# Patient Record
Sex: MALE | Race: WHITE | Employment: OTHER | URBAN - NONMETROPOLITAN AREA
[De-identification: names, ages, dates, MRNs, and addresses within clinical notes are randomized per-mention and may not be internally consistent; named-entity substitution may affect disease eponyms.]

---

## 2017-10-23 ENCOUNTER — HOSPITAL ENCOUNTER (OUTPATIENT)
Dept: GENERAL RADIOLOGY | Age: 67
Discharge: HOME | End: 2017-10-23
Payer: MEDICARE

## 2017-10-23 DIAGNOSIS — M25.551 PAIN OF RIGHT HIP JOINT: ICD-10-CM

## 2017-10-23 PROCEDURE — 73502 X-RAY EXAM HIP UNI 2-3 VIEWS: CPT

## 2017-11-17 ENCOUNTER — HOSPITAL ENCOUNTER (OUTPATIENT)
Age: 67
Setting detail: SPECIMEN
Discharge: HOME | End: 2017-11-17
Payer: MEDICARE

## 2017-11-17 DIAGNOSIS — R97.20 ELEVATED PSA: ICD-10-CM

## 2017-11-17 DIAGNOSIS — Z12.5 SCREENING PSA (PROSTATE SPECIFIC ANTIGEN): ICD-10-CM

## 2017-11-17 DIAGNOSIS — E78.5 HYPERLIPIDEMIA, UNSPECIFIED HYPERLIPIDEMIA TYPE: ICD-10-CM

## 2017-11-17 DIAGNOSIS — Z01.818 PRE-OP TESTING: ICD-10-CM

## 2017-11-17 LAB
ABSOLUTE EOS #: 0.2 K/UL (ref 0–0.4)
ABSOLUTE IMMATURE GRANULOCYTE: NORMAL K/UL (ref 0–0.3)
ABSOLUTE LYMPH #: 2.1 K/UL (ref 1–4.8)
ABSOLUTE MONO #: 0.8 K/UL (ref 0–1)
ALBUMIN SERPL-MCNC: 4.5 G/DL (ref 3.5–5.2)
ALBUMIN/GLOBULIN RATIO: 1.6 (ref 1–2.5)
ALP BLD-CCNC: 48 U/L (ref 40–129)
ALT SERPL-CCNC: 43 U/L (ref 5–41)
ANION GAP SERPL CALCULATED.3IONS-SCNC: 13 MMOL/L (ref 9–17)
AST SERPL-CCNC: 22 U/L
BASOPHILS # BLD: 1 %
BASOPHILS ABSOLUTE: 0 K/UL (ref 0–0.2)
BILIRUB SERPL-MCNC: 0.43 MG/DL (ref 0.3–1.2)
BUN BLDV-MCNC: 17 MG/DL (ref 8–23)
BUN/CREAT BLD: 22 (ref 9–20)
CALCIUM SERPL-MCNC: 10.7 MG/DL (ref 8.6–10.4)
CHLORIDE BLD-SCNC: 103 MMOL/L (ref 98–107)
CHOLESTEROL, FASTING: 187 MG/DL
CHOLESTEROL/HDL RATIO: 3.3
CO2: 25 MMOL/L (ref 20–31)
CREAT SERPL-MCNC: 0.76 MG/DL (ref 0.7–1.2)
DIFFERENTIAL TYPE: NORMAL
EOSINOPHILS RELATIVE PERCENT: 4 %
GFR AFRICAN AMERICAN: >60 ML/MIN
GFR NON-AFRICAN AMERICAN: >60 ML/MIN
GFR SERPL CREATININE-BSD FRML MDRD: ABNORMAL ML/MIN/{1.73_M2}
GFR SERPL CREATININE-BSD FRML MDRD: ABNORMAL ML/MIN/{1.73_M2}
GLUCOSE BLD-MCNC: 92 MG/DL (ref 70–99)
HCT VFR BLD CALC: 45 % (ref 41–53)
HDLC SERPL-MCNC: 56 MG/DL
HEMOGLOBIN: 15 G/DL (ref 13.5–17)
IMMATURE GRANULOCYTES: NORMAL %
LDL CHOLESTEROL: 116 MG/DL (ref 0–130)
LYMPHOCYTES # BLD: 32 %
MCH RBC QN AUTO: 30.5 PG (ref 26–34)
MCHC RBC AUTO-ENTMCNC: 33.4 G/DL (ref 31–37)
MCV RBC AUTO: 91.4 FL (ref 80–100)
MONOCYTES # BLD: 12 %
PDW BLD-RTO: 13.6 % (ref 12.1–15.2)
PLATELET # BLD: 195 K/UL (ref 140–450)
PLATELET ESTIMATE: NORMAL
PMV BLD AUTO: 10 FL (ref 6–12)
POTASSIUM SERPL-SCNC: 4.6 MMOL/L (ref 3.7–5.3)
PROSTATE SPECIFIC ANTIGEN: 18.67 UG/L
RBC # BLD: 4.92 M/UL (ref 4.5–5.9)
RBC # BLD: NORMAL 10*6/UL
SEG NEUTROPHILS: 51 %
SEGMENTED NEUTROPHILS ABSOLUTE COUNT: 3.3 K/UL (ref 1.8–7.7)
SODIUM BLD-SCNC: 141 MMOL/L (ref 135–144)
TOTAL PROTEIN: 7.3 G/DL (ref 6.4–8.3)
TRIGLYCERIDE, FASTING: 77 MG/DL
VLDLC SERPL CALC-MCNC: NORMAL MG/DL (ref 1–30)
WBC # BLD: 6.4 K/UL (ref 3.5–11)
WBC # BLD: NORMAL 10*3/UL

## 2017-11-17 PROCEDURE — 80061 LIPID PANEL: CPT

## 2017-11-17 PROCEDURE — 36415 COLL VENOUS BLD VENIPUNCTURE: CPT

## 2017-11-17 PROCEDURE — 84154 ASSAY OF PSA FREE: CPT

## 2017-11-17 PROCEDURE — 80053 COMPREHEN METABOLIC PANEL: CPT

## 2017-11-17 PROCEDURE — G0103 PSA SCREENING: HCPCS

## 2017-11-17 PROCEDURE — 85025 COMPLETE CBC W/AUTO DIFF WBC: CPT

## 2017-11-20 LAB
PROSTATE SPECIFIC ANTIGEN FREE: 4.9 UG/L
PROSTATE SPECIFIC ANTIGEN PERCENT FREE: 34 %
PROSTATE SPECIFIC ANTIGEN: 14.4 UG/L (ref 0–4)

## 2018-04-21 ENCOUNTER — HOSPITAL ENCOUNTER (OUTPATIENT)
Age: 68
Setting detail: SPECIMEN
Discharge: HOME | End: 2018-04-21
Payer: MEDICARE

## 2018-04-21 DIAGNOSIS — M25.561 ARTHRALGIA OF RIGHT KNEE: ICD-10-CM

## 2018-04-21 LAB — URIC ACID: 5.4 MG/DL (ref 3.4–7)

## 2018-04-21 PROCEDURE — 84550 ASSAY OF BLOOD/URIC ACID: CPT

## 2018-04-21 PROCEDURE — 36415 COLL VENOUS BLD VENIPUNCTURE: CPT

## 2020-01-03 ENCOUNTER — HOSPITAL ENCOUNTER (OUTPATIENT)
Age: 70
Discharge: HOME | End: 2020-01-03
Payer: MEDICARE

## 2020-01-03 LAB — PROSTATE SPECIFIC ANTIGEN: 14.54 UG/L

## 2020-01-03 PROCEDURE — 84520 ASSAY OF UREA NITROGEN: CPT

## 2020-01-03 PROCEDURE — 36415 COLL VENOUS BLD VENIPUNCTURE: CPT

## 2020-01-03 PROCEDURE — 82565 ASSAY OF CREATININE: CPT

## 2020-01-03 PROCEDURE — G0103 PSA SCREENING: HCPCS

## 2020-01-06 ENCOUNTER — HOSPITAL ENCOUNTER (OUTPATIENT)
Dept: CT IMAGING | Age: 70
Discharge: HOME | End: 2020-01-08
Payer: MEDICARE

## 2020-01-06 LAB
BUN BLDV-MCNC: 23 MG/DL (ref 8–23)
CREAT SERPL-MCNC: 0.86 MG/DL (ref 0.7–1.2)
GFR AFRICAN AMERICAN: >60 ML/MIN
GFR NON-AFRICAN AMERICAN: >60 ML/MIN
GFR SERPL CREATININE-BSD FRML MDRD: NORMAL ML/MIN/{1.73_M2}
GFR SERPL CREATININE-BSD FRML MDRD: NORMAL ML/MIN/{1.73_M2}

## 2020-01-06 PROCEDURE — 74178 CT ABD&PLV WO CNTR FLWD CNTR: CPT

## 2020-01-06 PROCEDURE — 6360000004 HC RX CONTRAST MEDICATION: Performed by: INTERNAL MEDICINE

## 2020-01-06 RX ADMIN — IOPAMIDOL 120 ML: 755 INJECTION, SOLUTION INTRAVENOUS at 17:42

## 2020-01-16 LAB — CREATININE: 1 MG/DL

## 2020-03-11 ENCOUNTER — OFFICE VISIT (OUTPATIENT)
Dept: FAMILY MEDICINE CLINIC | Age: 70
End: 2020-03-11
Payer: MEDICARE

## 2020-03-11 VITALS
DIASTOLIC BLOOD PRESSURE: 84 MMHG | HEIGHT: 71 IN | SYSTOLIC BLOOD PRESSURE: 126 MMHG | WEIGHT: 216 LBS | BODY MASS INDEX: 30.24 KG/M2

## 2020-03-11 PROBLEM — M48.061 SPINAL STENOSIS OF LUMBAR REGION: Status: ACTIVE | Noted: 2020-03-11

## 2020-03-11 PROBLEM — M48.02 CERVICAL SPINAL STENOSIS: Status: ACTIVE | Noted: 2020-03-11

## 2020-03-11 PROCEDURE — 1123F ACP DISCUSS/DSCN MKR DOCD: CPT | Performed by: FAMILY MEDICINE

## 2020-03-11 PROCEDURE — 4040F PNEUMOC VAC/ADMIN/RCVD: CPT | Performed by: FAMILY MEDICINE

## 2020-03-11 PROCEDURE — G8417 CALC BMI ABV UP PARAM F/U: HCPCS | Performed by: FAMILY MEDICINE

## 2020-03-11 PROCEDURE — G8427 DOCREV CUR MEDS BY ELIG CLIN: HCPCS | Performed by: FAMILY MEDICINE

## 2020-03-11 PROCEDURE — 99213 OFFICE O/P EST LOW 20 MIN: CPT | Performed by: FAMILY MEDICINE

## 2020-03-11 PROCEDURE — 3017F COLORECTAL CA SCREEN DOC REV: CPT | Performed by: FAMILY MEDICINE

## 2020-03-11 PROCEDURE — 99211 OFF/OP EST MAY X REQ PHY/QHP: CPT | Performed by: FAMILY MEDICINE

## 2020-03-11 PROCEDURE — G8482 FLU IMMUNIZE ORDER/ADMIN: HCPCS | Performed by: FAMILY MEDICINE

## 2020-03-11 PROCEDURE — 4004F PT TOBACCO SCREEN RCVD TLK: CPT | Performed by: FAMILY MEDICINE

## 2020-03-11 RX ORDER — TIMOLOL MALEATE 5 MG/ML
1 SOLUTION/ DROPS OPHTHALMIC DAILY
COMMUNITY
End: 2021-05-26 | Stop reason: SDUPTHER

## 2020-03-11 SDOH — ECONOMIC STABILITY: FOOD INSECURITY: WITHIN THE PAST 12 MONTHS, THE FOOD YOU BOUGHT JUST DIDN'T LAST AND YOU DIDN'T HAVE MONEY TO GET MORE.: NEVER TRUE

## 2020-03-11 SDOH — ECONOMIC STABILITY: INCOME INSECURITY: HOW HARD IS IT FOR YOU TO PAY FOR THE VERY BASICS LIKE FOOD, HOUSING, MEDICAL CARE, AND HEATING?: NOT HARD AT ALL

## 2020-03-11 SDOH — ECONOMIC STABILITY: FOOD INSECURITY: WITHIN THE PAST 12 MONTHS, YOU WORRIED THAT YOUR FOOD WOULD RUN OUT BEFORE YOU GOT MONEY TO BUY MORE.: NEVER TRUE

## 2020-03-11 ASSESSMENT — PATIENT HEALTH QUESTIONNAIRE - PHQ9
SUM OF ALL RESPONSES TO PHQ QUESTIONS 1-9: 0
SUM OF ALL RESPONSES TO PHQ9 QUESTIONS 1 & 2: 0
SUM OF ALL RESPONSES TO PHQ QUESTIONS 1-9: 0
2. FEELING DOWN, DEPRESSED OR HOPELESS: 0
1. LITTLE INTEREST OR PLEASURE IN DOING THINGS: 0

## 2020-03-11 NOTE — PROGRESS NOTES
I, Alan Polanco, [facility], am scribing for and in the presence of Dr. Santa Mandel. 3/11/20/4:27 pm/JML      [address] [address]  [place] [address]  Dept: [phone]    HPI:  Pain in back and left hip x 1 month  Bilat. arms numbness for several months, all the way down to all 5 fingers      Current Outpatient Medications   Medication Sig Dispense Refill    timolol (TIMOPTIC) 0.5 % ophthalmic solution 1 drop 2 times daily       No current facility-administered medications for this visit. ROS:  Admits back pain, around the middle lower back and down to hip, feels like \"something it trying to fall out,\" no pain with sitting, painful to stand straight up but improves as he begins to bend over, he saw Dr. Adelso Loera for manipulation and steroids which did not help, sharp pain on left side of back and down into left hip when he turns his head to the right    Admits hip pain, left now, ,had previously been right, had an episode in February where he had intense pain in his right hip muscles, came on very suddenly and almost dropped him to the floor, he has done PT with Harmony Dunlap for at least 2 weeks, dry needling, and massage therapy, these helped some but he also noticed that the pain radiated to the middle, this eventually resolved but then began happening on the left radiating down the posterior left leg    Admits numbness in both arms, worse at night, neck positioning does make a difference     EXAM:  /84   Ht 5' 10.5\" (1.791 m)   Wt 216 lb (98 kg)   BMI 30.55 kg/m²   Wt Readings from Last 3 Encounters:   03/11/20 216 lb (98 kg)     BP Readings from Last 3 Encounters:   03/11/20 126/84     General Appearance: in no acute distress, well developed, well nourished.   Eyes: pupils equal, round, reactive  Neck/Thyroid: No radicular element tenderness, flexion to 45 degrees, extension to 10 degrees, lateral rotation 30 degrees to the left and 45 to right, distraction

## 2020-03-12 ENCOUNTER — HOSPITAL ENCOUNTER (OUTPATIENT)
Dept: MRI IMAGING | Age: 70
Discharge: HOME | End: 2020-03-14
Payer: MEDICARE

## 2020-03-12 PROCEDURE — 72141 MRI NECK SPINE W/O DYE: CPT

## 2020-03-12 PROCEDURE — 72148 MRI LUMBAR SPINE W/O DYE: CPT

## 2020-06-24 ENCOUNTER — TELEPHONE (OUTPATIENT)
Dept: FAMILY MEDICINE CLINIC | Age: 70
End: 2020-06-24

## 2020-06-25 ENCOUNTER — HOSPITAL ENCOUNTER (OUTPATIENT)
Age: 70
Discharge: HOME | End: 2020-06-27
Payer: MEDICARE

## 2020-06-25 ENCOUNTER — HOSPITAL ENCOUNTER (OUTPATIENT)
Dept: GENERAL RADIOLOGY | Age: 70
Discharge: HOME | End: 2020-06-27
Payer: MEDICARE

## 2020-06-25 PROCEDURE — 73502 X-RAY EXAM HIP UNI 2-3 VIEWS: CPT

## 2020-07-21 ENCOUNTER — APPOINTMENT (OUTPATIENT)
Dept: GENERAL RADIOLOGY | Age: 70
DRG: 872 | End: 2020-07-21
Attending: FAMILY MEDICINE
Payer: MEDICARE

## 2020-07-21 ENCOUNTER — HOSPITAL ENCOUNTER (INPATIENT)
Age: 70
LOS: 2 days | Discharge: ANOTHER HOSPITAL | DRG: 872 | End: 2020-07-23
Attending: FAMILY MEDICINE | Admitting: FAMILY MEDICINE
Payer: MEDICARE

## 2020-07-21 ENCOUNTER — HOSPITAL ENCOUNTER (OUTPATIENT)
Dept: CT IMAGING | Age: 70
Discharge: HOME | DRG: 872 | End: 2020-07-23
Payer: MEDICARE

## 2020-07-21 ENCOUNTER — HOSPITAL ENCOUNTER (OUTPATIENT)
Dept: LAB | Age: 70
Setting detail: SPECIMEN
Discharge: HOME | DRG: 872 | End: 2020-07-21
Payer: MEDICARE

## 2020-07-21 PROBLEM — A41.9 SEPSIS (HCC): Status: ACTIVE | Noted: 2020-07-21

## 2020-07-21 PROBLEM — R33.9 INCOMPLETE EMPTYING OF BLADDER DUE TO BENIGN PROSTATIC HYPERPLASIA: Status: ACTIVE | Noted: 2020-07-21

## 2020-07-21 PROBLEM — N40.1 INCOMPLETE EMPTYING OF BLADDER DUE TO BENIGN PROSTATIC HYPERPLASIA: Status: ACTIVE | Noted: 2020-07-21

## 2020-07-21 PROBLEM — N30.00 ACUTE CYSTITIS WITHOUT HEMATURIA: Status: ACTIVE | Noted: 2020-07-21

## 2020-07-21 LAB
ABSOLUTE EOS #: 0.04 K/UL (ref 0–0.44)
ABSOLUTE EOS #: <0.03 K/UL (ref 0–0.44)
ABSOLUTE IMMATURE GRANULOCYTE: <0.03 K/UL (ref 0–0.3)
ABSOLUTE IMMATURE GRANULOCYTE: <0.03 K/UL (ref 0–0.3)
ABSOLUTE LYMPH #: 1.57 K/UL (ref 1.1–3.7)
ABSOLUTE LYMPH #: 1.8 K/UL (ref 1.1–3.7)
ABSOLUTE MONO #: 0.66 K/UL (ref 0.1–1.2)
ABSOLUTE MONO #: 1.11 K/UL (ref 0.1–1.2)
ALBUMIN SERPL-MCNC: 4.3 G/DL (ref 3.5–5.2)
ALBUMIN SERPL-MCNC: 4.7 G/DL (ref 3.5–5.2)
ALBUMIN/GLOBULIN RATIO: 1.7 (ref 1–2.5)
ALBUMIN/GLOBULIN RATIO: 1.7 (ref 1–2.5)
ALLEN TEST: ABNORMAL
ALP BLD-CCNC: 45 U/L (ref 40–129)
ALP BLD-CCNC: 50 U/L (ref 40–129)
ALT SERPL-CCNC: 57 U/L (ref 5–41)
ALT SERPL-CCNC: 58 U/L (ref 5–41)
ANION GAP SERPL CALCULATED.3IONS-SCNC: 11 MMOL/L (ref 9–17)
ANION GAP SERPL CALCULATED.3IONS-SCNC: 12 MMOL/L (ref 9–17)
AST SERPL-CCNC: 30 U/L
AST SERPL-CCNC: 33 U/L
BASOPHILS # BLD: 1 % (ref 0–2)
BASOPHILS # BLD: 1 % (ref 0–2)
BASOPHILS ABSOLUTE: 0.03 K/UL (ref 0–0.2)
BASOPHILS ABSOLUTE: 0.04 K/UL (ref 0–0.2)
BILIRUB SERPL-MCNC: 0.63 MG/DL (ref 0.3–1.2)
BILIRUB SERPL-MCNC: 0.85 MG/DL (ref 0.3–1.2)
BUN BLDV-MCNC: 22 MG/DL (ref 8–23)
BUN BLDV-MCNC: 25 MG/DL (ref 8–23)
BUN/CREAT BLD: 26 (ref 9–20)
BUN/CREAT BLD: 29 (ref 9–20)
C-REACTIVE PROTEIN: 0.3 MG/L (ref 0–5)
CALCIUM SERPL-MCNC: 10.3 MG/DL (ref 8.6–10.4)
CALCIUM SERPL-MCNC: 9.9 MG/DL (ref 8.6–10.4)
CARBOXYHEMOGLOBIN: ABNORMAL % (ref 0–5)
CHLORIDE BLD-SCNC: 101 MMOL/L (ref 98–107)
CHLORIDE BLD-SCNC: 97 MMOL/L (ref 98–107)
CO2: 23 MMOL/L (ref 20–31)
CO2: 24 MMOL/L (ref 20–31)
CREAT SERPL-MCNC: 0.84 MG/DL (ref 0.7–1.2)
CREAT SERPL-MCNC: 0.86 MG/DL (ref 0.7–1.2)
DIFFERENTIAL TYPE: ABNORMAL
DIFFERENTIAL TYPE: NORMAL
EOSINOPHILS RELATIVE PERCENT: 0 % (ref 1–4)
EOSINOPHILS RELATIVE PERCENT: 1 % (ref 1–4)
FIO2: 21
GFR AFRICAN AMERICAN: >60 ML/MIN
GFR AFRICAN AMERICAN: >60 ML/MIN
GFR NON-AFRICAN AMERICAN: >60 ML/MIN
GFR NON-AFRICAN AMERICAN: >60 ML/MIN
GFR SERPL CREATININE-BSD FRML MDRD: ABNORMAL ML/MIN/{1.73_M2}
GLUCOSE BLD-MCNC: 100 MG/DL (ref 70–99)
GLUCOSE BLD-MCNC: 142 MG/DL (ref 70–99)
HCO3 VENOUS: 25.4 MMOL/L (ref 24–30)
HCT VFR BLD CALC: 43.2 % (ref 40.7–50.3)
HCT VFR BLD CALC: 48.3 % (ref 40.7–50.3)
HEMOGLOBIN: 14.4 G/DL (ref 13–17)
HEMOGLOBIN: 16.1 G/DL (ref 13–17)
IMMATURE GRANULOCYTES: 0 %
IMMATURE GRANULOCYTES: 0 %
LACTIC ACID, SEPSIS WHOLE BLOOD: ABNORMAL MMOL/L (ref 0.5–1.9)
LACTIC ACID, SEPSIS WHOLE BLOOD: NORMAL MMOL/L (ref 0.5–1.9)
LACTIC ACID, SEPSIS: 1.3 MMOL/L (ref 0.5–1.9)
LACTIC ACID, SEPSIS: 2.2 MMOL/L (ref 0.5–1.9)
LYMPHOCYTES # BLD: 29 % (ref 24–43)
LYMPHOCYTES # BLD: 33 % (ref 24–43)
MCH RBC QN AUTO: 31.4 PG (ref 25.2–33.5)
MCH RBC QN AUTO: 31.6 PG (ref 25.2–33.5)
MCHC RBC AUTO-ENTMCNC: 33.3 G/DL (ref 28.4–34.8)
MCHC RBC AUTO-ENTMCNC: 33.3 G/DL (ref 28.4–34.8)
MCV RBC AUTO: 94.2 FL (ref 82.6–102.9)
MCV RBC AUTO: 94.7 FL (ref 82.6–102.9)
METHEMOGLOBIN: ABNORMAL % (ref 0–1.9)
MODE: ABNORMAL
MONOCYTES # BLD: 12 % (ref 3–12)
MONOCYTES # BLD: 20 % (ref 3–12)
NEGATIVE BASE EXCESS, VEN: ABNORMAL MMOL/L (ref 0–2)
NOTIFICATION TIME: ABNORMAL
NOTIFICATION: ABNORMAL
NRBC AUTOMATED: 0 PER 100 WBC
NRBC AUTOMATED: 0 PER 100 WBC
O2 DEVICE/FLOW/%: ABNORMAL
O2 SAT, VEN: 44.7 % (ref 60–85)
OXYHEMOGLOBIN: ABNORMAL % (ref 95–98)
PATIENT TEMP: 38.7
PCO2, VEN, TEMP ADJ: ABNORMAL MMHG (ref 39–55)
PCO2, VEN: 46.9 (ref 39–55)
PDW BLD-RTO: 12.7 % (ref 11.8–14.4)
PDW BLD-RTO: 12.8 % (ref 11.8–14.4)
PEEP/CPAP: ABNORMAL
PH VENOUS: 7.36 (ref 7.32–7.42)
PH, VEN, TEMP ADJ: ABNORMAL (ref 7.32–7.42)
PLATELET # BLD: 176 K/UL (ref 138–453)
PLATELET # BLD: 194 K/UL (ref 138–453)
PLATELET ESTIMATE: ABNORMAL
PLATELET ESTIMATE: NORMAL
PMV BLD AUTO: 10.9 FL (ref 8.1–13.5)
PMV BLD AUTO: 11.3 FL (ref 8.1–13.5)
PO2, VEN, TEMP ADJ: ABNORMAL MMHG (ref 30–50)
PO2, VEN: 28.4 (ref 30–50)
POSITIVE BASE EXCESS, VEN: 0.2 MMOL/L (ref 0–2)
POTASSIUM SERPL-SCNC: 3.7 MMOL/L (ref 3.7–5.3)
POTASSIUM SERPL-SCNC: 3.8 MMOL/L (ref 3.7–5.3)
PSV: ABNORMAL
PT. POSITION: ABNORMAL
RBC # BLD: 4.56 M/UL (ref 4.21–5.77)
RBC # BLD: 5.13 M/UL (ref 4.21–5.77)
RBC # BLD: ABNORMAL 10*6/UL
RBC # BLD: NORMAL 10*6/UL
RESPIRATORY RATE: 20
SAMPLE SITE: ABNORMAL
SEDIMENTATION RATE, ERYTHROCYTE: 4 MM (ref 0–20)
SEG NEUTROPHILS: 46 % (ref 36–65)
SEG NEUTROPHILS: 57 % (ref 36–65)
SEGMENTED NEUTROPHILS ABSOLUTE COUNT: 2.52 K/UL (ref 1.5–8.1)
SEGMENTED NEUTROPHILS ABSOLUTE COUNT: 3.17 K/UL (ref 1.5–8.1)
SET RATE: ABNORMAL
SODIUM BLD-SCNC: 132 MMOL/L (ref 135–144)
SODIUM BLD-SCNC: 136 MMOL/L (ref 135–144)
TEXT FOR RESPIRATORY: ABNORMAL
TOTAL HB: ABNORMAL G/DL (ref 12–16)
TOTAL PROTEIN: 6.9 G/DL (ref 6.4–8.3)
TOTAL PROTEIN: 7.4 G/DL (ref 6.4–8.3)
TOTAL RATE: ABNORMAL
VT: ABNORMAL
WBC # BLD: 5.5 K/UL (ref 3.5–11.3)
WBC # BLD: 5.5 K/UL (ref 3.5–11.3)
WBC # BLD: ABNORMAL 10*3/UL
WBC # BLD: NORMAL 10*3/UL

## 2020-07-21 PROCEDURE — 85025 COMPLETE CBC W/AUTO DIFF WBC: CPT

## 2020-07-21 PROCEDURE — 71045 X-RAY EXAM CHEST 1 VIEW: CPT

## 2020-07-21 PROCEDURE — 83605 ASSAY OF LACTIC ACID: CPT

## 2020-07-21 PROCEDURE — 87040 BLOOD CULTURE FOR BACTERIA: CPT

## 2020-07-21 PROCEDURE — 6370000000 HC RX 637 (ALT 250 FOR IP): Performed by: FAMILY MEDICINE

## 2020-07-21 PROCEDURE — 93005 ELECTROCARDIOGRAM TRACING: CPT | Performed by: FAMILY MEDICINE

## 2020-07-21 PROCEDURE — 87086 URINE CULTURE/COLONY COUNT: CPT

## 2020-07-21 PROCEDURE — 2580000003 HC RX 258: Performed by: FAMILY MEDICINE

## 2020-07-21 PROCEDURE — 51798 US URINE CAPACITY MEASURE: CPT

## 2020-07-21 PROCEDURE — 2000000000 HC ICU R&B

## 2020-07-21 PROCEDURE — 80053 COMPREHEN METABOLIC PANEL: CPT

## 2020-07-21 PROCEDURE — 85651 RBC SED RATE NONAUTOMATED: CPT

## 2020-07-21 PROCEDURE — 74177 CT ABD & PELVIS W/CONTRAST: CPT

## 2020-07-21 PROCEDURE — 6360000004 HC RX CONTRAST MEDICATION: Performed by: FAMILY MEDICINE

## 2020-07-21 PROCEDURE — 6360000002 HC RX W HCPCS: Performed by: FAMILY MEDICINE

## 2020-07-21 PROCEDURE — 36415 COLL VENOUS BLD VENIPUNCTURE: CPT

## 2020-07-21 PROCEDURE — 99223 1ST HOSP IP/OBS HIGH 75: CPT | Performed by: FAMILY MEDICINE

## 2020-07-21 PROCEDURE — 86140 C-REACTIVE PROTEIN: CPT

## 2020-07-21 PROCEDURE — 84145 PROCALCITONIN (PCT): CPT

## 2020-07-21 PROCEDURE — 82805 BLOOD GASES W/O2 SATURATION: CPT

## 2020-07-21 RX ORDER — HYDROMORPHONE HCL 110MG/55ML
0.5 PATIENT CONTROLLED ANALGESIA SYRINGE INTRAVENOUS
Status: DISCONTINUED | OUTPATIENT
Start: 2020-07-21 | End: 2020-07-22

## 2020-07-21 RX ORDER — VANCOMYCIN HYDROCHLORIDE 1 G/20ML
INJECTION, POWDER, LYOPHILIZED, FOR SOLUTION INTRAVENOUS
Status: DISPENSED
Start: 2020-07-21 | End: 2020-07-22

## 2020-07-21 RX ORDER — SODIUM CHLORIDE AND POTASSIUM CHLORIDE .9; .15 G/100ML; G/100ML
SOLUTION INTRAVENOUS CONTINUOUS
Status: DISCONTINUED | OUTPATIENT
Start: 2020-07-21 | End: 2020-07-23 | Stop reason: HOSPADM

## 2020-07-21 RX ORDER — SODIUM CHLORIDE 9 MG/ML
INJECTION, SOLUTION INTRAVENOUS CONTINUOUS
Status: DISCONTINUED | OUTPATIENT
Start: 2020-07-21 | End: 2020-07-21

## 2020-07-21 RX ORDER — SODIUM CHLORIDE 0.9 % (FLUSH) 0.9 %
10 SYRINGE (ML) INJECTION PRN
Status: DISCONTINUED | OUTPATIENT
Start: 2020-07-21 | End: 2020-07-23 | Stop reason: HOSPADM

## 2020-07-21 RX ORDER — ACETAMINOPHEN 325 MG/1
650 TABLET ORAL EVERY 6 HOURS PRN
Status: DISCONTINUED | OUTPATIENT
Start: 2020-07-21 | End: 2020-07-21

## 2020-07-21 RX ORDER — ALPRAZOLAM 0.5 MG/1
0.5 TABLET ORAL EVERY 6 HOURS PRN
Status: DISCONTINUED | OUTPATIENT
Start: 2020-07-21 | End: 2020-07-23 | Stop reason: HOSPADM

## 2020-07-21 RX ORDER — ACETAMINOPHEN 650 MG/1
650 SUPPOSITORY RECTAL EVERY 6 HOURS PRN
Status: DISCONTINUED | OUTPATIENT
Start: 2020-07-21 | End: 2020-07-21

## 2020-07-21 RX ORDER — ACETAMINOPHEN 650 MG/1
650 SUPPOSITORY RECTAL EVERY 6 HOURS PRN
Status: DISCONTINUED | OUTPATIENT
Start: 2020-07-21 | End: 2020-07-23 | Stop reason: HOSPADM

## 2020-07-21 RX ORDER — TIMOLOL MALEATE 5 MG/ML
1 SOLUTION/ DROPS OPHTHALMIC 2 TIMES DAILY
Status: DISCONTINUED | OUTPATIENT
Start: 2020-07-21 | End: 2020-07-23 | Stop reason: HOSPADM

## 2020-07-21 RX ORDER — ACETAMINOPHEN 325 MG/1
650 TABLET ORAL EVERY 4 HOURS PRN
Status: DISCONTINUED | OUTPATIENT
Start: 2020-07-21 | End: 2020-07-23 | Stop reason: HOSPADM

## 2020-07-21 RX ORDER — SODIUM CHLORIDE 0.9 % (FLUSH) 0.9 %
10 SYRINGE (ML) INJECTION EVERY 12 HOURS SCHEDULED
Status: DISCONTINUED | OUTPATIENT
Start: 2020-07-21 | End: 2020-07-23 | Stop reason: HOSPADM

## 2020-07-21 RX ORDER — VANCOMYCIN HYDROCHLORIDE 500 MG/10ML
INJECTION, POWDER, LYOPHILIZED, FOR SOLUTION INTRAVENOUS
Status: DISPENSED
Start: 2020-07-21 | End: 2020-07-22

## 2020-07-21 RX ORDER — HYDROMORPHONE HCL 110MG/55ML
0.25 PATIENT CONTROLLED ANALGESIA SYRINGE INTRAVENOUS
Status: DISCONTINUED | OUTPATIENT
Start: 2020-07-21 | End: 2020-07-22

## 2020-07-21 RX ORDER — 0.9 % SODIUM CHLORIDE 0.9 %
30 INTRAVENOUS SOLUTION INTRAVENOUS ONCE
Status: COMPLETED | OUTPATIENT
Start: 2020-07-21 | End: 2020-07-21

## 2020-07-21 RX ADMIN — VANCOMYCIN HYDROCHLORIDE 1500 MG: 500 INJECTION, POWDER, LYOPHILIZED, FOR SOLUTION INTRAVENOUS at 21:10

## 2020-07-21 RX ADMIN — HYDROMORPHONE HYDROCHLORIDE 0.25 MG: 2 INJECTION, SOLUTION INTRAMUSCULAR; INTRAVENOUS; SUBCUTANEOUS at 23:04

## 2020-07-21 RX ADMIN — ACETAMINOPHEN 650 MG: 325 TABLET, FILM COATED ORAL at 18:23

## 2020-07-21 RX ADMIN — SODIUM CHLORIDE AND POTASSIUM CHLORIDE: .9; .15 SOLUTION INTRAVENOUS at 21:38

## 2020-07-21 RX ADMIN — SODIUM CHLORIDE 1000 ML: 9 INJECTION, SOLUTION INTRAVENOUS at 18:15

## 2020-07-21 RX ADMIN — IOPAMIDOL 75 ML: 755 INJECTION, SOLUTION INTRAVENOUS at 16:10

## 2020-07-21 RX ADMIN — TIMOLOL MALEATE 1 DROP: 5 SOLUTION/ DROPS OPHTHALMIC at 21:37

## 2020-07-21 RX ADMIN — CEFEPIME HYDROCHLORIDE 2 G: 2 INJECTION, POWDER, FOR SOLUTION INTRAVENOUS at 19:45

## 2020-07-21 RX ADMIN — IOPAMIDOL 18 ML: 755 INJECTION, SOLUTION INTRAVENOUS at 16:10

## 2020-07-21 RX ADMIN — ENOXAPARIN SODIUM 40 MG: 40 INJECTION, SOLUTION INTRAVENOUS; SUBCUTANEOUS at 19:46

## 2020-07-21 ASSESSMENT — PAIN SCALES - GENERAL: PAINLEVEL_OUTOF10: 3

## 2020-07-21 NOTE — H&P
Hospital Medicine  History and Physical    Patient:  Minor Mansfield  MRN: [id number]    Chief Complaint:  Dysuria and chills      History Obtained From:  patient  PCP: Irlanda Huerta MD    History of Present Illness: The patient is a 79 y.o. male who presents with 10 day history of dsyuria and pelvic/ abdominal discomfort following a period of physical exertion which initially  Was felt to be musculoskeletal by pt due to reproducabilty with movement  Several days later he had foul and dark urine with burning dysuria  He began  Doxycyline with some modest improvement but still feeling not well  This am he recognized cogntion was not normal and felt woozy and thought may have neuro/psych side effect of doxycyline which would be consistent with his history of multiple med intolerances in past including quinolone and sulfa and NSAID among others  He had cbc and chemistries this am folllowed by CT abdomen and pelvis which revealed large prostate which is well documented as well as evidence of cystitis  He continued to feel poorly and decision making was suspect  He spiked fever and had chills  He is admitted to ICU for apparent sepsis of probable urinary source  He has not had catheter in  2 years and has had several rounds of antibiotics in past for UTI but no past cultures   He denies any respiratory symptoms and no HA  No nausea or vomiting  No diarrhea  No rash or joint pain aside from chronic nature      Past Medical History:    No past medical history on file. Past Surgical History:    No past surgical history on file. Medications Prior to Admission:    Prior to Admission medications    Medication Sig Start Date End Date Taking? Authorizing Provider   timolol (TIMOPTIC) 0.5 % ophthalmic solution 1 drop 2 times daily    Historical Provider, MD       Allergies:  Ibuprofen    Social History:   TOBACCO:   reports that he has never smoked.  He has never used smokeless tobacco.  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 Review of Systems:    Constitutional: positive for chills, fevers and malaise, negative for weight loss  Eyes: negative  Ears, nose, mouth, throat, and face: positive for hearing loss, negative for epistaxis and sore throat  Respiratory: negative for cough, dyspnea on exertion, pleurisy/chest pain and shortness of breath  Cardiovascular: negative for dyspnea, exertional chest pressure/discomfort, irregular heart beat, lower extremity edema, near-syncope and orthopnea  Gastrointestinal: positive for abdominal pain, negative for change in bowel habits, diarrhea and nausea  Genitourinary:positive for decreased stream, dysuria, frequency and hesitancy, negative for hematuria and urinary incontinence  Integument/breast: negative  Hematologic/lymphatic: negative for bleeding and easy bruising  Musculoskeletal:positive for arthralgias, negative for muscle weakness and myalgias  Neurological: positive for dizziness, memory problems and transient confusion, negative for headaches, speech problems and tremors  Behavioral/Psych: negative  Endocrine: negative  Allergic/Immunologic: negative        Objective:    Vitals:   Vitals:    07/21/20 1830   BP: (!) 166/106   Pulse:    Resp:    Temp: 101.7 °F (38.7 °C)   SpO2:          Height: 5' 10\" (177.8 cm)   -----------------------------------------------------------------    Exam:  GEN:    Awake, alert and oriented x 3. moderate acute distress. Well developed / well nourished. EYES:  EOMI, pupils equal   ENT:  Neck supple. No lymphadenopathy. No carotid bruit  CVS:    RRR, no murmur, rub or gallop  PULM: CTA, no wheezes, rales or rhonchi  ABD:    Bowels sounds normal.  Abdomen is soft. No distention. No flank tenderness  No tenderness. EXT:   no edema bilaterally . No calf tenderness.    NEURO: Motor and sensory are intact  He has transient lapses of focus  Somewhat hyperactive and verbal   SKIN:  No rashes. No skin lesions. Warm and dry  PSYCH:  Normal mood and affect  -----------------------------------------------------------------  Diagnostic Data:   · All diagnostic data was reviewed    Assessment:         Principal Problem:    Sepsis ([address]  Active Problems:    Acute cystitis without hematuria    Incomplete emptying of bladder due to benign prostatic hyperplasia  Resolved Problems:    * No resolved hospital problems.  *      Plan:     · This patient requires inpatient admission because of probalble sepsis of urinary origin  Prostate likely  · Factors affecting the medical complexity of this patient include massive prostatic hyperplasia and inadequate voiding  History of multiple prostate biopsies and past kirk cath placements    · Estimated length of stay is 3 days  · Given lack of clear source and multiple pathogens possible in urinary tract and failure of doxycycline we will cover him with broad gram neg and positive atb    Cooper Vera MD

## 2020-07-21 NOTE — PROGRESS NOTES
Patient direct admitted to  at this time. Patient states he has been sick for around a week and was having fevers this weekend off and on. Tympanic temperature of 101.3. Patient alert and oriented but does have some short term memory loss. States he does not remember coming into his room in ICU. Dr. Sae Arce and Florin Paulson, RN supervisor at bedside. New orders given.

## 2020-07-22 LAB
-: ABNORMAL
ABSOLUTE EOS #: <0.03 K/UL (ref 0–0.44)
ABSOLUTE IMMATURE GRANULOCYTE: <0.03 K/UL (ref 0–0.3)
ABSOLUTE LYMPH #: 1.43 K/UL (ref 1.1–3.7)
ABSOLUTE MONO #: 0.95 K/UL (ref 0.1–1.2)
ALBUMIN SERPL-MCNC: 3.7 G/DL (ref 3.5–5.2)
ALBUMIN/GLOBULIN RATIO: 1.6 (ref 1–2.5)
ALP BLD-CCNC: 38 U/L (ref 40–129)
ALT SERPL-CCNC: 48 U/L (ref 5–41)
AMORPHOUS: ABNORMAL
ANION GAP SERPL CALCULATED.3IONS-SCNC: 8 MMOL/L (ref 9–17)
AST SERPL-CCNC: 24 U/L
BACTERIA: ABNORMAL
BASOPHILS # BLD: 1 % (ref 0–2)
BASOPHILS ABSOLUTE: 0.03 K/UL (ref 0–0.2)
BILIRUB SERPL-MCNC: 0.48 MG/DL (ref 0.3–1.2)
BILIRUBIN URINE: NEGATIVE
BUN BLDV-MCNC: 15 MG/DL (ref 8–23)
BUN/CREAT BLD: 23 (ref 9–20)
CALCIUM SERPL-MCNC: 8.8 MG/DL (ref 8.6–10.4)
CASTS UA: ABNORMAL /LPF
CHLORIDE BLD-SCNC: 103 MMOL/L (ref 98–107)
CO2: 21 MMOL/L (ref 20–31)
COLOR: YELLOW
COMMENT UA: ABNORMAL
CREAT SERPL-MCNC: 0.64 MG/DL (ref 0.7–1.2)
CRYSTALS, UA: ABNORMAL /HPF
CULTURE: NO GROWTH
DIFFERENTIAL TYPE: ABNORMAL
EKG ATRIAL RATE: 73 BPM
EKG P AXIS: 1 DEGREES
EKG P-R INTERVAL: 172 MS
EKG Q-T INTERVAL: 412 MS
EKG QRS DURATION: 88 MS
EKG QTC CALCULATION (BAZETT): 453 MS
EKG R AXIS: 27 DEGREES
EKG T AXIS: 0 DEGREES
EKG VENTRICULAR RATE: 73 BPM
EOSINOPHILS RELATIVE PERCENT: 0 % (ref 1–4)
EPITHELIAL CELLS UA: ABNORMAL /HPF (ref 0–5)
GFR AFRICAN AMERICAN: >60 ML/MIN
GFR NON-AFRICAN AMERICAN: >60 ML/MIN
GFR SERPL CREATININE-BSD FRML MDRD: ABNORMAL ML/MIN/{1.73_M2}
GFR SERPL CREATININE-BSD FRML MDRD: ABNORMAL ML/MIN/{1.73_M2}
GLUCOSE BLD-MCNC: 115 MG/DL (ref 70–99)
GLUCOSE URINE: NEGATIVE
HCT VFR BLD CALC: 39.4 % (ref 40.7–50.3)
HEMOGLOBIN: 13 G/DL (ref 13–17)
IMMATURE GRANULOCYTES: 0 %
INR BLD: 1.1
KETONES, URINE: NEGATIVE
LACTIC ACID, WHOLE BLOOD: NORMAL MMOL/L (ref 0.7–2.1)
LACTIC ACID: 1.5 MMOL/L (ref 0.5–2.2)
LEUKOCYTE ESTERASE, URINE: NEGATIVE
LYMPHOCYTES # BLD: 26 % (ref 24–43)
Lab: NORMAL
MAGNESIUM: 2 MG/DL (ref 1.6–2.6)
MCH RBC QN AUTO: 31.4 PG (ref 25.2–33.5)
MCHC RBC AUTO-ENTMCNC: 33 G/DL (ref 28.4–34.8)
MCV RBC AUTO: 95.2 FL (ref 82.6–102.9)
MONOCYTES # BLD: 17 % (ref 3–12)
MUCUS: ABNORMAL
NITRITE, URINE: NEGATIVE
NRBC AUTOMATED: 0 PER 100 WBC
OTHER OBSERVATIONS UA: ABNORMAL
PARTIAL THROMBOPLASTIN TIME: 28.4 SEC (ref 24–36)
PDW BLD-RTO: 12.6 % (ref 11.8–14.4)
PH UA: 6 (ref 5–9)
PHOSPHORUS: 2.8 MG/DL (ref 2.5–4.5)
PLATELET # BLD: 153 K/UL (ref 138–453)
PLATELET ESTIMATE: ABNORMAL
PMV BLD AUTO: 10.8 FL (ref 8.1–13.5)
POTASSIUM SERPL-SCNC: 4.2 MMOL/L (ref 3.7–5.3)
PROCALCITONIN: 0.08 NG/ML
PROCALCITONIN: 0.08 NG/ML
PROTEIN UA: NEGATIVE
PROTHROMBIN TIME: 14.2 SEC (ref 11.8–14.6)
RBC # BLD: 4.14 M/UL (ref 4.21–5.77)
RBC # BLD: ABNORMAL 10*6/UL
RBC UA: ABNORMAL /HPF (ref 0–2)
RENAL EPITHELIAL, UA: ABNORMAL /HPF
SEG NEUTROPHILS: 56 % (ref 36–65)
SEGMENTED NEUTROPHILS ABSOLUTE COUNT: 3.17 K/UL (ref 1.5–8.1)
SODIUM BLD-SCNC: 132 MMOL/L (ref 135–144)
SPECIFIC GRAVITY UA: 1.01 (ref 1.01–1.02)
SPECIMEN DESCRIPTION: NORMAL
TOTAL PROTEIN: 6 G/DL (ref 6.4–8.3)
TRICHOMONAS: ABNORMAL
TURBIDITY: CLEAR
URINE HGB: ABNORMAL
UROBILINOGEN, URINE: NORMAL
WBC # BLD: 5.6 K/UL (ref 3.5–11.3)
WBC # BLD: ABNORMAL 10*3/UL
WBC UA: ABNORMAL /HPF (ref 0–5)
YEAST: ABNORMAL

## 2020-07-22 PROCEDURE — 6360000002 HC RX W HCPCS: Performed by: FAMILY MEDICINE

## 2020-07-22 PROCEDURE — 36415 COLL VENOUS BLD VENIPUNCTURE: CPT

## 2020-07-22 PROCEDURE — 84145 PROCALCITONIN (PCT): CPT

## 2020-07-22 PROCEDURE — 93010 ELECTROCARDIOGRAM REPORT: CPT | Performed by: INTERNAL MEDICINE

## 2020-07-22 PROCEDURE — 85610 PROTHROMBIN TIME: CPT

## 2020-07-22 PROCEDURE — 6370000000 HC RX 637 (ALT 250 FOR IP): Performed by: FAMILY MEDICINE

## 2020-07-22 PROCEDURE — 83735 ASSAY OF MAGNESIUM: CPT

## 2020-07-22 PROCEDURE — 85025 COMPLETE CBC W/AUTO DIFF WBC: CPT

## 2020-07-22 PROCEDURE — 80053 COMPREHEN METABOLIC PANEL: CPT

## 2020-07-22 PROCEDURE — 2580000003 HC RX 258: Performed by: FAMILY MEDICINE

## 2020-07-22 PROCEDURE — 81001 URINALYSIS AUTO W/SCOPE: CPT

## 2020-07-22 PROCEDURE — 51798 US URINE CAPACITY MEASURE: CPT

## 2020-07-22 PROCEDURE — 2000000000 HC ICU R&B

## 2020-07-22 PROCEDURE — 83605 ASSAY OF LACTIC ACID: CPT

## 2020-07-22 PROCEDURE — 85730 THROMBOPLASTIN TIME PARTIAL: CPT

## 2020-07-22 PROCEDURE — 84100 ASSAY OF PHOSPHORUS: CPT

## 2020-07-22 RX ORDER — HYDROMORPHONE HCL 110MG/55ML
0.5 PATIENT CONTROLLED ANALGESIA SYRINGE INTRAVENOUS
Status: DISCONTINUED | OUTPATIENT
Start: 2020-07-22 | End: 2020-07-23 | Stop reason: HOSPADM

## 2020-07-22 RX ORDER — HYDROMORPHONE HCL 110MG/55ML
1 PATIENT CONTROLLED ANALGESIA SYRINGE INTRAVENOUS
Status: DISCONTINUED | OUTPATIENT
Start: 2020-07-22 | End: 2020-07-23 | Stop reason: HOSPADM

## 2020-07-22 RX ORDER — POLYETHYLENE GLYCOL 3350 17 G/17G
17 POWDER, FOR SOLUTION ORAL DAILY
Status: DISCONTINUED | OUTPATIENT
Start: 2020-07-22 | End: 2020-07-23 | Stop reason: HOSPADM

## 2020-07-22 RX ORDER — TAMSULOSIN HYDROCHLORIDE 0.4 MG/1
0.4 CAPSULE ORAL 2 TIMES DAILY
Status: DISCONTINUED | OUTPATIENT
Start: 2020-07-22 | End: 2020-07-23 | Stop reason: HOSPADM

## 2020-07-22 RX ORDER — HYDROMORPHONE HCL 110MG/55ML
0.25 PATIENT CONTROLLED ANALGESIA SYRINGE INTRAVENOUS
Status: DISCONTINUED | OUTPATIENT
Start: 2020-07-22 | End: 2020-07-23 | Stop reason: HOSPADM

## 2020-07-22 RX ADMIN — HYDROMORPHONE HYDROCHLORIDE 0.5 MG: 2 INJECTION, SOLUTION INTRAMUSCULAR; INTRAVENOUS; SUBCUTANEOUS at 19:55

## 2020-07-22 RX ADMIN — ACETAMINOPHEN 650 MG: 325 TABLET, FILM COATED ORAL at 17:12

## 2020-07-22 RX ADMIN — Medication 10 ML: at 19:55

## 2020-07-22 RX ADMIN — ACETAMINOPHEN 650 MG: 325 TABLET, FILM COATED ORAL at 08:46

## 2020-07-22 RX ADMIN — CEFEPIME HYDROCHLORIDE 2 G: 2 INJECTION, POWDER, FOR SOLUTION INTRAVENOUS at 03:57

## 2020-07-22 RX ADMIN — VANCOMYCIN HYDROCHLORIDE 1500 MG: 500 INJECTION, POWDER, LYOPHILIZED, FOR SOLUTION INTRAVENOUS at 08:36

## 2020-07-22 RX ADMIN — ENOXAPARIN SODIUM 40 MG: 40 INJECTION, SOLUTION INTRAVENOUS; SUBCUTANEOUS at 08:46

## 2020-07-22 RX ADMIN — POLYETHYLENE GLYCOL (3350) 17 G: 17 POWDER, FOR SOLUTION ORAL at 12:22

## 2020-07-22 RX ADMIN — ACETAMINOPHEN 650 MG: 325 TABLET, FILM COATED ORAL at 23:35

## 2020-07-22 RX ADMIN — CEFEPIME HYDROCHLORIDE 2 G: 2 INJECTION, POWDER, FOR SOLUTION INTRAVENOUS at 12:16

## 2020-07-22 RX ADMIN — SODIUM CHLORIDE AND POTASSIUM CHLORIDE: .9; .15 SOLUTION INTRAVENOUS at 23:38

## 2020-07-22 RX ADMIN — ACETAMINOPHEN 650 MG: 325 TABLET, FILM COATED ORAL at 14:10

## 2020-07-22 RX ADMIN — CEFEPIME HYDROCHLORIDE 2 G: 2 INJECTION, POWDER, FOR SOLUTION INTRAVENOUS at 19:54

## 2020-07-22 RX ADMIN — TAMSULOSIN HYDROCHLORIDE 0.4 MG: 0.4 CAPSULE ORAL at 11:20

## 2020-07-22 RX ADMIN — ACETAMINOPHEN 650 MG: 325 TABLET, FILM COATED ORAL at 01:13

## 2020-07-22 RX ADMIN — HYDROMORPHONE HYDROCHLORIDE 0.5 MG: 2 INJECTION, SOLUTION INTRAMUSCULAR; INTRAVENOUS; SUBCUTANEOUS at 07:55

## 2020-07-22 RX ADMIN — TIMOLOL MALEATE 1 DROP: 5 SOLUTION/ DROPS OPHTHALMIC at 19:57

## 2020-07-22 RX ADMIN — HYDROMORPHONE HYDROCHLORIDE 0.5 MG: 2 INJECTION, SOLUTION INTRAMUSCULAR; INTRAVENOUS; SUBCUTANEOUS at 13:18

## 2020-07-22 RX ADMIN — HYDROMORPHONE HYDROCHLORIDE 1 MG: 2 INJECTION, SOLUTION INTRAMUSCULAR; INTRAVENOUS; SUBCUTANEOUS at 23:49

## 2020-07-22 RX ADMIN — TAMSULOSIN HYDROCHLORIDE 0.4 MG: 0.4 CAPSULE ORAL at 19:55

## 2020-07-22 RX ADMIN — VANCOMYCIN HYDROCHLORIDE 1500 MG: 500 INJECTION, POWDER, LYOPHILIZED, FOR SOLUTION INTRAVENOUS at 19:55

## 2020-07-22 RX ADMIN — Medication 10 ML: at 08:38

## 2020-07-22 RX ADMIN — SODIUM CHLORIDE AND POTASSIUM CHLORIDE: .9; .15 SOLUTION INTRAVENOUS at 11:20

## 2020-07-22 RX ADMIN — HYDROMORPHONE HYDROCHLORIDE 0.5 MG: 2 INJECTION, SOLUTION INTRAMUSCULAR; INTRAVENOUS; SUBCUTANEOUS at 01:13

## 2020-07-22 RX ADMIN — ALPRAZOLAM 0.5 MG: 0.5 TABLET ORAL at 01:13

## 2020-07-22 RX ADMIN — ALPRAZOLAM 0.5 MG: 0.5 TABLET ORAL at 19:55

## 2020-07-22 ASSESSMENT — PAIN DESCRIPTION - ORIENTATION
ORIENTATION: MID;LOWER
ORIENTATION: LOWER;MID

## 2020-07-22 ASSESSMENT — PAIN SCALES - GENERAL
PAINLEVEL_OUTOF10: 0
PAINLEVEL_OUTOF10: 3
PAINLEVEL_OUTOF10: 7
PAINLEVEL_OUTOF10: 7
PAINLEVEL_OUTOF10: 6
PAINLEVEL_OUTOF10: 0
PAINLEVEL_OUTOF10: 7
PAINLEVEL_OUTOF10: 8
PAINLEVEL_OUTOF10: 7

## 2020-07-22 ASSESSMENT — PAIN DESCRIPTION - LOCATION
LOCATION: PELVIS
LOCATION: FLANK
LOCATION: PELVIS;PENIS

## 2020-07-22 ASSESSMENT — PAIN DESCRIPTION - PAIN TYPE
TYPE: ACUTE PAIN

## 2020-07-22 ASSESSMENT — PAIN DESCRIPTION - DESCRIPTORS
DESCRIPTORS: CONSTANT;BURNING
DESCRIPTORS: CONSTANT;BURNING

## 2020-07-22 ASSESSMENT — PAIN DESCRIPTION - ONSET: ONSET: AWAKENED FROM SLEEP

## 2020-07-22 ASSESSMENT — PAIN DESCRIPTION - PROGRESSION: CLINICAL_PROGRESSION: GRADUALLY WORSENING

## 2020-07-22 ASSESSMENT — PAIN DESCRIPTION - FREQUENCY
FREQUENCY: INTERMITTENT
FREQUENCY: INTERMITTENT

## 2020-07-22 NOTE — PROGRESS NOTES
Dilaudid 0.25 mg IVP given slowly in left hand IV. States \"I've never had narcotics before\". Continues to void frequently. Girlfriend helping to hold urinal for patient.

## 2020-07-22 NOTE — PROGRESS NOTES
NS at 999 ml/hr continues to infuse in left had. C/O burning with urination. Bladder scanned for result of 222 ml noted. Dr. Nacho Amin aware. Okay with amount. Respirations easy and unlabored. Monitor shows normal sinus rhythm at 80's. C/O being hungry. Supervisor notified.

## 2020-07-22 NOTE — PROGRESS NOTES
Sat up at bedside. Attempted to void several times. Stood to void with assistance. Continues to be unable to void. Requests to self cath. Dr. Raj Rice called and talked with patient. Dr. Raj Rice agrees with  Self catheterization. Supplies given to patient. Insists on doing it himself. Used clean technique. Returned 600 ml of peach colored urine. Small amount of bleeding noted after catheter insertion. States great relief of pain with catheterization. Warm blanket applied to back. Clean kit at bedside at patients request.  Girlfriend at bedside.

## 2020-07-22 NOTE — PROGRESS NOTES
Progress Note    SUBJECTIVE:  Difficulty last pm getting rest  Eventually rested after xanax and dilaudid iv  BP improved  Urine frequency and urgency last pm  Pt agreed to straight cath and 600ml return  Has been incontinent while sleeping  Output has been good  Fever has subsided   No dyspnea and no neuro changes  Orients when aroused      OBJECTIVE:    Vitals:   Vitals:    07/22/20 0700   BP: (!) 114/57   Pulse: 55   Resp:    Temp:    SpO2:      Weight: 217 lb 13 oz (98.8 kg)   Height: 5' 10\" (177.8 cm)   -----------------------------------------------------------------  Exam:    CONSTITUTIONAL:  fatigued, somnolent and no apparent distress  EYES:  lids and lashes normal and pupils equal, round and reactive to light  ENT:  normocepalic, without obvious abnormality  NECK:  supple, symmetrical, trachea midline  HEMATOLOGIC/LYMPHATICS:  no cervical lymphadenopathy  BACK:  symmetric  LUNGS:  No increased work of breathing, good air exchange, clear to auscultation bilaterally, no crackles or wheezing  CARDIOVASCULAR:  Normal apical impulse, regular rate and rhythm, normal S1 and S2, no S3 or S4, and no murmur noted  ABDOMEN:  Firm not tender  No mass     MUSCULOSKELETAL:  there is no redness, warmth, or swelling of the joints  NEUROLOGIC:  Mental Status Exam:  Level of Alertness:   somnolent  Orientation:   person, place, time  Cranial Nerves:  cranial nerves II-XII are grossly intact  SKIN:  no bruising or bleeding  EXT:     no cyanosis, clubbing or edema present    -----------------------------------------------------------------  Diagnostic Data: Reviewed    ASSESSMENT:   Principal Problem:    Sepsis ([facility] [address]  Active Problems:    Acute cystitis without hematuria    Incomplete emptying of bladder due to benign prostatic hyperplasia  Resolved Problems:    * No resolved hospital problems.  *        PLAN:  · He is resting  Will discuss case with ID and determine appropriate narrow spectrum atb  Awaiting cultures  Lactic acid reduced  K jim Canseco M.D.

## 2020-07-22 NOTE — PROGRESS NOTES
Lactic acid drawn and second IV inserted in left antecubital by JUANITA Skinner RN. IV Vancomycin 1500 mg IV started. Girlfriend at bedside. Helping him eat supper.

## 2020-07-22 NOTE — PROGRESS NOTES
Nutrition Assessment     Type and Reason for Visit: Initial(Nutrition screen)    Nutrition Recommendations/Plan:   1. Suggest add 4 oz Ensure Enlive until appetite and oral intakes have improved to >60% of meals  2. Suggest add metamucil  3. Spoke with significant other about general healthy diet; will follow up with pt when more alert. Nutrition Assessment:  Inadequate energy intake related to acute injury/trauma as evidenced by intake 0-25%, lack of appetite, not feeling well enough to eat. Pt is sleeping at time of visit. Spoke with significant other. Overall, pt has been eating adequately, however past 3 days maybe less than 75% of typical. Overall exhausted, completing tasks as normal. This morning, pt refused breakfast, however ordered lunch. Usual body weight is 195# per significant other (stated), no weight history in EMR. 11.7% (22lb) weight gain is likely fluid weight. Last bowel movement was Monday. No BM since admission. Pt takes Metamucil at home and typically has good bowel regularity. Labs reviewed. Blood glucose acutely slightly elevated. Phosphorus is low normal which accounts for overall fatigue. Overall, pt eats healthy. Pt is at moderate nutrition risk and does not meet the guidelines for malnutrition. Malnutrition Assessment:  Malnutrition Status: No malnutrition    Estimated Daily Nutrient Needs:  Energy (kcal): 6150-6111.; Weight Used for Energy Requirements:  Admission     Protein (g): 83-98; Weight Used for Protein Requirements:  Ideal(1.1.-1. 3)        Fluid (ml/day): 1800+; Weight Used for Fluid Requirements:  Admission      Nutrition Related Findings: Obesity class I     Current Nutrition Therapies:    DIET GENERAL;     Anthropometric Measures:  · Height: 5' 10\" (177.8 cm)  · Current Body Wt: 217 lb 13 oz (98.8 kg)   · BMI: 31.3    Nutrition Diagnosis:   · Altered nutrition-related lab values related to endocrine dysfuntion as evidenced by lab values(142,100,115mg/dL)    · Inadequate energy intake related to acute injury/trauma as evidenced by intake 0-25%(lack of appetite, not feeling well enough to eat. )      Nutrition Interventions:   Food and/or Nutrient Delivery:  Continue Current Diet, Start Oral Nutrition Supplement  Nutrition Education/Counseling:  Education initiated   Coordination of Nutrition Care:  Continued Inpatient Monitoring    Goals:  PO > 75%        Nutrition Monitoring and Evaluation:   Food/Nutrient Intake Outcomes:  Food and Nutrient Intake  Physical Signs/Symptoms Outcomes:  Constipation, Biochemical Data     Discharge Planning:    Continue current diet     Electronically signed by Mary Cooley RD, LD on 7/22/20 at 10:11 AM EDT    Contact: [phone]

## 2020-07-22 NOTE — PROGRESS NOTES
Tylenol given for fever. Denies any pain, but still urinating frequently but improving. Incontinent at times. Patient still lethargic. Refusing to get up in chair and have brief changed. Patient states, \"Just wait until Moody comes back. \"

## 2020-07-22 NOTE — CONSULTS
Vancomycin trough ordered for 7/23/20 - 30 minutes prior to 4th dose. Thank you. Ozzy Sotelo.  Claudetta Lai

## 2020-07-22 NOTE — PLAN OF CARE
Problem: Falls - Risk of:  Goal: Will remain free from falls  Description: Will remain free from falls  Outcome: Ongoing  Note: Pt has no falls and is continuing to be monitored. Fall precautions remain intact. Bracelet on pt, star on, bed low and locked, alarm on, side rails up, call light and personal belongings within reach, and room clear and clean of clutter. Goal: Absence of physical injury  Description: Absence of physical injury  Outcome: Ongoing     Problem: Skin Integrity:  Goal: Will show no infection signs and symptoms  Description: Will show no infection signs and symptoms  Outcome: Ongoing  Note: IV antibiotics ordered. Patient febrile. Goal: Absence of new skin breakdown  Description: Absence of new skin breakdown  Outcome: Ongoing     Problem: Pain:  Description: Pain management should include both nonpharmacologic and pharmacologic interventions. Goal: Pain level will decrease  Description: Pain level will decrease  Outcome: Ongoing  Note: Pt is able to rate pain using a 0-10 scale. Medication, ice and repositioning reduce the pain if needed and pt is aware that it is available. Will continue to monitor.     Goal: Control of acute pain  Description: Control of acute pain  Outcome: Ongoing  Goal: Control of chronic pain  Description: Control of chronic pain  Outcome: Ongoing     Problem: Nutrition  Goal: Optimal nutrition therapy  Description: Nutrition Problem #1: Altered nutrition-related lab values  Intervention: Food and/or Nutrient Delivery: Continue Current Diet, Start Oral Nutrition Supplement  Nutritional Goals: PO > 75%       Outcome: Ongoing

## 2020-07-22 NOTE — PROGRESS NOTES
Tossing and turning in bed. Denies need  To call Dr. Nela Cary to get further pain medication. States, \"I'm fine\". Voided per urinal pink colored urine.

## 2020-07-22 NOTE — PROGRESS NOTES
Offered Tylenol and Xanax to patient. Refuses at this time. States he will take the Dilaudid 0.25 mg IVP.

## 2020-07-23 ENCOUNTER — APPOINTMENT (OUTPATIENT)
Dept: INTERVENTIONAL RADIOLOGY/VASCULAR | Age: 70
DRG: 871 | End: 2020-07-23
Attending: INTERNAL MEDICINE
Payer: MEDICARE

## 2020-07-23 ENCOUNTER — HOSPITAL ENCOUNTER (INPATIENT)
Age: 70
LOS: 12 days | Discharge: HOME HEALTH | DRG: 871 | End: 2020-08-04
Attending: INTERNAL MEDICINE | Admitting: INTERNAL MEDICINE
Payer: MEDICARE

## 2020-07-23 ENCOUNTER — APPOINTMENT (OUTPATIENT)
Dept: CT IMAGING | Age: 70
DRG: 872 | End: 2020-07-23
Attending: FAMILY MEDICINE
Payer: MEDICARE

## 2020-07-23 VITALS
OXYGEN SATURATION: 93 % | BODY MASS INDEX: 31.81 KG/M2 | WEIGHT: 222.2 LBS | DIASTOLIC BLOOD PRESSURE: 87 MMHG | TEMPERATURE: 99 F | HEIGHT: 70 IN | RESPIRATION RATE: 16 BRPM | HEART RATE: 74 BPM | SYSTOLIC BLOOD PRESSURE: 154 MMHG

## 2020-07-23 PROBLEM — G04.90 ENCEPHALITIS: Status: ACTIVE | Noted: 2020-07-23

## 2020-07-23 LAB
ABSOLUTE EOS #: 0.06 K/UL (ref 0–0.44)
ABSOLUTE EOS #: <0.03 K/UL (ref 0–0.44)
ABSOLUTE IMMATURE GRANULOCYTE: <0.03 K/UL (ref 0–0.3)
ABSOLUTE IMMATURE GRANULOCYTE: <0.03 K/UL (ref 0–0.3)
ABSOLUTE LYMPH #: 0.81 K/UL (ref 1.1–3.7)
ABSOLUTE LYMPH #: 1.3 K/UL (ref 1.1–3.7)
ABSOLUTE MONO #: 0.74 K/UL (ref 0.1–1.2)
ABSOLUTE MONO #: 0.93 K/UL (ref 0.1–1.2)
ALBUMIN CSF: 1393 MG/L (ref 70–350)
ALBUMIN INDEX: 339.8
ALBUMIN SERPL-MCNC: 3.9 G/DL (ref 3.5–5.2)
ALBUMIN SERPL-MCNC: 4 G/DL (ref 3.5–5.2)
ALBUMIN SERPL-MCNC: 4.1 G/DL (ref 3.5–5.2)
ALBUMIN/GLOBULIN RATIO: 1.7 (ref 1–2.5)
ALBUMIN/GLOBULIN RATIO: 1.8 (ref 1–2.5)
ALP BLD-CCNC: 39 U/L (ref 40–129)
ALP BLD-CCNC: 39 U/L (ref 40–129)
ALT SERPL-CCNC: 58 U/L (ref 5–41)
ALT SERPL-CCNC: 59 U/L (ref 5–41)
AMPHETAMINE SCREEN URINE: NEGATIVE
ANION GAP SERPL CALCULATED.3IONS-SCNC: 13 MMOL/L (ref 9–17)
ANION GAP SERPL CALCULATED.3IONS-SCNC: 9 MMOL/L (ref 9–17)
APPEARANCE CSF: ABNORMAL
AST SERPL-CCNC: 28 U/L
AST SERPL-CCNC: 31 U/L
BARBITURATE SCREEN URINE: NEGATIVE
BASOPHILS # BLD: 0 % (ref 0–2)
BASOPHILS # BLD: 1 % (ref 0–2)
BASOPHILS ABSOLUTE: 0.03 K/UL (ref 0–0.2)
BASOPHILS ABSOLUTE: <0.03 K/UL (ref 0–0.2)
BENZODIAZEPINE SCREEN, URINE: NEGATIVE
BILIRUB SERPL-MCNC: 0.74 MG/DL (ref 0.3–1.2)
BILIRUB SERPL-MCNC: 0.75 MG/DL (ref 0.3–1.2)
BILIRUBIN DIRECT: 0.21 MG/DL
BILIRUBIN, INDIRECT: 0.53 MG/DL (ref 0–1)
BUN BLDV-MCNC: 12 MG/DL (ref 8–23)
BUN BLDV-MCNC: 12 MG/DL (ref 8–23)
BUN/CREAT BLD: 18 (ref 9–20)
BUN/CREAT BLD: ABNORMAL (ref 9–20)
BUPRENORPHINE URINE: NORMAL
C-REACTIVE PROTEIN: 0.7 MG/L (ref 0–5)
CALCIUM SERPL-MCNC: 8.8 MG/DL (ref 8.6–10.4)
CALCIUM SERPL-MCNC: 9 MG/DL (ref 8.6–10.4)
CANNABINOID SCREEN URINE: NEGATIVE
CHLORIDE BLD-SCNC: 97 MMOL/L (ref 98–107)
CHLORIDE BLD-SCNC: 98 MMOL/L (ref 98–107)
CO2: 21 MMOL/L (ref 20–31)
CO2: 22 MMOL/L (ref 20–31)
COCAINE METABOLITE, URINE: NEGATIVE
CREAT SERPL-MCNC: 0.55 MG/DL (ref 0.7–1.2)
CREAT SERPL-MCNC: 0.65 MG/DL (ref 0.7–1.2)
CRYPTOCOCCUS NEOFORMANS/GATTI CSF FILM ARR.: NOT DETECTED
CYTOMEGALOVIRUS (CMV) CSF FILM ARRAY: NOT DETECTED
DIFFERENTIAL TYPE: ABNORMAL
DIFFERENTIAL TYPE: ABNORMAL
DIRECT EXAM: NORMAL
ENTEROVIRUS CSF FILM ARRAY: NOT DETECTED
EOSINOPHILS RELATIVE PERCENT: 0 % (ref 1–4)
EOSINOPHILS RELATIVE PERCENT: 1 % (ref 1–4)
ESCHERICHIA COLI K1 CSF FILM ARRAY: NOT DETECTED
FOLATE: 17.6 NG/ML
GFR AFRICAN AMERICAN: >60 ML/MIN
GFR AFRICAN AMERICAN: >60 ML/MIN
GFR NON-AFRICAN AMERICAN: >60 ML/MIN
GFR NON-AFRICAN AMERICAN: >60 ML/MIN
GFR SERPL CREATININE-BSD FRML MDRD: ABNORMAL ML/MIN/{1.73_M2}
GLOBULIN: ABNORMAL G/DL (ref 1.5–3.8)
GLUCOSE BLD-MCNC: 108 MG/DL (ref 70–99)
GLUCOSE BLD-MCNC: 125 MG/DL (ref 70–99)
GLUCOSE, CSF: 43 MG/DL (ref 40–70)
HAEMOPHILUS INFLUENZA CSF FILM ARRAY: NOT DETECTED
HCT VFR BLD CALC: 40.6 % (ref 40.7–50.3)
HCT VFR BLD CALC: 41.5 % (ref 40.7–50.3)
HEMOGLOBIN: 13.8 G/DL (ref 13–17)
HEMOGLOBIN: 14.1 G/DL (ref 13–17)
HHV-6 (HERPESVIRUS 6) CSF FILM ARRAY: NOT DETECTED
HIV AG/AB: NONREACTIVE
HSV-1 CSF FILM ARRAY: DETECTED
HSV-2 CSF FILM ARRAY: NOT DETECTED
IGG CSF: 14 MG/DL (ref 0.5–7)
IGG INDEX CSF: 0.54
IGG SYNTHESIS RATE CSF: 10.7 MG/24 H
IGG: 767 MG/DL (ref 700–1600)
IMMATURE GRANULOCYTES: 0 %
IMMATURE GRANULOCYTES: 0 %
INR BLD: 1.1
LACTIC ACID, WHOLE BLOOD: 1.3 MMOL/L (ref 0.7–2.1)
LACTIC ACID: NORMAL MMOL/L
LISTERIA MONOCYTOGENES CSF FILM ARRAY: NOT DETECTED
LYMPHOCYTES # BLD: 13 % (ref 24–43)
LYMPHOCYTES # BLD: 24 % (ref 24–43)
Lab: NORMAL
MAGNESIUM: 2.1 MG/DL (ref 1.6–2.6)
MCH RBC QN AUTO: 31 PG (ref 25.2–33.5)
MCH RBC QN AUTO: 31.2 PG (ref 25.2–33.5)
MCHC RBC AUTO-ENTMCNC: 34 G/DL (ref 28.4–34.8)
MCHC RBC AUTO-ENTMCNC: 34 G/DL (ref 28.4–34.8)
MCV RBC AUTO: 91.2 FL (ref 82.6–102.9)
MCV RBC AUTO: 91.9 FL (ref 82.6–102.9)
MDMA URINE: NORMAL
METHADONE SCREEN, URINE: NEGATIVE
METHAMPHETAMINE, URINE: NORMAL
MONOCYTES # BLD: 12 % (ref 3–12)
MONOCYTES # BLD: 17 % (ref 3–12)
MYOGLOBIN: 38 NG/ML (ref 28–72)
NEISSERIA MENIGITIDIS CSF FILM ARRAY: NOT DETECTED
NRBC AUTOMATED: 0 PER 100 WBC
NRBC AUTOMATED: 0 PER 100 WBC
OLIGOCLONAL BANDS: ABNORMAL
OPIATES, URINE: NEGATIVE
OXYCODONE SCREEN URINE: NEGATIVE
PARECHOVIRUS CSF FILM ARRAY: NOT DETECTED
PARTIAL THROMBOPLASTIN TIME: 27.9 SEC (ref 24–36)
PDW BLD-RTO: 11.9 % (ref 11.8–14.4)
PDW BLD-RTO: 12.2 % (ref 11.8–14.4)
PHENCYCLIDINE, URINE: NEGATIVE
PHOSPHORUS: 2.6 MG/DL (ref 2.5–4.5)
PLATELET # BLD: 142 K/UL (ref 138–453)
PLATELET # BLD: 153 K/UL (ref 138–453)
PLATELET ESTIMATE: ABNORMAL
PLATELET ESTIMATE: ABNORMAL
PMV BLD AUTO: 10.5 FL (ref 8.1–13.5)
PMV BLD AUTO: 10.7 FL (ref 8.1–13.5)
POTASSIUM SERPL-SCNC: 4.2 MMOL/L (ref 3.7–5.3)
POTASSIUM SERPL-SCNC: 4.4 MMOL/L (ref 3.7–5.3)
PROCALCITONIN: 0.08 NG/ML
PROPOXYPHENE, URINE: NORMAL
PROTEIN CSF: 257.4 MG/DL (ref 15–45)
PROTHROMBIN TIME: 13.6 SEC (ref 11.8–14.6)
RBC # BLD: 4.42 M/UL (ref 4.21–5.77)
RBC # BLD: 4.55 M/UL (ref 4.21–5.77)
RBC # BLD: ABNORMAL 10*6/UL
RBC # BLD: ABNORMAL 10*6/UL
RBC CSF: 3000 /MM3
SARS-COV-2 ANTIBODY, TOTAL: NEGATIVE
SARS-COV-2, PCR: NORMAL
SARS-COV-2, RAPID: NOT DETECTED
SARS-COV-2: NORMAL
SEG NEUTROPHILS: 57 % (ref 36–65)
SEG NEUTROPHILS: 75 % (ref 36–65)
SEGMENTED NEUTROPHILS ABSOLUTE COUNT: 3.17 K/UL (ref 1.5–8.1)
SEGMENTED NEUTROPHILS ABSOLUTE COUNT: 4.73 K/UL (ref 1.5–8.1)
SODIUM BLD-SCNC: 129 MMOL/L (ref 135–144)
SODIUM BLD-SCNC: 131 MMOL/L (ref 135–144)
SOURCE: NORMAL
SPECIMEN DESCRIPTION: ABNORMAL
SPECIMEN DESCRIPTION: NORMAL
STREPTOCOCCUS AGALACTIAE CSF FILM ARRAY: NOT DETECTED
STREPTOCOCCUS PNEUMONIAE CSF FILM ARRAY: NOT DETECTED
SUPERNAT COLOR CSF: ABNORMAL
TEST INFORMATION: NORMAL
TOTAL CK: 47 U/L (ref 39–308)
TOTAL PROTEIN: 6.2 G/DL (ref 6.4–8.3)
TOTAL PROTEIN: 6.2 G/DL (ref 6.4–8.3)
TRICYCLIC ANTIDEPRESSANTS, UR: NORMAL
TSH SERPL DL<=0.05 MIU/L-ACNC: 1.33 MIU/L (ref 0.3–5)
TUBE NUMBER CSF: 3
VANCOMYCIN TROUGH DATE LAST DOSE: NORMAL
VANCOMYCIN TROUGH DOSE AMOUNT: NORMAL
VANCOMYCIN TROUGH TIME LAST DOSE: NORMAL
VANCOMYCIN TROUGH: 10.2 UG/ML (ref 10–20)
VARICELLA-ZOSTER CSF FILM ARRAY: NOT DETECTED
VITAMIN B-12: 616 PG/ML (ref 232–1245)
VOLUME CSF: 8
WBC # BLD: 5.5 K/UL (ref 3.5–11.3)
WBC # BLD: 6.3 K/UL (ref 3.5–11.3)
WBC # BLD: ABNORMAL 10*3/UL
WBC # BLD: ABNORMAL 10*3/UL
WBC CSF: 1348 /MM3
XANTHOCHROMIA: PRESENT

## 2020-07-23 PROCEDURE — 6360000002 HC RX W HCPCS: Performed by: FAMILY MEDICINE

## 2020-07-23 PROCEDURE — 86341 ISLET CELL ANTIBODY: CPT

## 2020-07-23 PROCEDURE — 70450 CT HEAD/BRAIN W/O DYE: CPT

## 2020-07-23 PROCEDURE — 86777 TOXOPLASMA ANTIBODY: CPT

## 2020-07-23 PROCEDURE — 85610 PROTHROMBIN TIME: CPT

## 2020-07-23 PROCEDURE — 82550 ASSAY OF CK (CPK): CPT

## 2020-07-23 PROCEDURE — 95816 EEG AWAKE AND DROWSY: CPT | Performed by: PSYCHIATRY & NEUROLOGY

## 2020-07-23 PROCEDURE — 87483 CNS DNA AMP PROBE TYPE 12-25: CPT

## 2020-07-23 PROCEDURE — 83605 ASSAY OF LACTIC ACID: CPT

## 2020-07-23 PROCEDURE — 86038 ANTINUCLEAR ANTIBODIES: CPT

## 2020-07-23 PROCEDURE — 85730 THROMBOPLASTIN TIME PARTIAL: CPT

## 2020-07-23 PROCEDURE — 62328 DX LMBR SPI PNXR W/FLUOR/CT: CPT

## 2020-07-23 PROCEDURE — 86769 SARS-COV-2 COVID-19 ANTIBODY: CPT

## 2020-07-23 PROCEDURE — 80202 ASSAY OF VANCOMYCIN: CPT

## 2020-07-23 PROCEDURE — 86140 C-REACTIVE PROTEIN: CPT

## 2020-07-23 PROCEDURE — 84157 ASSAY OF PROTEIN OTHER: CPT

## 2020-07-23 PROCEDURE — 87389 HIV-1 AG W/HIV-1&-2 AB AG IA: CPT

## 2020-07-23 PROCEDURE — U0002 COVID-19 LAB TEST NON-CDC: HCPCS

## 2020-07-23 PROCEDURE — 99292 CRITICAL CARE ADDL 30 MIN: CPT | Performed by: INTERNAL MEDICINE

## 2020-07-23 PROCEDURE — 2000000000 HC ICU R&B

## 2020-07-23 PROCEDURE — 2580000003 HC RX 258: Performed by: INTERNAL MEDICINE

## 2020-07-23 PROCEDURE — 83735 ASSAY OF MAGNESIUM: CPT

## 2020-07-23 PROCEDURE — 6360000002 HC RX W HCPCS: Performed by: INTERNAL MEDICINE

## 2020-07-23 PROCEDURE — 86778 TOXOPLASMA ANTIBODY IGM: CPT

## 2020-07-23 PROCEDURE — 85025 COMPLETE CBC W/AUTO DIFF WBC: CPT

## 2020-07-23 PROCEDURE — 6370000000 HC RX 637 (ALT 250 FOR IP): Performed by: FAMILY MEDICINE

## 2020-07-23 PROCEDURE — 84443 ASSAY THYROID STIM HORMONE: CPT

## 2020-07-23 PROCEDURE — 83615 LACTATE (LD) (LDH) ENZYME: CPT

## 2020-07-23 PROCEDURE — 80076 HEPATIC FUNCTION PANEL: CPT

## 2020-07-23 PROCEDURE — 80307 DRUG TEST PRSMV CHEM ANLYZR: CPT

## 2020-07-23 PROCEDURE — 99223 1ST HOSP IP/OBS HIGH 75: CPT | Performed by: PSYCHIATRY & NEUROLOGY

## 2020-07-23 PROCEDURE — 6360000002 HC RX W HCPCS: Performed by: STUDENT IN AN ORGANIZED HEALTH CARE EDUCATION/TRAINING PROGRAM

## 2020-07-23 PROCEDURE — 89051 BODY FLUID CELL COUNT: CPT

## 2020-07-23 PROCEDURE — 83874 ASSAY OF MYOGLOBIN: CPT

## 2020-07-23 PROCEDURE — 95816 EEG AWAKE AND DROWSY: CPT

## 2020-07-23 PROCEDURE — 99291 CRITICAL CARE FIRST HOUR: CPT | Performed by: INTERNAL MEDICINE

## 2020-07-23 PROCEDURE — 80053 COMPREHEN METABOLIC PANEL: CPT

## 2020-07-23 PROCEDURE — 82945 GLUCOSE OTHER FLUID: CPT

## 2020-07-23 PROCEDURE — 87081 CULTURE SCREEN ONLY: CPT

## 2020-07-23 PROCEDURE — 87205 SMEAR GRAM STAIN: CPT

## 2020-07-23 PROCEDURE — 36415 COLL VENOUS BLD VENIPUNCTURE: CPT

## 2020-07-23 PROCEDURE — 86617 LYME DISEASE ANTIBODY: CPT

## 2020-07-23 PROCEDURE — 82042 OTHER SOURCE ALBUMIN QUAN EA: CPT

## 2020-07-23 PROCEDURE — 82746 ASSAY OF FOLIC ACID SERUM: CPT

## 2020-07-23 PROCEDURE — 84100 ASSAY OF PHOSPHORUS: CPT

## 2020-07-23 PROCEDURE — 84145 PROCALCITONIN (PCT): CPT

## 2020-07-23 PROCEDURE — 2580000003 HC RX 258: Performed by: FAMILY MEDICINE

## 2020-07-23 PROCEDURE — 82784 ASSAY IGA/IGD/IGG/IGM EACH: CPT

## 2020-07-23 PROCEDURE — 2709999900 HC NON-CHARGEABLE SUPPLY

## 2020-07-23 PROCEDURE — 2500000003 HC RX 250 WO HCPCS: Performed by: STUDENT IN AN ORGANIZED HEALTH CARE EDUCATION/TRAINING PROGRAM

## 2020-07-23 PROCEDURE — 87070 CULTURE OTHR SPECIMN AEROBIC: CPT

## 2020-07-23 PROCEDURE — 2500000003 HC RX 250 WO HCPCS: Performed by: INTERNAL MEDICINE

## 2020-07-23 PROCEDURE — 82607 VITAMIN B-12: CPT

## 2020-07-23 PROCEDURE — 80048 BASIC METABOLIC PNL TOTAL CA: CPT

## 2020-07-23 PROCEDURE — 86255 FLUORESCENT ANTIBODY SCREEN: CPT

## 2020-07-23 PROCEDURE — 99222 1ST HOSP IP/OBS MODERATE 55: CPT | Performed by: INTERNAL MEDICINE

## 2020-07-23 PROCEDURE — 83916 OLIGOCLONAL BANDS: CPT

## 2020-07-23 PROCEDURE — 2580000003 HC RX 258: Performed by: STUDENT IN AN ORGANIZED HEALTH CARE EDUCATION/TRAINING PROGRAM

## 2020-07-23 PROCEDURE — 83873 ASSAY OF CSF PROTEIN: CPT

## 2020-07-23 PROCEDURE — 82040 ASSAY OF SERUM ALBUMIN: CPT

## 2020-07-23 RX ORDER — LORAZEPAM 2 MG/ML
1 INJECTION INTRAMUSCULAR ONCE
Status: COMPLETED | OUTPATIENT
Start: 2020-07-23 | End: 2020-07-23

## 2020-07-23 RX ORDER — SODIUM CHLORIDE 9 MG/ML
INJECTION, SOLUTION INTRAVENOUS CONTINUOUS
Status: DISCONTINUED | OUTPATIENT
Start: 2020-07-23 | End: 2020-07-26

## 2020-07-23 RX ORDER — SODIUM CHLORIDE 0.9 % (FLUSH) 0.9 %
10 SYRINGE (ML) INJECTION PRN
Status: DISCONTINUED | OUTPATIENT
Start: 2020-07-23 | End: 2020-08-04 | Stop reason: HOSPADM

## 2020-07-23 RX ORDER — ACETAMINOPHEN 325 MG/1
650 TABLET ORAL EVERY 6 HOURS PRN
Status: DISCONTINUED | OUTPATIENT
Start: 2020-07-23 | End: 2020-08-04 | Stop reason: HOSPADM

## 2020-07-23 RX ORDER — PROMETHAZINE HYDROCHLORIDE 25 MG/1
12.5 TABLET ORAL EVERY 6 HOURS PRN
Status: DISCONTINUED | OUTPATIENT
Start: 2020-07-23 | End: 2020-08-04 | Stop reason: HOSPADM

## 2020-07-23 RX ORDER — LEVETIRACETAM 5 MG/ML
500 INJECTION INTRAVASCULAR 2 TIMES DAILY
Status: DISCONTINUED | OUTPATIENT
Start: 2020-07-23 | End: 2020-07-30

## 2020-07-23 RX ORDER — POLYETHYLENE GLYCOL 3350 17 G/17G
17 POWDER, FOR SOLUTION ORAL DAILY PRN
Status: DISCONTINUED | OUTPATIENT
Start: 2020-07-23 | End: 2020-07-29

## 2020-07-23 RX ORDER — SODIUM CHLORIDE 0.9 % (FLUSH) 0.9 %
10 SYRINGE (ML) INJECTION EVERY 12 HOURS SCHEDULED
Status: DISCONTINUED | OUTPATIENT
Start: 2020-07-23 | End: 2020-08-04 | Stop reason: HOSPADM

## 2020-07-23 RX ORDER — LEVETIRACETAM 10 MG/ML
1000 INJECTION INTRAVASCULAR ONCE
Status: COMPLETED | OUTPATIENT
Start: 2020-07-23 | End: 2020-07-23

## 2020-07-23 RX ORDER — ACETAMINOPHEN 650 MG/1
650 SUPPOSITORY RECTAL EVERY 6 HOURS PRN
Status: DISCONTINUED | OUTPATIENT
Start: 2020-07-23 | End: 2020-08-04 | Stop reason: HOSPADM

## 2020-07-23 RX ORDER — ONDANSETRON 2 MG/ML
4 INJECTION INTRAMUSCULAR; INTRAVENOUS EVERY 6 HOURS PRN
Status: DISCONTINUED | OUTPATIENT
Start: 2020-07-23 | End: 2020-08-04 | Stop reason: HOSPADM

## 2020-07-23 RX ADMIN — HYDROMORPHONE HYDROCHLORIDE 1 MG: 2 INJECTION, SOLUTION INTRAMUSCULAR; INTRAVENOUS; SUBCUTANEOUS at 13:27

## 2020-07-23 RX ADMIN — LEVETIRACETAM 1000 MG: 10 INJECTION INTRAVENOUS at 19:58

## 2020-07-23 RX ADMIN — SODIUM CHLORIDE: 9 INJECTION, SOLUTION INTRAVENOUS at 15:41

## 2020-07-23 RX ADMIN — LEVETIRACETAM 500 MG: 5 INJECTION INTRAVENOUS at 21:12

## 2020-07-23 RX ADMIN — ACYCLOVIR SODIUM 1000 MG: 50 INJECTION, SOLUTION INTRAVENOUS at 18:35

## 2020-07-23 RX ADMIN — HYDROMORPHONE HYDROCHLORIDE 1 MG: 2 INJECTION, SOLUTION INTRAMUSCULAR; INTRAVENOUS; SUBCUTANEOUS at 08:47

## 2020-07-23 RX ADMIN — CEFTRIAXONE SODIUM 2 G: 2 INJECTION, POWDER, FOR SOLUTION INTRAMUSCULAR; INTRAVENOUS at 17:59

## 2020-07-23 RX ADMIN — DOXYCYCLINE 100 MG: 100 INJECTION, POWDER, LYOPHILIZED, FOR SOLUTION INTRAVENOUS at 10:10

## 2020-07-23 RX ADMIN — TIMOLOL MALEATE 1 DROP: 5 SOLUTION/ DROPS OPHTHALMIC at 08:54

## 2020-07-23 RX ADMIN — ENOXAPARIN SODIUM 40 MG: 40 INJECTION, SOLUTION INTRAVENOUS; SUBCUTANEOUS at 08:48

## 2020-07-23 RX ADMIN — Medication 10 ML: at 08:48

## 2020-07-23 RX ADMIN — LORAZEPAM 1 MG: 2 INJECTION INTRAMUSCULAR; INTRAVENOUS at 00:25

## 2020-07-23 RX ADMIN — ACYCLOVIR SODIUM 1000 MG: 50 INJECTION, SOLUTION INTRAVENOUS at 10:27

## 2020-07-23 RX ADMIN — DOXYCYCLINE 100 MG: 100 INJECTION, POWDER, LYOPHILIZED, FOR SOLUTION INTRAVENOUS at 16:56

## 2020-07-23 RX ADMIN — TAMSULOSIN HYDROCHLORIDE 0.4 MG: 0.4 CAPSULE ORAL at 08:49

## 2020-07-23 RX ADMIN — SODIUM CHLORIDE, PRESERVATIVE FREE 10 ML: 5 INJECTION INTRAVENOUS at 21:12

## 2020-07-23 RX ADMIN — CEFEPIME HYDROCHLORIDE 2 G: 2 INJECTION, POWDER, FOR SOLUTION INTRAVENOUS at 04:06

## 2020-07-23 RX ADMIN — WATER 2 G: 1 INJECTION INTRAMUSCULAR; INTRAVENOUS; SUBCUTANEOUS at 10:12

## 2020-07-23 RX ADMIN — VANCOMYCIN HYDROCHLORIDE 1500 MG: 500 INJECTION, POWDER, LYOPHILIZED, FOR SOLUTION INTRAVENOUS at 08:59

## 2020-07-23 ASSESSMENT — PAIN SCALES - GENERAL
PAINLEVEL_OUTOF10: 7
PAINLEVEL_OUTOF10: 0
PAINLEVEL_OUTOF10: 7
PAINLEVEL_OUTOF10: 0

## 2020-07-23 ASSESSMENT — PAIN DESCRIPTION - PAIN TYPE: TYPE: ACUTE PAIN

## 2020-07-23 ASSESSMENT — PAIN DESCRIPTION - ORIENTATION: ORIENTATION: LEFT

## 2020-07-23 ASSESSMENT — PAIN DESCRIPTION - LOCATION: LOCATION: HIP

## 2020-07-23 ASSESSMENT — PAIN DESCRIPTION - DESCRIPTORS: DESCRIPTORS: PATIENT UNABLE TO DESCRIBE

## 2020-07-23 NOTE — PROGRESS NOTES
Non-pitting edema noted to LUE around hand IV site. Dressing adjusted; IV does have good blood return and flushes with no s/s of infiltrate. LUE elevated on pillow to aid in decrease in swelling. Will continue to monitor. Patient is restless in bed d/t c/o pain; Dilaudid given at this time. He is only oriented to himself, does not follow commands well, cannot keep eyes open during conversation, and has intermittent INC. Dr. Reagan Harp in room and aware.

## 2020-07-23 NOTE — PROGRESS NOTES
Patient laying calmly in bed with eyes closed. Chuckie Isaac states that patient's restlessness is minimal and he is able to rest comfortable in bed. Will continue to monitor for s/s of pain. Chuckie Isaac updated that there still is not a bed available yet for transfer.

## 2020-07-23 NOTE — PROGRESS NOTES
Progress Note    SUBJECTIVE:  Progressively more disoriented and agitated  Last pm responded to IV ativan and rested  This am he is confused and disoriented  Restless with akathisia he seems in pain in lower back  Denies HA  No nausea  Has had some im provement in urine flow   He has not had significant Etoh use in recent past  Significant other states he has been out in woods but protects himself well  Did note a bite on the left thigh in past few days  No rash      OBJECTIVE:    Vitals:   Vitals:    07/23/20 0653   BP:    Pulse: 71   Resp:    Temp:    SpO2:      Weight: 222 lb 3.2 oz (100.8 kg)   Height: 5' 10\" (177.8 cm)   -----------------------------------------------------------------  Exam:    CONSTITUTIONAL:  awake and intermittently coherent but quickly disorients    EYES:  lids and lashes normal, pupils equal, round and reactive to light, extra-ocular muscles intact and conjunctiva normal  ENT:  Normocephalic, without obvious abnormality, atraumatic, sinuses nontender on palpation, external ears without lesions, oral pharynx with moist mucus membranes, tonsils without erythema or exudates, gums normal and good dentition.   NECK:  Supple  No limited movement  No adenopathy   HEMATOLOGIC/LYMPHATICS:  no cervical lymphadenopathy  BACK:  Symmetric, no curvature, spinous processes are non-tender on palpation, paraspinous muscles are non-tender on palpation, no costal vertebral tenderness  LUNGS:  No increased work of breathing, good air exchange, clear to auscultation bilaterally, no crackles or wheezing  CARDIOVASCULAR:  Normal apical impulse, regular rate and rhythm, normal S1 and S2, no S3 or S4, and no murmur noted  ABDOMEN:  No scars, normal bowel sounds, soft, non-distended, non-tender, no masses palpated, no hepatosplenomegally    MUSCULOSKELETAL:  there is no redness, warmth, or swelling of the joints  NEUROLOGIC:  Awake but intermittently lapses coherence  Moves all extemities  Obeys commands    SKIN: Small bit on left thigh  No rashes   EXT:     no cyanosis, clubbing or edema present    -----------------------------------------------------------------  Diagnostic Data: Reviewed    ASSESSMENT:   Principal Problem:    Sepsis ([address]  Active Problems:    Acute cystitis without hematuria    Incomplete emptying of bladder due to benign prostatic hyperplasia  Resolved Problems:    * No resolved hospital problems.  *        PLAN:  · I am concerned that this does not look like sepsis any longer  His delirium and fever with normal WBC and high monocyte count suggests viral eitiology  We will get rapid covid test  CT brain  I spoke with DR Estee Marie who will see him when he gets here this am and we will transfer him to [facility] for expert ID and neuro eval        Mya Kumar M.D.

## 2020-07-23 NOTE — PLAN OF CARE
Problem: Falls - Risk of:  Goal: Will remain free from falls  Description: Will remain free from falls  7/22/2020 2025 by Kimani Granado RN  Outcome: Ongoing  Note: Bed in low position. Wheels locked. 2/4 side rails are up. Fall band on. Call light within reach. Problem: Skin Integrity:  Goal: Will show no infection signs and symptoms  Description: Will show no infection signs and symptoms  7/22/2020 2025 by Kimani Granado RN  Outcome: Ongoing  Note: Pt able to turn self, no open areas of concern noted at this time, will continue to monitor. Problem: Pain:  Goal: Pain level will decrease  Description: Pain level will decrease  7/22/2020 2025 by Kimani Granado RN  Outcome: Ongoing  Note: Pt is able to verbalize when in pain. Pt given pain medications as needed. Will continue to monitor. Problem: Infection, Septic Shock:  Goal: Will show no infection signs and symptoms  Description: Will show no infection signs and symptoms  Outcome: Ongoing  Note: Monitoring temperature. Pt is on Vanco and Maxipime IV, will continue to monitor. Problem: Venous Thromboembolism:  Goal: Will show no signs or symptoms of venous thromboembolism  Description: Will show no signs or symptoms of venous thromboembolism  Outcome: Ongoing  Note: No s/s of DVT's at this time, will continue to monitor.

## 2020-07-23 NOTE — H&P
Critical Care - History and Physical Examination    Patient's name:  Arnoldo Shepherd  Medical Record Number: [id number]  Patient's account/billing number: [de-identified]  Patient's YOB: 1950  Age: 79 y.o. Date of Admission: 7/23/2020  3:02 PM  Date of History and Physical Examination: 7/23/2020      Primary Care Physician: Mary Zavala MD  Attending Physician: Dr. Veronika Rogers Status: Full Code    Chief complaint: AMS    HISTORY OF PRESENT ILLNESS:   History was obtained from chart review. Arnoldo Shepherd is a 79 y.o. who presented initially at [facility] for abdominal/pelvic pain and dysuria x 10 days on 7/21. The patient had been taking doxycycline (prescribed himself) at home with some improvement. He started to feel \"woozy\" and it was thought it was related to the doxycycline. Per the wife, the patient had some confusion on Tuesday. He was evaluated in the emergency department noted to be febrile and was admitted for possible sepsis secondary to urinary source. CT abdomen/pelvis which showed large prostate and distended bladder concerning for cystitis. The patient was agitated and restless. Not oriented to person, place or time. He had a CT scan of his head which was negative for any acute abnormalities. Chest x-ray showed diffuse interstitial prominence. COVID swab was negative. The patient had no leukocytosis. His hemoglobin was stable. The patient was started on doxycycline, Rocephin and acyclovir for suspected encephalitis. Patient was transferred to [facility] for further management. Of note, per chart review the patient has a history of an insect bite sometime last week. No rashes. The patient has a history of UTIs in the past as well as Woods catheter placement due to his enlarged prostate. The patient arrived hemodynamically stable, saturating well on room air. Easily arousable. Unable to follow commands.        Past Medical History:  No past medical history on file.    Past Surgical History:  No past surgical history on file. Allergies: Allergies   Allergen Reactions    Ibuprofen          Home Meds:   Prior to Admission medications    Medication Sig Start Date End Date Taking? Authorizing Provider   timolol (TIMOPTIC) 0.5 % ophthalmic solution 1 drop 2 times daily    Historical Provider, MD       Social History:   TOBACCO:   reports that he has never smoked. He has never used smokeless tobacco.  ETOH:   has no history on file for alcohol. DRUGS:  has no history on file for drug. OCCUPATION:  Physician     Family History:   No family history on file. REVIEW OF SYSTEMS (ROS):  Review of Systems   Unable to obtain       Physical Exam:    Vitals: There were no vitals taken for this visit. Last Body weight:   Wt Readings from Last 3 Encounters:   07/23/20 222 lb 3.2 oz (100.8 kg)   03/11/20 216 lb (98 kg)       Body Mass Index : There is no height or weight on file to calculate BMI.         PHYSICAL EXAMINATION :  General appearance - no acute distress  Mental status -drowsy  Eyes - pupils equal and reactive, extraocular eye movements intact  Neck - supple, no significant adenopathy  Chest - clear to auscultation, no wheezes, rales or rhonchi, symmetric air entry  Heart - normal rate, regular rhythm, normal S1, S2, no murmurs, rubs, clicks or gallops  Abdomen - soft, nontender, nondistended, no masses or organomegaly  Neurological - no focal deficits, intermittently following commands, no oriented to person, place or time, garbled speech   Extremities - peripheral pulses normal, no pedal edema, no clubbing or cyanosis  Skin - normal coloration and turgor, no rashes, no suspicious skin lesions noted       Laboratory findings:-    CBC:   Recent Labs     07/23/20  0747   WBC 5.5   HGB 13.8        BMP:    Recent Labs     07/21/20  1825 07/22/20  0530 07/23/20  0747   * 132* 129*   K 3.7 4.2 4.2   CL 97* 103 98   CO2 24 21 22   BUN 25* 15 12   CREATININE 0. 86 0.64* 0.65*   GLUCOSE 100* 115* 108*     S. Calcium:  Recent Labs     07/23/20  0747   CALCIUM 9.0     S. Ionized Calcium:No results for input(s): IONCA in the last 72 hours. S. Magnesium:  Recent Labs     07/23/20  0747   MG 2.1     S. Phosphorus:  Recent Labs     07/23/20  0747   PHOS 2.6     S. Glucose:No results for input(s): POCGLU in the last 72 hours. Glycosylated hemoglobin A1C: No results for input(s): LABA1C in the last 72 hours. INR:   Recent Labs     07/23/20  0747   INR 1.1     Hepatic functions:   Recent Labs     07/23/20  0747   ALKPHOS 39*   ALT 58*   AST 28   PROT 6.2*   BILITOT 0.75   LABALBU 3.9     Pancreatic functions:  Recent Labs     07/22/20  0530   LACTA 1.5     S. Lactic Acid:   Recent Labs     07/22/20  0530   LACTA 1.5     Cardiac enzymes:No results for input(s): CKTOTAL, CKMB, CKMBINDEX, TROPONINI in the last 72 hours. BNP:No results for input(s): BNP in the last 72 hours. Lipid profile: No results for input(s): CHOL, TRIG, HDL, LDLCALC in the last 72 hours. Invalid input(s): LDL  Blood Gases: No results found for: PH, PCO2, PO2, HCO3, O2SAT  Thyroid functions: No results found for: TSH     (See actual reports for details)       Assessment and Plan       Patient Active Problem List   Diagnosis    Cervical spinal stenosis    Spinal stenosis of lumbar region    Sepsis ([facility] [address]    Acute cystitis without hematuria    Incomplete emptying of bladder due to benign prostatic hyperplasia    Encephalitis         Additional assessment:  79year old who presented from [facility] facility for altered mental status concerning for encephalitis/meningitis. Plan:  1.  AMS secondary to unknown etiology, possible meningitis  -plan for LP with IR   -neurology consulted, following recommendations   -continue on doxycyline, rocephin and acyclovir, ID consulted   -CT head [facility] facility negative for acute pathology   -UA negative for infection, blood cultures drawn at [facility] facility  -CXR negative, COVID swab negative      2. Hx of enlarged prostate  -will continue to monitor    3. Hyponatremia  -Na 129, will continue to monitor       Marzena Bran MD  Resident  PGY3   [facility], [address]   7/23/2020, 3:29 PM      Attending Physician Statement  I have discussed the care of Evelyne Walters, including pertinent history and exam findings with the resident. I have reviewed the key elements of all parts of the encounter with the resident. I have seen and examined the patient with the resident. I agree with the assessment and plan and status of the problem list as documented. Please see full note by critical care resident     I am seeing him after patient came to medical ICU, have reviewed the chart, H&P from Dr. Lynne Nayak on 7/21//20 seen and consult by Dr. Hernesto Griffiths in [facility] this morning seen and most of the information obtained from the chart as he is not able to provide me any history confused and disoriented to time and place also mumbling when he talks and when he is arousable and difficult to understand, according to the report he had received Ativan before he was transferred to [facility] ICU.   According to history he had history of dysuria pelvic/right hip pain for about 7 to 10 days other nonspecific symptoms feeling fatigue not sure whether he had fever initially but there was no cough no headache vomiting  according to chart, he had  History of prostatic hyperplasia and previous history of urinary tract infection he apparently started on doxycycline without much improvement and on Tuesday he was noted to have confusion his cognition was not normal thought to have side effect of doxycycline as he has history of other medication intolerance because of altered mental status he was brought into [facility] noted to have a high-grade fever on admission c started empirically on treatment for urinary tract infection/sepsis, his urinalysis apparently was not impressive, chest x-ray shows possible mild chronic changes no consolidation infiltrate, no noted hypoxia. He had CT scan of the abdomen and pelvis which showed very large prostate otherwise no acute changes, he also had a CT scan of the head done which did not show acute abnormality. During his 39 to 40 hours of stay in [facility] he had fever of 101 last night and since then he is afebrile. He had normal WBC count normal renal function normal platelets and normal INR. When I saw him he was confused disoriented to place time and person not able to answer questions or provide history with move extremities spontaneously and intermittently on commands, pupils were equally reactive, plantars were reactive abdominal soft and nondistended, lung examination was clear without wheezing he was not cooperative for full CNS exam.     Acute encephalopathy/possible viral encephalitis with history of fever and altered mental status doubt that he has acute bacterial meningitis. Mild hyponatremia  History of benign prostatic hyperplasia  Mild transaminitis.     We will get a spinal tap ASAP head call interventional radiology and they are aware. We will send CSF fluid for analysis cell count differential, proteins and glucose, AFB smear and culture, fungal culture, VDRL, viral PCR panel, encephalitis panel. Infectious disease consult and discussed with infectious disease attending DR Meryle Ao  Neurology evaluation and discussed with neurology resident. He will need EEG and MRI/MRA/MRV of the brain if no signs of encephalitis will need to rule out cerebral venous thrombosis   We will follow-up neurological status and mentation. Avoid benzodiazepines unless needed for MRI or spinal tap.   Continue with Rocephin, acyclovir and doxycycline for now and de-escalation of antibiotic depending upon the spinal tap and infectious disease evaluation.     Total critical care time caring for this patient with life threatening, unstable organ failure, including direct patient contact, management of life support systems, review of data including imaging and labs, discussions with other team members and physicians at least 48  Min so far today, excluding procedures.     Please note that this chart was generated using voice recognition Dragon dictation software.  Although every effort was made to ensure the accuracy of this automated transcription, some errors in transcription may have occurred.     Jonathan Andrade MD7/23/2020 4:36 PM

## 2020-07-23 NOTE — PROGRESS NOTES
PALLIATIVE CARE  Asked to return to pt room by mark Uriostegui. Dr. Arjun Singleton is leaving for transfer soon and she requested primary nurse and I reassess cognitive status. Nurse, Anastacia Pang, asks Dr. Arjun Singleton questions in regards to names of people present in the room. He is able to state nurses' name only. Does not say Moody's name or his 's name, who is present in the room. Unable to make healthcare decisions at this time. Mark Rice is notified that I spoke with pt daughter Marcella Nichols in regards to ACP activator note. She states understanding. Valentina Donald RN, [facility] and [facility] Russell Nurse Coordinator  7/23/2020 12:10 PM

## 2020-07-23 NOTE — BRIEF OP NOTE
Brief Postoperative Note Lumbar Puncture    Kamilah Pale  YOB: 1950  [id number]    Pre-operative Diagnosis: Encephalitis     Post-operative Diagnosis: Same    Procedure: Fluoro guided Lumbar Puncture    Anesthesia: 1% Lidocaine    Surgeons/Assistants: Grisel Kruse PA-C    Complications: None    EBL: Minimal    Specimens: Obtained and sent to lab    Fluoroscopy guided lumbar puncture. 20 gauge spinal needle. L4-L5. Mildly traumatic tap with red fluid to increasing clarity with 8 ml of CSF obtained. Dressing applied. Instructions given.     Electronically signed by TIKA Walker on 7/23/2020 at 4:26 PM

## 2020-07-23 NOTE — ACP (ADVANCE CARE PLANNING)
Advance Care Planning     Advance Care Planning Activator (Inpatient)  Conversation Note      Date of ACP Conversation: 7/21/2020    Conversation Conducted with: Patient with Decision Making Capacity   Healthcare Decision Maker: Named in Advance Directive or Healthcare Power of  (name) Annabelle    ACP Activator: [address] [facility] makes decisions on behalf of the incapacitated patient: Decision Maker is asked to consider and make decisions based on patient values, known preferences, or best interests. Health Care Decision Maker:     Current Designated Health Care Decision Maker:   (If there is a valid [address] named in the [address] [place] [address] Makers\" box in the ACP activity, but it is not visible above, be sure to open that field and then select the health care decision maker relationship (ie \"primary\") in the blank space to the right of the name.) Validate  this information as still accurate & up-to-date; edit [address] field as needed.)    Note: Assess and validate information in current ACP documents, as indicated. If no Decision Maker listed above or available through scanned documents, then:    If no Authorized Decision Maker has previously been identified, then patient chooses [address]:  \"Who would you like to name as your primary health care decision-maker? \"               Name: Dilshad Wilkinson        Relationship: daughter          Phone number: [phone]  [facility] this person be reached easily? \" Yes  \"Who would you like to name as your back-up decision maker? \"   Name: Odilia Navarro        Relationship: daughter          Phone number: [phone]  [facility] this person be reached easily? \" Yes    Note: If the relationship of these Decision-Makers to the patient does NOT follow your state's Next of Kin hierarchy, recommend that patient complete ACP document that meets state-specific requirements to allow them to act on the patient's behalf when appropriate. Care Preferences    Ventilation: \"If you were in your present state of health and suddenly became very ill and were unable to breathe on your own, what would your preference be about the use of a ventilator (breathing machine) if it were available to you? \"      Would the patient desire the use of ventilator (breathing machine)?: yes    \"If your health worsens and it becomes clear that your chance of recovery is unlikely, what would your preference be about the use of a ventilator (breathing machine) if it were available to you? \"     Would the patient desire the use of ventilator (breathing machine)?: Yes      Resuscitation  \"CPR works best to restart the heart when there is a sudden event, like a heart attack, in someone who is otherwise healthy. Unfortunately, CPR does not typically restart the heart for people who have serious health conditions or who are very sick. \"    \"In the event your heart stopped as a result of an underlying serious health condition, would you want attempts to be made to restart your heart (answer \"yes\" for attempt to resuscitate) or would you prefer a natural death (answer \"no\" for do not attempt to resuscitate)? \" yes      NOTE: If the patient has a valid advance directive AND now provides care preference(s) that are inconsistent with that prior directive, advise the patient to consider either: creating a new advance directive that complies with state-specific requirements; or, if that is not possible, orally revoking that prior directive in accordance with state-specific requirements, which must be documented in the EHR. [] Yes   [x] No   Educated Patient / Tyro Hides regarding differences between Advance Directives and portable DNR orders.     Length of ACP Conversation in minutes:      Conversation Outcomes:  [x] ACP discussion completed  [x] Existing advance directive reviewed with patient; no changes to patient's previously recorded wishes  [] New Advance Directive completed  [] Portable Do Not Rescitate prepared for Provider review and signature  [] POLST/POST/MOLST/MOST prepared for Provider review and signature      Follow-up plan:    [] Schedule follow-up conversation to continue planning  [x] Referred individual to Provider for additional questions/concerns   [] Advised patient/agent/surrogate to review completed ACP document and update if needed with changes in condition, patient preferences or care setting    [x] This note routed to one or more involved healthcare providers

## 2020-07-23 NOTE — PROGRESS NOTES
Attending Physician Statement  I have discussed the care of Ernestine Smith, including pertinent history and exam findings with the resident. I have reviewed the key elements of all parts of the encounter with the resident. I have seen and examined the patient with the resident. I agree with the assessment and plan and status of the problem list as documented. Please see full note by critical care resident    I am seeing him after patient came to medical ICU, have reviewed the chart, H&P from Dr. Jade Esteban on 7/21//20 seen and consult by Dr. Digna Ovalle in [facility] this morning seen and most of the information obtained from the chart as he is not able to provide me any history confused and disoriented to time and place also mumbling when he talks and when he is arousable and difficult to understand, according to the report he had received Ativan before he was transferred to [facility] ICU. According to history he had history of dysuria pelvic/right hip pain for about 7 to 10 days other nonspecific symptoms feeling fatigue not sure whether he had fever initially but there was no cough no headache vomiting  according to chart, he had  History of prostatic hyperplasia and previous history of urinary tract infection he apparently started on doxycycline without much improvement and on Tuesday he was noted to have confusion his cognition was not normal thought to have side effect of doxycycline as he has history of other medication intolerance because of altered mental status he was brought into [facility] noted to have a high-grade fever on admission c started empirically on treatment for urinary tract infection/sepsis, his urinalysis apparently was not impressive, chest x-ray shows possible mild chronic changes no consolidation infiltrate, no noted hypoxia.   He had CT scan of the abdomen and pelvis which showed very large prostate otherwise no acute changes, he also had a CT scan of the head done which did not generated using voice recognition Dragon dictation software. Although every effort was made to ensure the accuracy of this automated transcription, some errors in transcription may have occurred.     Jonathan Andrade MD7/23/2020 4:36 PM

## 2020-07-23 NOTE — PROGRESS NOTES
No cognitive change noted since this mornings assessment. Patient does seem more calm and comfortable throughout morning.  [facility]ing [facility]

## 2020-07-23 NOTE — PROGRESS NOTES
IV pain med given to patient p/t d/c with [facility]. Patient d/c'd off unit via [facility] Belongings taken by mark. No issues noted.

## 2020-07-23 NOTE — Clinical Note
Pt admitted with Dysuria and chills. Noted to have disorientation, agitation.    If possible, please document in progress notes and discharge summary further specificity regarding the type of encephalopathy:      [[Metabolic encephalopathy[de-identified] This patient has metabolic encephalopathy.]]  [[Septic encephalopathy[de-identified] This patient has septic encephalopathy.]]  [[Toxic encephalopathy[de-identified] This patient has toxic encephalopathy.]]  [[Encephalopathy due to ***:: This patient has encephalopathy due to ***.]]  [[Toxic metabolic encephalopathy[de-identified] This patient has toxic metabolic encephalopathy.]]      The medical record reflects the following:     Risk Factors: possible sepsis,  bite on the left thigh in past few days       Clinical Indicators: per 7/23 attending PN: Progressively more disoriented and agitated  temp Max:102.2 °F, adm lactic acid 2.2, covid negative       Treatment: IV NS infusion, IV Maxipime, IV vancomyin, Ativan,  CT brain, planned transfer for neuro eval

## 2020-07-23 NOTE — PROGRESS NOTES
PALLIATIVE CARE  Palliative Care Notes    Reason for Consult:  ACP discussion; family support    Patient Active Problem List   Diagnosis    Cervical spinal stenosis    Spinal stenosis of lumbar region    Sepsis ([facility] [address]    Acute cystitis without hematuria    Incomplete emptying of bladder due to benign prostatic hyperplasia       Advance Directives:  Code status: Full Code  Patient has capacity for medical decisions: no  Health Care Power of : yes  Living Will: yes        Pain Management:  Lower back pain. Receiving Dilaudid IV q3h prn. Symptom Management:  Are there any other symptoms that are distressing to the patient or family that needs addressed? Anxiety:  psychomotor agitation                          Dyspnea:  none                          Fatigue:  generalized weakness due to illness                          Bladder function:  no issues                          Bowel function:  risk for constipation. oral intake decreased due to mental status    Other:  none     Spiritual history/needs:   notified: no, per fiancee request at this time    Palliative Performance Scale: PTA: >70  ___70%  Ambulation reduced; Some disease; Can't do normal job or work; intake normal or reduced; can do full self care; LOC full  ___60%  Ambulation reduced; Significant disease; Can't do hobbies/housework; intake normal or reduced; occasional assist; LOC full/confusion  ___50%  Mainly sit/lie; Extensive disease; Can't do any work; Considerable assist; intake normal or reduced; LOC full/confusion  ___40%  Mainly in bed; Extensive disease; Mainly assist; intake normal or reduced; LOC full/confusion   ___30%  Bed Bound; Extensive disease; Total care; intake reduced; LOCfull/confusion  ___20%  Bed Bound; Extensive disease; Total care; intake minimal; Drowsy/coma  ___10%  Bed Bound; Extensive disease;  Total care; Mouth care only; Drowsy/coma  ___0       Death    Readmission Risk: 8%     Notes:   Brief visit with Dr. Derrek Osgood. Pt has been medicated with dilaudid for comfort and is resting with eyes closed. A little restless in bed. Pt not awoken at this time. Pt has had increased confusion during this hospital stay. Support given to visitor. ACP discussion. Najma Shawippo tells me that he was desiring to complete a new health care power of  after they are . She relates that his daughter's are aware that he is being transferred. One daughter lives in [place] and the other lives in [place]. Per Najma Menon they are not planning on coming to the hospital at this time as they both have small children, and with the COVID-19 traveling restrictions he did not want them to come to visit, even prior to this hospitalization. Najma Shawippo inquires about completion of a new healthcare power of . Advised that it would need to be determined that he understands what he is signing and is physically able to sign a new document. Provided with a blank document as requested. Advised that  would be available to assist with completion/witnessing of his signature. States understanding. Support given. Awaiting transfer to [place]. 's at this time. Dr. Yola Rodriguez was in to see patient prior to my visit. Discussion with pt daughter, Chasity Croft, via phone regarding ACP activator notes. Annabelle is the health care agent listed on the documents, however phone number is not correct. Will change in Healthcare decision maker section. Other daughter's contact information is correct in advance directive but was unable to reach her. Annabelle is tearful over the phone. Much reassurance is given regarding questions being asked. She feels that her father would want all attempts made to save his life. Support given. Will continue to follow and support as needed.     Palliative Care Plan:  Education/support to family  Providing support for coping/adaptation/distress of family  Continue with current plan of

## 2020-07-23 NOTE — CONSULTS
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  · Hyponatremia  · Mild transaminitis    Recommendations:   · Acyclovir 10 mg/kg IV every 8 hours pending exclusion of herpes simplex type I  · Supportive care      Medical Decision Making/Summary/Discussion:7/23/2020     ·   Infection Control Recommendations   · [place] Precautions    Antimicrobial Stewardship Recommendations     · Targeted therapy  · PK dosing  ·   Coordination of Outpatient Care:   · Estimated Length of IV antimicrobials: To be determined  · Patient will need Midline Catheter Insertion: Yes  · Patient will need PICC line Insertion: No  · Patient will need: Home IV , [place],  SNF,  LTAC: To be determined  · Patient will need outpatient wound care: No    Chief complaint/reason for consultation:   · Altered mental status with progression over a few days      History of Present Illness:   Akilah Abdullahi is a 79y.o.-year-old  male who was initially admitted on 7/23/2020. Patient seen at the request of Maggie Jimenez. 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 He is unable to provide any information himself at this point in time. Available information reviewed and discussed with Dr. Alejandra Moseley.    Patient apparently developed onset of some dysuria and pain in the pelvic and right hip area starting about 7 to the 10 days prior to admission. In addition he was exhibiting some fatigue. He has a pre-existing history of prostatic hypertrophy with previous documented urinary tract infections. The patient apparently felt that he was coming down with a urinary tract infection and had a started doxycycline on 7/20/2020 without benefit. He seems to have experienced prior problems with use of quinolones that may have influenced his choice of antibiotics.     On 7/21/2020 he was noticed to have some mild confusion and problems with cognition were initially attributed to the use of doxycycline. He was evaluated at [facility] and found to have a high-grade fever. He had urine and blood cultures obtained to exclude urinary tract sepsis. The urine and blood cultures have not shown any growth. His urine analysis was not very impressive. He was treated with cefepime with no benefit. The patient showed some cough but had no hypoxia. The chest x-ray did not show any pneumonia but had some mild chronic changes. CT of the abdomen and pelvis was obtained which showed the presence of a large prostate. No other abnormalities were noted. While active emergency he had a temperature 101 °F on 7/22/2020. No additional fevers were noted. Upon arrival to emergency [facility] patient is disoriented, confused, he is not able to provide any information or to answer any questions. His laboratory tests show some mild degree of hyponatremia and mild transaminitis. A spinal tap was done to exclude herpetic encephalitis. The CSF showed the bloody fluid with a protein level of 257 glucose of 43 RBC count of 3000 with 1348 white cells 80% of which were lymphocytes. The spinal tap was described as being mildly traumatic with progressive clearing of the spinal fluid. We will await the results of the infectious CSF panel. Plan to treat for the treatable conditions such as mycoplasma, herpes simplex. The presence of hemorrhage suggests Herpes simplex. Labs, X rays reviewed: 7/23/2020    BUN: 12  Cr: 0.55    WBC: 6.3  Hb: 14.1  Plat: 153    Cultures:  Urine:  ·   Blood:  ·   Sputum :  ·   Wound:  ·     COVID-19 test:  · 7/23/2020- negative    · 7/23/2020 CT of the head: No acute abnormality  ·     Viral panel; Herpes simplex type I positive    Discussed with patient, RN, family.     I have personally reviewed the past medical history, past surgical history, medications, social history, and family history, and I have updated the database accordingly. Past Medical History:   No past medical history on file. Past Surgical  History:   No past surgical history on file. Medications:      sodium chloride flush  10 mL Intravenous 2 times per day    [START ON 7/24/2020] enoxaparin  40 mg Subcutaneous Daily    doxycycline (VIBRAMYCIN) IV  100 mg Intravenous Q12H    cefTRIAXone (ROCEPHIN) IV  2 g Intravenous Q12H    acyclovir  1,000 mg Intravenous Q8H       Social History:     Social History     Socioeconomic History    Marital status:      Spouse name: Not on file    Number of children: Not on file    Years of education: Not on file    Highest education level: Not on file   Occupational History    Not on file   Social Needs    Financial resource strain: Not hard at all   [place] insecurity     Worry: Never true     Inability: Never true   [employer] needs     Medical: Not on file     Non-medical: Not on file   Tobacco Use    Smoking status: Never Smoker    Smokeless tobacco: Never Used   Substance and Sexual Activity    Alcohol use: Not on file    Drug use: Not on file    Sexual activity: Not on file   Lifestyle    Physical activity     Days per week: Not on file     Minutes per session: Not on file    Stress: Not on file   Relationships    Social connections     Talks on phone: Not on file     Gets together: Not on file     Attends [employer] service: Not on file     Active member of club or organization: Not on file     Attends meetings of clubs or organizations: Not on file     Relationship status: Not on file    Intimate partner violence     Fear of current or ex partner: Not on file     Emotionally abused: Not on file     Physically abused: Not on file     Forced sexual activity: Not on file   Other Topics Concern    Not on file   Social History Narrative    Not on file       Family History:   No family history on file. Allergies:   Ibuprofen     Review of Systems:     Unable to provide. Encephalopathic. 7/23/2020      Constitutional: No fevers or chills. No systemic complaints  Head: No headaches  Eyes: No double vision or blurry vision. No conjunctival inflammation. ENT: No sore throat or runny nose. . No hearing loss, tinnitus or vertigo. Cardiovascular: No chest pain or palpitations. No shortness of breath. No DONALDSON  Lung: No shortness of breath or cough. No sputum production  Abdomen: No nausea, vomiting, diarrhea, or abdominal pain. Tonye Cogan No cramps. Genitourinary: No increased urinary frequency, or dysuria. No hematuria. No suprapubic or CVA pain  Musculoskeletal: No muscle aches or pains. No joint effusions, swelling or deformities  Hematologic: No bleeding or bruising. Neurologic: No headache, weakness, numbness, or tingling. Integument: No rash, no ulcers. Psychiatric: No depression. Endocrine: No polyuria, no polydipsia, no polyphagia. Physical Examination :     Patient Vitals for the past 8 hrs:   BP Temp Temp src Pulse Resp SpO2 Weight   07/23/20 1730 126/78 -- -- 67 16 97 % --   07/23/20 1700 108/73 -- -- 68 19 95 % --   07/23/20 1630 (!) 143/96 -- -- -- -- -- 211 lb 6.7 oz (95.9 kg)   07/23/20 1617 (!) 138/58 -- -- 65 20 96 % --   07/23/20 1612 (!) 138/58 -- -- 72 17 98 % --   07/23/20 1530 (!) 148/91 98.8 °F (37.1 °C) Oral 68 17 97 % --   07/23/20 1513 (!) 181/85 -- -- 73 19 98 % --     General Appearance: Somnolent  Head:  Normocephalic, no trauma  Eyes: Pupils equal, round, reactive to light; sclera anicteric; conjunctivae pink. No embolic phenomena. ENT: Oropharynx clear, without erythema, exudate, or thrush. No tenderness of sinuses. Mouth/throat: mucosa pink and moist. No lesions. Dentition in good repair. Neck:Supple, without lymphadenopathy. Thyroid normal, No bruits. Pulmonary/Chest: Clear to auscultation, without wheezes, rales, or rhonchi. No dullness to percussion.    Cardiovascular: Regular rate and rhythm without murmurs, rubs, or gallops. Abdomen: Soft, non tender. Bowel sounds normal. No organomegaly  All four Extremities: No cyanosis, clubbing, edema, or effusions. . Scar from prior right hip arthroplasty  Neurologic: No gross sensory or motor deficits. Moves all extremities. Encephalopathic  Skin: Warm and dry with good turgor. No signs of peripheral arterial or venous insufficiency. No ulcerations. No open wounds. Medical Decision Making -Laboratory:   I have independently reviewed/ordered the following labs:    CBC with Differential:   Recent Labs     07/23/20  0747 07/23/20  1712   WBC 5.5 6.3   HGB 13.8 14.1   HCT 40.6* 41.5    153   LYMPHOPCT 24 13*   MONOPCT 17* 12     BMP:   Recent Labs     07/22/20  0530 07/23/20  0747   * 129*   K 4.2 4.2    98   CO2 21 22   BUN 15 12   CREATININE 0.64* 0.65*   MG 2.0 2.1     Hepatic Function Panel:   Recent Labs     07/22/20  0530 07/23/20  0747   PROT 6.0* 6.2*   LABALBU 3.7 3.9   BILITOT 0.48 0.75   ALKPHOS 38* 39*   ALT 48* 58*   AST 24 28     No results for input(s): RPR in the last 72 hours. No results for input(s): HIV in the last 72 hours. No results for input(s): BC in the last 72 hours.   Lab Results   Component Value Date    MUCUS NOT REPORTED 07/22/2020    RBC 4.55 07/23/2020    TRICHOMONAS NOT REPORTED 07/22/2020    WBC 6.3 07/23/2020    YEAST NOT REPORTED 07/22/2020    TURBIDITY CLEAR 07/22/2020     Lab Results   Component Value Date    CREATININE 0.65 07/23/2020    CREATININE 1.0 01/16/2020    GLUCOSE 108 07/23/2020       Medical Decision Making-Imaging:     CT OF THE HEAD WITHOUT CONTRAST  7/23/2020 8:09 am         TECHNIQUE:    CT of the head was performed without the administration of intravenous    contrast. Dose modulation, iterative reconstruction, and/or weight based    adjustment of the mA/kV was utilized to reduce the radiation dose to as low    as reasonably achievable.         COMPARISON:    None.      HISTORY:    ORDERING SYSTEM PROVIDED HISTORY: DELERIUM    TECHNOLOGIST PROVIDED HISTORY:    DELERIUM              FINDINGS:    BRAIN/VENTRICLES: There is no acute intracranial hemorrhage, mass effect or    midline shift.  No abnormal extra-axial fluid collection.  The gray-white    differentiation is maintained without evidence of an acute infarct.  There is    no evidence of hydrocephalus.         ORBITS: The visualized portion of the orbits demonstrate no acute abnormality.         SINUSES: The visualized paranasal sinuses and mastoid air cells demonstrate    no acute abnormality.         SOFT TISSUES/SKULL:  No acute abnormality of the visualized skull or soft    tissues.              Impression    No acute intracranial abnormality. EXAMINATION:    ONE XRAY VIEW OF THE CHEST         7/21/2020 6:32 pm         COMPARISON:    None.         HISTORY:    ORDERING SYSTEM PROVIDED HISTORY: sepsis    TECHNOLOGIST PROVIDED HISTORY:    sepsis         FINDINGS:    Heart size is normal.  Interstitial markings are prominent which may be    related to interstitial pneumonitis.  No effusion, extrapleural air or    localized consolidation is noted.  Osseous and mediastinal structures are    age-appropriate.              Impression    Diffuse prominence of the interstitial markings which may be related to    interstitial pneumonitis.  No focal consolidation.           EXAMINATION:    CT OF THE ABDOMEN AND PELVIS WITH CONTRAST 7/21/2020 4:06 pm         TECHNIQUE:    CT of the abdomen and pelvis was performed with the administration of    intravenous contrast. Multiplanar reformatted images are provided for review.     Dose modulation, iterative reconstruction, and/or weight based adjustment of    the mA/kV was utilized to reduce the radiation dose to as low as reasonably    achievable.         COMPARISON:    CT urogram January 6, 2020         HISTORY:    ORDERING SYSTEM PROVIDED HISTORY: Lower abdominal pain    TECHNOLOGIST planner  · Infection Control and Prevention measures reviewed  · All prior entries were reviewed  · Administer medications as ordered  · Prognosis: Guarded  · Discharge planning reviewed  · Follow up as outpatient. Thank you for allowing us to participate in the care of this patient. Please call with questions.     Olayinka Washburn MD  Pager: [phone] - Office: [phone]

## 2020-07-23 NOTE — PROGRESS NOTES
Writer contacted [facility] and updated them that Covid test is negative. They will now proceed with finding a bed.

## 2020-07-23 NOTE — PROGRESS NOTES
The Respiratory Therapy Department completed the Respiratory Care evaluation. No therapy is indicated at this time. The reason therapy is not indicated is due to the following:     [x] Patient does not meet indications for therapy. [] Patient has returned to their home regimen.    [] Patient frequency has changed to prn, nursing to assess and call if needed  . Respiratory Care will not see this patient further unless otherwise requested. Please call the respiratory care charge therapist with questions or concerns at extension [phone]. Thank you for using the Respiratory Therapy Protocol Program.    Abel Enciso   5:18 PM Patient on room air with Sa02 100%. Breath sounds clear. No known home respiratory meds. Please notify Respiratory Care [phone] if needed or any change in patient status.

## 2020-07-23 NOTE — PLAN OF CARE
Problem: Anxiety/Stress:  Goal: Level of anxiety will decrease  Description: Level of anxiety will decrease  Outcome: Ongoing     Problem: Mental Status - Impaired:  Goal: Mental status will be restored to baseline  Description: Mental status will be restored to baseline  Outcome: Ongoing

## 2020-07-23 NOTE — CONSULTS
[facility] Neurology   IN-PATIENT SERVICE      NEUROLOGY CONSULT  NOTE            Date:   7/23/2020  Patient name:  Eloy Solano  Date of admission:  7/23/2020  YOB: 1950      Chief Complaint:     No chief complaint on file. Reason for Consult: Altered mental status     History of Present Illness: The patient is a 79 y.o. male who presents with No chief complaint on file. and he is admitted to the hospital for the management of  AMS    The patient was seen and examined and the chart was reviewed. All information is obtained from the chart  And nursing staff as there is no family present at bedside. 79ear-old without significant past medical history was transferred from [facility] for the management of altered mental status. 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 He also dark urine with dysuria and micturition Started doxycycline without significant  Improvement. He did not feel good and went to ED, for possible medication side effect, CT abdomen pelvis showed large prostate. Patient was admitted in ICU for possible sepsis. Patient has been requesting Xanax and Dilaudid. This morning patient became progressively disoriented and agitated, a night before responded to IV Ativan and slept. After getting up in the morning he was confused disoriented, restless with akathisia and was pointing lower back pain. During the hospital stay patient had episode of fever, working diagnosis was UTI although urinary analysis showed leukocyte esterase negative, no WBC and RBC was 2-5. At [facility] V's:  Patient had IR guided lumbar puncture, CSF studies sent. CT head: No acute intracranial abnormality    At bedside: Patient lying in bed. Not alert oriented time place person, not participating in conversation. Difficult to perform cranial nerve examination. Spontaneously moves all extremities, withdraws to pain, intermittently follows simple command like eye-opening, hand squeezing. Pupils equally reactive, plantar flexor, motor is notable to appreciate. Tone normal, DTR lower extremity +1, upper extremity 2+. Of note: Patient's wife states that he has been working out in words but uses protective games. He did notice bite on left thigh per past few days, no rash. Past Medical History:     No past medical history on file. Past Surgical History:     No past surgical history on file. Medications Prior to Admission:     Prior to Admission medications    Medication Sig Start Date End Date Taking? Authorizing Provider   timolol (TIMOPTIC) 0.5 % ophthalmic solution 1 drop 2 times daily    Historical Provider, MD        Allergies:     Ibuprofen    Social History:     Tobacco:    reports that he has never smoked. He has never used smokeless tobacco.  Alcohol:      has no history on file for alcohol. Drug Use:  has no history on file for drug. Family History:     No family history on file. Review of Systems:   Unable to perform as patient is noncooperative.       Physical Exam:   BP (!) 138/58   Pulse 65   Temp 98.8 °F (37.1 °C) (Oral)   Resp 20   SpO2 96%   Temp (24hrs), Av.9 °F (37.7 °C), Min:98.1 °F (36.7 °C), Max:102 °F (38.9 °C)        General examination:      General Appearance:  alert, well appearing, and in no acute distress  HEENT: Normocephalic, atraumatic, moist mucus membranes  Neck: supple, no carotid bruits, (-) nuchal rigidity  Lungs:  Respirations unlabored, chest wall no deformity, BS normal  Cardiovascular: normal rate, regular rhythm  Abdomen: Soft, nontender, nondistended, normal bowel sounds  Skin: No gross lesions, rashes, bruising or bleeding on exposed skin area  Extremities:  peripheral pulses palpable, clubbing or edema  Psych: normal affect    NEUROLOGIC EXAMINATION    Mental status   Patient is not alert to precaution  · Fall precaution  · Patient is on acyclovir, ceftriaxone, doxycycline. · MRI brain W WO   · TSH/ammonia/UDS  · B12/folate  · F/U: CSF analysis: CSF studies cell count, Gram stain, glucose, protein, culture. Meningitis panel, oligoclonal banding CSF and serum. IgG synthesis rate and index, cryptococcal, HSV, CARRIE, Lyme's antibody, HIV, CPK, myoglobin, VDRL. · CSF [facility] encephalopathy panel  · Medication review:      Case will be discussed with the attending, further recommendation will follow.       Electronically signed by Debra Patterson MD on 7/23/2020 at 4:35 PM      Marcos Navarro MD   PGY- 2 Neurology Resident   7/23/2020 at 4:35 PM

## 2020-07-23 NOTE — PROGRESS NOTES
Girlfriend came out to nurses station and wanted writer to [facility] for increase of pain medication. Writer entered room, pt was very restless and shift back and forth in the bed. Temperature was 102. Will call  for further orders.

## 2020-07-23 NOTE — CONSULTS
Consult Note            Date:7/23/2020        Patient Nalani Jeans     YOB: 1950     Age:70 y.o. Consults    Chief Complaint   No chief complaint on file. 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 Case discussed personally with patient's wife and . Akhil Alva. In good health up until about a week ago when noted the insidious onset of fatigue and malaise. Wife states that he was complaining of deep right hip pain rate with radiation into the pelvis. Longstanding history of prostatism and obstructive voiding symptoms. Does bladder catheterizations occasionally. Patient felt that he had a urinary tract infection. Started himself on doxycycline. Symptoms not better. Wife states that his office staff noted confusion on Tuesday. Did not recognize people. No focal neurologic deficits. Specifically no cough or shortness of breath. Symptoms progressed and his wife brought him to the hospital last night. Fever was 38.9. Working diagnosis urinary tract infection although urinalysis showed leukocyte esterase negative, no WBCs, and RBCs 2-5 per high-powered field. No bacteria. CT scan of the abdomen reviewed. Lung cuts are clear. Kidneys appear normal without stranding or inflammation. Small cysts are noted bilaterally. Bladder is thickened and distended. Prostate large. Specific no bony destructive lesions are noted in pelvis. Post right hip arthroplasty changes. Fever better this morning although delirium not. Not oriented to person place or time. Recognizes neither me nor his wife. Agitated and restless. Chest x-ray mild interstitial prominence nonspecific. COVID-19 PCR negative. Sodium 129. WBCs 5500 hemoglobin 13.8 hematocrit 40.6 platelets 606,219. Patient was treated with cefepime and vancomycin overnight. He is afebrile this morning. Head CT is unremarkable. Wife states that the patient sustained an insect bite sometime in the last week. Apparently the inner aspect of his left thigh. No skin rash or arthralgias. Suffers from chronic low back pain. No respiratory symptoms. No sick contacts in the home. No recent travel. Was [place] outside [place]. Past Medical History   No past medical history on file. Past Surgical History   No past surgical history on file. Medications     Prior to Admission medications    Medication Sig Start Date End Date Taking?  Authorizing Provider   timolol (TIMOPTIC) 0.5 % ophthalmic solution 1 drop 2 times daily    Historical Provider, MD        cefTRIAXone (ROCEPHIN) 2 g in sterile water 20 mL IV syringe, Q12H  acyclovir (ZOVIRAX) 1,000 mg in dextrose 5 % 250 mL IVPB, Q8H  doxycycline (VIBRAMYCIN) 100 mg in dextrose 5 % 100 mL IVPB, Q12H  tamsulosin (FLOMAX) capsule 0.4 mg, BID  polyethylene glycol (GLYCOLAX) packet 17 g, Daily  HYDROmorphone (DILAUDID) injection 1 mg, Q3H PRN  HYDROmorphone (DILAUDID) injection 0.25 mg, Q3H PRN    Or  HYDROmorphone (DILAUDID) injection 0.5 mg, Q3H PRN  timolol (TIMOPTIC) 0.5 % ophthalmic solution 1 drop, BID  sodium chloride flush 0.9 % injection 10 mL, 2 times per day  sodium chloride flush 0.9 % injection 10 mL, PRN  enoxaparin (LOVENOX) injection 40 mg, Daily  0.9% NaCl with KCl 20 mEq infusion, Continuous  ALPRAZolam (XANAX) tablet 0.5 mg, Q6H PRN  acetaminophen (TYLENOL) tablet 650 mg, Q4H PRN    Or  acetaminophen (TYLENOL) suppository 650 mg, Q6H PRN        Allergies   Ibuprofen    Social History     Social History     Tobacco History     Smoking Status  Never Smoker    Smokeless Tobacco Use  Never Used          Alcohol History     Alcohol Use Status  Not Asked          Drug Use     Drug Use Status  Not Asked          Sexual Activity     Sexually Active  Not Asked                Family History   No family history on 4.42   HGB 14.4 13.0 13.8   HCT 43.2 39.4* 40.6*   MCV 94.7 95.2 91.9   RDW 12.8 12.6 12.2    153 142     CHEMISTRIES:  Recent Labs     07/21/20 1825 07/22/20  0530 07/23/20  0747   * 132* 129*   K 3.7 4.2 4.2   CL 97* 103 98   CO2 24 21 22   BUN 25* 15 12   CREATININE 0.86 0.64* 0.65*   GLUCOSE 100* 115* 108*   PHOS  --  2.8 2.6   MG  --  2.0 2.1     PT/INR:  Recent Labs     07/22/20 0530 07/23/20  0747   PROTIME 14.2 13.6   INR 1.1 1.1     APTT:  Recent Labs     07/22/20 0530 07/23/20  0747   APTT 28.4 27.9     LIVER PROFILE:  Recent Labs     07/21/20 1825 07/22/20  0530 07/23/20  0747   AST 30 24 28   ALT 58* 48* 58*   BILITOT 0.63 0.48 0.75   ALKPHOS 45 38* 39*       Imaging/Diagnostics   Ct Head Wo Contrast    Result Date: 7/23/2020  No acute intracranial abnormality. Ct Abdomen Pelvis W Iv Contrast Additional Contrast? Oral    Result Date: 7/21/2020  Markedly enlarged prostate. Possible cystitis. Increased stool. Xr Chest Portable    Result Date: 7/21/2020  Diffuse prominence of the interstitial markings which may be related to interstitial pneumonitis. No focal consolidation. Assessment      Hospital Problems           Last Modified POA    * (Principal) Sepsis ([facility] [address] 7/23/2020 Yes    Acute cystitis without hematuria 7/21/2020 Yes    Incomplete emptying of bladder due to benign prostatic hyperplasia 7/21/2020 Yes    Encephalitis 7/23/2020 Yes          Plan   1. Unifying diagnosis is not clear. Most prominent symptoms currently include fever and mental status change. Must exclude central nervous system infection/encephalitis. Negative urinalysis not consistent with urinary tract infection. 2. Transfer to [facility] for further evaluation. Will require lumbar puncture. 3. Empiric ceftriaxone, doxycycline, and acyclovir. 4. Discussed in detail with patient and Dr. Venkatesh Jarvis. 5. Critical care time 60 minutes.         Electronically signed by Elodia Gayle DO on 7/23/20 at 11:43 AM EDT

## 2020-07-23 NOTE — PROGRESS NOTES
Patient remains very restless. Girlfriend would like something else to calm him down at this time. Writer will call  for further orders.

## 2020-07-23 NOTE — PROGRESS NOTES
Tylenol 650 mg and Dilaudid 1 mg given at this time. Girlfriend remains at bedside. Will continue to monitor.

## 2020-07-24 ENCOUNTER — APPOINTMENT (OUTPATIENT)
Dept: MRI IMAGING | Age: 70
DRG: 871 | End: 2020-07-24
Attending: INTERNAL MEDICINE
Payer: MEDICARE

## 2020-07-24 LAB
ABSOLUTE EOS #: <0.03 K/UL (ref 0–0.44)
ABSOLUTE IMMATURE GRANULOCYTE: <0.03 K/UL (ref 0–0.3)
ABSOLUTE LYMPH #: 1.37 K/UL (ref 1.1–3.7)
ABSOLUTE MONO #: 0.81 K/UL (ref 0.1–1.2)
ANION GAP SERPL CALCULATED.3IONS-SCNC: 13 MMOL/L (ref 9–17)
ANTI-NUCLEAR ANTIBODY (ANA): NEGATIVE
BANDS, CSF: NORMAL %
BASO CSF: NORMAL %
BASOPHILS # BLD: 0 % (ref 0–2)
BASOPHILS ABSOLUTE: <0.03 K/UL (ref 0–0.2)
BLAST CSF: NORMAL %
BUN BLDV-MCNC: 12 MG/DL (ref 8–23)
BUN/CREAT BLD: ABNORMAL (ref 9–20)
CALCIUM SERPL-MCNC: 9 MG/DL (ref 8.6–10.4)
CHLORIDE BLD-SCNC: 101 MMOL/L (ref 98–107)
CO2: 20 MMOL/L (ref 20–31)
CREAT SERPL-MCNC: 0.61 MG/DL (ref 0.7–1.2)
DIFFERENTIAL TYPE: ABNORMAL
EOS CSF: NORMAL %
EOSINOPHILS RELATIVE PERCENT: 0 % (ref 1–4)
FLUID DIFF COMMENT: NORMAL
GFR AFRICAN AMERICAN: >60 ML/MIN
GFR NON-AFRICAN AMERICAN: >60 ML/MIN
GFR SERPL CREATININE-BSD FRML MDRD: ABNORMAL ML/MIN/{1.73_M2}
GFR SERPL CREATININE-BSD FRML MDRD: ABNORMAL ML/MIN/{1.73_M2}
GLUCOSE BLD-MCNC: 119 MG/DL (ref 70–99)
HCT VFR BLD CALC: 41.9 % (ref 40.7–50.3)
HEMOGLOBIN: 14.1 G/DL (ref 13–17)
IMMATURE GRANULOCYTES: 0 %
LYMPHOCYTES # BLD: 23 % (ref 24–43)
LYMPHS CSF: 87 %
MCH RBC QN AUTO: 31 PG (ref 25.2–33.5)
MCHC RBC AUTO-ENTMCNC: 33.7 G/DL (ref 28.4–34.8)
MCV RBC AUTO: 92.1 FL (ref 82.6–102.9)
METAYELO CSF: NORMAL %
MONO/MACROPHAGE CSF (MANUAL): NORMAL %
MONOCYTES # BLD: 13 % (ref 3–12)
MYELOCYTE CSF: NORMAL %
NEUTROPHILS, CSF: 0 %
NRBC AUTOMATED: 0 PER 100 WBC
OTHER CELLS FLUID: NORMAL %
PDW BLD-RTO: 12.1 % (ref 11.8–14.4)
PLATELET # BLD: 160 K/UL (ref 138–453)
PLATELET ESTIMATE: ABNORMAL
PMV BLD AUTO: 11.5 FL (ref 8.1–13.5)
POTASSIUM SERPL-SCNC: 4 MMOL/L (ref 3.7–5.3)
RBC # BLD: 4.55 M/UL (ref 4.21–5.77)
RBC # BLD: ABNORMAL 10*6/UL
SEG NEUTROPHILS: 64 % (ref 36–65)
SEGMENTED NEUTROPHILS ABSOLUTE COUNT: 3.87 K/UL (ref 1.5–8.1)
SODIUM BLD-SCNC: 134 MMOL/L (ref 135–144)
WBC # BLD: 6.1 K/UL (ref 3.5–11.3)
WBC # BLD: ABNORMAL 10*3/UL

## 2020-07-24 PROCEDURE — 6360000002 HC RX W HCPCS

## 2020-07-24 PROCEDURE — 99232 SBSQ HOSP IP/OBS MODERATE 35: CPT | Performed by: NURSE PRACTITIONER

## 2020-07-24 PROCEDURE — 2580000003 HC RX 258: Performed by: STUDENT IN AN ORGANIZED HEALTH CARE EDUCATION/TRAINING PROGRAM

## 2020-07-24 PROCEDURE — 99291 CRITICAL CARE FIRST HOUR: CPT | Performed by: INTERNAL MEDICINE

## 2020-07-24 PROCEDURE — 93005 ELECTROCARDIOGRAM TRACING: CPT | Performed by: STUDENT IN AN ORGANIZED HEALTH CARE EDUCATION/TRAINING PROGRAM

## 2020-07-24 PROCEDURE — 2500000003 HC RX 250 WO HCPCS: Performed by: STUDENT IN AN ORGANIZED HEALTH CARE EDUCATION/TRAINING PROGRAM

## 2020-07-24 PROCEDURE — 76937 US GUIDE VASCULAR ACCESS: CPT

## 2020-07-24 PROCEDURE — 6360000002 HC RX W HCPCS: Performed by: STUDENT IN AN ORGANIZED HEALTH CARE EDUCATION/TRAINING PROGRAM

## 2020-07-24 PROCEDURE — 2000000000 HC ICU R&B

## 2020-07-24 PROCEDURE — 95708 EEG WO VID EA 12-26HR UNMNTR: CPT

## 2020-07-24 PROCEDURE — 85025 COMPLETE CBC W/AUTO DIFF WBC: CPT

## 2020-07-24 PROCEDURE — 99233 SBSQ HOSP IP/OBS HIGH 50: CPT | Performed by: INTERNAL MEDICINE

## 2020-07-24 PROCEDURE — 51798 US URINE CAPACITY MEASURE: CPT

## 2020-07-24 PROCEDURE — 36415 COLL VENOUS BLD VENIPUNCTURE: CPT

## 2020-07-24 PROCEDURE — 009U3ZX DRAINAGE OF SPINAL CANAL, PERCUTANEOUS APPROACH, DIAGNOSTIC: ICD-10-PCS | Performed by: RADIOLOGY

## 2020-07-24 PROCEDURE — 80048 BASIC METABOLIC PNL TOTAL CA: CPT

## 2020-07-24 PROCEDURE — 70551 MRI BRAIN STEM W/O DYE: CPT

## 2020-07-24 RX ORDER — HALOPERIDOL 5 MG/ML
INJECTION INTRAMUSCULAR
Status: COMPLETED
Start: 2020-07-24 | End: 2020-07-24

## 2020-07-24 RX ORDER — DEXMEDETOMIDINE HYDROCHLORIDE 4 UG/ML
0.2 INJECTION, SOLUTION INTRAVENOUS CONTINUOUS
Status: DISCONTINUED | OUTPATIENT
Start: 2020-07-24 | End: 2020-07-29

## 2020-07-24 RX ORDER — ZIPRASIDONE MESYLATE 20 MG/ML
10 INJECTION, POWDER, LYOPHILIZED, FOR SOLUTION INTRAMUSCULAR ONCE
Status: COMPLETED | OUTPATIENT
Start: 2020-07-24 | End: 2020-07-24

## 2020-07-24 RX ORDER — LORAZEPAM 2 MG/ML
INJECTION INTRAMUSCULAR
Status: COMPLETED
Start: 2020-07-24 | End: 2020-07-24

## 2020-07-24 RX ORDER — HALOPERIDOL 5 MG/ML
5 INJECTION INTRAMUSCULAR ONCE
Status: COMPLETED | OUTPATIENT
Start: 2020-07-24 | End: 2020-07-24

## 2020-07-24 RX ORDER — LORAZEPAM 2 MG/ML
1 INJECTION INTRAMUSCULAR ONCE
Status: COMPLETED | OUTPATIENT
Start: 2020-07-24 | End: 2020-07-24

## 2020-07-24 RX ORDER — LORAZEPAM 2 MG/ML
2 INJECTION INTRAMUSCULAR ONCE
Status: COMPLETED | OUTPATIENT
Start: 2020-07-24 | End: 2020-07-24

## 2020-07-24 RX ADMIN — ENOXAPARIN SODIUM 40 MG: 40 INJECTION SUBCUTANEOUS at 09:42

## 2020-07-24 RX ADMIN — ACYCLOVIR SODIUM 1000 MG: 50 INJECTION, SOLUTION INTRAVENOUS at 03:19

## 2020-07-24 RX ADMIN — SODIUM CHLORIDE: 9 INJECTION, SOLUTION INTRAVENOUS at 03:20

## 2020-07-24 RX ADMIN — LEVETIRACETAM 500 MG: 5 INJECTION INTRAVENOUS at 21:24

## 2020-07-24 RX ADMIN — HALOPERIDOL 5 MG: 5 INJECTION INTRAMUSCULAR at 16:05

## 2020-07-24 RX ADMIN — ACYCLOVIR SODIUM 1000 MG: 50 INJECTION, SOLUTION INTRAVENOUS at 20:11

## 2020-07-24 RX ADMIN — WATER 1.2 ML: 1 INJECTION INTRAMUSCULAR; INTRAVENOUS; SUBCUTANEOUS at 13:37

## 2020-07-24 RX ADMIN — DEXMEDETOMIDINE HYDROCHLORIDE 0.2 MCG/KG/HR: 400 INJECTION INTRAVENOUS at 12:49

## 2020-07-24 RX ADMIN — ZIPRASIDONE MESYLATE 10 MG: 20 INJECTION, POWDER, LYOPHILIZED, FOR SOLUTION INTRAMUSCULAR at 13:37

## 2020-07-24 RX ADMIN — SODIUM CHLORIDE, PRESERVATIVE FREE 10 ML: 5 INJECTION INTRAVENOUS at 21:24

## 2020-07-24 RX ADMIN — HALOPERIDOL LACTATE 5 MG: 5 INJECTION, SOLUTION INTRAMUSCULAR at 16:05

## 2020-07-24 RX ADMIN — LORAZEPAM 1 MG: 2 INJECTION INTRAMUSCULAR at 10:00

## 2020-07-24 RX ADMIN — LORAZEPAM 2 MG: 2 INJECTION INTRAMUSCULAR at 11:52

## 2020-07-24 RX ADMIN — ACYCLOVIR SODIUM 1000 MG: 50 INJECTION, SOLUTION INTRAVENOUS at 12:17

## 2020-07-24 RX ADMIN — SODIUM CHLORIDE, PRESERVATIVE FREE 10 ML: 5 INJECTION INTRAVENOUS at 09:42

## 2020-07-24 RX ADMIN — LEVETIRACETAM 500 MG: 5 INJECTION INTRAVENOUS at 09:42

## 2020-07-24 RX ADMIN — SODIUM CHLORIDE: 9 INJECTION, SOLUTION INTRAVENOUS at 17:27

## 2020-07-24 RX ADMIN — DEXMEDETOMIDINE HYDROCHLORIDE 0.4 MCG/KG/HR: 400 INJECTION INTRAVENOUS at 19:16

## 2020-07-24 NOTE — PROGRESS NOTES
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  · Hyponatremia  · Mild transaminitis  · Full code    Recommendations:   · Acyclovir 10 mg/kg IV every 8 hours pending exclusion of herpes simplex type I  · Supportive care      Medical Decision Making/Summary/Discussion:7/24/2020     ·   Infection Control Recommendations   · [place] Precautions    Antimicrobial Stewardship Recommendations     · Targeted therapy  · PK dosing  ·   Coordination of Outpatient Care:   · Estimated Length of IV antimicrobials: To be determined  · Patient will need Midline Catheter Insertion: Yes  · Patient will need PICC line Insertion: No  · Patient will need: Home IV , [place],  SNF,  LTAC: To be determined  · Patient will need outpatient wound care: No    Chief complaint/reason for consultation:   · Altered mental status with progression over a few days      History of Present Illness:   Paula Fragoso is a 79y.o.-year-old  male who was initially admitted on 7/23/2020. Patient seen at the request of Jacob Hernández. 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 He is unable to provide any information himself at this point in time. Available information reviewed and discussed with Dr. Yudy Boles.    Patient apparently developed onset of some dysuria and pain in the pelvic and right hip area starting about 7 to the 10 days prior to admission. In addition he was exhibiting some fatigue. He has a pre-existing history of prostatic hypertrophy with previous documented urinary tract infections. The patient apparently felt that he was coming down with a urinary tract infection and had a started doxycycline on 7/20/2020 without benefit.   He seems to have experienced prior problems with use of quinolones that may have influenced his choice of antibiotics. On 7/21/2020 he was noticed to have some mild confusion and problems with cognition were initially attributed to the use of doxycycline. He was evaluated at [facility] and found to have a high-grade fever. He had urine and blood cultures obtained to exclude urinary tract sepsis. The urine and blood cultures have not shown any growth. His urine analysis was not very impressive. He was treated with cefepime with no benefit. The patient showed some cough but had no hypoxia. The chest x-ray did not show any pneumonia but had some mild chronic changes. CT of the abdomen and pelvis was obtained which showed the presence of a large prostate. No other abnormalities were noted. While active emergency he had a temperature 101 °F on 7/22/2020. No additional fevers were noted. Upon arrival to emergency [facility] patient is disoriented, confused, he is not able to provide any information or to answer any questions. His laboratory tests show some mild degree of hyponatremia and mild transaminitis. A spinal tap was done to exclude herpetic encephalitis. The CSF showed the bloody fluid with a protein level of 257 glucose of 43 RBC count of 3000 with 1348 white cells 80% of which were lymphocytes. The spinal tap was described as being mildly traumatic with progressive clearing of the spinal fluid. We will await the results of the infectious CSF panel. Plan to treat for the treatable conditions such as mycoplasma, herpes simplex. The presence of hemorrhage suggests Herpes simplex. CURRENT EVALUATION :07/24/20      Patient evaluated and examined in ICU. Temperature: 100  Respiratory rate: 25  Blood pressure: 142/72  No leukocytosis     HSV 1  is  detected on the CSF meningitis  panel on 7/23.         Labs, X rays reviewed: 7/24/2020    BUN: 12  Cr: 0.55-->0.61    WBC: 6.3-->6.1  Hb: 14.1  Plat: 153-->160    Cultures:  Urine:  ·   Blood:  ·   Sputum :  ·   Wound:  ·   CSF :  · 7/23:no growth    COVID-19 test:  · 7/23/2020- negative    · 7/23/2020 CT of the head: No acute abnormality  ·     Viral panel; Herpes simplex type I positive    Discussed with patient, RN, family. I have personally reviewed the past medical history, past surgical history, medications, social history, and family history, and I have updated the database accordingly. Past Medical History:   No past medical history on file. Past Surgical  History:   No past surgical history on file.     Medications:      sodium chloride flush  10 mL Intravenous 2 times per day    enoxaparin  40 mg Subcutaneous Daily    acyclovir  1,000 mg Intravenous Q8H    levetiracetam  500 mg Intravenous BID       Social History:     Social History     Socioeconomic History    Marital status:      Spouse name: Not on file    Number of children: Not on file    Years of education: Not on file    Highest education level: Not on file   Occupational History    Not on file   Social Needs    Financial resource strain: Not hard at all   [employer] insecurity     Worry: Never true     Inability: Never true   [employer] needs     Medical: Not on file     Non-medical: Not on file   Tobacco Use    Smoking status: Never Smoker    Smokeless tobacco: Never Used   Substance and Sexual Activity    Alcohol use: Not on file    Drug use: Not on file    Sexual activity: Not on file   Lifestyle    Physical activity     Days per week: Not on file     Minutes per session: Not on file    Stress: Not on file   Relationships    Social connections     Talks on phone: Not on file     Gets together: Not on file     Attends Hinduism service: Not on file     Active member of club or organization: Not on file     Attends meetings of clubs or organizations: Not on file     Relationship status: Not on file    Intimate partner violence     Fear of current or ex partner: Not on file     Emotionally abused: Not on file     Physically abused: Not on file     Forced sexual activity: Not on file   Other Topics Concern    Not on file   Social History Narrative    Not on file       Family History:   No family history on file. Allergies:   Ibuprofen     Review of Systems:     Unable to provide. Encephalopathic. 7/24/2020      Constitutional: No fevers or chills. No systemic complaints  Head: No headaches  Eyes: No double vision or blurry vision. No conjunctival inflammation. ENT: No sore throat or runny nose. . No hearing loss, tinnitus or vertigo. Cardiovascular: No chest pain or palpitations. No shortness of breath. No DONALDSON  Lung: No shortness of breath or cough. No sputum production  Abdomen: No nausea, vomiting, diarrhea, or abdominal pain. Geralene Sonny No cramps. Genitourinary: No increased urinary frequency, or dysuria. No hematuria. No suprapubic or CVA pain  Musculoskeletal: No muscle aches or pains. No joint effusions, swelling or deformities  Hematologic: No bleeding or bruising. Neurologic: No headache, weakness, numbness, or tingling. Integument: No rash, no ulcers. Psychiatric: No depression. Endocrine: No polyuria, no polydipsia, no polyphagia. Physical Examination :     Patient Vitals for the past 8 hrs:   BP Temp Temp src Pulse Resp SpO2   07/24/20 0600 (!) 142/72 -- -- 74 25 96 %   07/24/20 0500 124/69 -- -- 83 26 98 %   07/24/20 0400 (!) 146/76 100 °F (37.8 °C) Axillary 76 23 97 %   07/24/20 0315 132/67 -- -- 75 27 97 %   07/24/20 0200 (!) 144/69 -- -- 68 25 98 %     General Appearance: Somnolent  Head:  Normocephalic, no trauma  Eyes: Pupils equal, round, reactive to light; sclera anicteric; conjunctivae pink. No embolic phenomena. ENT: Oropharynx clear, without erythema, exudate, or thrush. No tenderness of sinuses. Mouth/throat: mucosa pink and moist. No lesions. Dentition in good repair. Neck:Supple, without lymphadenopathy. Thyroid normal, No bruits. Pulmonary/Chest: Clear to auscultation, without wheezes, rales, or rhonchi.   No dullness to percussion. Cardiovascular: Regular rate and rhythm without murmurs, rubs, or gallops. Abdomen: Soft, non tender. Bowel sounds normal. No organomegaly  All four Extremities: No cyanosis, clubbing, edema, or effusions. . Scar from prior right hip arthroplasty  Neurologic: No gross sensory or motor deficits. Moves all extremities. Encephalopathic  Skin: Warm and dry with good turgor. No signs of peripheral arterial or venous insufficiency. No ulcerations. No open wounds. Medical Decision Making -Laboratory:   I have independently reviewed/ordered the following labs:    CBC with Differential:   Recent Labs     07/23/20  1712 07/24/20  0403   WBC 6.3 6.1   HGB 14.1 14.1   HCT 41.5 41.9    160   LYMPHOPCT 13* 23*   MONOPCT 12 13*     BMP:   Recent Labs     07/22/20  0530 07/23/20  0747 07/23/20 1712 07/24/20  0403   * 129* 131* 134*   K 4.2 4.2 4.4 4.0    98 97* 101   CO2 21 22 21 20   BUN 15 12 12 12   CREATININE 0.64* 0.65* 0.55* 0.61*   MG 2.0 2.1  --   --      Hepatic Function Panel:   Recent Labs     07/23/20  0747 07/23/20  1712 07/23/20  1909   PROT 6.2* 6.2*  --    LABALBU 3.9 4.0 4.1   BILIDIR  --  0.21  --    IBILI  --  0.53  --    BILITOT 0.75 0.74  --    ALKPHOS 39* 39*  --    ALT 58* 59*  --    AST 28 31  --      No results for input(s): RPR in the last 72 hours. No results for input(s): HIV in the last 72 hours. No results for input(s): BC in the last 72 hours.   Lab Results   Component Value Date    MUCUS NOT REPORTED 07/22/2020    RBC 4.55 07/24/2020    TRICHOMONAS NOT REPORTED 07/22/2020    WBC 6.1 07/24/2020    YEAST NOT REPORTED 07/22/2020    TURBIDITY CLEAR 07/22/2020     Lab Results   Component Value Date    CREATININE 0.61 07/24/2020    CREATININE 1.0 01/16/2020    GLUCOSE 119 07/24/2020       Medical Decision Making-Imaging:     CT OF THE HEAD WITHOUT CONTRAST  7/23/2020 8:09 am         TECHNIQUE:    CT of the head was performed without the administration of intravenous    contrast. Dose modulation, iterative reconstruction, and/or weight based    adjustment of the mA/kV was utilized to reduce the radiation dose to as low    as reasonably achievable.         COMPARISON:    None.         HISTORY:    ORDERING SYSTEM PROVIDED HISTORY: DELERIUM    TECHNOLOGIST PROVIDED HISTORY:    DELERIUM              FINDINGS:    BRAIN/VENTRICLES: There is no acute intracranial hemorrhage, mass effect or    midline shift.  No abnormal extra-axial fluid collection.  The gray-white    differentiation is maintained without evidence of an acute infarct.  There is    no evidence of hydrocephalus.         ORBITS: The visualized portion of the orbits demonstrate no acute abnormality.         SINUSES: The visualized paranasal sinuses and mastoid air cells demonstrate    no acute abnormality.         SOFT TISSUES/SKULL:  No acute abnormality of the visualized skull or soft    tissues.              Impression    No acute intracranial abnormality. EXAMINATION:    ONE XRAY VIEW OF THE CHEST         7/21/2020 6:32 pm         COMPARISON:    None.         HISTORY:    ORDERING SYSTEM PROVIDED HISTORY: sepsis    TECHNOLOGIST PROVIDED HISTORY:    sepsis         FINDINGS:    Heart size is normal.  Interstitial markings are prominent which may be    related to interstitial pneumonitis.  No effusion, extrapleural air or    localized consolidation is noted.  Osseous and mediastinal structures are    age-appropriate.              Impression    Diffuse prominence of the interstitial markings which may be related to    interstitial pneumonitis.  No focal consolidation.           EXAMINATION:    CT OF THE ABDOMEN AND PELVIS WITH CONTRAST 7/21/2020 4:06 pm         TECHNIQUE:    CT of the abdomen and pelvis was performed with the administration of    intravenous contrast. Multiplanar reformatted images are provided for review.     Dose modulation, iterative reconstruction, and/or weight based adjustment of the mA/kV was utilized to reduce the radiation dose to as low as reasonably    achievable.         COMPARISON:    CT urogram January 6, 2020         HISTORY:    ORDERING SYSTEM PROVIDED HISTORY: Lower abdominal pain    TECHNOLOGIST PROVIDED HISTORY:              FINDINGS:    Lower Chest: Clear         Organs:         The abdominal wall appears normal.         The liver, spleen, pancreas, and adrenals appear normal.  Multiple punctate    calcified granulomas in the spleen.         Bilateral simple renal cortical cysts.         Bladder is decompressed and circumferentially thickened.         GI/Bowel: The stomach,small bowel, and colon appear normal. Oral contrast    noted throughout the small bowel.  Stool noted throughout the liver large    bowel.  Appendix normal.         Pelvis: Markedly enlarged prostate.  It measures 68 x 83 mm in AP and    transverse dimensions.         Peritoneum/Retroperitoneum: The abdominal aorta and iliac arteries are normal    in caliber. There is no pathologic adenopathy.         Bones/Soft Tissues: Right hip arthroplasty.              Impression    Markedly enlarged prostate.  Possible cystitis.  Increased stool.           EXAMINATION:    ONE XRAY VIEW OF THE PELVIS AND TWO XRAY VIEWS RIGHT HIP         6/25/2020 7:29 am         COMPARISON:    10/23/2017         HISTORY:    ORDERING SYSTEM PROVIDED HISTORY: Acute right hip pain         FINDINGS:    Status post right hip arthroplasty.  No evidence of hardware complications. Stable alignment.         Elsewhere, no acute fracture or dislocation.  Moderate left hip    osteoarthritis.  The SI joints and sacral arcuate lines appear intact. Degenerative changes of the SI joints are also seen.         No soft tissue abnormality.              Impression    1. Status post right hip arthroplasty.  No complication. 2. No acute osseous abnormality of the pelvis. 3. Moderate left hip osteoarthritis.         Medical Decision [id number]:       Medical Decision Making-Other:     Note:  · Labs, medications, radiologic studies were reviewed with personal review of films  · Large amounts of data were reviewed  · Discussed with nursing Staff, Discharge planner  · Infection Control and Prevention measures reviewed  · All prior entries were reviewed  · Administer medications as ordered  · Prognosis: Guarded  · Discharge planning reviewed  · Follow up as outpatient. Thank you for allowing us to participate in the care of this patient. Please call with questions. Sue Cardona DPM  Pager: [phone] - Office: [phone]    ATTESTATION:    I have discussed the case, including pertinent history and exam findings with the residents and students. I have seen and examined the patient and the key elements of the encounter have been performed by me. I was present when the student obtained his information or examined the patient. I have reviewed the laboratory data, other diagnostic studies and discussed them with the residents. I have updated the medical record where necessary. I agree with the assessment, plan and orders as documented by the resident/ student.     Car Smith MD.

## 2020-07-24 NOTE — DISCHARGE INSTR - COC
Continuity of Care Form    Patient Name: Marcos Jones   :  1950  MRN:  [id number]    [facility] date:  2020  Discharge date:  2020    Code Status Order: Full Code   Advance Directives:   Advance Care Flowsheet Documentation     Date/Time Healthcare Directive Type of Healthcare Directive Copy in [address] Agent's Name Healthcare Agent's Phone Number    20 0042  Yes, patient has an advance directive for healthcare treatment  Durable power of  for health care;Living will  Yes, copy in chart  Adult Children  Annabelle [address] [facility]  [phone] & [phone]          Admitting Physician:  Tarun Webster DO  PCP: Becky Rodriguez MD    Discharging Nurse: [facility] Unit/Room#: 0125/0125-01  Discharging Unit Phone Number:     Emergency Contact:   Extended Emergency Contact Information  Primary Emergency Contact: Mayrasusan Corbin [place] Phone: [phone]  Relation: Other  Secondary Emergency Contact: Silvino Abrams  Mobile Phone: [phone]  Relation: Child   needed? No    Past Surgical History:  No past surgical history on file.     Immunization History:   Immunization History   Administered Date(s) Administered    Influenza Virus Vaccine 10/30/2015    Influenza, High Dose (Fluzone 65 yrs and older) 10/06/2017, 10/19/2018, 10/16/2019    Influenza, Quadv, IM, PF (6 mo and older Fluzone, Flulaval, Fluarix, and 3 yrs and older Afluria) 10/03/2016    Pneumococcal Conjugate 13-valent Avanell Lust) 10/30/2015       Active Problems:  Patient Active Problem List   Diagnosis Code    Cervical spinal stenosis M48.02    Spinal stenosis of lumbar region M48.061    Sepsis ([facility] [address] A41.9    Acute cystitis without hematuria N30.00    Incomplete emptying of bladder due to benign prostatic hyperplasia N40.1, R39.14    Encephalitis G04.90    Suspected COVID-19 virus infection Z20.828    COVID-19 ruled out by laboratory testing Z03.818    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    Disorientation R41.0       Isolation/Infection:   Isolation          No Isolation        Patient Infection Status     Infection Onset Added Last Indicated Last Indicated By Review Planned Expiration Resolved Resolved By    None active    Resolved    COVID-19 Rule Out 07/23/20 07/23/20 07/23/20 COVID-19 (Ordered)   07/23/20 Rule-Out Test Resulted          Nurse Assessment:  Last Vital Signs: BP (!) 147/74   Pulse 70   Temp 98.1 °F (36.7 °C) (Oral)   Resp (!) 31   Wt 211 lb 6.7 oz (95.9 kg)   SpO2 96%   BMI 30.34 kg/m²     Last documented pain score (0-10 scale): Pain Level: 0  Last Weight:   Wt Readings from Last 1 Encounters:   07/23/20 211 lb 6.7 oz (95.9 kg)     Mental Status:  oriented, alert, coherent and logical    IV Access:  - Peripheral IV - site  {Anatomy; iv placement site:21454}, insertion date: ***    Nursing Mobility/ADLs:  Walking   Assisted  Transfer  Assisted  Bathing  Assisted  Dressing  Assisted  Toileting  Assisted  Feeding  Independent  Med Admin  Independent  Med Delivery   whole    Wound Care Documentation and Therapy:        Elimination:  Continence:   · Bowel: Yes  · Bladder: Yes  Urinary Catheter: None   Colostomy/Ileostomy/Ileal Conduit: No       Date of Last BM: unknown    Intake/Output Summary (Last 24 hours) at 7/24/2020 1135  Last data filed at 7/24/2020 0942  Gross per 24 hour   Intake 2024 ml   Output 2250 ml   Net -226 ml     I/O last 3 completed shifts: In: 1416 [I.V.:1416]  Out: [place] [Urine:1550]    Safety Concerns:      At Risk for Falls    Impairments/Disabilities:      None    Nutrition Therapy:  Current Nutrition Therapy:   - Oral Diet:  General    Routes of Feeding: Oral  Liquids: No Restrictions  Daily Fluid Restriction: no  Last Modified Barium Swallow with Video (Video Swallowing Test): not done    Treatments at the Time of Hospital Discharge:   Respiratory Treatments: none  Oxygen Therapy:  is not on home oxygen therapy. Ventilator:    - No ventilator support    Rehab Therapies: Physical Therapy and Occupational Therapy  Weight Bearing Status/Restrictions: No weight bearing restirctions  Other Medical Equipment (for information only, NOT a DME order):  walker and bath bench  Other Treatments: IV infusion and midline care. Patient's personal belongings (please select all that are sent with patient):  None    RN SIGNATURE:  Electronically signed by Geetha Coronado on 8/4/20 at 11:04 AM EDT    CASE MANAGEMENT/SOCIAL WORK SECTION    Inpatient Status Date: ***    Readmission Risk Assessment Score:  Readmission Risk              Risk of Unplanned Readmission:        9           Discharging to Facility/ Agency   Name:   [facility]       Phone: [phone]       Fax: [phone]        · [facility]- For hospital bed  [phone]  · [place] for IV antibiotics [phone]    Dialysis Facility (if applicable)   · Name:  · Address:  · Dialysis Schedule:  · Phone:  · Fax:    / signature: Electronically signed by Jass Lopez RN on 8/3/20 at 2:45 PM EDT    PHYSICIAN SECTION    Prognosis: Fair    Condition at Discharge: Stable    Rehab Potential (if transferring to Rehab): Good    Recommended Labs or Other Treatments After Discharge: IV Unasyn until 4/13/6189    Physician Certification: I certify the above information and transfer of James Knox  is necessary for the continuing treatment of the diagnosis listed and that he requires [address] for less 30 days.      Update Admission H&P: No change in H&P    PHYSICIAN SIGNATURE:  Electronically signed by Magali Cobian MD on 8/4/20 at 8:59 AM EDT

## 2020-07-24 NOTE — CARE COORDINATION
Case Management Initial Discharge Plan  Paula Fragoso             Met with:patient and mark at bedside to discuss discharge plans. Information verified: address, contacts, phone number, , insurance Yes    Emergency Contact/Next of Kin name & number: lisa [facility] - [phone]    PCP: Freda Cintron MD  Date of last visit: past year    Insurance Provider: Medicare/MMO    Discharge Planning    Living Arrangements:      Support Systems:       Home has 3 stories  3 stairs to climb to get into front door, stairs to climb to reach second floor  Location of bedroom/bathroom in home main    Patient able to perform ADL's:Independent    Current Services (outpatient & in home) none  DME equipment:   DME provider:     Receiving oral anticoagulation therapy? No    If indicated:   Physician managing anticoagulation treatment:   Where does patient obtain lab work for ATC treatment? Potential Assistance Needed:       Patient agreeable to home care: No  [place] of choice provided:  no    Prior SNF/Rehab Placement and Facility: no  Agreeable to SNF/Rehab: No  [place] of choice provided: no     Evaluation: no    Expected Discharge date:       Patient expects to be discharged to: Follow Up Appointment: Best Day/ Time:      Transportation provider: self  Transportation arrangements needed for discharge: No    Readmission Risk              Risk of Unplanned Readmission:        9             Does patient have a readmission risk score greater than 14?: No  If yes, follow-up appointment must be made within 7 days of discharge. Goals of Care: find out why he is confused      Discharge Plan: home with SO - follow for home care needs.  Transfer from [facility] a [facility]          Electronically signed by Antolin Webb RN on 20 at 8:47 AM EDT

## 2020-07-24 NOTE — PROGRESS NOTES
This morning pt calm and cooperative, A&O x3. Agreeable to MRI but anxious and requesting something to \"get him through it\". Critical Care aware. Ativan ordered. 10:17- pt to MRI with writer, 1mg Ativan given IV prior to transfer to MRI. Pt restless and attempting to stay still, slightly confused but redirectable and calm. 11:00- IV Contrast given, pt increasingly agitated stating \"get me out of here now. \" when redirected and reoriented pt calm. Critical Care called and notified of pt increasingly anxious and restless, ativan order given. After receiving second dose of ativan- pt aggressive, trying to get up and off table- took pt out of scanner and originated transfer back to MICU room 125.  12:30- upon arrival back to unit pt became increasing confused only oriented to self and combative/verbal.  Critical Care at bedside- bilateral wrist and ankle restraints applied and Precedex gtt ordered. Will continue to monitor- see flowsheets for head to toe assessments and eMar for medication administrations.

## 2020-07-24 NOTE — PLAN OF CARE
Problem: Falls - Risk of:  Goal: Will remain free from falls  Description: Will remain free from falls  Outcome: Met This Shift  Goal: Absence of physical injury  Description: Absence of physical injury  Outcome: Met This Shift     Problem: Anxiety/Stress:  Goal: Level of anxiety will decrease  Description: Level of anxiety will decrease  7/24/2020 0114 by Amina Issa RN  Outcome: Ongoing  7/23/2020 1735 by Skyla Marx RN  Outcome: Ongoing     Problem: Mental Status - Impaired:  Goal: Mental status will be restored to baseline  Description: Mental status will be restored to baseline  7/24/2020 0114 by Amina Issa RN  Outcome: Ongoing  7/23/2020 1735 by Skyla Marx RN  Outcome: Ongoing     Problem: Skin Integrity:  Goal: Will show no infection signs and symptoms  Description: Will show no infection signs and symptoms  Outcome: Ongoing  Goal: Absence of new skin breakdown  Description: Absence of new skin breakdown  Outcome: Ongoing

## 2020-07-24 NOTE — CARE COORDINATION
Patient/family seen: Yes       Informed patient/family of [facility] Medical Bundle Program with potential outreach by either Care Transitions Team or [facility] Team based on hospital admission and location.        [facility]-A Notification Letter given: Yes         Current discharge plan: home goal - testing not complete

## 2020-07-24 NOTE — PROGRESS NOTES
arrival, patient was stable hemodynamically, saturating well on room air, easily arousable, oriented to self only, moving all limbs but unable to follow commands. Patient underwent LP and was diagnosed with herpes encephalitis (HSV-1). He was continued on acyclovir and  Got loaded with Keppra 1 g IVPB x1 followed by maintenance dose of 500 mg twice a day. EEG was done -result awaited. MRI brain with and without contrast was ordered but patient was unable to complete the scan with contrast due to severe agitation; MRI negative.  LORazepam  1 mg Intravenous Once    LORazepam        LORazepam  2 mg Intravenous Once    sodium chloride flush  10 mL Intravenous 2 times per day    enoxaparin  40 mg Subcutaneous Daily    acyclovir  1,000 mg Intravenous Q8H    levetiracetam  500 mg Intravenous BID       No past medical history on file. No past surgical history on file. PHYSICAL EXAM:      Blood pressure (!) 147/74, pulse 70, temperature 98.1 °F (36.7 °C), temperature source Oral, resp. rate (!) 31, weight 211 lb 6.7 oz (95.9 kg), SpO2 96 %.       Limited Neurological Examination:  Pt was very restless, + akathisia, confused   Mostly kept his eyes closed  No verbal output   He did not follow any commands  Didn't follow any commands   Pupils -resisted examination  Moving all limbs almost constantly  Withdrawal on painful stimuli  DTRs/plantars -deferred due to patient's constant movements            DATA    Lab Results   Component Value Date    WBC 6.1 07/24/2020    HGB 14.1 07/24/2020    HCT 41.9 07/24/2020     07/24/2020    ALT 59 (H) 07/23/2020    AST 31 07/23/2020     (L) 07/24/2020    K 4.0 07/24/2020     07/24/2020    CREATININE 0.61 (L) 07/24/2020    BUN 12 07/24/2020    CO2 20 07/24/2020    TSH 1.33 07/23/2020    PSA 14.54 (H) 01/03/2020    INR 1.1 07/23/2020    [id number] 616 07/23/2020    FOLATE 17.6 07/23/2020     No results found for: CHOL  No results found for: TRIG  Lab Results   Component Value Date    HDL 56 11/17/2017     Lab Results   Component Value Date    LDLCHOLESTEROL 116 11/17/2017     Lab Results   Component Value Date    VLDL NOT REPORTED 11/17/2017     Lab Results   Component Value Date    CHOLHDLRATIO 3.3 11/17/2017 7/23/2020 19:09   Total IgG 767     CARRIE       Negative      7/21/2020 08:00   CRP 0.3   ESR 4       CSF STUDY:   7/23/2020 19:09   Albumin Index 339.8 (H)   Albumin, CSF 1393 (H)   Appearance, CSF Bloody   Glucose, CSF 43   IgG Index, CSF 0.54   IgG Synthesis Rate 10.7 (H)   IgG, CSF 14.0 (H)   Oligo Bands    Myelin basic protein    Protein, .4 (H)   RBC, CSF 3,000 (H)   WBC, CSF 1,348 (H)   Neutrophils, CSF 0   Lymphs, CSF 87   Cx Negative   HSV-1 Detected   LD    Toxoplasma AB    Borrelia Burgdorferi      HIV           Negative      DIAGNOSTIC DATA:  CT HEAD (7/23/2020): No acute intracranial abnormality     MRI BRAIN (7/24/2020): No acute intracranial abnormality     EEG (7/23/2020): Result awaited    LTME                          IMPRESSION: 79 y.o.  male admitted with  Acute encephalopathy in the setting of acute herpes simplex encephalitis (HSV-1); on acyclovir. Pt was very restless, + akathisia, confused, no verbal output; didn't follow any commands     He was unable to complete the MRI head with contrast due to severe agitation; has been started on Precedex by the primary team; avoid benzos    Was loaded with Keppra 1 g IVPB x1; continue Keppra 500 mg IVPB Q12hr for seizure prophylaxis. EEG- result awaited    Comorbid conditions - chronic prostatism, UTIs, chronic low back pain    Will follow    Please note that this note was generated using a voice recognition dictation software. Although every effort was made to ensure the accuracy of this automated transcription, some errors in transcription may have occurred.

## 2020-07-24 NOTE — FLOWSHEET NOTE
SPIRITUAL CARE DEPARTMENT - [facility] [address]  PROGRESS NOTE    Shift date: 7/23/2020  Shift day: Thursday   Shift # 3    Room # 0125/0125-01   Name: Lexii Howell            Age: 79 y.o. Gender: male          Hoahaoism: Non-Lutheran   Place of Orthodox: Unknown    Referral: Direct transfer from [facility] - per Dr. Dutch Siddiqi Date & Time: 7/23/2020  3:02 PM    PATIENT/EVENT DESCRIPTION:  Lexii Howell is a 79 y.o. male who was a direct admission from [facility] for \"sepsis and AMS. \" Patient was sleeping when  was present on unit. SPIRITUAL ASSESSMENT/INTERVENTION:  Patricia Kell was present in ED Triage when patient's fiance arrived to the desk requesting an escort to patient's bedside. Patient's fipatricia had a rolling suitcase with her and Amita Cordon been dropped off at the hospital by her daughter. \"  escorted patient's fiance to first floor waiting room, outside unit, and visited with charge nurse regarding overnight visitation. Per charge nurse, there are \"no overnight visitors, unless patient is end of life. \" Patricia Castorena and charge nurse spoke with [employer] regarding situation.  and [employer] met with patient's fiance in waiting room and informed her of the firm no overnight visitor policy. Patient's fiance expressed anger and frustration over situation. Per fiance, Ledy Lee has been with patient all day and night at [facility] since his admission on Tuesday. \" Fiance continued, indicating that \"Dr. Yohan Sullivan promised she could continue to stay at bedside 24/7 at [facility]. V's.\"  and House Supervisor returned to unit to speak to charge nurse.  inquired with ED Social Work about [facility] bed availability, however, there were no beds available at time of call. While  was off unit, patient's fiance spoke to personnel at [facility] and Dr. Yohan Sullivan contacted unit to confirm his promise of overnight visitation for fiance.  Per bedside nurse, patient's fiance was then escorted to patient's

## 2020-07-24 NOTE — PROGRESS NOTES
Critical Care Team - Daily Progress Note      Date and time: 7/24/2020 9:24 AM  Patient's name:  Eloy Solano  Medical Record Number: [id number]  Patient's account/billing number: [de-identified]  Patient's YOB: 1950  Age: 79 y.o.   Date of Admission: 7/23/2020  3:02 PM  Length of stay during current admission: 1      Primary Care Physician: Tram Borrego MD  ICU Attending Physician: Dr. Aubrie Marti Status: Full Code    Reason for ICU admission: AMS, encephalitis                SUBJECTIVE:     OVERNIGHT EVENTS:       No fevers overnight, patient restless and agitates unable to sleep     AWAKE & FOLLOWING COMMANDS:  [] No   [x] Yes    CURRENT VENTILATION STATUS:     [] Ventilator  [] BIPAP  [] Nasal Cannula [x] Room Air      IF INTUBATED, ET TUBE MARKING AT LOWER LIP:       cms    SECRETIONS Amount:  [] Small [] Moderate  [] Large  [x] None  Color:     [] White [] Colored  [] Bloody    SEDATION:  RAAS Score:  precedex     PARALYZED:  [x] No    [] Yes    DIARRHEA:                [x] No                [] Yes  (C. Difficile status: [] positive                                                                                                                       [] negative                                                                                                                     [] pending)    VASOPRESSORS:  [x] No    [] Yes    If yes -   [] Levophed       [] Dopamine     [] Vasopressin       [] Dobutamine  [] Phenylephrine         [] Epinephrine    CENTRAL LINES:     [x] No   [] Yes   (Date of Insertion:   )           If yes -     [] Right IJ     [] Left IJ [] Right Femoral [] Left Femoral                   [] Right Subclavian [] Left Subclavian       FLEMING'S CATHETER:   [x] No   [] Yes  (Date of Insertion:   )     URINE OUTPUT:            [x] Good   [] Low              [] Anuric      OBJECTIVE:     VITAL SIGNS:  BP (!) 142/72   Pulse 74   Temp 100 °F (37.8 °C) (Axillary)   Resp 25   Wt 211 lb 6.7 oz (95.9 kg)   SpO2 96%   BMI 30.34 kg/m²     Tmax over 24 hours:  Temp (24hrs), Av.1 °F (37.3 °C), Min:98.6 °F (37 °C), Max:100 °F (37.8 °C)      Patient Vitals for the past 6 hrs:   BP Temp Temp src Pulse Resp SpO2   20 0600 (!) 142/72 -- -- 74 25 96 %   20 0500 124/69 -- -- 83 26 98 %   20 0400 (!) 146/76 100 °F (37.8 °C) Axillary 76 23 97 %         Intake/Output Summary (Last 24 hours) at 2020 0924  Last data filed at 2020 0600  Gross per 24 hour   Intake 1416 ml   Output 1550 ml   Net -134 ml     Wt Readings from Last 2 Encounters:   20 211 lb 6.7 oz (95.9 kg)   20 222 lb 3.2 oz (100.8 kg)     Body mass index is 30.34 kg/m². Physical Exam  Constitutional:       Appearance: He is not ill-appearing, toxic-appearing or diaphoretic. Comments: Alert and awake, participating in exam, answering most questions correctly, spontaneously moving all extremities, having difficulty sitting still , uncontrollable movements    HENT:      Nose: Nose normal. No congestion. Mouth/Throat:      Mouth: Mucous membranes are moist.      Pharynx: No oropharyngeal exudate. Eyes:      General:         Right eye: No discharge. Left eye: No discharge. Pupils: Pupils are equal, round, and reactive to light. Neck:      Musculoskeletal: Normal range of motion and neck supple. Comments: No nuchal rigidity   Cardiovascular:      Rate and Rhythm: Normal rate and regular rhythm. Pulses: Normal pulses. Heart sounds: Normal heart sounds. Pulmonary:      Effort: Pulmonary effort is normal.      Breath sounds: Normal breath sounds. Abdominal:      General: There is no distension. Palpations: Abdomen is soft. Tenderness: There is no abdominal tenderness. Musculoskeletal: Normal range of motion. General: No swelling or tenderness. Skin:     General: Skin is warm. Capillary Refill: Capillary refill takes less than 2 seconds. Coloration: Skin is not jaundiced. Findings: No bruising. Neurological:      General: No focal deficit present. Mental Status: He is alert. He is disoriented.       Comments: Disoriented when recalling events, alert and oriented in the now, uncontrollable frequent spontaneuous movements   Psychiatric:         Mood and Affect: Mood normal.           MEDICATIONS:    Scheduled Meds:   sodium chloride flush  10 mL Intravenous 2 times per day    enoxaparin  40 mg Subcutaneous Daily    acyclovir  1,000 mg Intravenous Q8H    levetiracetam  500 mg Intravenous BID     Continuous Infusions:   sodium chloride 100 mL/hr at 07/24/20 0320     PRN Meds:   sodium chloride flush, 10 mL, PRN  acetaminophen, 650 mg, Q6H PRN    Or  acetaminophen, 650 mg, Q6H PRN  polyethylene glycol, 17 g, Daily PRN  promethazine, 12.5 mg, Q6H PRN    Or  ondansetron, 4 mg, Q6H PRN          VENT SETTINGS (Comprehensive) (if applicable):  Vent Information  SpO2: 96 %  Additional Respiratory  Assessments  Pulse: 74  Resp: 25  SpO2: 96 %        Laboratory findings:    Complete Blood Count:   Recent Labs     07/23/20  0747 07/23/20  1712 07/24/20  0403   WBC 5.5 6.3 6.1   HGB 13.8 14.1 14.1   HCT 40.6* 41.5 41.9    153 160        Last 3 Blood Glucose:   Recent Labs     07/23/20  0747 07/23/20  1712 07/24/20  0403   GLUCOSE 108* 125* 119*        PT/INR:    Lab Results   Component Value Date    PROTIME 13.6 07/23/2020    INR 1.1 07/23/2020     PTT:    Lab Results   Component Value Date    APTT 27.9 07/23/2020       Comprehensive Metabolic Profile:   Recent Labs     07/23/20  0747 07/23/20  1712 07/23/20  1909 07/24/20  0403   * 131*  --  134*   K 4.2 4.4  --  4.0   CL 98 97*  --  101   CO2 22 21  --  20   BUN 12 12  --  12   CREATININE 0.65* 0.55*  --  0.61*   GLUCOSE 108* 125*  --  119*   CALCIUM 9.0 8.8  --  9.0   PROT 6.2* 6.2*  --   --    LABALBU 3.9 4.0 4.1  --    BILITOT 0.75 0.74  --   --    ALKPHOS 39* 39*  --   -- AST 28 31  --   --    ALT 58* 59*  --   --       Magnesium:   Lab Results   Component Value Date    MG 2.1 07/23/2020     Phosphorus:   Lab Results   Component Value Date    PHOS 2.6 07/23/2020     Ionized Calcium: No results found for: CAION     Urinalysis : negative     Troponin: No results for input(s): TROPONINI in the last 72 hours. Microbiology: CSF + for HSV encephalitis   Cultures during this admission:     Blood cultures:                 [] None drawn      [x] Negative             []  Positive (Details:  )  Urine Culture:                   [] None drawn      [x] Negative             []  Positive (Details:  )  Sputum Culture:               [x] None drawn       [] Negative             []  Positive (Details:  )   Endotracheal aspirate:     [x] None drawn       [] Negative             []  Positive (Details:  )         Radiology/Imaging:   MRI BRAIN WO CONTRAST   Final Result   1. No acute intracranial abnormality. No acute infarct. 2. Minimal global parenchymal volume loss with minimal microvascular ischemic   changes. IR LUMBAR PUNCTURE FOR DIAGNOSIS   Final Result   Successful fluoroscopic-guided lumbar puncture. 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  Heparin Subcut  [] EPC Cuffs    NUTRITION: [x] NPO [] Tube Feeding (Specify: ) [] TPN  [] PO (Diet: Diet NPO Effective Now)    HOME MEDICATIONS RECONCILED: [x] No  [] Yes    INSULIN DRIP:   [x] No   [] Yes    CONSULTATION NEEDED:  [x] No   [] Yes    FAMILY UPDATED:    [] No   [] Yes    TRANSFER OUT OF ICU:   [x] No   [] Yes    ADDITIONAL PLAN:    79year old who presented from [facility] facility for altered mental status concerning for encephalitis/meningitis.     Plan:  1. AMS secondary to unknown etiology, possible meningitis  _Lumbar puncture + lymphocytosis with +CSF HSV-1  -neurology consulted, following recommendations   - EEG ordered - follow up results   - de-escalate abx, continue acyclovir   [facility]   - Follow up MRI w/ wo   -UA negative for infection, blood cultures drawn at [facility]  -CXR negative, COVID swab negative    - precedex started for sedation      2. Hx of enlarged prostate  -will continue to monitor     3. Hyponatremia  - resolved   - continue to monitor lytes daily     DO MARY Logan 21 7/24/2020, 9:24 AM    Attending Physician Statement  I have discussed the care of Eva Cerrato, including pertinent history and exam findings with the resident. I have reviewed the key elements of all parts of the encounter with the resident. I have seen and examined the patient with the resident. I agree with the assessment and plan and status of the problem list as documented. I have seen the patient during my round today, I have reviewed the chart, labs and medications reviewed  Spinal tap showed 2000 RBCs traumatic tap WBCs blood 1348 0% neutrophils 87% lymphocyte, Gram stain is negative, viral PCR from CSF shows HSV-1 positive. He was seen by infectious disease yesterday and Rocephin and doxycycline was discontinued and he was kept on acyclovir.   Overnight he remains disoriented and confused, according to nursing staff this morning

## 2020-07-24 NOTE — PROCEDURES
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 MEDICATIONS:  Include Phenergan, Zovirax, Keppra. This is a 16-channel EEG with one EKG channel recording performed on a  patient described to be awake and drowsy. The patient shows some  delayed waking rhythms. Background activity consists of poorly  regulated 8 Hz activity in a 40-60 microvolt range, more prominent over  the posterior head area, showing some reactivity to eye opening and  closing. Over the anterior head regions, there are 15-20 Hz activity in  a 20-30 microvolt range. The record is prominent for a mild degree of  medium-amplitude 4-7 Hz activity seen throughout all head areas during  waking state. With drowsiness, there is further intrusion of slower  frequencies in a theta and lesser degree a delta band. This record is  not lateralized or epileptiform. Hyperventilation is not performed. Photic stimulation shows no change  of the record. IMPRESSION:  This EEG shows the presence of mild diffuse slowing. This  EEG is concordant with diffuse encephalopathy. No clear lateralized or  epileptiform disturbance is seen.         Dereck Ryan    D: 2020 18:30:17       T: 2020 19:51:50     [email or web address]  Job#: [id number]     Doc#: [id number]    CC:

## 2020-07-24 NOTE — DISCHARGE SUMMARY
Discharge Summary    Arnoldo Shepherd  :  1950  MRN:  [id number]    Admit date:  2020      Discharge date: 2020     Admitting Physician:  Mary Zavala MD    Consultants:  Dr. Gisell Shine, pulmonology    Procedures: none    Complications: none    Discharge Condition: poor    Hospital Course:   Arnoldo Shepherd is a 79 y.o. male admitted with acute febrile illness suggestive of sepsis of urinary source due to 7-10 day history of dysuria and foul smeeling urine and past history of UTI with massive prostatic hypertrophy   Blood cultures and urine culture obtained  Broad spectrum antibiotics with cefepine and vanco initiated  Pt developed delirium the first night and with improvement of his lactate and vital signs he persisted in fever 102-102  Given no history of alcohol abuse and no other indicators of neurologic problems we considered encephalitis as a diagnosis and asked Dr Gisell Shine to eval for transfer to [facility]  He agreed and accepted him as admit to [facility] ICU  He was covid negative and CT brain was negative prior to transfer    Exam:  GEN:  alert and oriented to person, place and time, well-developed and well-nourished, in no acute distress, disoriented and agitated  EYES: PERRL  NECK: supple,  no carotid bruits  PULM: clear to auscultation bilaterally- no wheezes, rales or rhonchi, normal air movement, no respiratory distress  COR: regular rate & rhythm, no murmurs and no gallops  ABD:  soft, non-tender, non-distended, normal bowel sounds, no masses or organomegaly  EXT:   no cyanosis, clubbing or edema present    NEURO: confused  Not oriented  Minimal verbal response  Moves all extremities  No cranial nerve deficits  SKIN:  no rashes or significant lesions    Significant Diagnostic Studies:   Lab Results   Component Value Date    WBC 6.1 2020    HGB 14.1 2020     2020       Lab Results   Component Value Date    BUN 12 2020    CREATININE 0.61 (L) 2020     (L) 2020 K 4.0 07/24/2020    CALCIUM 9.0 07/24/2020     07/24/2020    CO2 20 07/24/2020    LABGLOM >60 07/24/2020       Lab Results   Component Value Date    WBCUA None 07/22/2020    RBCUA 2 TO 5 07/22/2020    EPITHUA None 07/22/2020    LEUKOCYTESUR NEGATIVE 07/22/2020    SPECGRAV 1.015 07/22/2020    GLUCOSEU NEGATIVE 07/22/2020    KETUA NEGATIVE 07/22/2020    PROTEINU NEGATIVE 07/22/2020    HGBUR 1+ (A) 07/22/2020    CASTUA NOT REPORTED 07/22/2020    CRYSTUA NOT REPORTED 07/22/2020    BACTERIA NOT REPORTED 07/22/2020    YEAST NOT REPORTED 07/22/2020       Ct Head Wo Contrast    Result Date: 7/23/2020  EXAMINATION: CT OF THE HEAD WITHOUT CONTRAST  7/23/2020 8:09 am TECHNIQUE: CT of the head was performed without the administration of intravenous contrast. Dose modulation, iterative reconstruction, and/or weight based adjustment of the mA/kV was utilized to reduce the radiation dose to as low as reasonably achievable. COMPARISON: None. HISTORY: ORDERING SYSTEM PROVIDED HISTORY: DELERIUM TECHNOLOGIST PROVIDED HISTORY: DELERIUM FINDINGS: BRAIN/VENTRICLES: There is no acute intracranial hemorrhage, mass effect or midline shift. No abnormal extra-axial fluid collection. The gray-white differentiation is maintained without evidence of an acute infarct. There is no evidence of hydrocephalus. ORBITS: The visualized portion of the orbits demonstrate no acute abnormality. SINUSES: The visualized paranasal sinuses and mastoid air cells demonstrate no acute abnormality. SOFT TISSUES/SKULL:  No acute abnormality of the visualized skull or soft tissues. No acute intracranial abnormality. Discharge Diagnoses:    Principal Problem:    Encephalitis  Active Problems:    Sepsis ([address]    Acute cystitis without hematuria    Incomplete emptying of bladder due to benign prostatic hyperplasia  Resolved Problems:    * No resolved hospital problems.  *      Active Hospital Problems    Diagnosis Date Noted    Encephalitis [Q46.92]

## 2020-07-24 NOTE — PROGRESS NOTES
7/21/2020 he was noticed to have some mild confusion and problems with cognition were initially attributed to the use of doxycycline. He was evaluated at [facility] and found to have a high-grade fever. He had urine and blood cultures obtained to exclude urinary tract sepsis. The urine and blood cultures have not shown any growth. His urine analysis was not very impressive. He was treated with cefepime with no benefit. The patient showed some cough but had no hypoxia. The chest x-ray did not show any pneumonia but had some mild chronic changes. CT of the abdomen and pelvis was obtained which showed the presence of a large prostate. No other abnormalities were noted. While active emergency he had a temperature 101 °F on 7/22/2020. No additional fevers were noted. Upon arrival to emergency [facility] patient is disoriented, confused, he is not able to provide any information or to answer any questions. His laboratory tests show some mild degree of hyponatremia and mild transaminitis. A spinal tap was done to exclude herpetic encephalitis. The CSF showed the bloody fluid with a protein level of 257 glucose of 43 RBC count of 3000 with 1348 white cells 80% of which were lymphocytes. The spinal tap was described as being mildly traumatic with progressive clearing of the spinal fluid. We will await the results of the infectious CSF panel. Plan to treat for the treatable conditions such as mycoplasma, herpes simplex. The presence of hemorrhage suggests Herpes simplex. CURRENT EVALUATION :    Patient evaluated and examined in the ICU.     Afebrile  VS stable, mild HTN    Patient tolerating IV acyclovir  To obtain MRI this morning    Cultures: No growth  CSF: HSV 1- positive    Labs, X rays reviewed: 7/24/2020    BUN: 12  Cr: 0.55-->0.61    WBC: 6.3-->6.1  Hb: 14.1  Plat: 153-->160    Cultures:  Urine:  ·   Blood:  ·   Sputum :  ·   Wound:  ·     COVID-19 test:  · 7/23/2020- file     Forced sexual activity: Not on file   Other Topics Concern    Not on file   Social History Narrative    Not on file       Family History:   No family history on file. Allergies:   Ibuprofen     Review of Systems:     Unable to provide. Encephalopathic. 7/24/2020      Constitutional: No fevers or chills. No systemic complaints  Head: No headaches  Eyes: No double vision or blurry vision. No conjunctival inflammation. ENT: No sore throat or runny nose. . No hearing loss, tinnitus or vertigo. Cardiovascular: No chest pain or palpitations. No shortness of breath. No DONALDSON  Lung: No shortness of breath or cough. No sputum production  Abdomen: No nausea, vomiting, diarrhea, or abdominal pain. [place] Staggers No cramps. Genitourinary: No increased urinary frequency, or dysuria. No hematuria. No suprapubic or CVA pain  Musculoskeletal: No muscle aches or pains. No joint effusions, swelling or deformities  Hematologic: No bleeding or bruising. Neurologic: No headache, weakness, numbness, or tingling. Integument: No rash, no ulcers. Psychiatric: No depression. Endocrine: No polyuria, no polydipsia, no polyphagia. Physical Examination :     Patient Vitals for the past 8 hrs:   BP Temp Temp src Pulse Resp SpO2   07/24/20 1000 (!) 147/74 -- -- 70 (!) 31 96 %   07/24/20 0900 (!) 141/88 -- -- 74 (!) 50 97 %   07/24/20 0800 (!) 143/85 98.1 °F (36.7 °C) Oral 68 27 97 %   07/24/20 0700 (!) 139/99 -- -- 65 23 96 %   07/24/20 0600 (!) 142/72 -- -- 74 25 96 %   07/24/20 0500 124/69 -- -- 83 26 98 %   07/24/20 0400 (!) 146/76 100 °F (37.8 °C) Axillary 76 23 97 %   07/24/20 0315 132/67 -- -- 75 27 97 %     General Appearance: Somnolent  Head:  Normocephalic, no trauma  Eyes: Pupils equal, round, reactive to light; sclera anicteric; conjunctivae pink. No embolic phenomena. ENT: Oropharynx clear, without erythema, exudate, or thrush. No tenderness of sinuses. Mouth/throat: mucosa pink and moist. No lesions.  Dentition in good repair. Neck:Supple, without lymphadenopathy. Thyroid normal, No bruits. Pulmonary/Chest: Clear to auscultation, without wheezes, rales, or rhonchi. No dullness to percussion. Cardiovascular: Regular rate and rhythm without murmurs, rubs, or gallops. Abdomen: Soft, non tender. Bowel sounds normal. No organomegaly  All four Extremities: No cyanosis, clubbing, edema, or effusions. . Scar from prior right hip arthroplasty  Neurologic: No gross sensory or motor deficits. Moves all extremities. Encephalopathic  Skin: Warm and dry with good turgor. No signs of peripheral arterial or venous insufficiency. No ulcerations. No open wounds. Medical Decision Making -Laboratory:   I have independently reviewed/ordered the following labs:    CBC with Differential:   Recent Labs     07/23/20 1712 07/24/20  0403   WBC 6.3 6.1   HGB 14.1 14.1   HCT 41.5 41.9    160   LYMPHOPCT 13* 23*   MONOPCT 12 13*     BMP:   Recent Labs     07/22/20  0530 07/23/20  0747 07/23/20  1712 07/24/20  0403   * 129* 131* 134*   K 4.2 4.2 4.4 4.0    98 97* 101   CO2 21 22 21 20   BUN 15 12 12 12   CREATININE 0.64* 0.65* 0.55* 0.61*   MG 2.0 2.1  --   --      Hepatic Function Panel:   Recent Labs     07/23/20  0747 07/23/20 1712 07/23/20  1909   PROT 6.2* 6.2*  --    LABALBU 3.9 4.0 4.1   BILIDIR  --  0.21  --    IBILI  --  0.53  --    BILITOT 0.75 0.74  --    ALKPHOS 39* 39*  --    ALT 58* 59*  --    AST 28 31  --      No results for input(s): RPR in the last 72 hours. No results for input(s): HIV in the last 72 hours. No results for input(s): BC in the last 72 hours.   Lab Results   Component Value Date    MUCUS NOT REPORTED 07/22/2020    RBC 4.55 07/24/2020    TRICHOMONAS NOT REPORTED 07/22/2020    WBC 6.1 07/24/2020    YEAST NOT REPORTED 07/22/2020    TURBIDITY CLEAR 07/22/2020     Lab Results   Component Value Date    CREATININE 0.61 07/24/2020    CREATININE 1.0 01/16/2020    GLUCOSE 119 07/24/2020       Medical Decision Making-Imaging:     CT OF THE HEAD WITHOUT CONTRAST  7/23/2020 8:09 am         TECHNIQUE:    CT of the head was performed without the administration of intravenous    contrast. Dose modulation, iterative reconstruction, and/or weight based    adjustment of the mA/kV was utilized to reduce the radiation dose to as low    as reasonably achievable.         COMPARISON:    None.         HISTORY:    ORDERING SYSTEM PROVIDED HISTORY: DELERIUM    TECHNOLOGIST PROVIDED HISTORY:    DELERIUM              FINDINGS:    BRAIN/VENTRICLES: There is no acute intracranial hemorrhage, mass effect or    midline shift.  No abnormal extra-axial fluid collection.  The gray-white    differentiation is maintained without evidence of an acute infarct.  There is    no evidence of hydrocephalus.         ORBITS: The visualized portion of the orbits demonstrate no acute abnormality.         SINUSES: The visualized paranasal sinuses and mastoid air cells demonstrate    no acute abnormality.         SOFT TISSUES/SKULL:  No acute abnormality of the visualized skull or soft    tissues.              Impression    No acute intracranial abnormality.       EXAMINATION:    ONE XRAY VIEW OF THE CHEST         7/21/2020 6:32 pm         COMPARISON:    None.         HISTORY:    ORDERING SYSTEM PROVIDED HISTORY: sepsis    TECHNOLOGIST PROVIDED HISTORY:    sepsis         FINDINGS:    Heart size is normal.  Interstitial markings are prominent which may be    related to interstitial pneumonitis.  No effusion, extrapleural air or    localized consolidation is noted.  Osseous and mediastinal structures are    age-appropriate.              Impression    Diffuse prominence of the interstitial markings which may be related to    interstitial pneumonitis.  No focal consolidation.           EXAMINATION:    CT OF THE ABDOMEN AND PELVIS WITH CONTRAST 7/21/2020 4:06 pm         TECHNIQUE:    CT of the abdomen and pelvis was performed with the administration of intravenous contrast. Multiplanar reformatted images are provided for review. Dose modulation, iterative reconstruction, and/or weight based adjustment of    the mA/kV was utilized to reduce the radiation dose to as low as reasonably    achievable.         COMPARISON:    CT urogram January 6, 2020         HISTORY:    ORDERING SYSTEM PROVIDED HISTORY: Lower abdominal pain    TECHNOLOGIST PROVIDED HISTORY:              FINDINGS:    Lower Chest: Clear         Organs:         The abdominal wall appears normal.         The liver, spleen, pancreas, and adrenals appear normal.  Multiple punctate    calcified granulomas in the spleen.         Bilateral simple renal cortical cysts.         Bladder is decompressed and circumferentially thickened.         GI/Bowel: The stomach,small bowel, and colon appear normal. Oral contrast    noted throughout the small bowel.  Stool noted throughout the liver large    bowel.  Appendix normal.         Pelvis: Markedly enlarged prostate.  It measures 68 x 83 mm in AP and    transverse dimensions.         Peritoneum/Retroperitoneum: The abdominal aorta and iliac arteries are normal    in caliber. There is no pathologic adenopathy.         Bones/Soft Tissues: Right hip arthroplasty.              Impression    Markedly enlarged prostate.  Possible cystitis.  Increased stool.           EXAMINATION:    ONE XRAY VIEW OF THE PELVIS AND TWO XRAY VIEWS RIGHT HIP         6/25/2020 7:29 am         COMPARISON:    10/23/2017         HISTORY:    ORDERING SYSTEM PROVIDED HISTORY: Acute right hip pain         FINDINGS:    Status post right hip arthroplasty.  No evidence of hardware complications. Stable alignment.         Elsewhere, no acute fracture or dislocation.  Moderate left hip    osteoarthritis.  The SI joints and sacral arcuate lines appear intact. Degenerative changes of the SI joints are also seen.         No soft tissue abnormality.              Impression    1.  Status post right hip arthroplasty.  No complication. 2. No acute osseous abnormality of the pelvis. 3. Moderate left hip osteoarthritis. Medical Decision [id number]:   Results for Gregoroi Yin (MRN [id number]) as of 7/24/2020 10:34   Ref. Range 7/23/2020 16:37   CYTOMEGALOVIRUS (CMV) CSF FILM ARRAY Latest Ref Range: Not Detected  Not Detected   HAEMOPHILUS INFLUENZA CSF FILM ARRAY Latest Ref Range: Not Detected  Not Detected   HHV-6 (HERPESVIRUS 6) CSF FILM ARRAY Latest Ref Range: Not Detected  Not Detected   HSV-1 CSF FILM ARRAY Latest Ref Range: Not Detected  DETECTED (A)   HSV-2 CSF FILM ARRAY Latest Ref Range: Not Detected  Not Detected   PARECHOVIRUS CSF FILM ARRAY Latest Ref Range: Not Detected  Not Detected   ESCHERICHIA COLI K1 CSF FILM ARRAY Latest Ref Range: Not Detected  Not Detected   LISTERIA MONOCYTOGENES CSF FILM ARRAY Latest Ref Range: Not Detected  Not Detected   NEISSERIA MENIGITIDIS CSF FILM ARRAY Latest Ref Range: Not Detected  Not Detected   STREPTOCOCCUS AGALACTIAE CSF FILM ARRAY Latest Ref Range: Not Detected  Not Detected   STREPTOCOCCUS PNEUMONIAE CSF FILM ARRAY Latest Ref Range: Not Detected  Not Detected   CRYPTOCOCCUS NEOFORMANS/SALUD CSF FILM ARR. Latest Ref Range: Not Detected  Not Detected       Medical Decision Making-Other:     Note:  · Labs, medications, radiologic studies were reviewed with personal review of films  · Large amounts of data were reviewed  · Discussed with nursing Staff, Discharge planner  · Infection Control and Prevention measures reviewed  · All prior entries were reviewed  · Administer medications as ordered  · Prognosis: Guarded  · Discharge planning reviewed  · Follow up as outpatient. Thank you for allowing us to participate in the care of this patient. Please call with questions.     Larry Duran MD  Pager: [phone] - Office: [phone]

## 2020-07-24 NOTE — ED NOTES
Social work: spoke to wife who was ready to go over to the HOME away from home room 2 for the night. Security Jermaine Wolfe helped drive the wife over and she was shown how to get into the building and then to her room. Security offered her help to her car when she was ready to bring that closer to the building tomorrow. She was up for 24 hours so she was just going to rest for now. Pat Garces will meet her tomorrow to get paperwork signed.   STEVIE Murray, [place]  07/24/20 7251

## 2020-07-24 NOTE — PROGRESS NOTES
Mri brain with and without contrast was ordered. The without exam was completed. Contrast was given at 11:00am and the patient then became agitated. We cannot scan him again for 24 hours.

## 2020-07-24 NOTE — CARE COORDINATION
Social work: spoke to patient's wife Jourdan Simons. She was informed of room available at home away from home Room #2. She does not want to leave patient right now as he is \"not settled\". So she plans to come around 5 am to get taken over and get signed into her room and get the key! Advised her anytime can work. Social work coverage is 24 hours. Li at social work desk in ED. Hiral Kaur made the key for Director [facility].   Garth Smiley [facility]

## 2020-07-25 ENCOUNTER — APPOINTMENT (OUTPATIENT)
Dept: MRI IMAGING | Age: 70
DRG: 871 | End: 2020-07-25
Attending: INTERNAL MEDICINE
Payer: MEDICARE

## 2020-07-25 LAB
ANION GAP SERPL CALCULATED.3IONS-SCNC: 11 MMOL/L (ref 9–17)
BUN BLDV-MCNC: 20 MG/DL (ref 8–23)
BUN/CREAT BLD: ABNORMAL (ref 9–20)
CALCIUM SERPL-MCNC: 8.7 MG/DL (ref 8.6–10.4)
CHLORIDE BLD-SCNC: 105 MMOL/L (ref 98–107)
CO2: 20 MMOL/L (ref 20–31)
CREAT SERPL-MCNC: 1.16 MG/DL (ref 0.7–1.2)
CULTURE: NORMAL
GFR AFRICAN AMERICAN: >60 ML/MIN
GFR NON-AFRICAN AMERICAN: >60 ML/MIN
GFR SERPL CREATININE-BSD FRML MDRD: ABNORMAL ML/MIN/{1.73_M2}
GFR SERPL CREATININE-BSD FRML MDRD: ABNORMAL ML/MIN/{1.73_M2}
GLUCOSE BLD-MCNC: 127 MG/DL (ref 70–99)
Lab: NORMAL
POTASSIUM SERPL-SCNC: 4 MMOL/L (ref 3.7–5.3)
SODIUM BLD-SCNC: 136 MMOL/L (ref 135–144)
SPECIMEN DESCRIPTION: NORMAL

## 2020-07-25 PROCEDURE — 36415 COLL VENOUS BLD VENIPUNCTURE: CPT

## 2020-07-25 PROCEDURE — 80048 BASIC METABOLIC PNL TOTAL CA: CPT

## 2020-07-25 PROCEDURE — 95714 VEEG EA 12-26 HR UNMNTR: CPT

## 2020-07-25 PROCEDURE — 93005 ELECTROCARDIOGRAM TRACING: CPT | Performed by: STUDENT IN AN ORGANIZED HEALTH CARE EDUCATION/TRAINING PROGRAM

## 2020-07-25 PROCEDURE — 2500000003 HC RX 250 WO HCPCS: Performed by: STUDENT IN AN ORGANIZED HEALTH CARE EDUCATION/TRAINING PROGRAM

## 2020-07-25 PROCEDURE — 95720 EEG PHY/QHP EA INCR W/VEEG: CPT | Performed by: PSYCHIATRY & NEUROLOGY

## 2020-07-25 PROCEDURE — 2580000003 HC RX 258: Performed by: STUDENT IN AN ORGANIZED HEALTH CARE EDUCATION/TRAINING PROGRAM

## 2020-07-25 PROCEDURE — 99233 SBSQ HOSP IP/OBS HIGH 50: CPT | Performed by: INTERNAL MEDICINE

## 2020-07-25 PROCEDURE — 99233 SBSQ HOSP IP/OBS HIGH 50: CPT | Performed by: PSYCHIATRY & NEUROLOGY

## 2020-07-25 PROCEDURE — 99291 CRITICAL CARE FIRST HOUR: CPT | Performed by: INTERNAL MEDICINE

## 2020-07-25 PROCEDURE — 6370000000 HC RX 637 (ALT 250 FOR IP): Performed by: STUDENT IN AN ORGANIZED HEALTH CARE EDUCATION/TRAINING PROGRAM

## 2020-07-25 PROCEDURE — 2000000000 HC ICU R&B

## 2020-07-25 PROCEDURE — 6360000002 HC RX W HCPCS: Performed by: STUDENT IN AN ORGANIZED HEALTH CARE EDUCATION/TRAINING PROGRAM

## 2020-07-25 RX ORDER — SODIUM CHLORIDE 9 MG/ML
INJECTION, SOLUTION INTRAVENOUS CONTINUOUS
Status: ACTIVE | OUTPATIENT
Start: 2020-07-25 | End: 2020-07-25

## 2020-07-25 RX ORDER — QUETIAPINE FUMARATE 25 MG/1
25 TABLET, FILM COATED ORAL 2 TIMES DAILY
Status: DISCONTINUED | OUTPATIENT
Start: 2020-07-25 | End: 2020-07-27

## 2020-07-25 RX ADMIN — ONDANSETRON 4 MG: 2 INJECTION INTRAMUSCULAR; INTRAVENOUS at 17:27

## 2020-07-25 RX ADMIN — SODIUM CHLORIDE: 9 INJECTION, SOLUTION INTRAVENOUS at 11:54

## 2020-07-25 RX ADMIN — DEXMEDETOMIDINE HYDROCHLORIDE 0.4 MCG/KG/HR: 400 INJECTION INTRAVENOUS at 23:11

## 2020-07-25 RX ADMIN — SODIUM CHLORIDE: 9 INJECTION, SOLUTION INTRAVENOUS at 05:13

## 2020-07-25 RX ADMIN — LEVETIRACETAM 500 MG: 5 INJECTION INTRAVENOUS at 20:33

## 2020-07-25 RX ADMIN — QUETIAPINE FUMARATE 25 MG: 25 TABLET ORAL at 11:59

## 2020-07-25 RX ADMIN — SODIUM CHLORIDE 100 ML/HR: 9 INJECTION, SOLUTION INTRAVENOUS at 19:45

## 2020-07-25 RX ADMIN — SODIUM CHLORIDE: 9 INJECTION, SOLUTION INTRAVENOUS at 13:53

## 2020-07-25 RX ADMIN — ACYCLOVIR SODIUM 1000 MG: 50 INJECTION, SOLUTION INTRAVENOUS at 11:54

## 2020-07-25 RX ADMIN — ENOXAPARIN SODIUM 40 MG: 40 INJECTION SUBCUTANEOUS at 08:46

## 2020-07-25 RX ADMIN — ACYCLOVIR SODIUM 1000 MG: 50 INJECTION, SOLUTION INTRAVENOUS at 20:31

## 2020-07-25 RX ADMIN — ACYCLOVIR SODIUM 1000 MG: 50 INJECTION, SOLUTION INTRAVENOUS at 03:58

## 2020-07-25 RX ADMIN — LEVETIRACETAM 500 MG: 5 INJECTION INTRAVENOUS at 08:46

## 2020-07-25 RX ADMIN — DEXMEDETOMIDINE HYDROCHLORIDE 0.7 MCG/KG/HR: 400 INJECTION INTRAVENOUS at 03:55

## 2020-07-25 ASSESSMENT — PAIN SCALES - GENERAL: PAINLEVEL_OUTOF10: 0

## 2020-07-25 NOTE — PROGRESS NOTES
Critical Care Team - Daily Progress Note      Date and time: 7/25/2020 9:23 AM  Patient's name:  Carlyon Schaumann  Medical Record Number: [id number]  Patient's account/billing number: [de-identified]  Patient's YOB: 1950  Age: 79 y.o.   Date of Admission: 7/23/2020  3:02 PM  Length of stay during current admission: 2      Primary Care Physician: Jose Angel Quinteros MD  ICU Attending Physician: Dr. Ryan Bailey Status: Full Code    Reason for ICU admission: AMS, encephalitis                SUBJECTIVE:     OVERNIGHT EVENTS:       No fevers overnight, patient restless and agitates unable to sleep     AWAKE & FOLLOWING COMMANDS:  [] No   [x] Yes    CURRENT VENTILATION STATUS:     [] Ventilator  [] BIPAP  [] Nasal Cannula [x] Room Air      IF INTUBATED, ET TUBE MARKING AT LOWER LIP:       cms    SECRETIONS Amount:  [] Small [] Moderate  [] Large  [x] None  Color:     [] White [] Colored  [] Bloody    SEDATION:  RAAS Score:  precedex     PARALYZED:  [x] No    [] Yes    DIARRHEA:                [x] No                [] Yes  (C. Difficile status: [] positive                                                                                                                       [] negative                                                                                                                     [] pending)    VASOPRESSORS:  [x] No    [] Yes    If yes -   [] Levophed       [] Dopamine     [] Vasopressin       [] Dobutamine  [] Phenylephrine         [] Epinephrine    CENTRAL LINES:     [x] No   [] Yes   (Date of Insertion:   )           If yes -     [] Right IJ     [] Left IJ [] Right Femoral [] Left Femoral                   [] Right Subclavian [] Left Subclavian       FLEMING'S CATHETER:   [x] No   [] Yes  (Date of Insertion:   )     URINE OUTPUT:            [x] Good   [] Low              [] Anuric      OBJECTIVE:     VITAL SIGNS:  /68   Pulse 60   Temp 98.6 °F (37 °C) (Axillary)   Resp 30   Wt 211 lb 6.7 oz (95.9 kg)   SpO2 95%   BMI 30.34 kg/m²     Tmax over 24 hours:  Temp (24hrs), Av.8 °F (37.1 °C), Min:97.5 °F (36.4 °C), Max:100.6 °F (38.1 °C)      Patient Vitals for the past 6 hrs:   BP Temp Temp src Pulse Resp SpO2   20 0800 115/68 98.6 °F (37 °C) Axillary 60 30 95 %   20 0700 102/61 -- -- 56 26 95 %   20 0600 103/64 -- -- 58 18 97 %   20 0500 (!) 96/58 -- -- 58 20 96 %   20 0405 114/85 100.6 °F (38.1 °C) Axillary 69 (!) 38 96 %         Intake/Output Summary (Last 24 hours) at 2020 0923  Last data filed at 2020 0800  Gross per 24 hour   Intake 2817 ml   Output 1720 ml   Net 1097 ml     Wt Readings from Last 2 Encounters:   20 211 lb 6.7 oz (95.9 kg)   20 222 lb 3.2 oz (100.8 kg)     Body mass index is 30.34 kg/m². Physical Exam  Constitutional:       Appearance: He is normal weight. Comments: Patient asleep, resting comfortbly, wearing EEG    HENT:      Head: Normocephalic and atraumatic. Nose: Nose normal. No congestion or rhinorrhea. Eyes:      Extraocular Movements: Extraocular movements intact. Pupils: Pupils are equal, round, and reactive to light. Neck:      Musculoskeletal: Normal range of motion and neck supple. Cardiovascular:      Rate and Rhythm: Normal rate and regular rhythm. Pulmonary:      Effort: Pulmonary effort is normal. No respiratory distress. Breath sounds: Normal breath sounds. No stridor. Abdominal:      General: There is no distension. Palpations: Abdomen is soft. Musculoskeletal: Normal range of motion. General: No swelling or tenderness. Skin:     General: Skin is warm. Capillary Refill: Capillary refill takes less than 2 seconds. Coloration: Skin is not jaundiced. Findings: No bruising.              MEDICATIONS:    Scheduled Meds:   sodium chloride flush  10 mL Intravenous 2 times per day    enoxaparin  40 mg Subcutaneous Daily    acyclovir  1,000 mg encephalitis/meningitis.     Plan:  1. AMS secondary to unknown etiology, possible meningitis  _Lumbar puncture + lymphocytosis with +CSF HSV-1  -neurology consulted, following recommendations   - EEG ordered - follow up results   - de-escalate abx, continue acyclovir   -CT head outlying facility negative for acute pathology   - Follow up MRI w/ wo   - Order MRI w/ contrast for today   [facility] for infection[facility]  -CXR negative, COVID swab negative    - precedex started for sedation      2. Hx of enlarged prostate  -will continue to monitor     3. Hyponatremia  - resolved   - continue to monitor lytes daily     Lincoln Signs, DO  [address], [address] [place] [place]   7/25/2020, 9:23 AM    Attending Physician Statement  I have discussed the care of Vianca Aguirre, including pertinent history and exam findings with the resident. I have reviewed the key elements of all parts of the encounter with the resident. I have seen and examined the patient with the resident. I agree with the assessment and plan and status of the problem list as documented. I have seen the patient this morning during rounds and I have reviewed the chart, labs and medications reviewed. He was lethargic when I saw him he was on Precedex drip, he was in four-point restraint because of agitation yesterday, he was getting LTM E by neurology. He had MRI done yesterday without contrast and no significant finding was noted also EEG was not suggestive of seizure. His creatinine is 1.16 today and he has external catheter. We will discontinue MRI of the brain as he required Ativan yesterday for MRI. Will wean off Precedex drip. Avoid benzodiazepine. Monitor neurological status and mentation. We will give him fluid bolus and will start him on IV fluids and increase IV fluids. Follow-up urine output renal function. May need bladder scan for any urinary retention.   Continue with acyclovir. We will remove restraints. Discussed with neurology attending Dr. Dawn Muñoz. Discontinue LTM E and will discontinue MRI with contrast ordered yesterday but was not completed    Discussed with nursing staff, treatment plan discussed with    Total critical care time caring for this patient with life threatening, unstable organ failure, including direct patient contact, management of life support systems, review of data including imaging and labs, discussions with other team members and physicians at least 27  Min so far today, excluding procedures. Please note that this chart was generated using voice recognition Dragon dictation software. Although every effort was made to ensure the accuracy of this automated transcription, some errors in transcription may have occurred.         Ирина Moreno MD  7/25/2020 11:36 AM

## 2020-07-25 NOTE — PROCEDURES
LONG-TERM EEG-VIDEO [address]    Patient: Lexii Howell  Age: 79 y.o. MRN: [id number]    Referring Physician: Zuri Armenta DO  History: The patient is a 79 y.o. male who presented breakthrough seizure/encephalopathy. This long-term video-EEG monitoring study was performed to determine the nature of the patient's clinical events. The patient is on neuroactive medications.    Lexii Howell   Current Facility-Administered Medications   Medication Dose Route Frequency Provider Last Rate Last Dose    dexmedetomidine (PRECEDEX) 400 mcg in sodium chloride 0.9 % 100 mL infusion  0.2 mcg/kg/hr Intravenous Continuous Donald Arreola DO 14.4 mL/hr at 07/25/20 0820 0.6 mcg/kg/hr at 07/25/20 0820    sodium chloride flush 0.9 % injection 10 mL  10 mL Intravenous 2 times per day Jerir Mar MD   10 mL at 07/24/20 2124    sodium chloride flush 0.9 % injection 10 mL  10 mL Intravenous PRN Jerri Mar MD        acetaminophen (TYLENOL) tablet 650 mg  650 mg Oral Q6H PRN Jerri Mar MD        Or    acetaminophen (TYLENOL) suppository 650 mg  650 mg Rectal Q6H PRN Jerri Mar MD        polyethylene glycol (GLYCOLAX) packet 17 g  17 g Oral Daily PRN Jerri Mar MD        promethazine (PHENERGAN) tablet 12.5 mg  12.5 mg Oral Q6H PRN Jerri Mar MD        Or    ondansetron (ZOFRAN) injection 4 mg  4 mg Intravenous Q6H PRN Naima Guerra MD        enoxaparin (LOVENOX) injection 40 mg  40 mg Subcutaneous Daily Naima Guerra MD   40 mg at 07/25/20 0846    0.9 % sodium chloride infusion   Intravenous Continuous Naima Guerra  mL/hr at 07/25/20 0513      acyclovir (ZOVIRAX) 1,000 mg in dextrose 5 % 250 mL IVPB  1,000 mg Intravenous Q8H Naima Guerra MD   Stopped at 07/25/20 0654    levetiracetam (KEPPRA) 500 mg/100 mL IVPB  500 mg Intravenous BID Michael Barnett MD 0 mL/hr at 07/24/20 2151 500 mg at 07/25/20 0846     Technical Description: This is a 21-channel digital EEG recording with time-locked video. Electrodes were placed in accordance with the 10-20 International System of Electrode Placement. Single lead EKG monitoring was included. Baseline EEG Recording:  A formal baseline EEG recording was not obtained. Day 1 - 7/24/20, starting at 12:23    Interictal EEG Samples: The background activity consisted of 4 to 5 Hz of polymorphic delta theta frequency of 15-35 µV. This activity was symmetric over both hemispheres. During behavioral sleep, frontally dominant rudimentary sleep spindles were seen. The background activity showed reactivity to external stimulation with burst of semirhythmic delta discharges. The EKG channel revealed no abnormalities. Ictal EEG Recording / Patient Events: During this period the patient had no events or seizures. Summary: During this day of recording no events were recorded. The interictal EEG was abnormal due to diffuse polymorphic delta and theta slowing suggestive of moderate to severe encephalopathy of nonspecific etiology. Monitoring was continued in order to record the patient's typical events. The EKG channel revealed no abnormalities. Day 2 - 7/25/20, reviewed through 15:00    Interictal EEG Samples: Interictal EEG was unchanged from yesterday. Ictal EEG Recording / Patient Events: During this period the patient reportedly had intermittent hand twitching. There was no EEG associate and these events were not seizures. Summary: During this day of recording the patient reportedly had hand twitching. These events were not seizures. The interictal EEG was abnormal due to diffuse polymorphic delta and theta slowing suggestive of moderate to severe encephalopathy of nonspecific etiology. Monitoring was continued in order to record the patient's typical events. The EKG channel revealed no abnormalities.     Lizabeth Baugh MD  Diplomate, American Board of Psychiatry and Neurology  Diplomate, American Board of Clinical Neurophysiology  Diplomate, American Board of Epilepsy     Please note this is a preliminary report and updated daily. The final report will have a summary of behavior and electrographic findings with clinical correlation.

## 2020-07-25 NOTE — PROCEDURES
LONG-TERM EEG-VIDEO [id number] [address]    Patient: Carlyon Schaumann  Age: 79 y.o. MRN: [id number]    Referring Physician: Ze Dye DO  History: The patient is a 79 y.o. male who presented breakthrough seizure/encephalopathy. This long-term video-EEG monitoring study was performed to determine the nature of the patient's clinical events. The patient is on neuroactive medications.    Carlyon Schaumann   Current Facility-Administered Medications   Medication Dose Route Frequency Provider Last Rate Last Dose    dexmedetomidine (PRECEDEX) 400 mcg in sodium chloride 0.9 % 100 mL infusion  0.2 mcg/kg/hr Intravenous Continuous Penelope Pipes, DO 12 mL/hr at 07/24/20 2126 0.5 mcg/kg/hr at 07/24/20 2126    sodium chloride flush 0.9 % injection 10 mL  10 mL Intravenous 2 times per day Leia Rangel MD   10 mL at 07/24/20 2124    sodium chloride flush 0.9 % injection 10 mL  10 mL Intravenous PRN Leia Rangel MD        acetaminophen (TYLENOL) tablet 650 mg  650 mg Oral Q6H PRN Leia Rangel MD        Or    acetaminophen (TYLENOL) suppository 650 mg  650 mg Rectal Q6H PRN Leia Rangel MD        polyethylene glycol (GLYCOLAX) packet 17 g  17 g Oral Daily PRN Leia Rangel MD        promethazine (PHENERGAN) tablet 12.5 mg  12.5 mg Oral Q6H PRN Leia Rangel MD        Or    ondansetron (ZOFRAN) injection 4 mg  4 mg Intravenous Q6H PRN Naima Guerra MD        enoxaparin (LOVENOX) injection 40 mg  40 mg Subcutaneous Daily Naima Guerra MD   40 mg at 07/24/20 0942    0.9 % sodium chloride infusion   Intravenous Continuous Naima Guerra  mL/hr at 07/24/20 1727      acyclovir (ZOVIRAX) 1,000 mg in dextrose 5 % 250 mL IVPB  1,000 mg Intravenous Q8H Naima Guerra MD   Stopped at 07/24/20 2126    levetiracetam (KEPPRA) 500 mg/100 mL IVPB  500 mg Intravenous BID Mahendra Smiley MD   Stopped at 07/24/20 2151     Technical Description: This is a 21-channel digital EEG recording with time-locked video. Electrodes were placed in accordance with the 10-20 International System of Electrode Placement. Single lead EKG monitoring was included. Baseline EEG Recording:  A formal baseline EEG recording was not obtained. Day 1 - 7/24/20, starting at 12:23    Interictal EEG Samples: The background activity consisted of 4 to 5 Hz of polymorphic delta theta frequency of 15-35 µV. This activity was symmetric over both hemispheres. During behavioral sleep, frontally dominant rudimentary sleep spindles were seen. The background activity showed reactivity to external stimulation with burst of semirhythmic delta discharges. The EKG channel revealed no abnormalities. Ictal EEG Recording / Patient Events: During this period the patient had no events or seizures. Summary: During this day of recording no events were recorded. The interictal EEG was abnormal due to diffuse polymorphic delta and theta slowing suggestive of moderate to severe encephalopathy of nonspecific etiology. Monitoring was continued in order to record the patient's typical events. The EKG channel revealed no abnormalities. Dagoberto Bernal MD  Diplomate, American Board of Psychiatry and Neurology  Diplomate, American Board of Clinical Neurophysiology  Diplomate, American Board of Epilepsy     Please note this is a preliminary report and updated daily. The final report will have a summary of behavior and electrographic findings with clinical correlation.

## 2020-07-25 NOTE — CONSULTS
[id number] [facility] Neurology   IN-PATIENT SERVICE      NEUROLOGY PROGRESS  NOTE            Date:   7/25/2020  Patient name:  Maryann Gaston  Date of admission:  7/23/2020  YOB: 1950      Interval History:   Maryann Gaston is a  79 y.o. male admitted on 7/23/2020 with Sepsis ([facility] [address] [A41.9]. This is a follow-up neurology progress note. The patient was seen and examined and the chart was reviewed. Patient was on Precedex drip for agitation, coming down on drip. Open eyes to voice, patient is still  Disoriented, agitated with garble speech, movies all extremities. CSF:7/23:   [id number], rbc, 3000, Protien 257.4, Ig g index 0.54, IG G synthesis rate: 10.7, IgG csf: 14.0.    C    History of Present Illness: The patient was seen and examined and the chart was reviewed. All information is obtained from the chart  And nursing staff as there is no family present at bedside.     79ear-old without significant past medical history was transferred from [facility] for the management of altered mental status.     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 He also dark urine with dysuria and micturition Started doxycycline without significant  Improvement. He did not feel good and went to ED, for possible medication side effect, CT abdomen pelvis showed large prostate. Patient was admitted in ICU for possible sepsis. Patient has been requesting Xanax and Dilaudid.     This morning patient became progressively disoriented and agitated, a night before responded to IV Ativan and slept. After getting up in the morning he was confused disoriented, restless with akathisia and was pointing lower back pain.   During the hospital stay patient had episode of fever, working diagnosis was UTI although urinary analysis showed leukocyte esterase negative, no WBC and RBC was 2-5.     At [facility] V's:  Patient had IR guided lumbar puncture, CSF studies sent. CT head: No acute intracranial abnormality     At bedside: Patient lying in bed. Not alert oriented time place person, not participating in conversation. Difficult to perform cranial nerve examination. Spontaneously moves all extremities, withdraws to pain, intermittently follows simple command like eye-opening, hand squeezing. Pupils equally reactive, plantar flexor, motor is notable to appreciate. Tone normal, DTR lower extremity +1, upper extremity 2+.           Of note: Patient's wife states that he has been working out in words but uses protective games. He did notice bite on left thigh per past few days, no rash. Past Medical History:     No past medical history on file. Past Surgical History:     No past surgical history on file.      Medications during admission:      sodium chloride flush  10 mL Intravenous 2 times per day    enoxaparin  40 mg Subcutaneous Daily    acyclovir  1,000 mg Intravenous Q8H    levetiracetam  500 mg Intravenous BID         Physical Exam:   /71   Pulse 56   Temp 98.6 °F (37 °C) (Axillary)   Resp 19   Wt 211 lb 6.7 oz (95.9 kg)   SpO2 95%   BMI 30.34 kg/m²   Temp (24hrs), Av.8 °F (37.1 °C), Min:97.5 °F (36.4 °C), Max:100.6 °F (38.1 °C)        General examination:      General Appearance:  Alert , oriented x 0  well appearing, and in no acute distress  HEENT: Normocephalic, atraumatic, moist mucus membranes  Neck: supple, no carotid bruits, (-) nuchal rigidity  Lungs:  Respirations unlabored, chest wall no deformity, BS normal  Cardiovascular: normal rate, regular rhythm  Abdomen: Soft, nontender, nondistended, normal bowel sounds  Skin: No gross lesions, rashes, bruising or bleeding on exposed skin area  Extremities:  peripheral pulses palpable, clubbing or edema  Psych: normal affect      Neurological examination:      Mental status    Patient is alert oriented x 0 , moves all extremities,       Cranial nerves   II - XII grossly intact      Motor function  Strength:    Not participating in exam      Sensory function Intact to touch, pin, vibration, proprioception throughout     Cerebellar  not tested      Reflex function 2/4 symmetric throughout . Downgoing plantar response bilaterally. (-)Rodriguez's sign bilaterally      Gait                   Not tested            Diagnostics:      Laboratory Testing:  CBC:   Recent Labs     07/23/20  0747 07/23/20  1712 07/24/20  0403   WBC 5.5 6.3 6.1   HGB 13.8 14.1 14.1    153 160       BMP:    Recent Labs     07/23/20  1712 07/24/20  0403 07/25/20  0419   * 134* 136   K 4.4 4.0 4.0   CL 97* 101 105   CO2 21 20 20   BUN 12 12 20   CREATININE 0.55* 0.61* 1.16   GLUCOSE 125* 119* 127*         Lab Results   Component Value Date    LDLCHOLESTEROL 116 11/17/2017    HDL 56 11/17/2017    ALT 59 (H) 07/23/2020    AST 31 07/23/2020    TSH 1.33 07/23/2020    INR 1.1 07/23/2020    [id number] 616 07/23/2020         No results found for: PHENYTOIN, PHENYTOIN, VALPROATE, CBMZ        Imaging/Diagnostics:      LTME:    Day 1: 7/24/2020  Summary: During this day of recording no events were recorded. The interictal EEG was abnormal due to diffuse polymorphic delta and theta slowing suggestive of moderate to severe encephalopathy of nonspecific etiology. Monitoring was continued in order to record the patient's typical events. The EKG channel revealed no abnormalities        Day 2:  7/25/2020      The interictal EEG was abnormal due to diffuse polymorphic delta and theta slowing suggestive of moderate to severe encephalopathy of nonspecific etiology. Monitoring was continued in order to record the patient's typical events. The EKG channel revealed no abnormalities. CT head 7/24/2020    No acute intracranial abnormalities          MRI Brain  7/24/2020  Impression    1. No acute intracranial abnormality.  No acute infarct.     2. Minimal global parenchymal volume loss with minimal microvascular ischemic    changes. Impression:        · Acute encephalitis/viral encephalitis: HSV 1 positive   · Less likely seizure  · Less likely metabolic instability  · Less likely cerebral venous thrombosis  Meningitis      Plan:     20-year-old male without significant past medical history presented to [facility]  V's for the management of altered mental status. 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 Keppra 1 gm  Once   ? Keppra 500 mg BID   · Seizure precaution  · Fall precaution  · Patient is on acyclovir,    · F/U: CSF analysis: CSF studies cell count, Gram stain, glucose, protein, culture. Meningitis panel, oligoclonal banding CSF and serum. IgG synthesis rate and index, cryptococcal, HSV, CARRIE, Lyme's antibody, HIV, CPK, myoglobin, VDRL.   · CSF [facility] encephalopathy panel  · Rest of management as per primary       Patient was staffed with the attending Dr. Thoe Almanza    Electronically signed by Peggy Adorno MD on 7/25/2020 at 11:07 AM      Rishabh Díaz MD  PGY-2 Neurology resident   Neurology [facility]

## 2020-07-25 NOTE — PROGRESS NOTES
Attempted to roll patient to the left side. Patient projectile vomited while rolling to the left side. Zofran given. Critical Care resident notified.

## 2020-07-25 NOTE — PROGRESS NOTES
Perfect Serve sent to Neurology. Pt is having right hand and feet tremors intermittently. Critical care notified to assess pt at bedside. Awaiting neurology response.

## 2020-07-25 NOTE — PROGRESS NOTES
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 · Supportive care      Medical Decision Making/Summary/Discussion:7/25/2020     ·   Infection Control Recommendations   · [place] Precautions    Antimicrobial Stewardship Recommendations     · Targeted therapy  · PK dosing  ·   Coordination of Outpatient Care:   · Estimated Length of IV antimicrobials: To be determined  · Patient will need Midline Catheter Insertion: Yes  · Patient will need PICC line Insertion: No  · Patient will need: Home IV , [place],  SNF,  LTAC: To be determined  · Patient will need outpatient wound care: No    Chief complaint/reason for consultation:   · Altered mental status with progression over a few days      History of Present Illness:   Caro Wing is a 79y.o.-year-old  male who was initially admitted on 7/23/2020. Patient seen at the request of Matthew Vigil. 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 He is unable to provide any information himself at this point in time. Available information reviewed and discussed with Dr. Roni Duran.    Patient apparently developed onset of some dysuria and pain in the pelvic and right hip area starting about 7 to the 10 days prior to admission. In addition he was exhibiting some fatigue. He has a pre-existing history of prostatic hypertrophy with previous documented urinary tract infections. The patient apparently felt that he was coming down with a urinary tract infection and had a started doxycycline on 7/20/2020 without benefit. He seems to have experienced prior problems with use of quinolones that may have influenced his choice of antibiotics.     On 7/21/2020 he inflammation in the frontal region. On 7/23 the CSF studies shows an increase in WBC count with lymphocytic predominance. On 7/23 the CSF culture shows no growth. Patient tolerating IV acyclovir    Cultures: No growth  CSF: HSV 1- positive    Labs, X rays reviewed: 7/25/2020    BUN: 12--20  Cr: 0.55-->0.61--1.16    WBC: 6.3-->6.1  Hb: 14.1  Plat: 153-->160    Cultures:  Urine:  ·   Blood:  ·   Sputum :  · 7/23: MRSA screening of the nares in progress  Wound:  ·     CSF:  · 7/23: no growth        COVID-19 test:  · 7/23/2020- negative    · 7/23/2020 CT of the head: No acute abnormality  ·     Viral panel; Herpes simplex type I positive    Discussed with patient, ROBBI CC. I have personally reviewed the past medical history, past surgical history, medications, social history, and family history, and I have updated the database accordingly. Past Medical History:   No past medical history on file. Past Surgical  History:   No past surgical history on file.     Medications:      sodium chloride flush  10 mL Intravenous 2 times per day    enoxaparin  40 mg Subcutaneous Daily    acyclovir  1,000 mg Intravenous Q8H    levetiracetam  500 mg Intravenous BID       Social History:     Social History     Socioeconomic History    Marital status:      Spouse name: Not on file    Number of children: Not on file    Years of education: Not on file    Highest education level: Not on file   Occupational History    Not on file   Social Needs    Financial resource strain: Not hard at all   [employer] insecurity     Worry: Never true     Inability: Never true   [employer] needs     Medical: Not on file     Non-medical: Not on file   Tobacco Use    Smoking status: Never Smoker    Smokeless tobacco: Never Used   Substance and Sexual Activity    Alcohol use: Not on file    Drug use: Not on file    Sexual activity: Not on file   Lifestyle    Physical activity     Days per week: Not on file     Minutes per session: Not on file    Stress: Not on file   Relationships    Social connections     Talks on phone: Not on file     Gets together: Not on file     Attends Confucianism service: Not on file     Active member of club or organization: Not on file     Attends meetings of clubs or organizations: Not on file     Relationship status: Not on file    Intimate partner violence     Fear of current or ex partner: Not on file     Emotionally abused: Not on file     Physically abused: Not on file     Forced sexual activity: Not on file   Other Topics Concern    Not on file   Social History Narrative    Not on file       Family History:   No family history on file. Allergies:   Ibuprofen     Review of Systems:     Unable to provide. Encephalopathic. 7/25/2020      Constitutional: No fevers or chills. No systemic complaints  Head: No headaches  Eyes: No double vision or blurry vision. No conjunctival inflammation. ENT: No sore throat or runny nose. . No hearing loss, tinnitus or vertigo. Cardiovascular: No chest pain or palpitations. No shortness of breath. No DONALDSON  Lung: No shortness of breath or cough. No sputum production  Abdomen: No nausea, vomiting, diarrhea, or abdominal pain. Tonye Cogan No cramps. Genitourinary: No increased urinary frequency, or dysuria. No hematuria. No suprapubic or CVA pain  Musculoskeletal: No muscle aches or pains. No joint effusions, swelling or deformities  Hematologic: No bleeding or bruising. Neurologic: No headache, weakness, numbness, or tingling. Integument: No rash, no ulcers. Psychiatric: No depression. Endocrine: No polyuria, no polydipsia, no polyphagia.     Physical Examination :     Patient Vitals for the past 8 hrs:   BP Temp Temp src Pulse Resp SpO2   07/25/20 0700 102/61 -- -- 56 26 95 %   07/25/20 0600 103/64 -- -- 58 18 97 %   07/25/20 0500 (!) 96/58 -- -- 58 20 96 %   07/25/20 0405 114/85 100.6 °F (38.1 °C) Axillary 69 (!) 38 96 %   07/25/20 0300 121/74 -- -- 63 26 97 % 07/25/20 0200 104/68 -- -- 58 23 99 %   07/25/20 0100 112/78 -- -- 59 21 99 %   07/25/20 0000 109/68 98.2 °F (36.8 °C) Axillary 58 22 99 %     General Appearance: Somnolent  Head:  Normocephalic, no trauma  Eyes: Pupils equal, round, reactive to light; sclera anicteric; conjunctivae pink. No embolic phenomena. ENT: Oropharynx clear, without erythema, exudate, or thrush. No tenderness of sinuses. Mouth/throat: mucosa pink and moist. No lesions. Dentition in good repair. Neck:Supple, without lymphadenopathy. Thyroid normal, No bruits. Pulmonary/Chest: Clear to auscultation, without wheezes, rales, or rhonchi. No dullness to percussion. Cardiovascular: Regular rate and rhythm without murmurs, rubs, or gallops. Abdomen: Soft, non tender. Bowel sounds normal. No organomegaly  All four Extremities: No cyanosis, clubbing, edema, or effusions. . Scar from prior right hip arthroplasty  Neurologic: No gross sensory or motor deficits. Moves all extremities. Encephalopathic  Skin: Warm and dry with good turgor. No signs of peripheral arterial or venous insufficiency. No ulcerations. No open wounds. Medical Decision Making -Laboratory:   I have independently reviewed/ordered the following labs:    CBC with Differential:   Recent Labs     07/23/20 1712 07/24/20  0403   WBC 6.3 6.1   HGB 14.1 14.1   HCT 41.5 41.9    160   LYMPHOPCT 13* 23*   MONOPCT 12 13*     BMP:   Recent Labs     07/23/20  0747  07/24/20  0403 07/25/20  0419   *   < > 134* 136   K 4.2   < > 4.0 4.0   CL 98   < > 101 105   CO2 22   < > 20 20   BUN 12   < > 12 20   CREATININE 0.65*   < > 0.61* 1.16   MG 2.1  --   --   --     < > = values in this interval not displayed.      Hepatic Function Panel:   Recent Labs     07/23/20  0747 07/23/20  1712 07/23/20  1909   PROT 6.2* 6.2*  --    LABALBU 3.9 4.0 4.1   BILIDIR  --  0.21  --    IBILI  --  0.53  --    BILITOT 0.75 0.74  --    ALKPHOS 39* 39*  --    ALT 58* 59*  --    AST 28 31  -- No results for input(s): RPR in the last 72 hours. No results for input(s): HIV in the last 72 hours. No results for input(s): BC in the last 72 hours. Lab Results   Component Value Date    MUCUS NOT REPORTED 07/22/2020    RBC 4.55 07/24/2020    TRICHOMONAS NOT REPORTED 07/22/2020    WBC 6.1 07/24/2020    YEAST NOT REPORTED 07/22/2020    TURBIDITY CLEAR 07/22/2020     Lab Results   Component Value Date    CREATININE 1.16 07/25/2020    CREATININE 1.0 01/16/2020    GLUCOSE 127 07/25/2020       Medical Decision Making-Imaging:     CT OF THE HEAD WITHOUT CONTRAST  7/23/2020 8:09 am         TECHNIQUE:    CT of the head was performed without the administration of intravenous    contrast. Dose modulation, iterative reconstruction, and/or weight based    adjustment of the mA/kV was utilized to reduce the radiation dose to as low    as reasonably achievable.         COMPARISON:    None.         HISTORY:    ORDERING SYSTEM PROVIDED HISTORY: DELERIUM    TECHNOLOGIST PROVIDED HISTORY:    DELERIUM              FINDINGS:    BRAIN/VENTRICLES: There is no acute intracranial hemorrhage, mass effect or    midline shift.  No abnormal extra-axial fluid collection.  The gray-white    differentiation is maintained without evidence of an acute infarct.  There is    no evidence of hydrocephalus.         ORBITS: The visualized portion of the orbits demonstrate no acute abnormality.         SINUSES: The visualized paranasal sinuses and mastoid air cells demonstrate    no acute abnormality.         SOFT TISSUES/SKULL:  No acute abnormality of the visualized skull or soft    tissues.              Impression    No acute intracranial abnormality.       EXAMINATION:    ONE XRAY VIEW OF THE CHEST         7/21/2020 6:32 pm         COMPARISON:    None.         HISTORY:    ORDERING SYSTEM PROVIDED HISTORY: sepsis    TECHNOLOGIST PROVIDED HISTORY:    sepsis         FINDINGS:    Heart size is normal.  Interstitial markings are prominent which may be    related to interstitial pneumonitis.  No effusion, extrapleural air or    localized consolidation is noted.  Osseous and mediastinal structures are    age-appropriate.              Impression    Diffuse prominence of the interstitial markings which may be related to    interstitial pneumonitis.  No focal consolidation.           EXAMINATION:    CT OF THE ABDOMEN AND PELVIS WITH CONTRAST 7/21/2020 4:06 pm         TECHNIQUE:    CT of the abdomen and pelvis was performed with the administration of    intravenous contrast. Multiplanar reformatted images are provided for review. Dose modulation, iterative reconstruction, and/or weight based adjustment of    the mA/kV was utilized to reduce the radiation dose to as low as reasonably    achievable.         COMPARISON:    CT urogram January 6, 2020         HISTORY:    ORDERING SYSTEM PROVIDED HISTORY: Lower abdominal pain    TECHNOLOGIST PROVIDED HISTORY:              FINDINGS:    Lower Chest: Clear         Organs:         The abdominal wall appears normal.         The liver, spleen, pancreas, and adrenals appear normal.  Multiple punctate    calcified granulomas in the spleen.         Bilateral simple renal cortical cysts.         Bladder is decompressed and circumferentially thickened.         GI/Bowel: The stomach,small bowel, and colon appear normal. Oral contrast    noted throughout the small bowel.  Stool noted throughout the liver large    bowel.  Appendix normal.         Pelvis: Markedly enlarged prostate.  It measures 68 x 83 mm in AP and    transverse dimensions.         Peritoneum/Retroperitoneum: The abdominal aorta and iliac arteries are normal    in caliber.  There is no pathologic adenopathy.         Bones/Soft Tissues: Right hip arthroplasty.              Impression    Markedly enlarged prostate.  Possible cystitis.  Increased stool.           EXAMINATION:    ONE XRAY VIEW OF THE PELVIS AND TWO XRAY VIEWS RIGHT HIP         6/25/2020 7:29 am      COMPARISON:    10/23/2017         HISTORY:    ORDERING SYSTEM PROVIDED HISTORY: Acute right hip pain         FINDINGS:    Status post right hip arthroplasty.  No evidence of hardware complications. Stable alignment.         Elsewhere, no acute fracture or dislocation.  Moderate left hip    osteoarthritis.  The SI joints and sacral arcuate lines appear intact. Degenerative changes of the SI joints are also seen.         No soft tissue abnormality.              Impression    1. Status post right hip arthroplasty.  No complication. 2. No acute osseous abnormality of the pelvis. 3. Moderate left hip osteoarthritis. Medical Decision [id number]:   Results for Jennifer Mason (MRN [id number]) as of 7/24/2020 10:34   Ref. Range 7/23/2020 16:37   CYTOMEGALOVIRUS (CMV) CSF FILM ARRAY Latest Ref Range: Not Detected  Not Detected   HAEMOPHILUS INFLUENZA CSF FILM ARRAY Latest Ref Range: Not Detected  Not Detected   HHV-6 (HERPESVIRUS 6) CSF FILM ARRAY Latest Ref Range: Not Detected  Not Detected   HSV-1 CSF FILM ARRAY Latest Ref Range: Not Detected  DETECTED (A)   HSV-2 CSF FILM ARRAY Latest Ref Range: Not Detected  Not Detected   PARECHOVIRUS CSF FILM ARRAY Latest Ref Range: Not Detected  Not Detected   ESCHERICHIA COLI K1 CSF FILM ARRAY Latest Ref Range: Not Detected  Not Detected   LISTERIA MONOCYTOGENES CSF FILM ARRAY Latest Ref Range: Not Detected  Not Detected   NEISSERIA MENIGITIDIS CSF FILM ARRAY Latest Ref Range: Not Detected  Not Detected   STREPTOCOCCUS AGALACTIAE CSF FILM ARRAY Latest Ref Range: Not Detected  Not Detected   STREPTOCOCCUS PNEUMONIAE CSF FILM ARRAY Latest Ref Range: Not Detected  Not Detected   CRYPTOCOCCUS NEOFORMANS/SALUD CSF FILM ARR.  Latest Ref Range: Not Detected  Not Detected       Medical Decision Making-Other:     Note:  · Labs, medications, radiologic studies were reviewed with personal review of films  · Large amounts of data were reviewed  · Discussed with nursing Staff, Discharge planner  · Infection Control and Prevention measures reviewed  · All prior entries were reviewed  · Administer medications as ordered  · Prognosis: Guarded  · Discharge planning reviewed  · Follow up as outpatient. Thank you for allowing us to participate in the care of this patient. Please call with questions. Harris Silva MD     ATTESTATION:    I have discussed the case, including pertinent history and exam findings with the residents and students. I have seen and examined the patient and the key elements of the encounter have been performed by me. I was present when the student obtained his information or examined the patient. I have reviewed the laboratory data, other diagnostic studies and discussed them with the residents. I have updated the medical record where necessary. I agree with the assessment, plan and orders as documented by the resident/ student.     Christopher Tucker MD.    Pager: [phone] - Office: [phone]

## 2020-07-25 NOTE — PLAN OF CARE
Problem: Anxiety/Stress:  Goal: Level of anxiety will decrease  Description: Level of anxiety will decrease  7/25/2020 1034 by Yolanda Hernandez RN  Outcome: Ongoing     Problem: Mental Status - Impaired:  Goal: Mental status will be restored to baseline  Description: Mental status will be restored to baseline  7/25/2020 1034 by Yolanda Hernandez RN  Outcome: Ongoing     Problem: Falls - Risk of:  Goal: Will remain free from falls  Description: Will remain free from falls  7/25/2020 1034 by Yolanda Hernandez RN  Outcome: Ongoing     Problem: Falls - Risk of:  Goal: Absence of physical injury  Description: Absence of physical injury  7/25/2020 1034 by Yolanda Hernandez RN  Outcome: Ongoing     Problem: Skin Integrity:  Goal: Will show no infection signs and symptoms  Description: Will show no infection signs and symptoms  7/25/2020 1034 by Yolanda Hernandez RN  Outcome: Ongoing     Problem: Skin Integrity:  Goal: Absence of new skin breakdown  Description: Absence of new skin breakdown  7/25/2020 1034 by Yolanda Hernandez RN  Outcome: Ongoing     Problem: Restraint Use - Nonviolent/Non-Self-Destructive Behavior:  Goal: Absence of restraint indications  Description: Absence of restraint indications  7/25/2020 1034 by Yolanda Hernandez RN  Outcome: Ongoing     Problem: Restraint Use - Nonviolent/Non-Self-Destructive Behavior:  Goal: Absence of restraint-related injury  Description: Absence of restraint-related injury  7/25/2020 1034 by Yolanda Hernandez RN  Outcome: Ongoing

## 2020-07-26 ENCOUNTER — APPOINTMENT (OUTPATIENT)
Dept: ULTRASOUND IMAGING | Age: 70
DRG: 871 | End: 2020-07-26
Attending: INTERNAL MEDICINE
Payer: MEDICARE

## 2020-07-26 LAB
-: ABNORMAL
-: NORMAL
ABSOLUTE EOS #: <0.03 K/UL (ref 0–0.44)
ABSOLUTE IMMATURE GRANULOCYTE: 0.03 K/UL (ref 0–0.3)
ABSOLUTE LYMPH #: 1.05 K/UL (ref 1.1–3.7)
ABSOLUTE MONO #: 1.08 K/UL (ref 0.1–1.2)
ALLEN TEST: ABNORMAL
AMORPHOUS: ABNORMAL
ANION GAP SERPL CALCULATED.3IONS-SCNC: 14 MMOL/L (ref 9–17)
ANION GAP SERPL CALCULATED.3IONS-SCNC: 15 MMOL/L (ref 9–17)
ANION GAP SERPL CALCULATED.3IONS-SCNC: 19 MMOL/L (ref 9–17)
BACTERIA: ABNORMAL
BASOPHILS # BLD: 0 % (ref 0–2)
BASOPHILS ABSOLUTE: 0.03 K/UL (ref 0–0.2)
BILIRUBIN URINE: NEGATIVE
BUN BLDV-MCNC: 36 MG/DL (ref 8–23)
BUN BLDV-MCNC: 40 MG/DL (ref 8–23)
BUN BLDV-MCNC: 46 MG/DL (ref 8–23)
BUN/CREAT BLD: ABNORMAL (ref 9–20)
CALCIUM SERPL-MCNC: 8.3 MG/DL (ref 8.6–10.4)
CALCIUM SERPL-MCNC: 8.3 MG/DL (ref 8.6–10.4)
CALCIUM SERPL-MCNC: 8.6 MG/DL (ref 8.6–10.4)
CASTS UA: ABNORMAL /LPF (ref 0–8)
CHLORIDE BLD-SCNC: 104 MMOL/L (ref 98–107)
CHLORIDE BLD-SCNC: 106 MMOL/L (ref 98–107)
CHLORIDE BLD-SCNC: 108 MMOL/L (ref 98–107)
CO2: 13 MMOL/L (ref 20–31)
CO2: 17 MMOL/L (ref 20–31)
CO2: 18 MMOL/L (ref 20–31)
COLOR: YELLOW
CREAT SERPL-MCNC: 3.58 MG/DL (ref 0.7–1.2)
CREAT SERPL-MCNC: 3.97 MG/DL (ref 0.7–1.2)
CREAT SERPL-MCNC: 4.16 MG/DL (ref 0.7–1.2)
CREATININE URINE: 93.6 MG/DL (ref 39–259)
CRYSTALS, UA: ABNORMAL /HPF
CULTURE: NORMAL
DIFFERENTIAL TYPE: ABNORMAL
DIRECT EXAM: NORMAL
DIRECT EXAM: NORMAL
EKG ATRIAL RATE: 49 BPM
EKG ATRIAL RATE: 57 BPM
EKG P AXIS: 22 DEGREES
EKG P AXIS: 32 DEGREES
EKG P-R INTERVAL: 178 MS
EKG P-R INTERVAL: 182 MS
EKG Q-T INTERVAL: 434 MS
EKG Q-T INTERVAL: 508 MS
EKG QRS DURATION: 86 MS
EKG QRS DURATION: 90 MS
EKG QTC CALCULATION (BAZETT): 422 MS
EKG QTC CALCULATION (BAZETT): 458 MS
EKG R AXIS: 16 DEGREES
EKG R AXIS: 38 DEGREES
EKG T AXIS: -14 DEGREES
EKG T AXIS: -19 DEGREES
EKG VENTRICULAR RATE: 49 BPM
EKG VENTRICULAR RATE: 57 BPM
EOSINOPHIL,URINE: NORMAL
EOSINOPHILS RELATIVE PERCENT: 0 % (ref 1–4)
EPITHELIAL CELLS UA: ABNORMAL /HPF (ref 0–5)
FIO2: ABNORMAL
GFR AFRICAN AMERICAN: 17 ML/MIN
GFR AFRICAN AMERICAN: 18 ML/MIN
GFR AFRICAN AMERICAN: 21 ML/MIN
GFR NON-AFRICAN AMERICAN: 14 ML/MIN
GFR NON-AFRICAN AMERICAN: 15 ML/MIN
GFR NON-AFRICAN AMERICAN: 17 ML/MIN
GFR SERPL CREATININE-BSD FRML MDRD: ABNORMAL ML/MIN/{1.73_M2}
GLUCOSE BLD-MCNC: 107 MG/DL (ref 70–99)
GLUCOSE BLD-MCNC: 137 MG/DL (ref 70–99)
GLUCOSE BLD-MCNC: 146 MG/DL (ref 74–100)
GLUCOSE BLD-MCNC: 169 MG/DL (ref 70–99)
GLUCOSE URINE: NEGATIVE
HCT VFR BLD CALC: 38.8 % (ref 40.7–50.3)
HEMOGLOBIN: 12.9 G/DL (ref 13–17)
IMMATURE GRANULOCYTES: 0 %
KETONES, URINE: ABNORMAL
LEUKOCYTE ESTERASE, URINE: ABNORMAL
LYMPHOCYTES # BLD: 12 % (ref 24–43)
Lab: NORMAL
MCH RBC QN AUTO: 31.7 PG (ref 25.2–33.5)
MCHC RBC AUTO-ENTMCNC: 33.2 G/DL (ref 28.4–34.8)
MCV RBC AUTO: 95.3 FL (ref 82.6–102.9)
MODE: ABNORMAL
MONOCYTES # BLD: 13 % (ref 3–12)
MUCUS: ABNORMAL
MYOGLOBIN: 148 NG/ML (ref 28–72)
NEGATIVE BASE EXCESS, ART: 7 (ref 0–2)
NITRITE, URINE: NEGATIVE
NRBC AUTOMATED: 0 PER 100 WBC
O2 DEVICE/FLOW/%: ABNORMAL
OTHER OBSERVATIONS UA: ABNORMAL
PATIENT TEMP: ABNORMAL
PDW BLD-RTO: 12.7 % (ref 11.8–14.4)
PH UA: 5 (ref 5–8)
PLATELET # BLD: 133 K/UL (ref 138–453)
PLATELET ESTIMATE: ABNORMAL
PMV BLD AUTO: 11.7 FL (ref 8.1–13.5)
POC HCO3: 16.7 MMOL/L (ref 21–28)
POC LACTIC ACID: 0.75 MMOL/L (ref 0.56–1.39)
POC O2 SATURATION: 96 % (ref 94–98)
POC PCO2 TEMP: ABNORMAL MM HG
POC PCO2: 28 MM HG (ref 35–48)
POC PH TEMP: ABNORMAL
POC PH: 7.38 (ref 7.35–7.45)
POC PO2 TEMP: ABNORMAL MM HG
POC PO2: 78.3 MM HG (ref 83–108)
POSITIVE BASE EXCESS, ART: ABNORMAL (ref 0–3)
POTASSIUM SERPL-SCNC: 4.3 MMOL/L (ref 3.7–5.3)
POTASSIUM SERPL-SCNC: 4.4 MMOL/L (ref 3.7–5.3)
POTASSIUM SERPL-SCNC: 4.6 MMOL/L (ref 3.7–5.3)
PROTEIN UA: ABNORMAL
RBC # BLD: 4.07 M/UL (ref 4.21–5.77)
RBC # BLD: ABNORMAL 10*6/UL
RBC UA: ABNORMAL /HPF (ref 0–4)
REASON FOR REJECTION: NORMAL
RENAL EPITHELIAL, UA: ABNORMAL /HPF
SAMPLE SITE: ABNORMAL
SEG NEUTROPHILS: 75 % (ref 36–65)
SEGMENTED NEUTROPHILS ABSOLUTE COUNT: 6.28 K/UL (ref 1.5–8.1)
SODIUM BLD-SCNC: 136 MMOL/L (ref 135–144)
SODIUM BLD-SCNC: 138 MMOL/L (ref 135–144)
SODIUM BLD-SCNC: 140 MMOL/L (ref 135–144)
SODIUM,UR: 67 MMOL/L
SPECIFIC GRAVITY UA: 1.01 (ref 1–1.03)
SPECIMEN DESCRIPTION: NORMAL
TCO2 (CALC), ART: 18 MMOL/L (ref 22–29)
TOTAL CK: 124 U/L (ref 39–308)
TOTAL PROTEIN, URINE: 62 MG/DL
TRICHOMONAS: ABNORMAL
TURBIDITY: ABNORMAL
URINE HGB: ABNORMAL
UROBILINOGEN, URINE: NORMAL
WBC # BLD: 8.5 K/UL (ref 3.5–11.3)
WBC # BLD: ABNORMAL 10*3/UL
WBC UA: ABNORMAL /HPF (ref 0–5)
YEAST: ABNORMAL
ZZ NTE CLEAN UP: ORDERED TEST: NORMAL
ZZ NTE WITH NAME CLEAN UP: SPECIMEN SOURCE: NORMAL

## 2020-07-26 PROCEDURE — 84156 ASSAY OF PROTEIN URINE: CPT

## 2020-07-26 PROCEDURE — 87205 SMEAR GRAM STAIN: CPT

## 2020-07-26 PROCEDURE — 82550 ASSAY OF CK (CPK): CPT

## 2020-07-26 PROCEDURE — 76770 US EXAM ABDO BACK WALL COMP: CPT

## 2020-07-26 PROCEDURE — 51702 INSERT TEMP BLADDER CATH: CPT

## 2020-07-26 PROCEDURE — 6360000002 HC RX W HCPCS: Performed by: STUDENT IN AN ORGANIZED HEALTH CARE EDUCATION/TRAINING PROGRAM

## 2020-07-26 PROCEDURE — 99222 1ST HOSP IP/OBS MODERATE 55: CPT | Performed by: INTERNAL MEDICINE

## 2020-07-26 PROCEDURE — 36415 COLL VENOUS BLD VENIPUNCTURE: CPT

## 2020-07-26 PROCEDURE — 2000000000 HC ICU R&B

## 2020-07-26 PROCEDURE — 82947 ASSAY GLUCOSE BLOOD QUANT: CPT

## 2020-07-26 PROCEDURE — 82803 BLOOD GASES ANY COMBINATION: CPT

## 2020-07-26 PROCEDURE — 83874 ASSAY OF MYOGLOBIN: CPT

## 2020-07-26 PROCEDURE — 2580000003 HC RX 258: Performed by: STUDENT IN AN ORGANIZED HEALTH CARE EDUCATION/TRAINING PROGRAM

## 2020-07-26 PROCEDURE — 99233 SBSQ HOSP IP/OBS HIGH 50: CPT | Performed by: INTERNAL MEDICINE

## 2020-07-26 PROCEDURE — 95714 VEEG EA 12-26 HR UNMNTR: CPT

## 2020-07-26 PROCEDURE — 51798 US URINE CAPACITY MEASURE: CPT

## 2020-07-26 PROCEDURE — 86038 ANTINUCLEAR ANTIBODIES: CPT

## 2020-07-26 PROCEDURE — 99233 SBSQ HOSP IP/OBS HIGH 50: CPT | Performed by: PSYCHIATRY & NEUROLOGY

## 2020-07-26 PROCEDURE — 36600 WITHDRAWAL OF ARTERIAL BLOOD: CPT

## 2020-07-26 PROCEDURE — 95720 EEG PHY/QHP EA INCR W/VEEG: CPT | Performed by: PSYCHIATRY & NEUROLOGY

## 2020-07-26 PROCEDURE — 99291 CRITICAL CARE FIRST HOUR: CPT | Performed by: INTERNAL MEDICINE

## 2020-07-26 PROCEDURE — 85025 COMPLETE CBC W/AUTO DIFF WBC: CPT

## 2020-07-26 PROCEDURE — 84300 ASSAY OF URINE SODIUM: CPT

## 2020-07-26 PROCEDURE — 83605 ASSAY OF LACTIC ACID: CPT

## 2020-07-26 PROCEDURE — 2500000003 HC RX 250 WO HCPCS: Performed by: STUDENT IN AN ORGANIZED HEALTH CARE EDUCATION/TRAINING PROGRAM

## 2020-07-26 PROCEDURE — 80048 BASIC METABOLIC PNL TOTAL CA: CPT

## 2020-07-26 PROCEDURE — 82570 ASSAY OF URINE CREATININE: CPT

## 2020-07-26 PROCEDURE — 81001 URINALYSIS AUTO W/SCOPE: CPT

## 2020-07-26 PROCEDURE — 93005 ELECTROCARDIOGRAM TRACING: CPT | Performed by: STUDENT IN AN ORGANIZED HEALTH CARE EDUCATION/TRAINING PROGRAM

## 2020-07-26 RX ORDER — HEPARIN SODIUM 5000 [USP'U]/ML
5000 INJECTION, SOLUTION INTRAVENOUS; SUBCUTANEOUS EVERY 8 HOURS SCHEDULED
Status: DISCONTINUED | OUTPATIENT
Start: 2020-07-26 | End: 2020-08-04 | Stop reason: HOSPADM

## 2020-07-26 RX ORDER — 0.9 % SODIUM CHLORIDE 0.9 %
500 INTRAVENOUS SOLUTION INTRAVENOUS ONCE
Status: COMPLETED | OUTPATIENT
Start: 2020-07-26 | End: 2020-07-26

## 2020-07-26 RX ADMIN — LEVETIRACETAM 500 MG: 5 INJECTION INTRAVENOUS at 09:24

## 2020-07-26 RX ADMIN — SODIUM CHLORIDE 500 ML: 9 INJECTION, SOLUTION INTRAVENOUS at 11:01

## 2020-07-26 RX ADMIN — SODIUM CHLORIDE 500 ML: 9 INJECTION, SOLUTION INTRAVENOUS at 09:49

## 2020-07-26 RX ADMIN — HEPARIN SODIUM 5000 UNITS: 5000 INJECTION INTRAVENOUS; SUBCUTANEOUS at 13:36

## 2020-07-26 RX ADMIN — SODIUM CHLORIDE, PRESERVATIVE FREE 10 ML: 5 INJECTION INTRAVENOUS at 20:44

## 2020-07-26 RX ADMIN — ACYCLOVIR SODIUM 1000 MG: 50 INJECTION, SOLUTION INTRAVENOUS at 05:25

## 2020-07-26 RX ADMIN — SODIUM CHLORIDE, PRESERVATIVE FREE 10 ML: 5 INJECTION INTRAVENOUS at 09:01

## 2020-07-26 RX ADMIN — SODIUM CHLORIDE 125 ML/HR: 9 INJECTION, SOLUTION INTRAVENOUS at 23:03

## 2020-07-26 RX ADMIN — LEVETIRACETAM 500 MG: 5 INJECTION INTRAVENOUS at 20:44

## 2020-07-26 RX ADMIN — SODIUM CHLORIDE: 9 INJECTION, SOLUTION INTRAVENOUS at 09:25

## 2020-07-26 RX ADMIN — DEXMEDETOMIDINE HYDROCHLORIDE 1 MCG/KG/HR: 400 INJECTION INTRAVENOUS at 23:03

## 2020-07-26 RX ADMIN — DEXMEDETOMIDINE HYDROCHLORIDE 0.3 MCG/KG/HR: 400 INJECTION INTRAVENOUS at 16:10

## 2020-07-26 RX ADMIN — ONDANSETRON 4 MG: 2 INJECTION INTRAMUSCULAR; INTRAVENOUS at 20:52

## 2020-07-26 RX ADMIN — SODIUM CHLORIDE: 9 INJECTION, SOLUTION INTRAVENOUS at 13:40

## 2020-07-26 ASSESSMENT — PAIN SCALES - GENERAL: PAINLEVEL_OUTOF10: 0

## 2020-07-26 NOTE — CONSULTS
History    No past medical history on file.        Past Surgical History:    Past Surgical History   No past surgical history on file.        Current Medications:      Current Meds Link used for Sign Out Report   0.9 % sodium chloride bolus, Once  heparin (porcine) injection 5,000 Units, 3 times per day  0.9 % sodium chloride bolus, Once  QUEtiapine (SEROQUEL) tablet 25 mg, BID  dexmedetomidine (PRECEDEX) 400 mcg in sodium chloride 0.9 % 100 mL infusion, Continuous  sodium chloride flush 0.9 % injection 10 mL, 2 times per day  sodium chloride flush 0.9 % injection 10 mL, PRN  acetaminophen (TYLENOL) tablet 650 mg, Q6H PRN    Or  acetaminophen (TYLENOL) suppository 650 mg, Q6H PRN  polyethylene glycol (GLYCOLAX) packet 17 g, Daily PRN  promethazine (PHENERGAN) tablet 12.5 mg, Q6H PRN    Or  ondansetron (ZOFRAN) injection 4 mg, Q6H PRN  0.9 % sodium chloride infusion, Continuous  levetiracetam (KEPPRA) 500 mg/100 mL IVPB, BID           Allergies:  Ibuprofen     Social History:   Social History               Socioeconomic History    Marital status:        Spouse name: Not on file    Number of children: Not on file    Years of education: Not on file    Highest education level: Not on file   Occupational History    Not on file   Social Needs    Financial resource strain: Not hard at all   P[employer] insecurity       Worry: Never true       Inability: Never true    Transportation needs       Medical: Not on file       Non-medical: Not on file   Tobacco Use    Smoking status: Never Smoker    Smokeless tobacco: Never Used   Substance and Sexual Activity    Alcohol use: Not on file    Drug use: Not on file    Sexual activity: Not on file   Lifestyle    Physical activity       Days per week: Not on file       Minutes per session: Not on file    Stress: Not on file   Relationships    Social connections       Talks on phone: Not on file       Gets together: Not on file       Attends Protestant service: Not on file       Active member of club or organization: Not on file       Attends meetings of clubs or organizations: Not on file       Relationship status: Not on file    Intimate partner violence       Fear of current or ex partner: Not on file       Emotionally abused: Not on file       Physically abused: Not on file       Forced sexual activity: Not on file   Other Topics Concern    Not on file   Social History Narrative    Not on file           Family History:   Family History   No family history on file.        Review of Systems:    TIFFANY due to patient's inability to verbalize coherently at this time     Objective:  CURRENT TEMPERATURE:  Temp: 98.4 °F (36.9 °C)  MAXIMUM TEMPERATURE OVER 24HRS:  Temp (24hrs), Av.3 °F (36.8 °C), Min:97.8 °F (36.6 °C), Max:98.6 °F (37 °C)    CURRENT RESPIRATORY RATE:  Resp: 25  CURRENT PULSE:  Pulse: 59  CURRENT BLOOD PRESSURE:  BP: 123/60  24HR BLOOD PRESSURE RANGE:  Systolic (32CFV), OLV:399 , Min:93 , GHP:832   ; Diastolic (26XZV), WFE:48, Min:58, Max:81    24HR INTAKE/OUTPUT:      Intake/Output Summary (Last 24 hours) at 2020 1029  Last data filed at 2020 0859      Gross per 24 hour   Intake 3023.94 ml   Output 700 ml   Net 2323.94 ml     Patient Vitals for the past 96 hrs (Last 3 readings):    Weight   20 0536 222 lb 0.1 oz (100.7 kg)   20 1630 211 lb 6.7 oz (95.9 kg)        Physical Exam:  General appearance: Awake, alert, unable to verbalize or follow commands  Skin:  warm and dry, no rash or erythema  Eyes: conjunctivae normal and sclera anicteric  ENT:  no thrush no pharyngeal congestion    Neck: no carotid bruit ,no  JVD,no carotid Lymphadenopathy, noThyromegaly   Pulmonary: no wheezing or rhonchi. No rales heard.   Cardiovascular: Normal S1 & S2,  No S3   Abdomen: soft nontender, bowel sounds present, no organomegaly,  no ascites  : Woods catheter in place with decent UOP  Extremities:Edema 2+ LUE     Labs:     CBC:        Recent Labs 07/23/20  1712 07/24/20  0403 07/26/20  0503   WBC 6.3 6.1 8.5   RBC 4.55 4.55 4.07*   HGB 14.1 14.1 12.9*   HCT 41.5 41.9 38.8*   MCV 91.2 92.1 95.3   MCH 31.0 31.0 31.7   MCHC 34.0 33.7 33.2   RDW 11.9 12.1 12.7    160 133*   MPV 10.7 11.5 11.7         BMP:         Recent Labs     07/25/20  0419 07/26/20  0503 07/26/20  0921    138 136   K 4.0 4.4 4.3    106 104   CO2 20 17* 18*   BUN 20 36* 40*   CREATININE 1.16 3.58* 3.97*   GLUCOSE 127* 107* 169*   CALCIUM 8.7 8.3* 8.3*    Albumin:                 Recent Labs     07/23/20  1712 07/23/20  1909   LABALBU 4.0 4.1     SPEP:         Lab Results   Component Value Date     PROT 6.2 07/23/2020     Urine Sodium:  No results found for: YANI   Urine Creatinine:  No results found for: LABCREA  Urine Eosinophils: No results found for: UREO  Urine Protein:  No results found for: TPU  Urinalysis:  U/A:         Lab Results   Component Value Date     NITRU NEGATIVE 07/22/2020     COLORU YELLOW 07/22/2020     PHUR 6.0 07/22/2020     WBCUA None 07/22/2020     RBCUA 2 TO 5 07/22/2020     MUCUS NOT REPORTED 07/22/2020     TRICHOMONAS NOT REPORTED 07/22/2020     YEAST NOT REPORTED 07/22/2020     BACTERIA NOT REPORTED 07/22/2020     SPECGRAV 1.015 07/22/2020     LEUKOCYTESUR NEGATIVE 07/22/2020     UROBILINOGEN Normal 07/22/2020     BILIRUBINUR NEGATIVE 07/22/2020     GLUCOSEU NEGATIVE 07/22/2020     KETUA NEGATIVE 07/22/2020     AMORPHOUS NOT REPORTED 07/22/2020           Radiology:  Reviewed as available.     Assessment:  1. BALBIR likely with ATN secondary to dehydration and and nephrotoxic contrast dye and Acyclovir  2. 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 Acute change in mental status-Secondary to encephalitis-Neurology following    4. Non-gap metabolic acidosis  5. Enlarged prostate    Plan:  1.  Will Check Renal Ultrasound to r/o element of obstruction and to assess the kidney size/echotexture. 2. Comprehensive urine testing including Urinalysis, Urine sodium, Urine protein and creatinine to quantify        the proteinuria if any at all. Will check urinary eosinophils as well. 3. Discuss treatment options for herpes encephalitis with ID, critical care, and pharmacy to determine if a        less nephrotoxic strategy can be formulated. 4. Closely monitor intake and output and record daily weight  5. Recheck BMP tonight  6. Will continue to follow     Thank you for the consultation. Please do not hesitate to call with questions.     Electronically signed by ISHMAEL Connors CNP on 7/26/2020 at 10:29 AM      Attending Physician Statement  I have discussed the care of Mila De Dios, including pertinent history and exam findings with the NP I have reviewed the key elements of all parts of the encounter with the np. Note was updated and recorded changes were made I have seen and examined the patient with the np. I agree with the assessment and plan and status of the problem list as documented. In summary 66-year-old gentleman who has a past medical history significant for benign prostatic hypertrophy presented to hospital with 10-day history of confusion. He had a CT scan with contrast on 7/21/2020 and after LP he was started on acyclovir on 23rd of this month. His admitting/baseline creatinine was 0.6 mg/dL. The pattern of his creatinine is as follows:Results for Bettina Juarez (MRN [id number]) as of 7/26/2020 13:10   Ref. Range 7/24/2020 04:03 7/25/2020 04:19 7/26/2020 05:03 7/26/2020 09:21   Creatinine Latest Ref Range: 0.70 - 1.20 mg/dL 0.61 (L) 1.16 3.58 (H) 3.97 (H)     Based on above information his current nephrological and associated diagnoses include  Nonoliguric acute renal failure most likely due to exposure to contrast resulting in ATN and may be further complicated by acyclovir.   However his creatinine is increasing and his acute renal failure is evolving. His urine output is somewhere in the range of 50 to 60 mL/h. So far his mag electrolytes are within acceptable range  2. Non-anion gap metabolic acidosis  3. History of herpes encephalitis  4. History of benign prostatic hypertrophy    Recommendations:  1. Continue IV fluids at 125 mL/h  2.  Labs as ordered  3. Use Lasix as needed for urine output less than 30 mL for 2 consecutive hours. 4. BMP this evening  5.   We will follow with    Leroy Mcrae

## 2020-07-26 NOTE — PROCEDURES
LONG-TERM EEG-VIDEO [id number]    Patient: Ernestine Smith  Age: 79 y.o. MRN: [id number]    Referring Physician: Jc Latham DO  History: The patient is a 79 y.o. male who presented breakthrough seizure/encephalopathy. This long-term video-EEG monitoring study was performed to determine the nature of the patient's clinical events. The patient is on neuroactive medications.    Ernestine Smith   Current Facility-Administered Medications   Medication Dose Route Frequency Provider Last Rate Last Dose    0.9 % sodium chloride bolus  500 mL Intravenous Once Skye Lauren,  mL/hr at 07/26/20 0949 500 mL at 07/26/20 0949    heparin (porcine) injection 5,000 Units  5,000 Units Subcutaneous 3 times per day Cornelious Dunks, DO        0.9 % sodium chloride bolus  500 mL Intravenous Once Du Milian MD        QUEtiapine (SEROQUEL) tablet 25 mg  25 mg Oral BID Cornelious Dunks, DO   25 mg at 07/25/20 1159    dexmedetomidine (PRECEDEX) 400 mcg in sodium chloride 0.9 % 100 mL infusion  0.2 mcg/kg/hr Intravenous Continuous Corneliobdulia Stokes, DO 7.2 mL/hr at 07/26/20 0951 0.3 mcg/kg/hr at 07/26/20 0951    sodium chloride flush 0.9 % injection 10 mL  10 mL Intravenous 2 times per day Jordi Kaba MD   10 mL at 07/24/20 2124    sodium chloride flush 0.9 % injection 10 mL  10 mL Intravenous PRN Jordi Kaba MD        acetaminophen (TYLENOL) tablet 650 mg  650 mg Oral Q6H PRN Jordi Kaba MD        Or    acetaminophen (TYLENOL) suppository 650 mg  650 mg Rectal Q6H PRN Jordi Kaba MD        polyethylene glycol (GLYCOLAX) packet 17 g  17 g Oral Daily PRN Jordi Kaba MD        promethazine (PHENERGAN) tablet 12.5 mg  12.5 mg Oral Q6H PRN Naima Guerra MD        Or    ondansetron (ZOFRAN) injection 4 mg  4 mg Intravenous Q6H PRN Naima Guerra MD   4 mg at 07/25/20 1727    0.9 % sodium chloride infusion etiology. Monitoring was continued in order to record the patient's typical events. The EKG channel revealed no abnormalities. Day 3 - 7/26/20 reviewed through end of the recording at 16:43    Interictal EEG Samples: Interictal EEG was unchanged from yesterday. Ictal EEG Recording / Patient Events: During this period the patient did not have any events. Summary: During this day of recording the patient did not have any events. The interictal EEG was abnormal due to diffuse polymorphic delta and theta slowing suggestive of moderate to severe encephalopathy of nonspecific etiology. Monitoring was discontinued. The EKG channel revealed no abnormalities. Summary and behavior: The interictal EEG was abnormal. Diffuse polymorphic delta and theta slowing was seen. During the recording the patient reportedly had hand twitching. These events were not seizures    Conclusion: This is an abnormal 3 day video EEG recording. Diffuse polymorphic delta and theta slowing suggested moderate to severe encephalopathy of nonspecific etiology. During the recording, the patient reportedly had hand twitching. These events were not seizures.        Jenae Ritter MD  Diplomate, American Board of Psychiatry and Neurology  Diplomate, American Board of Clinical Neurophysiology  Diplomate, American Board of Epilepsy

## 2020-07-26 NOTE — PROGRESS NOTES
Nephrology Consult Note    Reason for Consult: BALBIR  Requesting Physician:  Dr. Alessia Smith    Chief Complaint:  AMS/Encephalitis    History Obtained From:  electronic medical record    History of Present Illness: This is a 79 y.o. male with pertinent medical history of prostatic hyperplasia and recurrent urinary tract infections who presented to the hospital from [facility] for evaluation of altered mentation and fever. After experiencing dysuria and right pelvic/hip pain for about a week, he was started on doxycycline on 7/20/2020 for a UTI. The next day, 7/21/2020, he was seen at [facility] for confusion and high grade fever. Urine and blood cultures were drawn and have come back negative for infection. CT with contrast on 7/21/20 was negative other than enlarged prostate. A lumbar puncture performed at [facility] revealed acute to subacute encephalitis with Herpes simplex type 1. On 7/23/20 the patient was started on IV acyclovir. On 7/24/20 the patient had an MRI with contrast that did show some subacute inflammation of the frontal region of the brain. According to past lab values, the patient's baseline creatnine appears to be around 1.0 mg/dL. On 7/25/20 the patient's creatinine was 1.1 mg/dL, and today 7/26/20 it greatly increased to 3.58 mg/dL with a recheck showing 3.97 mg/dL. There is no history of blood or bone marrow disorders. There is no hx of jaundice or hepatitis or sexually transmitted disease. Pt has no hx of collagen vascular disease or vasculitis. No history of dysuria or frequency. There is no hx of paraprotein disease. Pt does have a hx of recurrent UTI, as well as CT with contrast on 7/21 and IV acyclovir which is known to be nephrotoxic. Upon assessment, the patient was unable to verbalize coherently or follow commands. A kirk catheter was placed today with adequate UO. A 1 liter of fluid bolus was being administered.          Past Medical History:    No past medical history on file    Intimate partner violence     Fear of current or ex partner: Not on file     Emotionally abused: Not on file     Physically abused: Not on file     Forced sexual activity: Not on file   Other Topics Concern    Not on file   Social History Narrative    Not on file       Family History:   No family history on file. Review of Systems:    TIFFANY due to patient's inability to verbalize coherently at this time    Objective:  CURRENT TEMPERATURE:  Temp: 98.4 °F (36.9 °C)  MAXIMUM TEMPERATURE OVER 24HRS:  Temp (24hrs), Av.3 °F (36.8 °C), Min:97.8 °F (36.6 °C), Max:98.6 °F (37 °C)    CURRENT RESPIRATORY RATE:  Resp: 25  CURRENT PULSE:  Pulse: 59  CURRENT BLOOD PRESSURE:  BP: 123/60  24HR BLOOD PRESSURE RANGE:  Systolic (43LVN), WGR:659 , Min:93 , OCO:893   ; Diastolic (98IUX), FRK:72, Min:58, Max:81    24HR INTAKE/OUTPUT:      Intake/Output Summary (Last 24 hours) at 2020 1029  Last data filed at 2020 0859  Gross per 24 hour   Intake 3023.94 ml   Output 700 ml   Net 2323.94 ml     Patient Vitals for the past 96 hrs (Last 3 readings):   Weight   20 0536 222 lb 0.1 oz (100.7 kg)   20 1630 211 lb 6.7 oz (95.9 kg)       Physical Exam:  General appearance: Awake, alert, unable to verbalize or follow commands  Skin:  warm and dry, no rash or erythema  Eyes: conjunctivae normal and sclera anicteric  ENT:  no thrush no pharyngeal congestion    Neck: no carotid bruit ,no  JVD,no carotid Lymphadenopathy, noThyromegaly   Pulmonary: no wheezing or rhonchi. No rales heard.   Cardiovascular: Normal S1 & S2,  No S3 or  S4, no Pericardial Rub no Murmur   Abdomen: soft nontender, bowel sounds present, no organomegaly,  no ascites  : Woods catheter in place with decent UOP  Extremities:Edema 2+ LUE    Labs:    CBC:  Recent Labs     20  1712 20  0403 20  0503   WBC 6.3 6.1 8.5   RBC 4.55 4.55 4.07*   HGB 14.1 14.1 12.9*   HCT 41.5 41.9 38.8*   MCV 91.2 92.1 95.3   MCH 31.0 31.0 31.7 the kidney size/echotexture. 2. Comprehensive urine testing including Urinalysis, Urine sodium, Urine protein and creatinine to quantify        the proteinuria if any at all. Will check urinary eosinophils as well. 3. Discuss treatment options for herpes encephalitis with ID, critical care, and pharmacy to determine if a        less nephrotoxic strategy can be formulated. 4. Closely monitor intake and output and record daily weight  5. Recheck BMP tonight  6. Will continue to follow     Thank you for the consultation. Please do not hesitate to call with questions.     Electronically signed by Merlin Schanz, APRN - CNP on 7/26/2020 at 10:29 AM

## 2020-07-26 NOTE — PROGRESS NOTES
Low              [] Anuric      OBJECTIVE:     VITAL SIGNS:  /60   Pulse 59   Temp 98.6 °F (37 °C) (Axillary)   Resp 25   Wt 222 lb 0.1 oz (100.7 kg)   SpO2 92%   BMI 31.85 kg/m²     Tmax over 24 hours:  Temp (24hrs), Av.3 °F (36.8 °C), Min:97.8 °F (36.6 °C), Max:98.6 °F (37 °C)      Patient Vitals for the past 6 hrs:   BP Temp Temp src Pulse Resp SpO2 Weight   20 0600 123/60 -- -- 59 25 92 % --   20 0536 -- -- -- -- -- -- 222 lb 0.1 oz (100.7 kg)   20 0500 118/74 -- -- 61 24 93 % --   20 0400 111/69 98.6 °F (37 °C) Axillary 58 22 96 % --   20 0300 (!) 99/59 -- -- 55 18 95 % --         Intake/Output Summary (Last 24 hours) at 2020 0808  Last data filed at 2020 0600  Gross per 24 hour   Intake 2492.66 ml   Output 700 ml   Net 1792.66 ml     Wt Readings from Last 2 Encounters:   20 222 lb 0.1 oz (100.7 kg)   20 222 lb 3.2 oz (100.8 kg)     Body mass index is 31.85 kg/m². Physical Exam  Constitutional:       Appearance: He is normal weight. Comments: Patient asleep, resting comfortbly, wearing EEG    HENT:      Head: Normocephalic and atraumatic. Nose: Nose normal. No congestion or rhinorrhea. Eyes:      Extraocular Movements: Extraocular movements intact. Pupils: Pupils are equal, round, and reactive to light. Neck:      Musculoskeletal: Normal range of motion and neck supple. Cardiovascular:      Rate and Rhythm: Normal rate and regular rhythm. Pulmonary:      Effort: Pulmonary effort is normal. No respiratory distress. Breath sounds: Normal breath sounds. No stridor. Abdominal:      General: There is no distension. Palpations: Abdomen is soft. Musculoskeletal: Normal range of motion. General: No swelling or tenderness. Skin:     General: Skin is warm. Capillary Refill: Capillary refill takes less than 2 seconds. Coloration: Skin is not jaundiced. Findings: No bruising. MEDICATIONS:    Scheduled Meds:   QUEtiapine  25 mg Oral BID    sodium chloride flush  10 mL Intravenous 2 times per day    enoxaparin  40 mg Subcutaneous Daily    acyclovir  1,000 mg Intravenous Q8H    levetiracetam  500 mg Intravenous BID     Continuous Infusions:   dexmedetomidine 0.3 mcg/kg/hr (07/26/20 0803)    sodium chloride 100 mL/hr (07/25/20 1945)     PRN Meds:   sodium chloride flush, 10 mL, PRN  acetaminophen, 650 mg, Q6H PRN    Or  acetaminophen, 650 mg, Q6H PRN  polyethylene glycol, 17 g, Daily PRN  promethazine, 12.5 mg, Q6H PRN    Or  ondansetron, 4 mg, Q6H PRN          VENT SETTINGS (Comprehensive) (if applicable):  Vent Information  SpO2: 92 %  Additional Respiratory  Assessments  Pulse: 59  Resp: 25  SpO2: 92 %        Laboratory findings:    Complete Blood Count:   Recent Labs     07/23/20 1712 07/24/20 0403 07/26/20  0503   WBC 6.3 6.1 8.5   HGB 14.1 14.1 12.9*   HCT 41.5 41.9 38.8*    160 133*        Last 3 Blood Glucose:   Recent Labs     07/24/20 0403 07/25/20  0419 07/26/20  0503   GLUCOSE 119* 127* 107*        PT/INR:    Lab Results   Component Value Date    PROTIME 13.6 07/23/2020    INR 1.1 07/23/2020     PTT:    Lab Results   Component Value Date    APTT 27.9 07/23/2020       Comprehensive Metabolic Profile:   Recent Labs     07/23/20 1712 07/23/20  1909 07/24/20  0403 07/25/20  0419 07/26/20  0503   *  --  134* 136 138   K 4.4  --  4.0 4.0 4.4   CL 97*  --  101 105 106   CO2 21  --  20 20 17*   BUN 12  --  12 20 36*   CREATININE 0.55*  --  0.61* 1.16 3.58*   GLUCOSE 125*  --  119* 127* 107*   CALCIUM 8.8  --  9.0 8.7 8.3*   PROT 6.2*  --   --   --   --    LABALBU 4.0 4.1  --   --   --    BILITOT 0.74  --   --   --   --    ALKPHOS 39*  --   --   --   --    AST 31  --   --   --   --    ALT 59*  --   --   --   --       Magnesium:   Lab Results   Component Value Date    MG 2.1 07/23/2020     Phosphorus:   Lab Results   Component Value Date    PHOS 2.6 07/23/2020     Ionized Calcium: No results found for: CAION     Urinalysis : negative     Troponin: No results for input(s): TROPONINI in the last 72 hours. Microbiology: CSF + for HSV encephalitis   Cultures during this admission:     Blood cultures:                 [] None drawn      [x] Negative             []  Positive (Details:  )  Urine Culture:                   [] None drawn      [x] Negative             []  Positive (Details:  )  Sputum Culture:               [x] None drawn       [] Negative             []  Positive (Details:  )   Endotracheal aspirate:     [x] None drawn       [] Negative             []  Positive (Details:  )         Radiology/Imaging:   MRI BRAIN WO CONTRAST   Final Result   1. No acute intracranial abnormality. No acute infarct. 2. Minimal global parenchymal volume loss with minimal microvascular ischemic   changes. IR LUMBAR PUNCTURE FOR DIAGNOSIS   Final Result   Successful fluoroscopic-guided lumbar puncture. 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  Heparin Subcut  [] EPC Cuffs    NUTRITION:  [x] NPO [] Tube Feeding (Specify: ) [] TPN  [] PO (Diet: Diet NPO Effective Now)    HOME MEDICATIONS RECONCILED: [x] No  [] Yes    INSULIN DRIP:   [x] No   [] Yes    CONSULTATION NEEDED:  [x] No   [] Yes    FAMILY UPDATED:    [] No   [] Yes    TRANSFER OUT OF ICU:   [x] No   [] Yes    ADDITIONAL PLAN:    79year old who presented from [facility] facility for altered mental status concerning for encephalitis/meningitis.     Plan:  1. AMS secondary to unknown etiology, possible meningitis  _Lumbar puncture + lymphocytosis with +CSF HSV-1  -neurology consulted, following recommendations   - EEG ordered - follow up results   - de-escalate abx, continue acyclovir   [facility]   - Follow up MRI w/ wo   -UA negative for infection, blood cultures drawn at [facility]  -CXR negative, COVID swab negative    - precedex started for sedation      2. Hx of enlarged prostate  -will continue to monitor     3. Hyponatremia  - resolved   - continue to monitor lytes daily     4. BALBIR  - continue to monitor Cr  - marginal Urine output  - on nephrotoxic acyclovir  - will consult nephrology     Negrito Goel, DO  [address] [facility], [address]   7/26/2020, 8:08 AM      Attending Physician Statement  I have discussed the care of Anushka Dillard, including pertinent history and exam findings with the resident. I have reviewed the key elements of all parts of the encounter with the resident. I have seen and examined the patient with the resident. I agree with the assessment and plan and status of the problem list as documented. I have seen the patient this morning, I have reviewed the labs, chart seen and medications reviewed. He remained disoriented and confused difficult to understand he does talk intermittently open eyes intermittently follows some command move extremities but remain very restless and agitated. He has occasional hypotension in the 90s but mostly systolic blood pressure above 100 to 130 and hemodynamically stable afebrile.     Yesterday his creatinine was 1.16 he had to time straight cath done, he had received fluid bolus

## 2020-07-26 NOTE — CONSULTS
Cam Bowen, Kenyatta Gregory, Kevin Polanco, & Trevon   Urology Consultation      Patient:  Eva Cerrato  MRN: [id number]  YOB: 1950    CHIEF COMPLAINT:  Traumatic kirk removal    HISTORY OF PRESENT ILLNESS:   The patient is a 79 y.o. 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 Urology is consulted for traumatic kirk removal. Due to patient's mentation, history obtained from chart review and fiance. Per the nurse, the patient was getting turned today when his kirk catheter was inadvertently pulled out. Bright red blood at the urethral meatus was noticed. The balloon port was deflated, the kirk pushed all the way in and re-inflated with 10cc of sterile water. The kirk was draining well after this maneuver. During this admission, he has had decreasing urine output related to acute kidney injury from acyclovir exposure. With regards to prior urological history, patient has an enlarged prostate volume documented on imaging. Per his fiance, he has dysuria, urinary urgency, nocturia intermittently along with urinary retention sometimes requiring self-catherizations. He also has history of UTIs. Most recent PSA- 14.54. No other prior urological history     Patient's old records, notes and chart reviewed and summarized above. Past Medical History:    No past medical history on file. Past Surgical History:    No past surgical history on file.     Medications:      Current Facility-Administered Medications:     heparin (porcine) injection 5,000 Units, 5,000 Units, Subcutaneous, 3 times per day, Nelson Knowles DO, 5,000 Units at 07/26/20 1336    [START ON 7/27/2020] acyclovir (ZOVIRAX) 700 mg in dextrose 5 % 250 mL IVPB, 700 mg, Intravenous, Q24H, Марина Johnson MD    QUEtiapine (SEROQUEL) tablet 25 mg, 25 mg, Oral, BID, Nelson Knowles DO, 25 mg at 07/25/20 1159    dexmedetomidine (PRECEDEX) 400 mcg in sodium chloride 0.9 % 100 mL infusion, 0.2 mcg/kg/hr, Intravenous, Continuous, Amy Egan DO, Last Rate: 14.4 mL/hr at 07/26/20 1802, 0.6 mcg/kg/hr at 07/26/20 1802    sodium chloride flush 0.9 % injection 10 mL, 10 mL, Intravenous, 2 times per day, Maria C Dorado MD, 10 mL at 07/26/20 0901    sodium chloride flush 0.9 % injection 10 mL, 10 mL, Intravenous, PRN, Maria C Dorado MD    acetaminophen (TYLENOL) tablet 650 mg, 650 mg, Oral, Q6H PRN **OR** acetaminophen (TYLENOL) suppository 650 mg, 650 mg, Rectal, Q6H PRN, Maria C Dorado MD    polyethylene glycol (GLYCOLAX) packet 17 g, 17 g, Oral, Daily PRN, Maria C Dorado MD    promethazine (PHENERGAN) tablet 12.5 mg, 12.5 mg, Oral, Q6H PRN **OR** ondansetron (ZOFRAN) injection 4 mg, 4 mg, Intravenous, Q6H PRN, Naima Guerra MD, 4 mg at 07/25/20 1727    0.9 % sodium chloride infusion, , Intravenous, Continuous, Abiel Perry MD, Last Rate: 125 mL/hr at 07/26/20 1340    levetiracetam (KEPPRA) 500 mg/100 mL IVPB, 500 mg, Intravenous, BID, Peggy Adorno MD, Stopped at 07/26/20 1040    Allergies:     Allergies   Allergen Reactions    Ibuprofen        Social History:   Social History     Socioeconomic History    Marital status:      Spouse name: Not on file    Number of children: Not on file    Years of education: Not on file    Highest education level: Not on file   Occupational History    Not on file   Social Needs    Financial resource strain: Not hard at all   [employer] insecurity     Worry: Never true     Inability: Never true   [employer] needs     Medical: Not on file     Non-medical: Not on file   Tobacco Use    Smoking status: Never Smoker    Smokeless tobacco: Never Used   Substance and Sexual Activity    Alcohol use: Not on file    Drug use: Not on file    Sexual activity: Not on file   Lifestyle    Physical activity     Days per week: Not on file     Minutes per session: Not on file    Stress: Not on file   Relationships    Social connections     Talks on phone: Not on file     Gets together: Not on file     Attends [employer] service: Not on file     Active member of club or organization: Not on file     Attends meetings of clubs or organizations: Not on file     Relationship status: Not on file    Intimate partner violence     Fear of current or ex partner: Not on file     Emotionally abused: Not on file     Physically abused: Not on file     Forced sexual activity: Not on file   Other Topics Concern    Not on file   Social History Narrative    Not on file       Family History:  No family history on file. REVIEW OF SYSTEMS:  Unable to obtain due to mentation    Physical Exam:      This a 79 y.o. female   Vitals:    07/26/20 1200   BP: 130/71   Pulse: 61   Resp: 20   Temp:    SpO2: 98%     Constitutional: Patient confused  Head: Atraumatic and normocephalic. Neck: Trachea midline. Ext: 2+ radial pulses bilaterally. Skin: No rashes or bruising present. Lungs: Respiratory effort normal.  Cardiovascular:  Regular rhythm. Abdomen: Soft, non-tender, non-distended  ; Woods in draining light pink urine with no clots, debris appreciated.      Labs:  Recent Labs     07/24/20  0403 07/26/20  0503   WBC 6.1 8.5   HGB 14.1 12.9*   HCT 41.9 38.8*   MCV 92.1 95.3    133*     Recent Labs     07/25/20  0419 07/26/20  0503 07/26/20  0921    138 136   K 4.0 4.4 4.3    106 104   CO2 20 17* 18*   BUN 20 36* 40*   CREATININE 1.16 3.58* 3.97*       Recent Labs     07/26/20  1051   COLORU YELLOW   PHUR 5.0   WBCUA 10 TO 20   RBCUA 20 TO 50   MUCUS NOT REPORTED   TRICHOMONAS NOT REPORTED   YEAST NOT REPORTED   BACTERIA NOT REPORTED   SPECGRAV 1.013   LEUKOCYTESUR SMALL*   UROBILINOGEN Normal   BILIRUBINUR NEGATIVE           -----------------------------------------------------------------  Imaging Results:  Ct Head Wo Contrast    Result Date: 7/24/2020  EXAMINATION: CT OF THE HEAD WITHOUT CONTRAST  7/23/2020 8:09 am TECHNIQUE: CT of the head was performed without the administration of intravenous contrast. Dose modulation, iterative reconstruction, and/or weight based adjustment of the mA/kV was utilized to reduce the radiation dose to as low as reasonably achievable. COMPARISON: None. HISTORY: ORDERING SYSTEM PROVIDED HISTORY: DELIRIUM TECHNOLOGIST PROVIDED HISTORY: DELIRIUM FINDINGS: BRAIN/VENTRICLES: There is no acute intracranial hemorrhage, mass effect or midline shift. No abnormal extra-axial fluid collection. The gray-white differentiation is maintained without evidence of an acute infarct. There is no evidence of hydrocephalus. ORBITS: The visualized portion of the orbits demonstrate no acute abnormality. SINUSES: The visualized paranasal sinuses and mastoid air cells demonstrate no acute abnormality. SOFT TISSUES/SKULL:  No acute abnormality of the visualized skull or soft tissues. No acute intracranial abnormality. Ir Lumbar Puncture For Diagnosis    Result Date: 7/23/2020  EXAMINATION: FLUOROSCOPIC GUIDED LUMBAR PUNCTURE 7/23/2020 4:01 pm HISTORY: ORDERING SYSTEM PROVIDED HISTORY: concern for meningitis TECHNOLOGIST PROVIDED HISTORY: concern for meningitis Altered mental status, confusion FLUOROSCOPY DOSE AND TYPE OR TIME AND EXPOSURES: Fluoro time: 1.0 minute DAP: 1100 cGycm2 PROCEDURE: :  TIKA Dumont under direct supervision of Shade Michel MD Informed consent was obtained after the risks and benefits of the procedure were discussed with the patient and all questions were answered fully. Salmon protocol was observed and a standard timeout was performed. The patient was positioned prone and the back was prepped and draped in the normal sterile fashion. 1% lidocaine was used for local anesthesia. The CSF space was accessed with a 20-gauge 6 inch \"spinal needle at the L4-5 sublaminar space. Free flow of clear CSF was noted. Approximately 8 ml of CSF was removed and sent for analysis.  The stylet was reinserted, spinal needle was removed and brief pressure was applied at the puncture site. There were no immediate complications and the patient tolerated the procedure well. Estimated blood loss: Less than 1 mL. FINDINGS: Mildly traumatic tap with clearing of CSF as flow progressed. Successful fluoroscopic-guided lumbar puncture. Mri Brain Wo Contrast    Result Date: 7/24/2020  EXAMINATION: MRI OF THE BRAIN WITHOUT CONTRAST  7/24/2020 10:11 am TECHNIQUE: Multiplanar multisequence MRI of the brain was performed without the administration of intravenous contrast. COMPARISON: None. HISTORY: ORDERING SYSTEM PROVIDED HISTORY: Altered mental status TECHNOLOGIST PROVIDED HISTORY: Altered mental status Initial evaluation. FINDINGS: INTRACRANIAL STRUCTURES/VENTRICLES: There is no acute infarct. No mass effect or midline shift. No evidence of an acute intracranial hemorrhage. Areas of T2 FLAIR hyperintensity are seen in the periventricular and subcortical white matter, which are nonspecific, but may represent chronic microvascular ischemic change. There is prominence of the ventricles and sulci due to global parenchymal volume loss. The sellar/suprasellar regions appear unremarkable. The normal signal voids within the major intracranial vessels appear maintained. ORBITS: The visualized portion of the orbits demonstrate no acute abnormality. SINUSES: The visualized paranasal sinuses and mastoid air cells are well aerated. BONES/SOFT TISSUES: The bone marrow signal intensity appears normal. The soft tissues demonstrate no acute abnormality. 1. No acute intracranial abnormality. No acute infarct. 2. Minimal global parenchymal volume loss with minimal microvascular ischemic changes. Assessment and Plan   Impression:   Problem List:  - Traumatic kirk removal  - Gross Hematuria  - BPH  - Bilateral simple renal cysts  - Elevated PSA- 14.54      Plan:   Flush catheter once or twice a shift to ensure adequate drainage.  Monitor urine output and color of urine  Appreciate nephrology recommendations for management of BALBIR  Keep kirk till Cr nadirs  Patient will need to follow up as an outpatient with a urologist for management of BPH, elevated PSA once out of ICU    Rome Cruise  6:16 PM 7/26/2020    ADDENDUM: Note changed to rounding attending physician.

## 2020-07-26 NOTE — VIRTUAL HEALTH
Consults  Patient Location:  [address] [facility]    Provider Location ([facility]): Susan Wilda, [place]    I was contacted by patients fiance on 7/24/20 Moise Sharif. She updated me on Dr. Cuauhtemoc Barba condition and asked if I would be willing to monitor Dr. Cuauhtemoc Barba condition over the course of his hospitalization. She stated she felt strongly that if he was able to make this decision himself, he would appreciate me at least following his course to help translate treatment for her and potentially identify additional treatment suggestions. I told her that I would be happy to help in any way that I could.

## 2020-07-26 NOTE — PROGRESS NOTES
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  · Slow infusion of acyclovir, hydration. · Monitor renal function. .  · Supportive care      Medical Decision Making/Summary/Discussion:7/26/2020     ·   Infection Control Recommendations   · [place] Precautions    Antimicrobial Stewardship Recommendations     · Targeted therapy  · PK dosing  ·   Coordination of Outpatient Care:   · Estimated Length of IV antimicrobials: To be determined  · Patient will need Midline Catheter Insertion: Yes  · Patient will need PICC line Insertion: No  · Patient will need: Home IV , [place],  SNF,  LTAC: To be determined  · Patient will need outpatient wound care: No    Chief complaint/reason for consultation:   · Altered mental status with progression over a few days      History of Present Illness:   Marcos Jones is a 79y.o.-year-old  male who was initially admitted on 7/23/2020. Patient seen at the request of Cali Nuñez. 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 He is unable to provide any information himself at this point in time. Available information reviewed and discussed with Dr. Cata Naidu.    Patient apparently developed onset of some dysuria and pain in the pelvic and right hip area starting about 7 to the 10 days prior to admission. In addition he was exhibiting some fatigue. He has a pre-existing history of prostatic hypertrophy with previous documented urinary tract infections. The patient apparently felt that he was coming down with a urinary tract infection and had a started doxycycline on 7/20/2020 without benefit.   He seems to have experienced prior problems with use of quinolones that may have influenced his choice of antibiotics. On 7/21/2020 he was noticed to have some mild confusion and problems with cognition were initially attributed to the use of doxycycline. He was evaluated at [facility] and found to have a high-grade fever. He had urine and blood cultures obtained to exclude urinary tract sepsis. The urine and blood cultures have not shown any growth. His urine analysis was not very impressive. He was treated with cefepime with no benefit. The patient showed some cough but had no hypoxia. The chest x-ray did not show any pneumonia but had some mild chronic changes. CT of the abdomen and pelvis was obtained which showed the presence of a large prostate. No other abnormalities were noted. While active emergency he had a temperature 101 °F on 7/22/2020. No additional fevers were noted. Upon arrival to emergency [facility] patient is disoriented, confused, he is not able to provide any information or to answer any questions. His laboratory tests show some mild degree of hyponatremia and mild transaminitis. A spinal tap was done to exclude herpetic encephalitis. The CSF showed the bloody fluid with a protein level of 257 glucose of 43 RBC count of 3000 with 1348 white cells 80% of which were lymphocytes. The spinal tap was described as being mildly traumatic with progressive clearing of the spinal fluid. We will await the results of the infectious CSF panel. Plan to treat for the treatable conditions such as mycoplasma, herpes simplex. The presence of hemorrhage suggests Herpes simplex. CURRENT EVALUATION :    Patient evaluated and examined in the ICU. Afebrile  VS stable  He is 1 L nasal O2. He is on precedex. MRI scan of the head on  7/24 shows subacute inflammation in the frontal region. On 7/24 the EEG showed mild diffuse encephalopathy with no epileptiform disturbance.   He is on Keppra for seizure headache, weakness, numbness, or tingling. Integument: No rash, no ulcers. Psychiatric: No depression. Endocrine: No polyuria, no polydipsia, no polyphagia. Physical Examination :     Patient Vitals for the past 8 hrs:   BP Temp Temp src Pulse Resp SpO2   07/26/20 1200 130/71 -- -- 61 20 98 %   07/26/20 1100 131/70 -- -- 59 29 97 %   07/26/20 1000 106/61 -- -- 51 16 95 %   07/26/20 0900 100/63 98.4 °F (36.9 °C) Axillary 58 20 94 %   07/26/20 0820 121/64 -- -- 59 20 --   07/26/20 0800 (!) 87/51 -- -- 53 19 95 %   07/26/20 0700 126/70 -- -- 68 25 --     General Appearance: Somnolent  Head:  Normocephalic, no trauma  Eyes: Pupils equal, round, reactive to light; sclera anicteric; conjunctivae pink. No embolic phenomena. ENT: Oropharynx clear, without erythema, exudate, or thrush. No tenderness of sinuses. Mouth/throat: mucosa pink and moist. No lesions. Dentition in good repair. Neck:Supple, without lymphadenopathy. Thyroid normal, No bruits. Pulmonary/Chest: Clear to auscultation, without wheezes, rales, or rhonchi. No dullness to percussion. Cardiovascular: Regular rate and rhythm without murmurs, rubs, or gallops. Abdomen: Soft, non tender. Bowel sounds normal. No organomegaly  All four Extremities: No cyanosis, clubbing, edema, or effusions. . Scar from prior right hip arthroplasty  Neurologic: No gross sensory or motor deficits. Moves all extremities. Encephalopathic  Skin: Warm and dry with good turgor. No signs of peripheral arterial or venous insufficiency. No ulcerations. No open wounds.     Medical Decision Making -Laboratory:   I have independently reviewed/ordered the following labs:    CBC with Differential:   Recent Labs     07/24/20  0403 07/26/20  0503   WBC 6.1 8.5   HGB 14.1 12.9*   HCT 41.9 38.8*    133*   LYMPHOPCT 23* 12*   MONOPCT 13* 13*     BMP:   Recent Labs     07/26/20  0503 07/26/20  0921    136   K 4.4 4.3    104   CO2 17* 18*   BUN 36* 40*   CREATININE 3.58* 3.97*     Hepatic Function Panel:   Recent Labs     07/23/20  1712 07/23/20  1909   PROT 6.2*  --    LABALBU 4.0 4.1   BILIDIR 0.21  --    IBILI 0.53  --    BILITOT 0.74  --    ALKPHOS 39*  --    ALT 59*  --    AST 31  --      No results for input(s): RPR in the last 72 hours. No results for input(s): HIV in the last 72 hours. No results for input(s): BC in the last 72 hours. Lab Results   Component Value Date    MUCUS NOT REPORTED 07/26/2020    RBC 4.07 07/26/2020    TRICHOMONAS NOT REPORTED 07/26/2020    WBC 8.5 07/26/2020    YEAST NOT REPORTED 07/26/2020    TURBIDITY CLOUDY 07/26/2020     Lab Results   Component Value Date    CREATININE 3.97 07/26/2020    CREATININE 1.0 01/16/2020    GLUCOSE 169 07/26/2020       Medical Decision Making-Imaging:       EXAMINATION:    MRI OF THE BRAIN WITHOUT CONTRAST  7/24/2020 10:11 am         TECHNIQUE:    Multiplanar multisequence MRI of the brain was performed without the    administration of intravenous contrast.         COMPARISON:    None.         HISTORY:    ORDERING SYSTEM PROVIDED HISTORY: Altered mental status    TECHNOLOGIST PROVIDED HISTORY:    Altered mental status         Initial evaluation.         FINDINGS:    INTRACRANIAL STRUCTURES/VENTRICLES: There is no acute infarct. No mass effect    or midline shift.  No evidence of an acute intracranial hemorrhage.  Areas of    T2 FLAIR hyperintensity are seen in the periventricular and subcortical white    matter, which are nonspecific, but may represent chronic microvascular    ischemic change. Dorothye Hero is prominence of the ventricles and sulci due to    global parenchymal volume loss.  The sellar/suprasellar regions appear    unremarkable.  The normal signal voids within the major intracranial vessels    appear maintained.         ORBITS: The visualized portion of the orbits demonstrate no acute abnormality.         SINUSES: The visualized paranasal sinuses and mastoid air cells are well    aerated.      BONES/SOFT TISSUES: The bone marrow signal intensity appears normal. The soft    tissues demonstrate no acute abnormality.              Impression    1. No acute intracranial abnormality.  No acute infarct. 2. Minimal global parenchymal volume loss with minimal microvascular ischemic    changes. CT OF THE HEAD WITHOUT CONTRAST  7/23/2020 8:09 am         TECHNIQUE:    CT of the head was performed without the administration of intravenous    contrast. Dose modulation, iterative reconstruction, and/or weight based    adjustment of the mA/kV was utilized to reduce the radiation dose to as low    as reasonably achievable.         COMPARISON:    None.         HISTORY:    ORDERING SYSTEM PROVIDED HISTORY: DELERIUM    TECHNOLOGIST PROVIDED HISTORY:    DELERIUM              FINDINGS:    BRAIN/VENTRICLES: There is no acute intracranial hemorrhage, mass effect or    midline shift.  No abnormal extra-axial fluid collection.  The gray-white    differentiation is maintained without evidence of an acute infarct.  There is    no evidence of hydrocephalus.         ORBITS: The visualized portion of the orbits demonstrate no acute abnormality.         SINUSES: The visualized paranasal sinuses and mastoid air cells demonstrate    no acute abnormality.         SOFT TISSUES/SKULL:  No acute abnormality of the visualized skull or soft    tissues.              Impression    No acute intracranial abnormality.       EXAMINATION:    ONE XRAY VIEW OF THE CHEST         7/21/2020 6:32 pm         COMPARISON:    None.         HISTORY:    ORDERING SYSTEM PROVIDED HISTORY: sepsis    TECHNOLOGIST PROVIDED HISTORY:    sepsis         FINDINGS:    Heart size is normal.  Interstitial markings are prominent which may be    related to interstitial pneumonitis.  No effusion, extrapleural air or    localized consolidation is noted.  Osseous and mediastinal structures are    age-appropriate.              Impression    Diffuse prominence of the interstitial markings which may be related to    interstitial pneumonitis.  No focal consolidation.           EXAMINATION:    CT OF THE ABDOMEN AND PELVIS WITH CONTRAST 7/21/2020 4:06 pm         TECHNIQUE:    CT of the abdomen and pelvis was performed with the administration of    intravenous contrast. Multiplanar reformatted images are provided for review. Dose modulation, iterative reconstruction, and/or weight based adjustment of    the mA/kV was utilized to reduce the radiation dose to as low as reasonably    achievable.         COMPARISON:    CT urogram January 6, 2020         HISTORY:    ORDERING SYSTEM PROVIDED HISTORY: Lower abdominal pain    TECHNOLOGIST PROVIDED HISTORY:              FINDINGS:    Lower Chest: Clear         Organs:         The abdominal wall appears normal.         The liver, spleen, pancreas, and adrenals appear normal.  Multiple punctate    calcified granulomas in the spleen.         Bilateral simple renal cortical cysts.         Bladder is decompressed and circumferentially thickened.         GI/Bowel: The stomach,small bowel, and colon appear normal. Oral contrast    noted throughout the small bowel.  Stool noted throughout the liver large    bowel.  Appendix normal.         Pelvis: Markedly enlarged prostate.  It measures 68 x 83 mm in AP and    transverse dimensions.         Peritoneum/Retroperitoneum: The abdominal aorta and iliac arteries are normal    in caliber. There is no pathologic adenopathy.         Bones/Soft Tissues: Right hip arthroplasty.              Impression    Markedly enlarged prostate.  Possible cystitis.  Increased stool.           EXAMINATION:    ONE XRAY VIEW OF THE PELVIS AND TWO XRAY VIEWS RIGHT HIP         6/25/2020 7:29 am         COMPARISON:    10/23/2017         HISTORY:    ORDERING SYSTEM PROVIDED HISTORY: Acute right hip pain         FINDINGS:    Status post right hip arthroplasty.  No evidence of hardware complications.     Stable alignment.      Elsewhere, no acute fracture or dislocation.  Moderate left hip    osteoarthritis.  The SI joints and sacral arcuate lines appear intact. Degenerative changes of the SI joints are also seen.         No soft tissue abnormality.              Impression    1. Status post right hip arthroplasty.  No complication. 2. No acute osseous abnormality of the pelvis. 3. Moderate left hip osteoarthritis. Medical Decision [id number]:   Results for Suzanna Rojas (MRN [id number]) as of 7/24/2020 10:34   Ref. Range 7/23/2020 16:37   CYTOMEGALOVIRUS (CMV) CSF FILM ARRAY Latest Ref Range: Not Detected  Not Detected   HAEMOPHILUS INFLUENZA CSF FILM ARRAY Latest Ref Range: Not Detected  Not Detected   HHV-6 (HERPESVIRUS 6) CSF FILM ARRAY Latest Ref Range: Not Detected  Not Detected   HSV-1 CSF FILM ARRAY Latest Ref Range: Not Detected  DETECTED (A)   HSV-2 CSF FILM ARRAY Latest Ref Range: Not Detected  Not Detected   PARECHOVIRUS CSF FILM ARRAY Latest Ref Range: Not Detected  Not Detected   ESCHERICHIA COLI K1 CSF FILM ARRAY Latest Ref Range: Not Detected  Not Detected   LISTERIA MONOCYTOGENES CSF FILM ARRAY Latest Ref Range: Not Detected  Not Detected   NEISSERIA MENIGITIDIS CSF FILM ARRAY Latest Ref Range: Not Detected  Not Detected   STREPTOCOCCUS AGALACTIAE CSF FILM ARRAY Latest Ref Range: Not Detected  Not Detected   STREPTOCOCCUS PNEUMONIAE CSF FILM ARRAY Latest Ref Range: Not Detected  Not Detected   CRYPTOCOCCUS NEOFORMANS/SALUD CSF FILM ARR.  Latest Ref Range: Not Detected  Not Detected       Medical Decision Making-Other:     Note:  · Labs, medications, radiologic studies were reviewed with personal review of films  · Large amounts of data were reviewed  · Discussed with nursing Staff, Discharge planner  · Infection Control and Prevention measures reviewed  · All prior entries were reviewed  · Administer medications as ordered  · Prognosis: Guarded  · Discharge planning reviewed  · Follow up as

## 2020-07-26 NOTE — PLAN OF CARE
Problem: Restraint Use - Nonviolent/Non-Self-Destructive Behavior:  Goal: Absence of restraint-related injury  Description: Absence of restraint-related injury  Outcome: Met This Shift     Problem: Anxiety/Stress:  Goal: Level of anxiety will decrease  Description: Level of anxiety will decrease  Outcome: Ongoing     Problem: Mental Status - Impaired:  Goal: Mental status will be restored to baseline  Description: Mental status will be restored to baseline  Outcome: Ongoing     Problem: Falls - Risk of:  Goal: Will remain free from falls  Description: Will remain free from falls  Outcome: Ongoing  Goal: Absence of physical injury  Description: Absence of physical injury  Outcome: Ongoing     Problem: Skin Integrity:  Goal: Will show no infection signs and symptoms  Description: Will show no infection signs and symptoms  Outcome: Ongoing  Goal: Absence of new skin breakdown  Description: Absence of new skin breakdown  Outcome: Ongoing     Problem: Restraint Use - Nonviolent/Non-Self-Destructive Behavior:  Goal: Absence of restraint indications  Description: Absence of restraint indications  Outcome: Not Met This Shift

## 2020-07-27 ENCOUNTER — APPOINTMENT (OUTPATIENT)
Dept: GENERAL RADIOLOGY | Age: 70
DRG: 871 | End: 2020-07-27
Attending: INTERNAL MEDICINE
Payer: MEDICARE

## 2020-07-27 LAB
ABSOLUTE EOS #: 0 K/UL (ref 0–0.44)
ABSOLUTE IMMATURE GRANULOCYTE: 0 K/UL (ref 0–0.3)
ABSOLUTE LYMPH #: 0.43 K/UL (ref 1.1–3.7)
ABSOLUTE MONO #: 0.6 K/UL (ref 0.1–1.2)
ALLEN TEST: POSITIVE
ANION GAP SERPL CALCULATED.3IONS-SCNC: 18 MMOL/L (ref 9–17)
ANTI-NUCLEAR ANTIBODY (ANA): NEGATIVE
BASOPHILS # BLD: 0 % (ref 0–2)
BASOPHILS ABSOLUTE: 0 K/UL (ref 0–0.2)
BUN BLDV-MCNC: 50 MG/DL (ref 8–23)
BUN/CREAT BLD: ABNORMAL (ref 9–20)
CALCIUM SERPL-MCNC: 8.5 MG/DL (ref 8.6–10.4)
CHLORIDE BLD-SCNC: 108 MMOL/L (ref 98–107)
CO2: 15 MMOL/L (ref 20–31)
CREAT SERPL-MCNC: 4.08 MG/DL (ref 0.7–1.2)
CSF ISOELECTRIC FOCUSING INTERPRETATION: NORMAL
DIFFERENTIAL TYPE: ABNORMAL
EKG ATRIAL RATE: 57 BPM
EKG P AXIS: 44 DEGREES
EKG P-R INTERVAL: 172 MS
EKG Q-T INTERVAL: 454 MS
EKG QRS DURATION: 92 MS
EKG QTC CALCULATION (BAZETT): 441 MS
EKG R AXIS: 16 DEGREES
EKG T AXIS: -7 DEGREES
EKG VENTRICULAR RATE: 57 BPM
EOSINOPHILS RELATIVE PERCENT: 0 % (ref 1–4)
FIO2: ABNORMAL
GFR AFRICAN AMERICAN: 18 ML/MIN
GFR NON-AFRICAN AMERICAN: 15 ML/MIN
GFR SERPL CREATININE-BSD FRML MDRD: ABNORMAL ML/MIN/{1.73_M2}
GFR SERPL CREATININE-BSD FRML MDRD: ABNORMAL ML/MIN/{1.73_M2}
GLUCOSE BLD-MCNC: 148 MG/DL (ref 70–99)
HCT VFR BLD CALC: 37.5 % (ref 40.7–50.3)
HEMOGLOBIN: 12.8 G/DL (ref 13–17)
IMMATURE GRANULOCYTES: 0 %
LYMPHOCYTES # BLD: 5 % (ref 24–43)
MCH RBC QN AUTO: 31.4 PG (ref 25.2–33.5)
MCHC RBC AUTO-ENTMCNC: 34.1 G/DL (ref 28.4–34.8)
MCV RBC AUTO: 92.1 FL (ref 82.6–102.9)
MODE: ABNORMAL
MONOCYTES # BLD: 7 % (ref 3–12)
MORPHOLOGY: NORMAL
MYELIN BASIC PROTEIN, CSF: 1.76 NG/ML (ref 0–5.5)
NEGATIVE BASE EXCESS, ART: 8 (ref 0–2)
NRBC AUTOMATED: 0 PER 100 WBC
O2 DEVICE/FLOW/%: ABNORMAL
OLIGOCLONAL BANDS NUMBER: 0 BANDS (ref 0–1)
OLIGOCLONAL BANDS: NEGATIVE
PATIENT TEMP: ABNORMAL
PDW BLD-RTO: 12.5 % (ref 11.8–14.4)
PLATELET # BLD: 160 K/UL (ref 138–453)
PLATELET ESTIMATE: ABNORMAL
PMV BLD AUTO: 12.9 FL (ref 8.1–13.5)
POC HCO3: 16.3 MMOL/L (ref 21–28)
POC O2 SATURATION: 97 % (ref 94–98)
POC PCO2 TEMP: ABNORMAL MM HG
POC PCO2: 31.3 MM HG (ref 35–48)
POC PH TEMP: ABNORMAL
POC PH: 7.33 (ref 7.35–7.45)
POC PO2 TEMP: ABNORMAL MM HG
POC PO2: 99.8 MM HG (ref 83–108)
POSITIVE BASE EXCESS, ART: ABNORMAL (ref 0–3)
POTASSIUM SERPL-SCNC: 4.8 MMOL/L (ref 3.7–5.3)
RBC # BLD: 4.07 M/UL (ref 4.21–5.77)
RBC # BLD: ABNORMAL 10*6/UL
SAMPLE SITE: ABNORMAL
SEG NEUTROPHILS: 88 % (ref 36–65)
SEGMENTED NEUTROPHILS ABSOLUTE COUNT: 7.57 K/UL (ref 1.5–8.1)
SODIUM BLD-SCNC: 141 MMOL/L (ref 135–144)
TCO2 (CALC), ART: 17 MMOL/L (ref 22–29)
WBC # BLD: 8.6 K/UL (ref 3.5–11.3)
WBC # BLD: ABNORMAL 10*3/UL

## 2020-07-27 PROCEDURE — 80048 BASIC METABOLIC PNL TOTAL CA: CPT

## 2020-07-27 PROCEDURE — 71045 X-RAY EXAM CHEST 1 VIEW: CPT

## 2020-07-27 PROCEDURE — 6360000002 HC RX W HCPCS: Performed by: STUDENT IN AN ORGANIZED HEALTH CARE EDUCATION/TRAINING PROGRAM

## 2020-07-27 PROCEDURE — 2500000003 HC RX 250 WO HCPCS: Performed by: STUDENT IN AN ORGANIZED HEALTH CARE EDUCATION/TRAINING PROGRAM

## 2020-07-27 PROCEDURE — 99233 SBSQ HOSP IP/OBS HIGH 50: CPT | Performed by: INTERNAL MEDICINE

## 2020-07-27 PROCEDURE — 2000000000 HC ICU R&B

## 2020-07-27 PROCEDURE — 2500000003 HC RX 250 WO HCPCS: Performed by: INTERNAL MEDICINE

## 2020-07-27 PROCEDURE — 85025 COMPLETE CBC W/AUTO DIFF WBC: CPT

## 2020-07-27 PROCEDURE — 6360000002 HC RX W HCPCS

## 2020-07-27 PROCEDURE — 36415 COLL VENOUS BLD VENIPUNCTURE: CPT

## 2020-07-27 PROCEDURE — 99233 SBSQ HOSP IP/OBS HIGH 50: CPT | Performed by: PSYCHIATRY & NEUROLOGY

## 2020-07-27 PROCEDURE — 99291 CRITICAL CARE FIRST HOUR: CPT | Performed by: INTERNAL MEDICINE

## 2020-07-27 PROCEDURE — 2580000003 HC RX 258: Performed by: INTERNAL MEDICINE

## 2020-07-27 PROCEDURE — 6370000000 HC RX 637 (ALT 250 FOR IP): Performed by: STUDENT IN AN ORGANIZED HEALTH CARE EDUCATION/TRAINING PROGRAM

## 2020-07-27 PROCEDURE — 2580000003 HC RX 258: Performed by: STUDENT IN AN ORGANIZED HEALTH CARE EDUCATION/TRAINING PROGRAM

## 2020-07-27 PROCEDURE — 93010 ELECTROCARDIOGRAM REPORT: CPT | Performed by: INTERNAL MEDICINE

## 2020-07-27 PROCEDURE — 82803 BLOOD GASES ANY COMBINATION: CPT

## 2020-07-27 PROCEDURE — 6360000002 HC RX W HCPCS: Performed by: INTERNAL MEDICINE

## 2020-07-27 PROCEDURE — 36600 WITHDRAWAL OF ARTERIAL BLOOD: CPT

## 2020-07-27 PROCEDURE — 99232 SBSQ HOSP IP/OBS MODERATE 35: CPT | Performed by: INTERNAL MEDICINE

## 2020-07-27 RX ORDER — HALOPERIDOL 5 MG/ML
INJECTION INTRAMUSCULAR
Status: COMPLETED
Start: 2020-07-27 | End: 2020-07-27

## 2020-07-27 RX ORDER — QUETIAPINE FUMARATE 25 MG/1
25 TABLET, FILM COATED ORAL 2 TIMES DAILY
Status: DISCONTINUED | OUTPATIENT
Start: 2020-07-27 | End: 2020-07-29

## 2020-07-27 RX ORDER — PANTOPRAZOLE SODIUM 40 MG/1
40 TABLET, DELAYED RELEASE ORAL 2 TIMES DAILY
Status: DISCONTINUED | OUTPATIENT
Start: 2020-07-27 | End: 2020-07-27

## 2020-07-27 RX ORDER — LORAZEPAM 2 MG/ML
INJECTION INTRAMUSCULAR
Status: DISCONTINUED
Start: 2020-07-27 | End: 2020-07-27 | Stop reason: WASHOUT

## 2020-07-27 RX ORDER — ZIPRASIDONE MESYLATE 20 MG/ML
20 INJECTION, POWDER, LYOPHILIZED, FOR SOLUTION INTRAMUSCULAR ONCE
Status: COMPLETED | OUTPATIENT
Start: 2020-07-27 | End: 2020-07-27

## 2020-07-27 RX ORDER — HALOPERIDOL 5 MG/ML
5 INJECTION INTRAMUSCULAR ONCE
Status: COMPLETED | OUTPATIENT
Start: 2020-07-27 | End: 2020-07-27

## 2020-07-27 RX ADMIN — ZIPRASIDONE MESYLATE 20 MG: 20 INJECTION, POWDER, LYOPHILIZED, FOR SOLUTION INTRAMUSCULAR at 01:08

## 2020-07-27 RX ADMIN — QUETIAPINE FUMARATE 25 MG: 25 TABLET ORAL at 20:01

## 2020-07-27 RX ADMIN — SODIUM CHLORIDE, PRESERVATIVE FREE 10 ML: 5 INJECTION INTRAVENOUS at 19:54

## 2020-07-27 RX ADMIN — DEXMEDETOMIDINE HYDROCHLORIDE 0.4 MCG/KG/HR: 400 INJECTION INTRAVENOUS at 22:15

## 2020-07-27 RX ADMIN — HALOPERIDOL LACTATE 5 MG: 5 INJECTION, SOLUTION INTRAMUSCULAR at 17:00

## 2020-07-27 RX ADMIN — Medication: at 00:42

## 2020-07-27 RX ADMIN — DEXMEDETOMIDINE HYDROCHLORIDE 1 MCG/KG/HR: 400 INJECTION INTRAVENOUS at 03:07

## 2020-07-27 RX ADMIN — Medication: at 14:43

## 2020-07-27 RX ADMIN — FAMOTIDINE 20 MG: 10 INJECTION, SOLUTION INTRAVENOUS at 20:01

## 2020-07-27 RX ADMIN — HALOPERIDOL 5 MG: 5 INJECTION INTRAMUSCULAR at 17:00

## 2020-07-27 RX ADMIN — Medication: at 12:08

## 2020-07-27 RX ADMIN — WATER 1.2 ML: 1 INJECTION INTRAMUSCULAR; INTRAVENOUS; SUBCUTANEOUS at 01:08

## 2020-07-27 RX ADMIN — LEVETIRACETAM 500 MG: 5 INJECTION INTRAVENOUS at 12:31

## 2020-07-27 RX ADMIN — ACYCLOVIR SODIUM 700 MG: 50 INJECTION, SOLUTION INTRAVENOUS at 09:32

## 2020-07-27 RX ADMIN — ONDANSETRON 4 MG: 2 INJECTION INTRAMUSCULAR; INTRAVENOUS at 12:02

## 2020-07-27 RX ADMIN — LEVETIRACETAM 500 MG: 5 INJECTION INTRAVENOUS at 20:01

## 2020-07-27 NOTE — PROGRESS NOTES
Active problem Herpes encephalitis . The condition is he is on precedex drip obtunded being agitated thrashing off drip moving all limbs equally  . He was unable to get seroquel now having NG drip . There has been no seizure activity . Creatinine is 4.08 remaining on IV acyclovir being managed by ID. Off sedation he will have jargon speech moving all limbs equally . He presented to [facility] with generalized weakness abdominal pain and dysuria . He had been treated for suspected UTI. In the hospital he became agitated and confused transferred to [facility] . MRI of Head with mild chronic periventricular small vessel ischemia . EEG mild diffuse slowing . CSF analysis WBC 1,348 (87 l) , 3000 RBC , total protein 257.4 , glucose 43 , CSF HSV PCR positive . He is being followed by ID on acyclovir IVPB. Signifcant medication keppra 500 mg IVPB q 12 hours , acyclovir IVPB , seroquel 25 mg po bid . Testing  LTME shows moderate to severe disfuse slowing with no seizure activity      No past medical history on file. No past surgical history on file. No family history on file.     Social History     Socioeconomic History    Marital status:      Spouse name: Not on file    Number of children: Not on file    Years of education: Not on file    Highest education level: Not on file   Occupational History    Not on file   Social Needs    Financial resource strain: Not hard at all   Michele-Nagi insecurity     Worry: Never true     Inability: Never true   [employer] needs     Medical: Not on file     Non-medical: Not on file   Tobacco Use    Smoking status: Never Smoker    Smokeless tobacco: Never Used   Substance and Sexual Activity    Alcohol use: Not on file    Drug use: Not on file    Sexual activity: Not on file   Lifestyle    Physical activity     Days per week: Not on file     Minutes per session: Not on file    Stress: Not on file   Relationships    Social connections     Talks on phone:

## 2020-07-27 NOTE — PROGRESS NOTES
Renal Progress Note    Patient :  Serena Euceda; 79 y.o. MRN# [id number]  Location:  0125/0125-01  Attending:  Amee Christianson DO  Admit Date:  2020   Hospital Day: 4      Subjective:     Encephalopathy seems to have worsened somewhat, unable to clear all secretions. Will vomiting this morning, some suspicion of aspiration. Still able to maintain oxygenation. Urine output fair, did have some Woods trauma leading to dark-colored urine. Creatinine seems to have plateaued and showing signs of improvement. Hemodynamically stable. No fever or chills. Acyclovir dose adjusted based on renal function. Acidosis and acidemia persist.  No fever or chills. Outpatient Medications:     Medications Prior to Admission: timolol (TIMOPTIC) 0.5 % ophthalmic solution, 1 drop 2 times daily    Current Medications:     Scheduled Meds:    heparin (porcine)  5,000 Units Subcutaneous 3 times per day    acyclovir  700 mg Intravenous Q24H    QUEtiapine  25 mg Oral BID    sodium chloride flush  10 mL Intravenous 2 times per day    levetiracetam  500 mg Intravenous BID     Continuous Infusions:    IV infusion builder      dexmedetomidine 0.3 mcg/kg/hr (20 0905)     PRN Meds:  sodium chloride flush, acetaminophen **OR** acetaminophen, polyethylene glycol, promethazine **OR** ondansetron    Input/Output:       I/O last 3 completed shifts: In: 4190.9 [I.V.:3190.9; IV Piggyback:1000]  Out: 5929 [Urine:1485].       Patient Vitals for the past 96 hrs (Last 3 readings):   Weight   20 0625 221 lb 1.9 oz (100.3 kg)   20 0536 222 lb 0.1 oz (100.7 kg)   20 1630 211 lb 6.7 oz (95.9 kg)       Vital Signs:   Temperature:  Temp: 96.6 °F (35.9 °C)  TMax:   Temp (24hrs), Av.2 °F (36.8 °C), Min:96.6 °F (35.9 °C), Max:98.8 °F (37.1 °C)    Respirations:  Resp: 20  Pulse:   Pulse: 66  BP:    BP: (!) 147/89  BP Range: Systolic (48UKL), VLJ:607 , Min:126 , EMR:063       Diastolic (00SRY), ZBK:39, Min:69, Max:131      Physical Examination:     General:  Lethargic, barely arousable not responding to commands no accessory muscle use. HEENT: Trachea midline, moist mucosa. Eyes:   Pupils equal, round and reactive to light,   Neck:   No JVD, no thyromegaly, no lymphadenopathy. Chest:  Bilateral vesicular breath sounds, no rales or wheezes. Cardiac:  S1 S2 RR, no murmurs, gallops or rubs, JVP not raised. Abdomen: Soft, non-tender, no masses or organomegaly, BS audible. :   No suprapubic or flank tenderness. Neuro:  AAO x 3, No FND. SKIN:  No rashes, good skin turgor. Extremities:  No edema, palpable peripheral pulses, no calf tenderness. Labs:       Recent Labs     07/26/20  0503 07/27/20  0425   WBC 8.5 8.6   RBC 4.07* 4.07*   HGB 12.9* 12.8*   HCT 38.8* 37.5*   MCV 95.3 92.1   MCH 31.7 31.4   MCHC 33.2 34.1   RDW 12.7 12.5   * 160   MPV 11.7 12.9      BMP:   Recent Labs     07/26/20  0921 07/26/20  2221 07/27/20  0425    140 141   K 4.3 4.6 4.8    108* 108*   CO2 18* 13* 15*   BUN 40* 46* 50*   CREATININE 3.97* 4.16* 4.08*   GLUCOSE 169* 137* 148*   CALCIUM 8.3* 8.6 8.5*      Phosphorus:   No results for input(s): PHOS in the last 72 hours. Magnesium:  No results for input(s): MG in the last 72 hours. Albumin:  No results for input(s): LABALBU in the last 72 hours.   BNP:    No results found for: BNP  CARRIE:      Lab Results   Component Value Date    CARRIE NEGATIVE 07/23/2020     SPEP:  Lab Results   Component Value Date    PROT 6.2 07/23/2020     UPEP:   No results found for: LABPE  C3:   No results found for: C3  C4:   No results found for: C4  MPO ANCA:   No results found for: MPO  PR3 ANCA:   No results found for: PR3  Anti-GBM:   No results found for: GBMABIGG  Hep BsAg:       No results found for: HEPBSAG  Hep C AB:        No results found for: HEPCAB    Urinalysis/Chemistries:      Lab Results   Component Value Date    NITRU NEGATIVE 07/26/2020    COLORU YELLOW 07/26/2020    PHUR 5.0 07/26/2020    WBCUA 10 TO 20 07/26/2020    RBCUA 20 TO 50 07/26/2020    MUCUS NOT REPORTED 07/26/2020    TRICHOMONAS NOT REPORTED 07/26/2020    YEAST NOT REPORTED 07/26/2020    BACTERIA NOT REPORTED 07/26/2020    SPECGRAV 1.013 07/26/2020    LEUKOCYTESUR SMALL 07/26/2020    UROBILINOGEN Normal 07/26/2020    BILIRUBINUR NEGATIVE 07/26/2020    GLUCOSEU NEGATIVE 07/26/2020    KETUA TRACE 07/26/2020    AMORPHOUS NOT REPORTED 07/26/2020     Urine Sodium:     Lab Results   Component Value Date    YANI 67 07/26/2020     Urine Potassium:  No results found for: KUR  Urine Chloride:  No results found for: CLUR  Urine Osmolarity: No results found for: OSMOU  Urine Protein:   No components found for: TOTALPROTEIN, URINE   Urine Creatinine:     Lab Results   Component Value Date    LABCREA 93.6 07/26/2020     Urine Eosinophils:  No components found for: UEOS    Radiology:     CXR:     Assessment:     1. Acute Kidney Injury: Secondary to ischemic and nephrotoxic ATN from intravascular volume depletion from decreased intake and contrast exposure, baseline 0.7-0.8 now up to 4.2, appears to have plateaued and showing signs of improvement nonoliguric  2. Anion gap and non-anion gap metabolic acidosis secondary to acute kidney injury  3. 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 BPH  5. Anemia of chronic disease    Plan:   1. Change IV fluids to D5W with 150 mg sodium bicarbonate at total fluid of 1 mL an hour  2. Leave Woods in  3. Keep systolic pressure more than 110  4. Follow renal function  5. His creatinine appears to have plateaued and now improving expect renal function to improve further    Nutrition   Please ensure that patient is on a renal diet/TF. Avoid nephrotoxic drugs/contrast exposure. We will continue to follow along with you.

## 2020-07-27 NOTE — PLAN OF CARE
Problem: Restraint Use - Nonviolent/Non-Self-Destructive Behavior:  Goal: Absence of restraint-related injury  Description: Absence of restraint-related injury  7/27/2020 0018 by Divya Castillo RN  Outcome: Met This Shift     Problem: Mental Status - Impaired:  Goal: Mental status will be restored to baseline  Description: Mental status will be restored to baseline  7/27/2020 0018 by Divya Castillo RN  Outcome: Ongoing

## 2020-07-27 NOTE — PROGRESS NOTES
Trevon Alvarenga, [address], [address], [place], [place]  Urology Progress Note     Subjective:   No acute events overnight   No documented fevers   Minimal odalys catheter bleeding / catheter draining clear urine     Patient Vitals for the past 24 hrs:   BP Temp Temp src Pulse Resp SpO2 Weight   07/27/20 0625 -- -- -- -- -- -- 221 lb 1.9 oz (100.3 kg)   07/27/20 0600 129/83 -- -- 55 16 95 % --   07/27/20 0500 (!) 148/131 -- -- 53 25 96 % --   07/27/20 0400 139/80 98.8 °F (37.1 °C) Axillary 54 21 95 % --   07/27/20 0300 134/80 -- -- 59 19 95 % --   07/27/20 0200 (!) 161/86 -- -- 64 22 93 % --   07/27/20 0100 (!) 162/101 -- -- 60 23 94 % --   07/27/20 0000 (!) 154/88 98.6 °F (37 °C) Axillary 63 22 95 % --   07/26/20 2300 (!) 126/95 -- -- 62 27 94 % --   07/26/20 2200 (!) 140/90 -- -- 64 27 97 % --   07/26/20 2100 (!) 153/96 -- -- 64 28 96 % --   07/26/20 2000 (!) 151/78 98.6 °F (37 °C) Axillary 64 21 96 % --   07/26/20 1900 (!) 146/86 -- -- 68 27 96 % --   07/26/20 1800 137/84 -- -- 72 30 95 % --   07/26/20 1745 -- -- -- -- -- 96 % --   07/26/20 1740 -- -- -- -- -- 98 % --   07/26/20 1735 -- -- -- -- -- 98 % --   07/26/20 1730 -- -- -- -- -- 98 % --   07/26/20 1725 -- -- -- 69 18 98 % --   07/26/20 1720 -- -- -- 67 18 96 % --   07/26/20 1715 -- -- -- 66 19 98 % --   07/26/20 1710 -- -- -- 68 21 97 % --   07/26/20 1705 -- -- -- 67 20 97 % --   07/26/20 1700 (!) 149/82 -- -- 72 22 95 % --   07/26/20 1655 -- -- -- -- -- (!) 86 % --   07/26/20 1650 -- -- -- 90 28 97 % --   07/26/20 1645 -- -- -- 75 30 96 % --   07/26/20 1640 -- -- -- -- -- 96 % --   07/26/20 1635 -- -- -- -- -- 96 % --   07/26/20 1630 -- -- -- -- -- 94 % --   07/26/20 1625 -- -- -- -- -- 96 % --   07/26/20 1620 -- -- -- -- -- 100 % --   07/26/20 1615 -- -- -- -- -- 98 % --   07/26/20 1610 -- -- -- -- -- 97 % --   07/26/20 1605 -- -- -- 63 20 97 % --   07/26/20 1600 135/75 98.3 °F (36.8 °C) Axillary 62 23 97 % --   07/26/20 1555 -- -- -- 61 21 97 % -- 07/26/20 1550 -- -- -- 61 22 97 % --   07/26/20 1545 -- -- -- 62 20 97 % --   07/26/20 1540 -- -- -- 61 19 98 % --   07/26/20 1535 -- -- -- 63 20 97 % --   07/26/20 1530 -- -- -- 60 23 98 % --   07/26/20 1525 -- -- -- 59 20 98 % --   07/26/20 1520 -- -- -- 64 20 98 % --   07/26/20 1515 -- -- -- 64 29 98 % --   07/26/20 1510 -- -- -- 62 21 98 % --   07/26/20 1500 135/69 -- -- 61 17 98 % --   07/26/20 1455 -- -- -- -- -- 98 % --   07/26/20 1450 -- -- -- -- -- 99 % --   07/26/20 1445 -- -- -- -- -- 99 % --   07/26/20 1440 -- -- -- -- -- 98 % --   07/26/20 1415 -- -- -- 69 (!) 34 98 % --   07/26/20 1410 -- -- -- 62 21 99 % --   07/26/20 1405 -- -- -- 61 22 99 % --   07/26/20 1400 131/86 -- -- 59 28 99 % --   07/26/20 1355 -- -- -- 62 27 98 % --   07/26/20 1335 -- -- -- -- -- 99 % --   07/26/20 1330 -- -- -- -- -- 98 % --   07/26/20 1325 -- -- -- -- -- 98 % --   07/26/20 1310 -- -- -- -- -- 98 % --   07/26/20 1305 -- -- -- -- -- 98 % --   07/26/20 1300 127/69 -- -- 60 25 98 % --   07/26/20 1255 -- -- -- -- -- 100 % --   07/26/20 1250 -- -- -- -- -- 99 % --   07/26/20 1235 -- -- -- -- -- 98 % --   07/26/20 1230 -- -- -- -- -- 99 % --   07/26/20 1225 -- -- -- -- -- 98 % --   07/26/20 1220 -- -- -- -- -- 98 % --   07/26/20 1210 -- -- -- -- -- 96 % --   07/26/20 1200 130/71 98.3 °F (36.8 °C) Axillary 61 20 98 % --   07/26/20 1140 -- -- -- -- -- 98 % --   07/26/20 1135 -- -- -- -- -- 98 % --   07/26/20 1130 -- -- -- -- -- 96 % --   07/26/20 1125 -- -- -- -- -- 96 % --   07/26/20 1100 131/70 -- -- 59 29 97 % --   07/26/20 1050 -- -- -- -- -- 96 % --   07/26/20 1045 -- -- -- -- -- 95 % --   07/26/20 1040 -- -- -- -- -- 96 % --   07/26/20 1035 -- -- -- -- -- 97 % --   07/26/20 1015 -- -- -- -- -- 97 % --   07/26/20 1000 106/61 -- -- 51 16 95 % --   07/26/20 0950 -- -- -- -- -- 94 % --   07/26/20 0945 -- -- -- -- -- 95 % --   07/26/20 0940 -- -- -- -- -- 95 % --   07/26/20 0905 -- -- -- -- -- 95 % --   07/26/20 0900 100/63 98.4 °F (36.9 °C) Axillary 58 20 94 % --   07/26/20 0855 -- -- -- -- -- 95 % --   07/26/20 0850 -- -- -- -- -- 96 % --   07/26/20 0845 -- -- -- -- -- 95 % --   07/26/20 0820 121/64 -- -- 59 20 -- --   07/26/20 0815 -- -- -- -- -- 95 % --   07/26/20 0810 -- -- -- -- -- 95 % --   07/26/20 0805 -- -- -- -- -- 95 % --   07/26/20 0800 (!) 87/51 -- -- 53 19 95 % --   07/26/20 0700 126/70 -- -- 68 25 -- --       Intake/Output Summary (Last 24 hours) at 7/27/2020 0649  Last data filed at 7/27/2020 4394  Gross per 24 hour   Intake 4190.87 ml   Output 1485 ml   Net 2705.87 ml       Recent Labs     07/26/20  0503 07/27/20  0425   WBC 8.5 8.6   HGB 12.9* 12.8*   HCT 38.8* 37.5*   MCV 95.3 92.1   * 160     Recent Labs     07/26/20  0921 07/26/20  2221 07/27/20  0425    140 141   K 4.3 4.6 4.8    108* 108*   CO2 18* 13* 15*   BUN 40* 46* 50*   CREATININE 3.97* 4.16* 4.08*       Recent Labs     07/26/20  1051   COLORU YELLOW   PHUR 5.0   WBCUA 10 TO 20   RBCUA 20 TO 50   MUCUS NOT REPORTED   TRICHOMONAS NOT REPORTED   YEAST NOT REPORTED   BACTERIA NOT REPORTED   SPECGRAV 1.013   LEUKOCYTESUR SMALL*   UROBILINOGEN Normal   BILIRUBINUR NEGATIVE       Additional Lab/culture results:    Physical Exam:   Constitutional: Patient confused  Head: Atraumatic and normocephalic. Neck: Trachea midline. Ext: 2+ radial pulses bilaterally. Skin: No rashes or bruising present. Lungs: Respiratory effort normal.  Cardiovascular:  Regular rhythm. Abdomen: Soft, non-tender, non-distended  ; Kirk in draining clear urine/ urethral bleeding noted       Interval Imaging Findings:    Impression:     Problem List:  - Traumatic kirk removal  - Gross Hematuria  - BPH  - Bilateral simple renal cysts  - Elevated PSA- 14.54    Plan:   Flush catheter if blocked to ensure adequate drainage.    Appreciate nephrology recommendations for management of BALBIR  Keep kirk till Cr nadirs or per nephrology recs   Patient will need to follow up as an outpatient with a urologist for management of BPH and elevated PSA     Rodrigo Ibrahim  6:49 AM 7/27/2020

## 2020-07-27 NOTE — PROGRESS NOTES
Pt NT suctioned with red rubber cath. Retrieved moderate amount of thick white sputum from pt. Pt had strong, productive cough. Pt still sounded like he had subglottic secretions, so writer used yankauer and tried to suction back of throat, but pt gagged and vomited large amount of brown liquid. Slava Holt RN at bedside throughout event. Immediately suctioned pt mouth and sat pt upright. No aspiration seems to have occurred. Pt in no distress, and O2 saturation remains adequate. Will continue to monitor.

## 2020-07-27 NOTE — PROGRESS NOTES
Active problem Herpes encephalitis . The condition is he was kept on LTME another day for arm tremor episode  . LTME shows moderate to severe disfuse slowing with no seizure activity with LTME being off this evening . Creatinine is 3.97 remaining on IV acyclovir being managed by ID. He is on IV precedex drip having otherwise  agitation per nursing also on seroquel 25 mg po bid . He has been following some command with jargon speech moving all limbs equally . He presented to [facility] with generalized weakness abdominal pain and dysuria . He had been treated for suspected UTI. In the hospital he became agitated and confused  transferred to [facility] . MRI of Head with mild chronic periventricular small vessel ischemia . EEG mild diffuse slowing . CSF analysis WBC 1,348 (87 l) , 3000 RBC , total protein 257.4 , glucose 43 , CSF HSV PCR positive . He is being followed by ID on acyclovir IVPB. Signifcant medication keppra 500 mg IVPB q 12 hours , acyclovir IVPB , seroquel 25 mg po bid      No past medical history on file. No past surgical history on file. No family history on file.     Social History     Socioeconomic History    Marital status:      Spouse name: Not on file    Number of children: Not on file    Years of education: Not on file    Highest education level: Not on file   Occupational History    Not on file   Social Needs    Financial resource strain: Not hard at all   [place] insecurity     Worry: Never true     Inability: Never true   [employer] needs     Medical: Not on file     Non-medical: Not on file   Tobacco Use    Smoking status: Never Smoker    Smokeless tobacco: Never Used   Substance and Sexual Activity    Alcohol use: Not on file    Drug use: Not on file    Sexual activity: Not on file   Lifestyle    Physical activity     Days per week: Not on file     Minutes per session: Not on file    Stress: Not on file   Relationships    Social connections     Talks on phone: Not on file     Gets together: Not on file     Attends [employer] service: Not on file     Active member of club or organization: Not on file     Attends meetings of clubs or organizations: Not on file     Relationship status: Not on file    Intimate partner violence     Fear of current or ex partner: Not on file     Emotionally abused: Not on file     Physically abused: Not on file     Forced sexual activity: Not on file   Other Topics Concern    Not on file   Social History Narrative    Not on file       Current Facility-Administered Medications   Medication Dose Route Frequency Provider Last Rate Last Dose    heparin (porcine) injection 5,000 Units  5,000 Units Subcutaneous 3 times per day Lyssa Liang, DO   Stopped at 07/26/20 2200    [START ON 7/27/2020] acyclovir (ZOVIRAX) 700 mg in dextrose 5 % 250 mL IVPB  700 mg Intravenous Q24H Sara Marquez MD        QUEtiapine (SEROQUEL) tablet 25 mg  25 mg Oral BID Lyssa Liang, DO   25 mg at 07/25/20 1159    dexmedetomidine (PRECEDEX) 400 mcg in sodium chloride 0.9 % 100 mL infusion  0.2 mcg/kg/hr Intravenous Continuous Lyssa Liang DO 16.8 mL/hr at 07/26/20 2047 0.7 mcg/kg/hr at 07/26/20 2047    sodium chloride flush 0.9 % injection 10 mL  10 mL Intravenous 2 times per day Chay Guerra MD   10 mL at 07/26/20 2044    sodium chloride flush 0.9 % injection 10 mL  10 mL Intravenous PRN Stephanie Cruz MD        acetaminophen (TYLENOL) tablet 650 mg  650 mg Oral Q6H PRN Stephanie Cruz MD        Or    acetaminophen (TYLENOL) suppository 650 mg  650 mg Rectal Q6H PRN Stephanie Cruz MD        polyethylene glycol (GLYCOLAX) packet 17 g  17 g Oral Daily PRN Stephanie Cruz MD        promethazine (PHENERGAN) tablet 12.5 mg  12.5 mg Oral Q6H PRN Naima Guerra MD        Or    ondansetron (ZOFRAN) injection 4 mg  4 mg Intravenous Q6H PRN Naima Guerra MD   4 mg at 07/26/20 2052    0.9 % sodium chloride infusion   Intravenous Continuous Rod Sanches  mL/hr at 07/26/20 1340      levetiracetam (KEPPRA) 500 mg/100 mL IVPB  500 mg Intravenous BID Cruz Rayo MD   Stopped at 07/26/20 2106       Allergies   Allergen Reactions    Ibuprofen        ROS:   Patient unable to respond     Vitals:    07/26/20 1900   BP: (!) 146/86   Pulse: 68   Resp: 27   Temp:    SpO2: 96%     Admission weight: 211 lb 6.7 oz (95.9 kg)    Neurological Examination on IV precedex drip   Head/ Ears /Nose/Throat/external ear . Normal exam  Neck and thyroid . Normal size. No bruits  Respiratory . Breathsounds clear bilaterally  Cardiovascular: Auscultation of heart with regular rate and rhythm   Musculoskeletal. Muscle bulk and tone normal                                                           Muscle strength withdrawing all limbs equally                                                                                 Orientation Obtunded moaning to stimulation     Attention and concentration reduced  Cranial nerve 2 normal visual threat  Cranial nerve 3, 4 and 6 . Extraocular muscles are intact . Pupils are equal and reactive   Cranial nerve 5 . Intact corneal reflex  Cranial nerve 7 normal exam   Cranial nerve 8. Grossly intact hearing   Cranial nerve 9 and 10. Symmetric palate elevation   Cranial nerve 11 , 5 out of 5 strength   Cranial Nerve 12 midline tongue . No atrophy  Sensation . Equal withdrawal all limbs   Deep Tendon Reflexes symmetrical  Plantar response equivocal  bilaterally    Assessment :    Herpes encephalitis    Plan:    Continue present care

## 2020-07-27 NOTE — PROGRESS NOTES
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  · Slow infusion of acyclovir, hydration. · Monitor renal function. .  · Supportive care      Medical Decision Making/Summary/Discussion:7/27/2020     ·   Infection Control Recommendations   · [place] Precautions    Antimicrobial Stewardship Recommendations     · Targeted therapy  · PK dosing  ·   Coordination of Outpatient Care:   · Estimated Length of IV antimicrobials: To be determined  · Patient will need Midline Catheter Insertion: Yes  · Patient will need PICC line Insertion: No  · Patient will need: Home IV , [place],  SNF,  LTAC: To be determined  · Patient will need outpatient wound care: No    Chief complaint/reason for consultation:   · Altered mental status with progression over a few days      History of Present Illness:   Shaye Spencer is a 79y.o.-year-old  male who was initially admitted on 7/23/2020. Patient seen at the request of Juno Padron. 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 He is unable to provide any information himself at this point in time. Available information reviewed and discussed with Dr. Bob Rhodes.    Patient apparently developed onset of some dysuria and pain in the pelvic and right hip area starting about 7 to the 10 days prior to admission. In addition he was exhibiting some fatigue. He has a pre-existing history of prostatic hypertrophy with previous documented urinary tract infections. The patient apparently felt that he was coming down with a urinary tract infection and had a started doxycycline on 7/20/2020 without benefit.   He 1,485ml. Worsening kidney function but maintaining urine output. US Renal:7/26:  Small cyst noted on the left kidney. Evaluation for renal echogenicity is indeterminate secondary to increased   echogenicity of the liver. Hepatic steatosis noted. Urine analysis:7/26:  Cloudy urine with large Hgb. 2+ protein  Large leukocyte esterase. Woods catheter in place    Myoglobin is elevated. MRI scan of the head on  7/24 shows subacute inflammation in the frontal region. This is an abnormal 3 day video EEG recording,suggesting moderate to severe encephalopathy of nonspecific etiology , no seizure activity noted. He is on Keppra for seizure prophylaxis. 7/23/20 CSF studies show an increase in WBC count with lymphocytic predominance. CSF cultures show no growth. Patient has developed acute kidney injury after prior CT of the abdomen with contrast on 7/21/2020 and administration of IV acyclovir. Unfortunately there is no alternative of treatment for the herpes encephalitis. We will plan to decrease the dose of acyclovir to 10 mg/kg once daily, based on an estimated creatinine clearance of 15 cc/min. The infusion will need to be given a slowly as rapid administration can lead to creatinine elevation on and off itself. The patient will also need IV fluids as per nephrology. Cultures: No growth  CSF: HSV 1- positive    Labs, X rays reviewed: 7/27/2020    BUN: 12--20-->40-->50  Cr: 0.61--1.16-->3.97-->4.08    WBC: 6.3-->6.1-->8.5-->8.6  Hb: 14.1-->12.9-->12.8  Plat: 153-->160-->160    Cultures:  Urine:  ·   Blood:  ·   Sputum :  · 7/23: MRSA screening of the nares in progress  Wound:  ·     CSF:  · 7/23: no growth        COVID-19 test:  · 7/23/2020- negative    · 7/23/2020 CT of the head: No acute abnormality  ·     Viral panel; Herpes simplex type I positive    Discussed with patient, ROBBI CC.     I have personally reviewed the past medical history, past surgical history, medications, social history, Encephalopathic. 7/27/2020      Constitutional: No fevers or chills. No systemic complaints  Head: No headaches  Eyes: No double vision or blurry vision. No conjunctival inflammation. ENT: No sore throat or runny nose. . No hearing loss, tinnitus or vertigo. Cardiovascular: No chest pain or palpitations. No shortness of breath. No DONALDSON  Lung: No shortness of breath or cough. No sputum production  Abdomen: No nausea, vomiting, diarrhea, or abdominal pain. [place] Staggers No cramps. Genitourinary: No increased urinary frequency, or dysuria. No hematuria. No suprapubic or CVA pain  Musculoskeletal: No muscle aches or pains. No joint effusions, swelling or deformities  Hematologic: No bleeding or bruising. Neurologic: No headache, weakness, numbness, or tingling. Integument: No rash, no ulcers. Psychiatric: No depression. Endocrine: No polyuria, no polydipsia, no polyphagia. Physical Examination :     Patient Vitals for the past 8 hrs:   BP Temp Temp src Pulse Resp SpO2 Weight   07/27/20 0800 (!) 164/84 96.6 °F (35.9 °C) Axillary 55 25 98 % --   07/27/20 0700 (!) 156/85 -- -- 54 17 96 % --   07/27/20 0625 -- -- -- -- -- -- 221 lb 1.9 oz (100.3 kg)   07/27/20 0600 129/83 -- -- 55 16 95 % --   07/27/20 0500 (!) 148/131 -- -- 53 25 96 % --   07/27/20 0400 139/80 98.8 °F (37.1 °C) Axillary 54 21 95 % --   07/27/20 0300 134/80 -- -- 59 19 95 % --   07/27/20 0200 (!) 161/86 -- -- 64 22 93 % --   07/27/20 0100 (!) 162/101 -- -- 60 23 94 % --     General Appearance: Somnolent  Head:  Normocephalic, no trauma  Eyes: Pupils equal, round, reactive to light; sclera anicteric; conjunctivae pink. No embolic phenomena. ENT: Oropharynx clear, without erythema, exudate, or thrush. No tenderness of sinuses. Mouth/throat: mucosa pink and moist. No lesions. Dentition in good repair. Neck:Supple, without lymphadenopathy. Thyroid normal, No bruits. Pulmonary/Chest: Clear to auscultation, without wheezes, rales, or rhonchi.   No dullness to percussion. Cardiovascular: Regular rate and rhythm without murmurs, rubs, or gallops. Abdomen: Soft, non tender. Bowel sounds normal. No organomegaly  All four Extremities: No cyanosis, clubbing, edema, or effusions. . Scar from prior right hip arthroplasty  Neurologic: No gross sensory or motor deficits. Moves all extremities. Encephalopathic  Skin: Warm and dry with good turgor. No signs of peripheral arterial or venous insufficiency. No ulcerations. No open wounds. Medical Decision Making -Laboratory:   I have independently reviewed/ordered the following labs:    CBC with Differential:   Recent Labs     07/26/20  0503 07/27/20  0425   WBC 8.5 8.6   HGB 12.9* 12.8*   HCT 38.8* 37.5*   * 160   LYMPHOPCT 12* 5*   MONOPCT 13* 7     BMP:   Recent Labs     07/26/20  2221 07/27/20  0425    141   K 4.6 4.8   * 108*   CO2 13* 15*   BUN 46* 50*   CREATININE 4.16* 4.08*     Hepatic Function Panel:   No results for input(s): PROT, LABALBU, BILIDIR, IBILI, BILITOT, ALKPHOS, ALT, AST in the last 72 hours. No results for input(s): RPR in the last 72 hours. No results for input(s): HIV in the last 72 hours. No results for input(s): BC in the last 72 hours.   Lab Results   Component Value Date    MUCUS NOT REPORTED 07/26/2020    RBC 4.07 07/27/2020    TRICHOMONAS NOT REPORTED 07/26/2020    WBC 8.6 07/27/2020    YEAST NOT REPORTED 07/26/2020    TURBIDITY CLOUDY 07/26/2020     Lab Results   Component Value Date    CREATININE 4.08 07/27/2020    CREATININE 1.0 01/16/2020    GLUCOSE 148 07/27/2020       Medical Decision Making-Imaging:       EXAMINATION:    MRI OF THE BRAIN WITHOUT CONTRAST  7/24/2020 10:11 am         TECHNIQUE:    Multiplanar multisequence MRI of the brain was performed without the    administration of intravenous contrast.         COMPARISON:    None.         HISTORY:    ORDERING SYSTEM PROVIDED HISTORY: Altered mental status    TECHNOLOGIST PROVIDED HISTORY:    Altered mental status      Initial evaluation.         FINDINGS:    INTRACRANIAL STRUCTURES/VENTRICLES: There is no acute infarct. No mass effect    or midline shift. No evidence of an acute intracranial hemorrhage.  Areas of    T2 FLAIR hyperintensity are seen in the periventricular and subcortical white    matter, which are nonspecific, but may represent chronic microvascular    ischemic change. Maria Elena Knows is prominence of the ventricles and sulci due to    global parenchymal volume loss.  The sellar/suprasellar regions appear    unremarkable.  The normal signal voids within the major intracranial vessels    appear maintained.         ORBITS: The visualized portion of the orbits demonstrate no acute abnormality.         SINUSES: The visualized paranasal sinuses and mastoid air cells are well    aerated.         BONES/SOFT TISSUES: The bone marrow signal intensity appears normal. The soft    tissues demonstrate no acute abnormality.              Impression    1. No acute intracranial abnormality.  No acute infarct. 2. Minimal global parenchymal volume loss with minimal microvascular ischemic    changes.         CT OF THE HEAD WITHOUT CONTRAST  7/23/2020 8:09 am         TECHNIQUE:    CT of the head was performed without the administration of intravenous    contrast. Dose modulation, iterative reconstruction, and/or weight based    adjustment of the mA/kV was utilized to reduce the radiation dose to as low    as reasonably achievable.         COMPARISON:    None.         HISTORY:    ORDERING SYSTEM PROVIDED HISTORY: DELERIUM    TECHNOLOGIST PROVIDED HISTORY:    DELERIUM              FINDINGS:    BRAIN/VENTRICLES: There is no acute intracranial hemorrhage, mass effect or    midline shift.  No abnormal extra-axial fluid collection.  The gray-white    differentiation is maintained without evidence of an acute infarct.  There is    no evidence of hydrocephalus.         ORBITS: The visualized portion of the orbits demonstrate no acute bowel.  Stool noted throughout the liver large    bowel.  Appendix normal.         Pelvis: Markedly enlarged prostate.  It measures 68 x 83 mm in AP and    transverse dimensions.         Peritoneum/Retroperitoneum: The abdominal aorta and iliac arteries are normal    in caliber. There is no pathologic adenopathy.         Bones/Soft Tissues: Right hip arthroplasty.              Impression    Markedly enlarged prostate.  Possible cystitis.  Increased stool.           EXAMINATION:    ONE XRAY VIEW OF THE PELVIS AND TWO XRAY VIEWS RIGHT HIP         6/25/2020 7:29 am         COMPARISON:    10/23/2017         HISTORY:    ORDERING SYSTEM PROVIDED HISTORY: Acute right hip pain         FINDINGS:    Status post right hip arthroplasty.  No evidence of hardware complications. Stable alignment.         Elsewhere, no acute fracture or dislocation.  Moderate left hip    osteoarthritis.  The SI joints and sacral arcuate lines appear intact. Degenerative changes of the SI joints are also seen.         No soft tissue abnormality.              Impression    1. Status post right hip arthroplasty.  No complication. 2. No acute osseous abnormality of the pelvis. 3. Moderate left hip osteoarthritis. Medical Decision Kivjdx-Tcjwnued-Loxdh:   Results for Uzair Arias (MRN [id number]) as of 7/24/2020 10:34   Ref.  Range 7/23/2020 16:37   CYTOMEGALOVIRUS (CMV) CSF FILM ARRAY Latest Ref Range: Not Detected  Not Detected   HAEMOPHILUS INFLUENZA CSF FILM ARRAY Latest Ref Range: Not Detected  Not Detected   HHV-6 (HERPESVIRUS 6) CSF FILM ARRAY Latest Ref Range: Not Detected  Not Detected   HSV-1 CSF FILM ARRAY Latest Ref Range: Not Detected  DETECTED (A)   HSV-2 CSF FILM ARRAY Latest Ref Range: Not Detected  Not Detected   PARECHOVIRUS CSF FILM ARRAY Latest Ref Range: Not Detected  Not Detected   ESCHERICHIA COLI K1 CSF FILM ARRAY Latest Ref Range: Not Detected  Not Detected   LISTERIA MONOCYTOGENES CSF FILM ARRAY Latest Ref Range: Not Detected  Not Detected   NEISSERIA MENIGITIDIS CSF FILM ARRAY Latest Ref Range: Not Detected  Not Detected   STREPTOCOCCUS AGALACTIAE CSF FILM ARRAY Latest Ref Range: Not Detected  Not Detected   STREPTOCOCCUS PNEUMONIAE CSF FILM ARRAY Latest Ref Range: Not Detected  Not Detected   CRYPTOCOCCUS NEOFORMANS/SALUD CSF FILM ARR. Latest Ref Range: Not Detected  Not Detected       Medical Decision Making-Other:     Note:  · Labs, medications, radiologic studies were reviewed with personal review of films  · Large amounts of data were reviewed  · Discussed with nursing Staff, Discharge planner  · Infection Control and Prevention measures reviewed  · All prior entries were reviewed  · Administer medications as ordered  · Prognosis: Guarded  · Discharge planning reviewed  · Follow up as outpatient. Thank you for allowing us to participate in the care of this patient. Please call with questions. Silva Vela DPM     ATTESTATION:    I have discussed the case, including pertinent history and exam findings with the residents and students. I have seen and examined the patient and the key elements of the encounter have been performed by me. I was present when the student obtained his information or examined the patient. I have reviewed the laboratory data, other diagnostic studies and discussed them with the residents. I have updated the medical record where necessary. I agree with the assessment, plan and orders as documented by the resident/ student.     Paradise Bush MD.    Pager: [phone] - Office: [phone]

## 2020-07-27 NOTE — PROGRESS NOTES
Physician Progress Note      PATIENT:               James Harper  CSN #:                  [id number]  :                       1950  ADMIT DATE:       2020 5:37 PM  [address] DATE:        2020 1:40 PM  RESPONDING  PROVIDER #:        Jose Rothman MD          QUERY TEXT:    Pt admitted with dysuria and chills. Noted to have disorientation,   agitation. If possible, please document in the progress notes and discharge summary if   you are evaluating and/or treating any of the following: The medical record reflects the following:  Risk Factors: possible sepsis,  bite on the left thigh in past few days  Clinical Indicators: per  attending PN: Progressively more disoriented and   agitated  temp Max:102.2 ? F, adm lactic acid 2.2, covid negative  Treatment: IV NS infusion, IV Maxipime, IV vancomyin, Ativan,  CT brain,   planned transfer for neuro eval  Options provided:  -- Metabolic encephalopathy  -- Septic encephalopathy  -- Toxic encephalopathy  -- Encephalopathy due to ***  -- Toxic metabolic encephalopathy  -- Other - I will add my own diagnosis  -- Disagree - Clinically unable to determine / Unknown  -- Refer to Clinical Documentation Reviewer    PROVIDER RESPONSE TEXT:    This patient has encephalopathy due to ***. Query created by: David Robert on 2020 11:18 AM      QUERY TEXT:    Pt admitted with  possible sepsis. Noted  20 documentation by   pulmonology consultant that diagnosis is unclear, must exclude central nervous   system infection/encephalitis. If possible, please document in progress notes and discharge summary:    The medical record reflects the following:  Risk Factors: hx insect bite week prior to admission  Clinical Indicators: Per pulmonology consult: \" Unifying diagnosis is not   clear. Most prominent symptoms currently include fever and mental status   change. Must exclude central nervous system infection/encephalitis.   Negative   urinalysis not consistent with urinary tract infection. \"  Treatment: empiric ceftriaxone, doxycycline, and acyclovir, transfer to   tertiary care facility for evaluation and lumbar puncture. Options provided:  -- Viral meningitis suspected  -- Viral meningitis confirmed  -- Viral encephalitis suspected  -- Viral encephalitis confirmed  -- Central nervous system infection suspected  -- Central nervous system infection confirmed  -- Central nervous system infection/encephalitis ruled out  -- Other - I will add my own diagnosis  -- Disagree - Clinically unable to determine / Unknown  -- Refer to Clinical Documentation Reviewer    PROVIDER RESPONSE TEXT:    The diagnosis of viral encephalitis is suspected.     Query created by: Teddy Mccormick on 7/24/2020 11:19 AM      Electronically signed by:  Reyna Macedo MD 7/27/2020 11:42 AM

## 2020-07-27 NOTE — PROGRESS NOTES
Critical Care Team - Daily Progress Note      Date and time: 7/27/2020 5:10 PM  Patient's name:  Jacqueline Jones  Medical Record Number: [id number]  Patient's account/billing number: [de-identified]  Patient's YOB: 1950  Age: 79 y.o. Date of Admission: 7/23/2020  3:02 PM  Length of stay during current admission: 4      Primary Care Physician: Dom Muñoz MD  ICU Attending Physician: Dr. Lary Her Status: Full Code    Reason for ICU admission: AMS, encephalitis                SUBJECTIVE:     OVERNIGHT EVENTS:   Patient agitated overnight requiring increased Precedex drip, Geodon 20 IM, unable to give p.o. Seroquel due to n.p.o. Worsening mental status this morning, waxing and waning agitation and yelling, grunting speech, not following commands. Afebrile, normal WBC. Vital signs stable, on Keppra per neurology, EEG showed diffuse slowing no seizures. Creatinine increasing to 4, previously 3, on bicarb drip.   2 episodes of emesis this morning, concern for  possibly unable to maintain secretions, although normal saturations, on 2 L NC.    AWAKE & FOLLOWING COMMANDS:  [] No   [x] Yes    CURRENT VENTILATION STATUS:     [] Ventilator  [] BIPAP  [] Nasal Cannula [x] Room Air      IF INTUBATED, ET TUBE MARKING AT LOWER LIP:       cms    SECRETIONS Amount:  [] Small [] Moderate  [] Large  [x] None  Color:     [] White [] Colored  [] Bloody    SEDATION:  RAAS Score: precedex     PARALYZED:  [x] No    [] Yes    DIARRHEA:                [x] No                [] Yes  (C. Difficile status: [] positive                                                                                                                       [] negative                                                                                                                     [] pending)    VASOPRESSORS:  [x] No    [] Yes    If yes -   [] Levophed       [] Dopamine     [] Vasopressin       [] Dobutamine  [] Phenylephrine         [] Epinephrine    CENTRAL LINES:     [x] No   [] Yes   (Date of Insertion:   )           If yes -     [] Right IJ     [] Left IJ [] Right Femoral [] Left Femoral                   [] Right Subclavian [] Left Subclavian       FLEMING'S CATHETER:   [x] No   [] Yes  (Date of Insertion:   )     URINE OUTPUT:            [x] Good   [] Low              [] Anuric      OBJECTIVE:     VITAL SIGNS:  BP (!) 139/91   Pulse 61   Temp 98.4 °F (36.9 °C) (Axillary)   Resp 25   Wt 221 lb 1.9 oz (100.3 kg)   SpO2 96%   BMI 31.73 kg/m²     Tmax over 24 hours:  Temp (24hrs), Av.2 °F (36.8 °C), Min:96.6 °F (35.9 °C), Max:98.8 °F (37.1 °C)      Patient Vitals for the past 6 hrs:   BP Temp Temp src Pulse Resp SpO2   20 1600 (!) 139/91 98.4 °F (36.9 °C) Axillary 61 25 96 %   20 1500 (!) 148/88 -- -- 62 23 97 %   20 1400 (!) 143/85 -- -- 62 23 98 %   20 1300 132/83 -- -- 65 20 98 %   20 1200 (!) 146/76 98.1 °F (36.7 °C) Axillary 70 24 96 %         Intake/Output Summary (Last 24 hours) at 2020 1710  Last data filed at 2020 1600  Gross per 24 hour   Intake 2678.83 ml   Output 1785 ml   Net 893.83 ml     Wt Readings from Last 2 Encounters:   20 221 lb 1.9 oz (100.3 kg)   20 222 lb 3.2 oz (100.8 kg)     Body mass index is 31.73 kg/m².       ROS:  Unable to be obtained due to mental status    PHYSICAL EXAM:  General appearance -patient resting comfortably, no acute distress  Neck -supple, trachea midline, no nuchal rigidity  Pulm -slight crackles throughout, bilateral breath sounds, 2 L nasal cannula with normal saturations  Heart - normal rate, regular rhythm, normal S1, S2, no murmurs, rubs, clicks or gallops  Abdomen - soft, nontender, no edema  Neurological -GCS 10, partially opening eyes to voice, making incoherent sounds, not following commands, not localizing to pain, pupils 3 mm, equal, reactive  Skin - normal coloration and turgor, no rashes, no suspicious skin lesions noted      MEDICATIONS:    Scheduled Meds:   haloperidol lactate        haloperidol lactate  5 mg Intramuscular Once    QUEtiapine  25 mg Per NG tube BID    heparin (porcine)  5,000 Units Subcutaneous 3 times per day    acyclovir  700 mg Intravenous Q24H    sodium chloride flush  10 mL Intravenous 2 times per day    levetiracetam  500 mg Intravenous BID     Continuous Infusions:   IV infusion builder 100 mL/hr at 07/27/20 1443    dexmedetomidine 0.4 mcg/kg/hr (07/27/20 1430)     PRN Meds:   sodium chloride flush, 10 mL, PRN  acetaminophen, 650 mg, Q6H PRN    Or  acetaminophen, 650 mg, Q6H PRN  polyethylene glycol, 17 g, Daily PRN  promethazine, 12.5 mg, Q6H PRN    Or  ondansetron, 4 mg, Q6H PRN        VENT SETTINGS (Comprehensive) (if applicable):  Vent Information  SpO2: 96 %  Additional Respiratory  Assessments  Pulse: 61  Resp: 25  SpO2: 96 %  Oral Care: Mouth suctioned, Suction toothette      Laboratory findings:    Complete Blood Count:   Recent Labs     07/26/20  0503 07/27/20  0425   WBC 8.5 8.6   HGB 12.9* 12.8*   HCT 38.8* 37.5*   * 160        Last 3 Blood Glucose:   Recent Labs     07/26/20  0921 07/26/20 2221 07/27/20  0425   GLUCOSE 169* 137* 148*        PT/INR:    Lab Results   Component Value Date    PROTIME 13.6 07/23/2020    INR 1.1 07/23/2020     PTT:    Lab Results   Component Value Date    APTT 27.9 07/23/2020       Comprehensive Metabolic Profile:   Recent Labs     07/26/20  0921 07/26/20  2221 07/27/20  0425    140 141   K 4.3 4.6 4.8    108* 108*   CO2 18* 13* 15*   BUN 40* 46* 50*   CREATININE 3.97* 4.16* 4.08*   GLUCOSE 169* 137* 148*   CALCIUM 8.3* 8.6 8.5*      Magnesium:   Lab Results   Component Value Date    MG 2.1 07/23/2020     Phosphorus:   Lab Results   Component Value Date    PHOS 2.6 07/23/2020     Ionized Calcium: No results found for: CAION     Urinalysis : negative     Troponin: No results for input(s): TROPONINI in the last 72 hours.    Microbiology: CSF + for HSV encephalitis   Cultures during this admission:     Blood cultures:                 [] None drawn      [x] Negative             []  Positive (Details:  )  Urine Culture:                   [] None drawn      [x] Negative             []  Positive (Details:  )  Sputum Culture:               [x] None drawn       [] Negative             []  Positive (Details:  )   Endotracheal aspirate:     [x] None drawn       [] Negative             []  Positive (Details:  )         Radiology/Imaging:   XR CHEST (SINGLE VIEW FRONTAL)   Final Result   Rotated with relatively low lung volumes; no consolidation or sizable pleural   effusion. Mild central vascular congestion consideration. Mild basilar   atelectasis. RECOMMENDATION:   Followup chest x-ray if continued concern for aspiration or vascular   congestion         US RENAL COMPLETE   Final Result   Limited study without evidence of obstructive uropathy. Incidental note of hepatic steatosis limits evaluation for renal parenchymal   echogenicity evaluation. Bladder is not visualized due to catheter placement. MRI BRAIN WO CONTRAST   Final Result   1. No acute intracranial abnormality. No acute infarct. 2. Minimal global parenchymal volume loss with minimal microvascular ischemic   changes. IR LUMBAR PUNCTURE FOR DIAGNOSIS   Final Result   Successful fluoroscopic-guided lumbar puncture.                  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     PLAN/MEDICAL DECISION MAKING:  Neurologic:   AMS secondary to HSV 1 encephalitis  _Lumbar puncture + lymphocytosis with +CSF HSV-1  - Neuro Consults   -Keppra 500mg BID  - EEG: Diffuse slowing, no seizures  - de-escalate abx, continue acyclovir   -CT head [facility] facility negative for acute pathology   - MRI: 7/24/20: no abnormalities or infarct  - precedex gtt  - start seroquel 25mg NGT QHS  - agitation: haldol 5mg IM x1    Cardiovascular:  · Hemodynamically stable  · MAP goal     Pulmonary:  · Maintain oxygen sats >92%  · Pulmonary toilet  · Covid negative  · 2L NC, normal sats  · Monitor for asp pna  · Repeat CXR 7/27 showed no consolidation  · Vent Information  · SpO2: 97 %     GI/Nutrition  Monitor for GIB  · glyclolax  · Ulcer Prophylaxis: Protonix 40mg BID  · Diet:Diet NPO Effective Now   · NG tube placed, LIWS, hold temporarily during PO med  · Zofran/Phenergan PRN    Renal/Fluid/Electrolyte  BALBIR  · IV Fluids: 1/2 NS with 150 meq Bicarb @ 100 mL/Hr   · I/O: In: 1875.7 [I.V.:1875.7]  · Out: 1445 [Urine:1445]  · UOP: 0.6 cc/kg/hr  · Nephro Recs:  · Renal US: normal no obstruction  · Bicarb 15 (13)  · Cr 4(3)  · Inc to 1/2 NS w/ 150meq Bicarb @ 100cc/hr  ·   H/O BPH  Traumatic Woods removal  Blood around meatus, hold AC for now if still bleeding  - if controlled, restart Heparin ppx  -uro recs: flush if clogged, f/u out for PSA elevation 14 (was 14 in 2017)    ID  AMS 2/2 HSV-1 Encephalitis  WBC:   Lab Results   Component Value Date    WBC 8.6 2020   ·   · Tmax: Temp (24hrs), Av.2 °F (36.8 °C), Min:96.6 °F (35.9 °C), Max:98.8 °F (37.1 °C)  · ID Recs: Acyclovir 700mg QD  · Pending Borrelia, Toxo    Hematology:  Recent Labs     20  0503 20  0425   HGB 12.9* 12.8*    stable     Endocrine:   glucose controlled - most recent BGL is   Recent Labs     20  0921 20  2221 20  0425   GLUCOSE 169* 137* 148*   ·   DVT Prophylaxis  · SCD sleeves -thigh high and Heparin   · Hold heparin PPX if continued urethral bleed (traumatic), continue if controlled.      CODE STATUS: Full Code      DISPOSITION:  [x] To remain ICU:   [] OK for out of ICU from  [facility], DO  PGY 3  Resident Physician Emergency Medicine  20 5:14 PM    Donnia Cabot, DO   Emergency Medicine Resident  Mendy Jaramillo [address] [place] [address]  2020, 5:10 PM

## 2020-07-28 LAB
ABSOLUTE EOS #: 0 K/UL (ref 0–0.44)
ABSOLUTE IMMATURE GRANULOCYTE: 0.1 K/UL (ref 0–0.3)
ABSOLUTE LYMPH #: 0.62 K/UL (ref 1.1–3.7)
ABSOLUTE MONO #: 0.94 K/UL (ref 0.1–1.2)
ALBUMIN SERPL-MCNC: 2.8 G/DL (ref 3.5–5.2)
ALBUMIN/GLOBULIN RATIO: 1.1 (ref 1–2.5)
ALLEN TEST: ABNORMAL
ALP BLD-CCNC: 38 U/L (ref 40–129)
ALT SERPL-CCNC: 28 U/L (ref 5–41)
AMMONIA: 36 UMOL/L (ref 16–60)
ANION GAP SERPL CALCULATED.3IONS-SCNC: 12 MMOL/L (ref 9–17)
ANION GAP SERPL CALCULATED.3IONS-SCNC: 14 MMOL/L (ref 9–17)
AST SERPL-CCNC: 16 U/L
BASOPHILS # BLD: 0 % (ref 0–2)
BASOPHILS ABSOLUTE: 0 K/UL (ref 0–0.2)
BILIRUB SERPL-MCNC: 0.57 MG/DL (ref 0.3–1.2)
BUN BLDV-MCNC: 45 MG/DL (ref 8–23)
BUN BLDV-MCNC: 51 MG/DL (ref 8–23)
BUN/CREAT BLD: ABNORMAL (ref 9–20)
BUN/CREAT BLD: ABNORMAL (ref 9–20)
CALCIUM IONIZED: 1.22 MMOL/L (ref 1.13–1.33)
CALCIUM SERPL-MCNC: 8.5 MG/DL (ref 8.6–10.4)
CALCIUM SERPL-MCNC: 8.7 MG/DL (ref 8.6–10.4)
CARBOXYHEMOGLOBIN: 0.6 % (ref 0–5)
CHLORIDE BLD-SCNC: 109 MMOL/L (ref 98–107)
CHLORIDE BLD-SCNC: 109 MMOL/L (ref 98–107)
CO2: 21 MMOL/L (ref 20–31)
CO2: 21 MMOL/L (ref 20–31)
CREAT SERPL-MCNC: 2.94 MG/DL (ref 0.7–1.2)
CREAT SERPL-MCNC: 3.44 MG/DL (ref 0.7–1.2)
DIFFERENTIAL TYPE: ABNORMAL
EOSINOPHILS RELATIVE PERCENT: 0 % (ref 1–4)
FIO2: ABNORMAL
GFR AFRICAN AMERICAN: 22 ML/MIN
GFR AFRICAN AMERICAN: 26 ML/MIN
GFR NON-AFRICAN AMERICAN: 18 ML/MIN
GFR NON-AFRICAN AMERICAN: 21 ML/MIN
GFR SERPL CREATININE-BSD FRML MDRD: ABNORMAL ML/MIN/{1.73_M2}
GLUCOSE BLD-MCNC: 123 MG/DL (ref 70–99)
GLUCOSE BLD-MCNC: 148 MG/DL (ref 70–99)
HCO3 VENOUS: 23.7 MMOL/L (ref 24–30)
HCT VFR BLD CALC: 35.5 % (ref 40.7–50.3)
HEMOGLOBIN: 12.3 G/DL (ref 13–17)
IMMATURE GRANULOCYTES: 1 %
LYMPHOCYTES # BLD: 6 % (ref 24–43)
MAGNESIUM: 2.2 MG/DL (ref 1.6–2.6)
MCH RBC QN AUTO: 31.6 PG (ref 25.2–33.5)
MCHC RBC AUTO-ENTMCNC: 34.6 G/DL (ref 28.4–34.8)
MCV RBC AUTO: 91.3 FL (ref 82.6–102.9)
METHEMOGLOBIN: ABNORMAL % (ref 0–1.5)
MODE: ABNORMAL
MONOCYTES # BLD: 9 % (ref 3–12)
MORPHOLOGY: NORMAL
NEGATIVE BASE EXCESS, VEN: ABNORMAL MMOL/L (ref 0–2)
NOTIFICATION TIME: ABNORMAL
NOTIFICATION: ABNORMAL
NRBC AUTOMATED: 0 PER 100 WBC
O2 DEVICE/FLOW/%: ABNORMAL
O2 SAT, VEN: 97.9 % (ref 60–85)
OXYHEMOGLOBIN: ABNORMAL % (ref 95–98)
PATIENT TEMP: 37
PCO2, VEN, TEMP ADJ: ABNORMAL MMHG (ref 39–55)
PCO2, VEN: 33.1 (ref 39–55)
PDW BLD-RTO: 12.8 % (ref 11.8–14.4)
PEEP/CPAP: ABNORMAL
PH VENOUS: 7.47 (ref 7.32–7.42)
PH, VEN, TEMP ADJ: ABNORMAL (ref 7.32–7.42)
PHOSPHORUS: 3.8 MG/DL (ref 2.5–4.5)
PLATELET # BLD: 159 K/UL (ref 138–453)
PLATELET ESTIMATE: ABNORMAL
PMV BLD AUTO: 11.7 FL (ref 8.1–13.5)
PO2, VEN, TEMP ADJ: ABNORMAL MMHG (ref 30–50)
PO2, VEN: 105 (ref 30–50)
POSITIVE BASE EXCESS, VEN: 0.9 MMOL/L (ref 0–2)
POTASSIUM SERPL-SCNC: 3.8 MMOL/L (ref 3.7–5.3)
POTASSIUM SERPL-SCNC: 3.9 MMOL/L (ref 3.7–5.3)
PSV: ABNORMAL
PT. POSITION: ABNORMAL
RBC # BLD: 3.89 M/UL (ref 4.21–5.77)
RBC # BLD: ABNORMAL 10*6/UL
RESPIRATORY RATE: ABNORMAL
SAMPLE SITE: ABNORMAL
SEG NEUTROPHILS: 84 % (ref 36–65)
SEGMENTED NEUTROPHILS ABSOLUTE COUNT: 8.74 K/UL (ref 1.5–8.1)
SET RATE: ABNORMAL
SODIUM BLD-SCNC: 142 MMOL/L (ref 135–144)
SODIUM BLD-SCNC: 144 MMOL/L (ref 135–144)
TEXT FOR RESPIRATORY: ABNORMAL
TOTAL HB: ABNORMAL G/DL (ref 12–16)
TOTAL PROTEIN: 5.4 G/DL (ref 6.4–8.3)
TOTAL RATE: ABNORMAL
VT: ABNORMAL
WBC # BLD: 10.4 K/UL (ref 3.5–11.3)
WBC # BLD: ABNORMAL 10*3/UL

## 2020-07-28 PROCEDURE — 2580000003 HC RX 258: Performed by: STUDENT IN AN ORGANIZED HEALTH CARE EDUCATION/TRAINING PROGRAM

## 2020-07-28 PROCEDURE — 6370000000 HC RX 637 (ALT 250 FOR IP): Performed by: STUDENT IN AN ORGANIZED HEALTH CARE EDUCATION/TRAINING PROGRAM

## 2020-07-28 PROCEDURE — 2580000003 HC RX 258: Performed by: INTERNAL MEDICINE

## 2020-07-28 PROCEDURE — 82140 ASSAY OF AMMONIA: CPT

## 2020-07-28 PROCEDURE — 99233 SBSQ HOSP IP/OBS HIGH 50: CPT | Performed by: PSYCHIATRY & NEUROLOGY

## 2020-07-28 PROCEDURE — 99233 SBSQ HOSP IP/OBS HIGH 50: CPT | Performed by: INTERNAL MEDICINE

## 2020-07-28 PROCEDURE — 99291 CRITICAL CARE FIRST HOUR: CPT | Performed by: INTERNAL MEDICINE

## 2020-07-28 PROCEDURE — 82805 BLOOD GASES W/O2 SATURATION: CPT

## 2020-07-28 PROCEDURE — 2500000003 HC RX 250 WO HCPCS: Performed by: INTERNAL MEDICINE

## 2020-07-28 PROCEDURE — 82330 ASSAY OF CALCIUM: CPT

## 2020-07-28 PROCEDURE — 80048 BASIC METABOLIC PNL TOTAL CA: CPT

## 2020-07-28 PROCEDURE — 85025 COMPLETE CBC W/AUTO DIFF WBC: CPT

## 2020-07-28 PROCEDURE — 2500000003 HC RX 250 WO HCPCS: Performed by: STUDENT IN AN ORGANIZED HEALTH CARE EDUCATION/TRAINING PROGRAM

## 2020-07-28 PROCEDURE — 83735 ASSAY OF MAGNESIUM: CPT

## 2020-07-28 PROCEDURE — 80053 COMPREHEN METABOLIC PANEL: CPT

## 2020-07-28 PROCEDURE — 99232 SBSQ HOSP IP/OBS MODERATE 35: CPT | Performed by: INTERNAL MEDICINE

## 2020-07-28 PROCEDURE — 6360000002 HC RX W HCPCS: Performed by: STUDENT IN AN ORGANIZED HEALTH CARE EDUCATION/TRAINING PROGRAM

## 2020-07-28 PROCEDURE — 6360000002 HC RX W HCPCS: Performed by: INTERNAL MEDICINE

## 2020-07-28 PROCEDURE — 84100 ASSAY OF PHOSPHORUS: CPT

## 2020-07-28 PROCEDURE — 2000000000 HC ICU R&B

## 2020-07-28 PROCEDURE — 36415 COLL VENOUS BLD VENIPUNCTURE: CPT

## 2020-07-28 RX ORDER — AMLODIPINE BESYLATE 5 MG/1
5 TABLET ORAL DAILY
Status: DISCONTINUED | OUTPATIENT
Start: 2020-07-28 | End: 2020-07-29

## 2020-07-28 RX ORDER — SODIUM CHLORIDE 9 MG/ML
INJECTION, SOLUTION INTRAVENOUS CONTINUOUS
Status: DISCONTINUED | OUTPATIENT
Start: 2020-07-28 | End: 2020-07-29

## 2020-07-28 RX ADMIN — SODIUM CHLORIDE, PRESERVATIVE FREE 10 ML: 5 INJECTION INTRAVENOUS at 20:06

## 2020-07-28 RX ADMIN — HEPARIN SODIUM 5000 UNITS: 5000 INJECTION INTRAVENOUS; SUBCUTANEOUS at 14:54

## 2020-07-28 RX ADMIN — LEVETIRACETAM 500 MG: 5 INJECTION INTRAVENOUS at 20:05

## 2020-07-28 RX ADMIN — Medication: at 04:38

## 2020-07-28 RX ADMIN — QUETIAPINE FUMARATE 25 MG: 25 TABLET ORAL at 08:45

## 2020-07-28 RX ADMIN — SODIUM CHLORIDE 100 ML/HR: 9 INJECTION, SOLUTION INTRAVENOUS at 22:21

## 2020-07-28 RX ADMIN — ACETAMINOPHEN 650 MG: 325 TABLET ORAL at 16:36

## 2020-07-28 RX ADMIN — AMLODIPINE BESYLATE 5 MG: 5 TABLET ORAL at 13:03

## 2020-07-28 RX ADMIN — FAMOTIDINE 20 MG: 10 INJECTION, SOLUTION INTRAVENOUS at 20:06

## 2020-07-28 RX ADMIN — LEVETIRACETAM 500 MG: 5 INJECTION INTRAVENOUS at 10:23

## 2020-07-28 RX ADMIN — FAMOTIDINE 20 MG: 10 INJECTION, SOLUTION INTRAVENOUS at 08:46

## 2020-07-28 RX ADMIN — SODIUM CHLORIDE, PRESERVATIVE FREE 10 ML: 5 INJECTION INTRAVENOUS at 08:46

## 2020-07-28 RX ADMIN — QUETIAPINE FUMARATE 25 MG: 25 TABLET ORAL at 20:05

## 2020-07-28 RX ADMIN — SODIUM CHLORIDE: 9 INJECTION, SOLUTION INTRAVENOUS at 10:31

## 2020-07-28 RX ADMIN — ACYCLOVIR SODIUM 700 MG: 50 INJECTION, SOLUTION INTRAVENOUS at 08:45

## 2020-07-28 ASSESSMENT — PAIN SCALES - GENERAL: PAINLEVEL_OUTOF10: 0

## 2020-07-28 NOTE — CARE COORDINATION
Care Transition  Called daughter Annabelle, she defers to his fiance Debra Jackson. Eduard Almanza is with her and spoke with Moody and plan is to return home with outpatient therapy. She will need a walker and a shower chair. Discussed DME companies and she chose [employer] for the walker. She will order the shower chair.

## 2020-07-28 NOTE — PROGRESS NOTES
INTENSIVE CARE UNIT  Resident Physician Progress Note    Patient - Arnoldo Shepherd  Date of Admission -  2020  3:02 PM  Date of Evaluation -  2020  Room and Bed Number -  0125/0125-01   Hospital Day - 5      SUBJECTIVE:     OVERNIGHT EVENTS:    Patient seen and examined at bedside. No acute issues overnight. Blood pressure has been running high. Afebrile and HR is stable. Off precedex this morning. AAO x1. Mumbles words. Urine output is adequate. Urine clear in colour. Patient is requesting Milk of mag as he did not have a stool output. OBJECTIVE:     VITAL SIGNS:  BP (!) 154/97   Pulse 67   Temp 98.4 °F (36.9 °C) (Axillary)   Resp 19   Wt 225 lb 12 oz (102.4 kg)   SpO2 100%   BMI 32.39 kg/m²   Tmax over 24 hours:  Temp (24hrs), Av.3 °F (36.8 °C), Min:98.1 °F (36.7 °C), Max:98.4 °F (36.9 °C)      Patient Vitals for the past 8 hrs:   BP Temp Temp src Pulse Resp SpO2 Weight   20 0700 (!) 154/97 -- -- 67 19 100 % --   20 0600 (!) 149/85 -- -- 70 25 100 % 225 lb 12 oz (102.4 kg)   20 0500 (!) 152/103 -- -- 60 21 100 % --   20 0400 129/76 98.4 °F (36.9 °C) Axillary 57 27 98 % --   20 0300 (!) 141/102 -- -- 64 23 100 % --   20 0200 136/79 -- -- 56 21 99 % --   20 0100 132/86 -- -- 58 28 -- --         Intake/Output Summary (Last 24 hours) at 2020 0858  Last data filed at 2020 2921  Gross per 24 hour   Intake 2211.14 ml   Output 2835 ml   Net -623.86 ml     Date 20 0000 - 20 2359   Shift 7640-3486 7511-6445 6647-3230 24 Hour Total   INTAKE   I.V.(mL/kg) 855. 9(8.4)   855. 9(8.4)   Shift Total(mL/kg) 855. 9(8.4)   855. 9(8.4)   OUTPUT   Urine(mL/kg/hr) 725(0.9)   725   Emesis/NG output(mL/kg) 50(0.5)   50(0.5)   Shift Total(mL/kg) 775(7.6)   775(7.6)   Weight (kg) 102.4 102.4 102.4 102.4     Wt Readings from Last 3 Encounters:   20 225 lb 12 oz (102.4 kg)   20 222 lb 3.2 oz (100.8 kg)   20 216 lb (98 kg)     Body mass index is 32.39 kg/m².         PHYSICAL EXAM:  General appearance - alert, well appearing, and in no distress  Mental status - AAOx1  Chest - clear to auscultation, no wheezes, rales or rhonchi, symmetric air entry  Heart - normal rate, regular rhythm, normal S1, S2, no murmurs, rubs, clicks or gallops  Abdomen - soft, nontender, nondistended, no masses or organomegaly  Musculoskeletal - no joint tenderness, deformity or swelling  Extremities - peripheral pulses normal, no pedal edema, no clubbing or cyanosis  Skin - normal coloration and turgor, no rashes, no suspicious skin lesions noted    MEDICATIONS:  Scheduled Meds:   magnesium hydroxide  30 mL Oral Once    amLODIPine  5 mg Oral Daily    QUEtiapine  25 mg Per NG tube BID    famotidine (PEPCID) injection  20 mg Intravenous BID    heparin (porcine)  5,000 Units Subcutaneous 3 times per day    acyclovir  700 mg Intravenous Q24H    sodium chloride flush  10 mL Intravenous 2 times per day    levetiracetam  500 mg Intravenous BID     Continuous Infusions:   IV infusion builder 100 mL/hr at 07/28/20 0438    dexmedetomidine Stopped (07/28/20 0642)     PRN Meds:   sodium chloride flush, 10 mL, PRN  acetaminophen, 650 mg, Q6H PRN    Or  acetaminophen, 650 mg, Q6H PRN  polyethylene glycol, 17 g, Daily PRN  promethazine, 12.5 mg, Q6H PRN    Or  ondansetron, 4 mg, Q6H PRN      Vent Information  SpO2: 100 %  Additional Respiratory  Assessments  Pulse: 67  Resp: 19  SpO2: 100 %  Oral Care: Mouth suctioned, Suction toothette    ABGs:     Lab Results   Component Value Date    ECN7CIK 17 07/27/2020    FIO2 UNKNOWN 07/28/2020         DATA:  Complete Blood Count:   Recent Labs     07/26/20  0503 07/27/20  0425 07/28/20  0604   WBC 8.5 8.6 10.4   RBC 4.07* 4.07* 3.89*   HGB 12.9* 12.8* 12.3*   HCT 38.8* 37.5* 35.5*   MCV 95.3 92.1 91.3   MCH 31.7 31.4 31.6   MCHC 33.2 34.1 34.6   RDW 12.7 12.5 12.8   * 160 159   MPV 11.7 12.9 11.7        Last 3 Blood Glucose: ruled out by laboratory testing    Disorientation    Altered mental status    Transaminitis    Hyponatremia    Benign prostatic hyperplasia without lower urinary tract symptoms    BALBIR (acute kidney injury) ([facility] [address]    Intestinal occlusion (HCC)  Resolved Problems:    * No resolved hospital problems. *    PLAN:     · Continue Acyclovir for herpes encephalitis. · Started patient on norvasc for high blood pressure which has been there for the last few days. · Precedex is off. Continue Seroquel 25 mg BD. · Continue Pepcid. Started patient on [address]. · Continue Keppra. · Continue D5 with HCO3. · Continue heparin for DVT Prophylaxis.     Jacqueline Cortes MD  PGY-3, Department of Internal Medicine  [address] [place] [address]  7/28/2020 9:15 AM

## 2020-07-28 NOTE — PROGRESS NOTES
NEPHROLOGY PROGRESS NOTE      SUBJECTIVE     On IV fluids containing bicarb. Urine output 2.1 L last 24 hours and improving. Continues to be encephalopathic. Blood pressures elevated. Norvasc has been added. Continues to be on acyclovir adjusted as per renal function for encephalitis. OBJECTIVE     Vitals:    07/28/20 0600 07/28/20 0700 07/28/20 0800 07/28/20 0900   BP: (!) 149/85 (!) 154/97 (!) 159/108 (!) 174/116   Pulse: 70 67 67 68   Resp: 25 19 21 18   Temp:   97.7 °F (36.5 °C)    TempSrc:   Oral    SpO2: 100% 100% 100% 100%   Weight: 225 lb 12 oz (102.4 kg)        24HR INTAKE/OUTPUT:      Intake/Output Summary (Last 24 hours) at 7/28/2020 1011  Last data filed at 7/28/2020 0900  Gross per 24 hour   Intake 2626.14 ml   Output 3185 ml   Net -558.86 ml       General appearance: in no acute distress  HEENT: PERRLA  Respiratory::vesicular breath sounds,no wheeze/crackles  Cardiovascular:S1 S2 normal,no gallop or organic murmur. Abdomen:Non tender/non distended. Bowel sounds present  Extremities: No Cyanosis or Clubbing,Lower extremity edema  Neurological:CONFUSED      MEDICATIONS     Scheduled Meds:    magnesium hydroxide  30 mL Oral Once    amLODIPine  5 mg Oral Daily    QUEtiapine  25 mg Per NG tube BID    famotidine (PEPCID) injection  20 mg Intravenous BID    heparin (porcine)  5,000 Units Subcutaneous 3 times per day    acyclovir  700 mg Intravenous Q24H    sodium chloride flush  10 mL Intravenous 2 times per day    levetiracetam  500 mg Intravenous BID     Continuous Infusions:    sodium chloride      dexmedetomidine Stopped (07/28/20 0642)     PRN Meds:  sodium chloride flush, acetaminophen **OR** acetaminophen, polyethylene glycol, promethazine **OR** ondansetron  Home Meds:                Medications Prior to Admission: timolol (TIMOPTIC) 0.5 % ophthalmic solution, 1 drop 2 times daily    INVESTIGATIONS     Last 3 CMP:    Recent Labs     07/26/20  2221 07/27/20  0425 07/28/20  0604   NA

## 2020-07-28 NOTE — PLAN OF CARE
Problem: Restraint Use - Nonviolent/Non-Self-Destructive Behavior:  Goal: Absence of restraint-related injury  Description: Absence of restraint-related injury  Outcome: Met This Shift     Problem: Restraint Use - Nonviolent/Non-Self-Destructive Behavior:  Goal: Absence of restraint indications  Description: Absence of restraint indications  Outcome: Not Met This Shift     Problem: Anxiety/Stress:  Goal: Level of anxiety will decrease  Description: Level of anxiety will decrease  Outcome: Ongoing     Problem: Mental Status - Impaired:  Goal: Mental status will be restored to baseline  Description: Mental status will be restored to baseline  Outcome: Ongoing     Problem: Falls - Risk of:  Goal: Will remain free from falls  Description: Will remain free from falls  Outcome: Ongoing  Goal: Absence of physical injury  Description: Absence of physical injury  Outcome: Ongoing     Problem: Skin Integrity:  Goal: Will show no infection signs and symptoms  Description: Will show no infection signs and symptoms  Outcome: Ongoing  Goal: Absence of new skin breakdown  Description: Absence of new skin breakdown  Outcome: Ongoing     Problem: Confusion - Acute:  Goal: Mental status will be restored to baseline  Description: Mental status will be restored to baseline  Outcome: Ongoing  Goal: Absence of continued neurological deterioration signs and symptoms  Description: Absence of continued neurological deterioration signs and symptoms  Outcome: Ongoing     Problem: Discharge Planning:  Goal: Ability to perform activities of daily living will improve  Description: Ability to perform activities of daily living will improve  Outcome: Ongoing  Goal: Participates in care planning  Description: Participates in care planning  Outcome: Ongoing     Problem: Injury - Risk of, Physical Injury:  Goal: Will remain free from falls  Description: Will remain free from falls  Outcome: Ongoing  Goal: Absence of physical injury  Description: Absence of physical injury  Outcome: Ongoing     Problem: Mood - Altered:  Goal: Mood stable  Description: Mood stable  Outcome: Ongoing  Goal: Absence of abusive behavior  Description: Absence of abusive behavior  Outcome: Ongoing  Goal: Verbalizations of feeling emotionally comfortable while being cared for will increase  Description: Verbalizations of feeling emotionally comfortable while being cared for will increase  Outcome: Ongoing     Problem: Psychomotor Activity - Altered:  Goal: Absence of psychomotor disturbance signs and symptoms  Description: Absence of psychomotor disturbance signs and symptoms  Outcome: Ongoing     Problem: Sensory Perception - Impaired:  Goal: Demonstrations of improved sensory functioning will increase  Description: Demonstrations of improved sensory functioning will increase  Outcome: Ongoing  Goal: Decrease in sensory misperception frequency  Description: Decrease in sensory misperception frequency  Outcome: Ongoing  Goal: Able to refrain from responding to false sensory perceptions  Description: Able to refrain from responding to false sensory perceptions  Outcome: Ongoing  Goal: Demonstrates accurate environmental perceptions  Description: Demonstrates accurate environmental perceptions  Outcome: Ongoing  Goal: Able to distinguish between reality-based and nonreality-based thinking  Description: Able to distinguish between reality-based and nonreality-based thinking  Outcome: Ongoing  Goal: Able to interrupt nonreality-based thinking  Description: Able to interrupt nonreality-based thinking  Outcome: Ongoing     Problem: Sleep Pattern Disturbance:  Goal: Appears well-rested  Description: Appears well-rested  Outcome: Ongoing

## 2020-07-28 NOTE — PROGRESS NOTES
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 Recommendations:   · Acyclovir 10 mg/kg IV every 24 hours calculated on ideal body weight of 70 kg ie 700 mg/24 hr.  · Slow infusion of acyclovir, hydration. · Monitor renal function. .  · Supportive care      Medical Decision Making/Summary/Discussion:7/28/2020     ·   Infection Control Recommendations   · [place] Precautions    Antimicrobial Stewardship Recommendations     · Targeted therapy  · PK dosing  ·   Coordination of Outpatient Care:   · Estimated Length of IV antimicrobials: To be determined  · Patient will need Midline Catheter Insertion: Yes  · Patient will need PICC line Insertion: No  · Patient will need: Home IV , [place],  SNF,  LTAC: To be determined  · Patient will need outpatient wound care: No    Chief complaint/reason for consultation:   · Altered mental status with progression over a few days      History of Present Illness:   Terrence Corbin is a 79y.o.-year-old  male who was initially admitted on 7/23/2020. Patient seen at the request of Ines Ahumada. 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 He is unable to provide any information himself at this point in time. Available information reviewed and discussed with Dr. Ciera Hamilton.    Patient apparently developed onset of some dysuria and pain in the pelvic and right hip area starting about 7 to the 10 days prior to admission. In addition he was exhibiting some fatigue. He has a pre-existing history of prostatic hypertrophy with previous documented urinary tract infections.   The patient apparently felt that he was coming down with a urinary tract infection and had a started doxycycline on 7/20/2020 without benefit. He seems to have experienced prior problems with use of quinolones that may have influenced his choice of antibiotics. On 7/21/2020 he was noticed to have some mild confusion and problems with cognition were initially attributed to the use of doxycycline. He was evaluated at [facility] and found to have a high-grade fever. He had urine and blood cultures obtained to exclude urinary tract sepsis. The urine and blood cultures have not shown any growth. His urine analysis was not very impressive. He was treated with cefepime with no benefit. The patient showed some cough but had no hypoxia. The chest x-ray did not show any pneumonia but had some mild chronic changes. CT of the abdomen and pelvis was obtained which showed the presence of a large prostate. No other abnormalities were noted. While active emergency he had a temperature 101 °F on 7/22/2020. No additional fevers were noted. Upon arrival to emergency [place] patient is disoriented, confused, he is not able to provide any information or to answer any questions. His laboratory tests show some mild degree of hyponatremia and mild transaminitis. A spinal tap was done to exclude herpetic encephalitis. The CSF showed the bloody fluid with a protein level of 257 glucose of 43 RBC count of 3000 with 1348 white cells 80% of which were lymphocytes. The spinal tap was described as being mildly traumatic with progressive clearing of the spinal fluid. We will await the results of the infectious CSF panel. Plan to treat for the treatable conditions such as mycoplasma, herpes simplex. The presence of hemorrhage suggests Herpes simplex. CURRENT EVALUATION :    Patient evaluated and examined in the ICU. Afebrile  Bradycardic  Bp:164/84  tachypneic    NG tube removed today. He is on 2L nasal O2. His urinary output from the last 24 hours is 2100ml.   His creatinine appears to have plateaued and now improving expect renal function to improve further  maintaining urine output. Woods catheter is in place. US Renal:7/26:  Small cyst noted on the left kidney. Evaluation for renal echogenicity is indeterminate secondary to increased   echogenicity of the liver. Hepatic steatosis noted. Urine analysis:7/26:  Cloudy urine with large Hgb. 2+ protein  Large leukocyte esterase. Myoglobin is elevated. MRI scan of the head on  7/24 shows subacute inflammation in the frontal region. This is an abnormal 3 day video EEG recording,suggesting moderate to severe encephalopathy of nonspecific etiology , no seizure activity noted. He is on Keppra for seizure prophylaxis. 7/23/20 CSF studies show an increase in WBC count with lymphocytic predominance. CSF cultures show no growth. Patient has developed acute kidney injury after prior CT of the abdomen with contrast on 7/21/2020 and administration of IV acyclovir. Unfortunately there is no alternative of treatment for the herpes encephalitis. We will plan to decrease the dose of acyclovir to 10 mg/kg once daily, based on an estimated creatinine clearance of 15 cc/min. The infusion will need to be given a slowly as rapid administration can lead to creatinine elevation on and off itself. The patient will also need IV fluids as per nephrology. Cultures: No growth  CSF: HSV 1- positive    Labs, X rays reviewed: 7/28/2020    BUN: 12--20-->40-->50--> 51  Cr: 0.61--1.16-->3.97-->4.08--> 3.44    WBC: 6.3-->6.1-->8.5-->8.6--> 10.4  Hb: 14.1-->12.9-->12.8--> 0.3  Plat: 153-->160-->160--> 159    Cultures:  Urine:  ·   Blood:  ·   Sputum :  7/23: MRSA screening of the nares is negative  Wound:  ·     CSF:  · 7/23: no growth        COVID-19 test:  · 7/23/2020- negative    · 7/23/2020 CT of the head: No acute abnormality  ·     Viral panel; Herpes simplex type I positive    Discussed with patient, ROBBI CC.     I have personally reviewed the past medical history, past surgical history, medications, social history, and family history, and I have updated the database accordingly. Past Medical History:   No past medical history on file. Past Surgical  History:   No past surgical history on file.     Medications:      magnesium hydroxide  30 mL Oral Once    amLODIPine  5 mg Oral Daily    QUEtiapine  25 mg Per NG tube BID    famotidine (PEPCID) injection  20 mg Intravenous BID    heparin (porcine)  5,000 Units Subcutaneous 3 times per day    acyclovir  700 mg Intravenous Q24H    sodium chloride flush  10 mL Intravenous 2 times per day    levetiracetam  500 mg Intravenous BID       Social History:     Social History     Socioeconomic History    Marital status:      Spouse name: Not on file    Number of children: Not on file    Years of education: Not on file    Highest education level: Not on file   Occupational History    Not on file   Social Needs    Financial resource strain: Not hard at all   [employer] insecurity     Worry: Never true     Inability: Never true   [employer] needs     Medical: Not on file     Non-medical: Not on file   Tobacco Use    Smoking status: Never Smoker    Smokeless tobacco: Never Used   Substance and Sexual Activity    Alcohol use: Not on file    Drug use: Not on file    Sexual activity: Not on file   Lifestyle    Physical activity     Days per week: Not on file     Minutes per session: Not on file    Stress: Not on file   Relationships    Social connections     Talks on phone: Not on file     Gets together: Not on file     Attends Yazidism service: Not on file     Active member of club or organization: Not on file     Attends meetings of clubs or organizations: Not on file     Relationship status: Not on file    Intimate partner violence     Fear of current or ex partner: Not on file     Emotionally abused: Not on file     Physically abused: Not on file     Forced sexual activity: Not on file   Other Topics Concern    Not on file   Social History Narrative    Not on file       Family History:   No family history on file. Allergies:   Ibuprofen     Review of Systems:     Unable to provide. Encephalopathic. 7/28/2020      Constitutional: No fevers or chills. No systemic complaints  Head: No headaches  Eyes: No double vision or blurry vision. No conjunctival inflammation. ENT: No sore throat or runny nose. . No hearing loss, tinnitus or vertigo. Cardiovascular: No chest pain or palpitations. No shortness of breath. No DONALDSON  Lung: No shortness of breath or cough. No sputum production  Abdomen: No nausea, vomiting, diarrhea, or abdominal pain. [place] No cramps. Genitourinary: No increased urinary frequency, or dysuria. No hematuria. No suprapubic or CVA pain  Musculoskeletal: No muscle aches or pains. No joint effusions, swelling or deformities  Hematologic: No bleeding or bruising. Neurologic: No headache, weakness, numbness, or tingling. Integument: No rash, no ulcers. Psychiatric: No depression. Endocrine: No polyuria, no polydipsia, no polyphagia. Physical Examination :     Patient Vitals for the past 8 hrs:   BP Temp Temp src Pulse Resp SpO2 Weight   07/28/20 0900 (!) 174/116 -- -- 68 18 100 % --   07/28/20 0800 (!) 159/108 97.7 °F (36.5 °C) Oral 67 21 100 % --   07/28/20 0700 (!) 154/97 -- -- 67 19 100 % --   07/28/20 0600 (!) 149/85 -- -- 70 25 100 % 225 lb 12 oz (102.4 kg)   07/28/20 0500 (!) 152/103 -- -- 60 21 100 % --   07/28/20 0400 129/76 98.4 °F (36.9 °C) Axillary 57 27 98 % --   07/28/20 0300 (!) 141/102 -- -- 64 23 100 % --     General Appearance: Somnolent  Head:  Normocephalic, no trauma  Eyes: Pupils equal, round, reactive to light; sclera anicteric; conjunctivae pink. No embolic phenomena. ENT: Oropharynx clear, without erythema, exudate, or thrush. No tenderness of sinuses. Mouth/throat: mucosa pink and moist. No lesions. Dentition in good repair. Neck:Supple, without lymphadenopathy.  Thyroid normal, No is    no evidence of hydrocephalus.         ORBITS: The visualized portion of the orbits demonstrate no acute abnormality.         SINUSES: The visualized paranasal sinuses and mastoid air cells demonstrate    no acute abnormality.         SOFT TISSUES/SKULL:  No acute abnormality of the visualized skull or soft    tissues.              Impression    No acute intracranial abnormality. EXAMINATION:    ONE XRAY VIEW OF THE CHEST         7/21/2020 6:32 pm         COMPARISON:    None.         HISTORY:    ORDERING SYSTEM PROVIDED HISTORY: sepsis    TECHNOLOGIST PROVIDED HISTORY:    sepsis         FINDINGS:    Heart size is normal.  Interstitial markings are prominent which may be    related to interstitial pneumonitis.  No effusion, extrapleural air or    localized consolidation is noted.  Osseous and mediastinal structures are    age-appropriate.              Impression    Diffuse prominence of the interstitial markings which may be related to    interstitial pneumonitis.  No focal consolidation.           EXAMINATION:    CT OF THE ABDOMEN AND PELVIS WITH CONTRAST 7/21/2020 4:06 pm         TECHNIQUE:    CT of the abdomen and pelvis was performed with the administration of    intravenous contrast. Multiplanar reformatted images are provided for review.     Dose modulation, iterative reconstruction, and/or weight based adjustment of    the mA/kV was utilized to reduce the radiation dose to as low as reasonably    achievable.         COMPARISON:    CT urogram January 6, 2020         HISTORY:    ORDERING SYSTEM PROVIDED HISTORY: Lower abdominal pain    TECHNOLOGIST PROVIDED HISTORY:              FINDINGS:    Lower Chest: Clear         Organs:         The abdominal wall appears normal.         The liver, spleen, pancreas, and adrenals appear normal.  Multiple punctate    calcified granulomas in the spleen.         Bilateral simple renal cortical cysts.         Bladder is decompressed and circumferentially thickened.      GI/Bowel: The stomach,small bowel, and colon appear normal. Oral contrast    noted throughout the small bowel.  Stool noted throughout the liver large    bowel.  Appendix normal.         Pelvis: Markedly enlarged prostate.  It measures 68 x 83 mm in AP and    transverse dimensions.         Peritoneum/Retroperitoneum: The abdominal aorta and iliac arteries are normal    in caliber. There is no pathologic adenopathy.         Bones/Soft Tissues: Right hip arthroplasty.              Impression    Markedly enlarged prostate.  Possible cystitis.  Increased stool.           EXAMINATION:    ONE XRAY VIEW OF THE PELVIS AND TWO XRAY VIEWS RIGHT HIP         6/25/2020 7:29 am         COMPARISON:    10/23/2017         HISTORY:    ORDERING SYSTEM PROVIDED HISTORY: Acute right hip pain         FINDINGS:    Status post right hip arthroplasty.  No evidence of hardware complications. Stable alignment.         Elsewhere, no acute fracture or dislocation.  Moderate left hip    osteoarthritis.  The SI joints and sacral arcuate lines appear intact. Degenerative changes of the SI joints are also seen.         No soft tissue abnormality.              Impression    1. Status post right hip arthroplasty.  No complication. 2. No acute osseous abnormality of the pelvis. 3. Moderate left hip osteoarthritis. Medical Decision Putssn-[id number]-Larwi:   Results for Anton Koroma (MRN [id number]) as of 7/24/2020 10:34   Ref.  Range 7/23/2020 16:37   CYTOMEGALOVIRUS (CMV) CSF FILM ARRAY Latest Ref Range: Not Detected  Not Detected   HAEMOPHILUS INFLUENZA CSF FILM ARRAY Latest Ref Range: Not Detected  Not Detected   HHV-6 (HERPESVIRUS 6) CSF FILM ARRAY Latest Ref Range: Not Detected  Not Detected   HSV-1 CSF FILM ARRAY Latest Ref Range: Not Detected  DETECTED (A)   HSV-2 CSF FILM ARRAY Latest Ref Range: Not Detected  Not Detected   PARECHOVIRUS CSF FILM ARRAY Latest Ref Range: Not Detected  Not Detected   ESCHERICHIA COLI K1 CSF

## 2020-07-28 NOTE — PROGRESS NOTES
Comprehensive Nutrition Assessment    Type and Reason for Visit:  Initial, NPO/Clear Liquid    Nutrition Recommendations/Plan: Start oral diet and supplements as able. If TF needed (and able to keep NG in place), suggest Standard without Fiber goal 65 mL/hr (1872 kcal and 87 g pro/day). Will follow/monitor plans. Nutrition Assessment:  Pt admitted with AMS, encephalitis. Per RN, NG was placed yesterday, but pt pulled it out. Pts mentation is better today -pt took sips of water and pills without difficulty today. Diet order remains NPO at this time. Meds/Labs reviewed. Malnutrition Assessment:  Malnutrition Status: At risk for malnutrition (Comment)    Context:  Acute Illness     Findings of the 6 clinical characteristics of malnutrition:  Energy Intake:  1 - 75% or less of estimated energy requirements for 7 or more days  Weight Loss:  No significant weight loss     Body Fat Loss:  No significant body fat loss     Muscle Mass Loss:  No significant muscle mass loss    Fluid Accumulation:  (mild-moderate fluid accumulation) Extremities, Generalized   Strength:  Not Performed    Estimated Daily Nutrient Needs:  Energy (kcal):  6122-2389 kcal/day; Weight Used for Energy Requirements:  Current     Protein (g):  90 g pro/day; Weight Used for Protein Requirements:  Ideal(1.2)          Current Nutrition Therapies:    Diet NPO Effective Now    Anthropometric Measures:  · Height: 5' 10\" (177.8 cm)  · Current Body Weight: 225 lb 12 oz (102.4 kg)   · Usual Body Weight: 195 lb (88.5 kg)     · Ideal Body Weight: 166 lbs; % Ideal Body Weight 136 %   · BMI: 32.4  · BMI Categories: Obese Class 1 (BMI 30.0-34. 9)       Nutrition Diagnosis:   · Inadequate oral intake related to cognitive or neurological impairment as evidenced by NPO or clear liquid status due to medical condition    Nutrition Interventions:   Food and/or Nutrient Delivery: Start oral diet and supplements as able.  If TF needed (and able to keep NG in place), suggest Standard without Fiber goal 65 mL/hr (1872 kcal and 87 g pro/day).   Nutrition Education/Counseling:  Education not indicated   Coordination of Nutrition Care:  Continued Inpatient Monitoring    Nutrition Monitoring and Evaluation:   Food/Nutrient Intake Outcomes:  Diet Advancement/Tolerance  Physical Signs/Symptoms Outcomes:  Biochemical Data, Nutrition Focused Physical Findings, Skin, Weight     Electronically signed by Roseanna Gibson RD, LD on 7/28/20 at 12:39 PM EDT    Contact: [phone]

## 2020-07-28 NOTE — PROGRESS NOTES
Active problem Herpes encephalitis . The condition is he is doing better being off precedex drip being awake tracking visually  responding verbally with some jargon speech not oriented although does have some naming lifting all limbs equally. There has been no seizure activity . Creatinine is improved at 3.44  remaining on IV acyclovir being managed by ID. He presented to [facility] with generalized weakness abdominal pain and dysuria . He had been treated for suspected UTI. In the hospital he became agitated and confused transferred to [facility] . MRI of Head with mild chronic periventricular small vessel ischemia . EEG mild diffuse slowing . CSF analysis WBC 1,348 (87 l) , 3000 RBC , total protein 257.4 , glucose 43 , CSF HSV PCR positive . He is being followed by ID on acyclovir IVPB. Signifcant medication keppra 500 mg IVPB q 12 hours , acyclovir IVPB , seroquel 25 mg po bid . Testing  LTME shows moderate to severe disfuse slowing with no seizure activity      No past medical history on file. No past surgical history on file. No family history on file.     Social History     Socioeconomic History    Marital status:      Spouse name: Not on file    Number of children: Not on file    Years of education: Not on file    Highest education level: Not on file   Occupational History    Not on file   Social Needs    Financial resource strain: Not hard at all   [place] insecurity     Worry: Never true     Inability: Never true   [employer] [facility] [employer] [facility] [employer] needs     Medical: Not on file     Non-medical: Not on file   Tobacco Use    Smoking status: Never Smoker    Smokeless tobacco: Never Used   Substance and Sexual Activity    Alcohol use: Not on file    Drug use: Not on file    Sexual activity: Not on file   Lifestyle    Physical activity     Days per week: Not on file     Minutes per session: Not on file    Stress: Not on file   Relationships    Social connections     Talks on phone: Not on file Gets together: Not on file     Attends Tenriism service: Not on file     Active member of club or organization: Not on file     Attends meetings of clubs or organizations: Not on file     Relationship status: Not on file    Intimate partner violence     Fear of current or ex partner: Not on file     Emotionally abused: Not on file     Physically abused: Not on file     Forced sexual activity: Not on file   Other Topics Concern    Not on file   Social History Narrative    Not on file       Current Facility-Administered Medications   Medication Dose Route Frequency Provider Last Rate Last Dose    magnesium hydroxide (MILK OF MAGNESIA) 400 MG/5ML suspension 30 mL  30 mL Oral Once Radhika Combs MD        amLODIPine (NORVASC) tablet 5 mg  5 mg Oral Daily Radhika Combs MD   5 mg at 07/28/20 1303    0.9 % sodium chloride infusion   Intravenous Continuous Sanjeev Batres  mL/hr at 07/28/20 1031      QUEtiapine (SEROQUEL) tablet 25 mg  25 mg Per NG tube BID Link Arana DO   25 mg at 07/28/20 0845    famotidine (PEPCID) injection 20 mg  20 mg Intravenous BID Link Arana DO   20 mg at 07/28/20 0846    heparin (porcine) injection 5,000 Units  5,000 Units Subcutaneous 3 times per day Nelson Knowles DO   5,000 Units at 07/28/20 1454    acyclovir (ZOVIRAX) 700 mg in dextrose 5 % 250 mL IVPB  700 mg Intravenous Q24H Марина Johnson MD   Stopped at 07/28/20 0945    dexmedetomidine (PRECEDEX) 400 mcg in sodium chloride 0.9 % 100 mL infusion  0.2 mcg/kg/hr Intravenous Continuous Nelson Knowles DO   Stopped at 07/28/20 4174    sodium chloride flush 0.9 % injection 10 mL  10 mL Intravenous 2 times per day Chay Guerra MD   10 mL at 07/28/20 0846    sodium chloride flush 0.9 % injection 10 mL  10 mL Intravenous PRN Rosalinda Abdi MD        acetaminophen (TYLENOL) tablet 650 mg  650 mg Oral Q6H PRN Rosalinda Abdi MD   650 mg at 07/28/20 1636    Or    acetaminophen (TYLENOL) suppository 650 mg  650 mg Rectal Q6H PRN Jerri Mar MD        polyethylene glycol (GLYCOLAX) packet 17 g  17 g Oral Daily PRN Jerri Mar MD        promethazine (PHENERGAN) tablet 12.5 mg  12.5 mg Oral Q6H PRN Jerri Mar MD        Or    ondansetron [facility]) injection 4 mg  4 mg Intravenous Q6H PRN Naima Guerra MD   4 mg at 07/27/20 1202    levetiracetam (KEPPRA) 500 mg/100 mL IVPB  500 mg Intravenous BID Michael Barnett MD   Stopped at 07/28/20 1038       Allergies   Allergen Reactions    Ibuprofen        ROS:   Patient unable to respond     Vitals:    07/28/20 1900   BP: (!) 141/66   Pulse: 65   Resp: 16   Temp:    SpO2: 99%     Admission weight: 211 lb 6.7 oz (95.9 kg)    Neurological Examination  Head/ Ears /Nose/Throat/external ear . Normal exam  Neck and thyroid . Normal size. No bruits  Respiratory . Breathsounds clear bilaterally  Cardiovascular: Auscultation of heart with regular rate and rhythm   Musculoskeletal. Muscle bulk and tone normal                                                           Muscle strength withdrawing all limbs equally                                                                                 Orientation alert oriented x 0  Attention and concentration reduced  Language some jargon speech able to name   Cranial nerve 2 normal visual threat  Cranial nerve 3, 4 and 6 . Extraocular muscles are intact . Pupils are equal and reactive   Cranial nerve 5 . Intact corneal reflex  Cranial nerve 7 normal exam   Cranial nerve 8. Grossly intact hearing   Cranial nerve 9 and 10. Symmetric palate elevation   Cranial nerve 11 , 5 out of 5 strength   Cranial Nerve 12 midline tongue . No atrophy  Sensation . Equal withdrawal all limbs   Deep Tendon Reflexes symmetrical  Plantar response equivocal  bilaterally    Assessment :    Herpes encephalitis    Plan:    Continue present Rx

## 2020-07-28 NOTE — PLAN OF CARE
Problem: Falls - Risk of:  Goal: Will remain free from falls  Description: Will remain free from falls  Outcome: Ongoing     Problem: Skin Integrity:  Goal: Will show no infection signs and symptoms  Description: Will show no infection signs and symptoms  Outcome: Ongoing     Problem: Restraint Use - Nonviolent/Non-Self-Destructive Behavior:  Goal: Absence of restraint indications  Description: Absence of restraint indications  Outcome: Ongoing     Problem: Injury - Risk of, Physical Injury:  Goal: Will remain free from falls  Description: Will remain free from falls  Outcome: Ongoing     Problem: Restraint Use - Nonviolent/Non-Self-Destructive Behavior:  Goal: Absence of restraint-related injury  Description: Absence of restraint-related injury  Outcome: Not Met This Shift

## 2020-07-29 ENCOUNTER — APPOINTMENT (OUTPATIENT)
Dept: GENERAL RADIOLOGY | Age: 70
DRG: 871 | End: 2020-07-29
Attending: INTERNAL MEDICINE
Payer: MEDICARE

## 2020-07-29 LAB
ABSOLUTE EOS #: <0.03 K/UL (ref 0–0.44)
ABSOLUTE IMMATURE GRANULOCYTE: 0.09 K/UL (ref 0–0.3)
ABSOLUTE LYMPH #: 0.86 K/UL (ref 1.1–3.7)
ABSOLUTE MONO #: 0.91 K/UL (ref 0.1–1.2)
ALBUMIN SERPL-MCNC: 2.7 G/DL (ref 3.5–5.2)
ALBUMIN/GLOBULIN RATIO: 0.9 (ref 1–2.5)
ALP BLD-CCNC: 41 U/L (ref 40–129)
ALT SERPL-CCNC: 24 U/L (ref 5–41)
ANION GAP SERPL CALCULATED.3IONS-SCNC: 19 MMOL/L (ref 9–17)
AST SERPL-CCNC: 28 U/L
BASOPHILS # BLD: 0 % (ref 0–2)
BASOPHILS ABSOLUTE: <0.03 K/UL (ref 0–0.2)
BILIRUB SERPL-MCNC: 0.56 MG/DL (ref 0.3–1.2)
BILIRUBIN DIRECT: 0.16 MG/DL
BILIRUBIN, INDIRECT: 0.4 MG/DL (ref 0–1)
BUN BLDV-MCNC: 42 MG/DL (ref 8–23)
BUN/CREAT BLD: ABNORMAL (ref 9–20)
CALCIUM SERPL-MCNC: 8.8 MG/DL (ref 8.6–10.4)
CHLORIDE BLD-SCNC: 110 MMOL/L (ref 98–107)
CHOLESTEROL/HDL RATIO: 4
CHOLESTEROL: 137 MG/DL
CO2: 19 MMOL/L (ref 20–31)
CREAT SERPL-MCNC: 2.57 MG/DL (ref 0.7–1.2)
DIFFERENTIAL TYPE: ABNORMAL
EKG ATRIAL RATE: 66 BPM
EKG ATRIAL RATE: 72 BPM
EKG P AXIS: 26 DEGREES
EKG P AXIS: 32 DEGREES
EKG P-R INTERVAL: 158 MS
EKG P-R INTERVAL: 158 MS
EKG Q-T INTERVAL: 400 MS
EKG Q-T INTERVAL: 404 MS
EKG QRS DURATION: 90 MS
EKG QRS DURATION: 92 MS
EKG QTC CALCULATION (BAZETT): 419 MS
EKG QTC CALCULATION (BAZETT): 442 MS
EKG R AXIS: 30 DEGREES
EKG R AXIS: 34 DEGREES
EKG T AXIS: -12 DEGREES
EKG T AXIS: -31 DEGREES
EKG VENTRICULAR RATE: 66 BPM
EKG VENTRICULAR RATE: 72 BPM
EOSINOPHILS RELATIVE PERCENT: 0 % (ref 1–4)
GFR AFRICAN AMERICAN: 30 ML/MIN
GFR NON-AFRICAN AMERICAN: 25 ML/MIN
GFR SERPL CREATININE-BSD FRML MDRD: ABNORMAL ML/MIN/{1.73_M2}
GFR SERPL CREATININE-BSD FRML MDRD: ABNORMAL ML/MIN/{1.73_M2}
GLOBULIN: ABNORMAL G/DL (ref 1.5–3.8)
GLUCOSE BLD-MCNC: 116 MG/DL (ref 70–99)
HCT VFR BLD CALC: 36 % (ref 40.7–50.3)
HDLC SERPL-MCNC: 34 MG/DL
HEMOGLOBIN: 12.2 G/DL (ref 13–17)
IMMATURE GRANULOCYTES: 1 %
LDL CHOLESTEROL: 84 MG/DL (ref 0–130)
LIPASE: 33 U/L (ref 13–60)
LYMPHOCYTES # BLD: 11 % (ref 24–43)
MCH RBC QN AUTO: 32.1 PG (ref 25.2–33.5)
MCHC RBC AUTO-ENTMCNC: 33.9 G/DL (ref 28.4–34.8)
MCV RBC AUTO: 94.7 FL (ref 82.6–102.9)
MONOCYTES # BLD: 12 % (ref 3–12)
NRBC AUTOMATED: 0 PER 100 WBC
PDW BLD-RTO: 13 % (ref 11.8–14.4)
PLATELET # BLD: 167 K/UL (ref 138–453)
PLATELET ESTIMATE: ABNORMAL
PMV BLD AUTO: 11.6 FL (ref 8.1–13.5)
POTASSIUM SERPL-SCNC: 4 MMOL/L (ref 3.7–5.3)
RBC # BLD: 3.8 M/UL (ref 4.21–5.77)
RBC # BLD: ABNORMAL 10*6/UL
SEG NEUTROPHILS: 76 % (ref 36–65)
SEGMENTED NEUTROPHILS ABSOLUTE COUNT: 5.8 K/UL (ref 1.5–8.1)
SODIUM BLD-SCNC: 148 MMOL/L (ref 135–144)
TOTAL PROTEIN: 5.7 G/DL (ref 6.4–8.3)
TOXOPLASMA ABS,CSF: NORMAL
TRIGL SERPL-MCNC: 96 MG/DL
TROPONIN INTERP: ABNORMAL
TROPONIN INTERP: ABNORMAL
TROPONIN T: ABNORMAL NG/ML
TROPONIN T: ABNORMAL NG/ML
TROPONIN, HIGH SENSITIVITY: 23 NG/L (ref 0–22)
TROPONIN, HIGH SENSITIVITY: 23 NG/L (ref 0–22)
VLDLC SERPL CALC-MCNC: ABNORMAL MG/DL (ref 1–30)
WBC # BLD: 7.7 K/UL (ref 3.5–11.3)
WBC # BLD: ABNORMAL 10*3/UL

## 2020-07-29 PROCEDURE — 99233 SBSQ HOSP IP/OBS HIGH 50: CPT | Performed by: PSYCHIATRY & NEUROLOGY

## 2020-07-29 PROCEDURE — 97161 PT EVAL LOW COMPLEX 20 MIN: CPT

## 2020-07-29 PROCEDURE — 6360000002 HC RX W HCPCS: Performed by: STUDENT IN AN ORGANIZED HEALTH CARE EDUCATION/TRAINING PROGRAM

## 2020-07-29 PROCEDURE — 80076 HEPATIC FUNCTION PANEL: CPT

## 2020-07-29 PROCEDURE — 74018 RADEX ABDOMEN 1 VIEW: CPT

## 2020-07-29 PROCEDURE — 6370000000 HC RX 637 (ALT 250 FOR IP): Performed by: STUDENT IN AN ORGANIZED HEALTH CARE EDUCATION/TRAINING PROGRAM

## 2020-07-29 PROCEDURE — 2580000003 HC RX 258: Performed by: STUDENT IN AN ORGANIZED HEALTH CARE EDUCATION/TRAINING PROGRAM

## 2020-07-29 PROCEDURE — 2580000003 HC RX 258: Performed by: INTERNAL MEDICINE

## 2020-07-29 PROCEDURE — 80048 BASIC METABOLIC PNL TOTAL CA: CPT

## 2020-07-29 PROCEDURE — 97530 THERAPEUTIC ACTIVITIES: CPT

## 2020-07-29 PROCEDURE — 80061 LIPID PANEL: CPT

## 2020-07-29 PROCEDURE — 85025 COMPLETE CBC W/AUTO DIFF WBC: CPT

## 2020-07-29 PROCEDURE — C9113 INJ PANTOPRAZOLE SODIUM, VIA: HCPCS | Performed by: STUDENT IN AN ORGANIZED HEALTH CARE EDUCATION/TRAINING PROGRAM

## 2020-07-29 PROCEDURE — 99233 SBSQ HOSP IP/OBS HIGH 50: CPT | Performed by: INTERNAL MEDICINE

## 2020-07-29 PROCEDURE — 6370000000 HC RX 637 (ALT 250 FOR IP): Performed by: FAMILY MEDICINE

## 2020-07-29 PROCEDURE — 83690 ASSAY OF LIPASE: CPT

## 2020-07-29 PROCEDURE — 2000000000 HC ICU R&B

## 2020-07-29 PROCEDURE — 99291 CRITICAL CARE FIRST HOUR: CPT | Performed by: INTERNAL MEDICINE

## 2020-07-29 PROCEDURE — 84484 ASSAY OF TROPONIN QUANT: CPT

## 2020-07-29 PROCEDURE — 97535 SELF CARE MNGMENT TRAINING: CPT

## 2020-07-29 PROCEDURE — 36415 COLL VENOUS BLD VENIPUNCTURE: CPT

## 2020-07-29 PROCEDURE — 97166 OT EVAL MOD COMPLEX 45 MIN: CPT

## 2020-07-29 PROCEDURE — 99232 SBSQ HOSP IP/OBS MODERATE 35: CPT | Performed by: INTERNAL MEDICINE

## 2020-07-29 PROCEDURE — 6360000002 HC RX W HCPCS: Performed by: INTERNAL MEDICINE

## 2020-07-29 PROCEDURE — 93005 ELECTROCARDIOGRAM TRACING: CPT | Performed by: INTERNAL MEDICINE

## 2020-07-29 RX ORDER — QUETIAPINE FUMARATE 25 MG/1
50 TABLET, FILM COATED ORAL NIGHTLY
Status: DISCONTINUED | OUTPATIENT
Start: 2020-07-29 | End: 2020-08-03

## 2020-07-29 RX ORDER — PANTOPRAZOLE SODIUM 40 MG/10ML
40 INJECTION, POWDER, LYOPHILIZED, FOR SOLUTION INTRAVENOUS ONCE
Status: COMPLETED | OUTPATIENT
Start: 2020-07-29 | End: 2020-07-29

## 2020-07-29 RX ORDER — QUETIAPINE FUMARATE 25 MG/1
25 TABLET, FILM COATED ORAL 2 TIMES DAILY
Status: DISCONTINUED | OUTPATIENT
Start: 2020-07-29 | End: 2020-07-29

## 2020-07-29 RX ORDER — PANTOPRAZOLE SODIUM 40 MG/1
40 TABLET, DELAYED RELEASE ORAL 2 TIMES DAILY
Status: DISCONTINUED | OUTPATIENT
Start: 2020-07-29 | End: 2020-08-04 | Stop reason: HOSPADM

## 2020-07-29 RX ORDER — POLYETHYLENE GLYCOL 3350 17 G/17G
17 POWDER, FOR SOLUTION ORAL DAILY
Status: DISCONTINUED | OUTPATIENT
Start: 2020-07-30 | End: 2020-08-04 | Stop reason: HOSPADM

## 2020-07-29 RX ORDER — SODIUM CHLORIDE 9 MG/ML
10 INJECTION INTRAVENOUS ONCE
Status: COMPLETED | OUTPATIENT
Start: 2020-07-29 | End: 2020-07-29

## 2020-07-29 RX ORDER — SODIUM CHLORIDE 450 MG/100ML
INJECTION, SOLUTION INTRAVENOUS CONTINUOUS
Status: DISCONTINUED | OUTPATIENT
Start: 2020-07-29 | End: 2020-08-03

## 2020-07-29 RX ORDER — HYDRALAZINE HYDROCHLORIDE 20 MG/ML
10 INJECTION INTRAMUSCULAR; INTRAVENOUS ONCE
Status: COMPLETED | OUTPATIENT
Start: 2020-07-29 | End: 2020-07-29

## 2020-07-29 RX ORDER — AMLODIPINE BESYLATE 10 MG/1
10 TABLET ORAL DAILY
Status: DISCONTINUED | OUTPATIENT
Start: 2020-07-30 | End: 2020-08-04 | Stop reason: HOSPADM

## 2020-07-29 RX ORDER — SENNA PLUS 8.6 MG/1
1 TABLET ORAL NIGHTLY
Status: DISCONTINUED | OUTPATIENT
Start: 2020-07-29 | End: 2020-08-04 | Stop reason: HOSPADM

## 2020-07-29 RX ORDER — CALCIUM CARBONATE 200(500)MG
500 TABLET,CHEWABLE ORAL 2 TIMES DAILY PRN
Status: DISCONTINUED | OUTPATIENT
Start: 2020-07-29 | End: 2020-08-04 | Stop reason: HOSPADM

## 2020-07-29 RX ADMIN — ANTACID TABLETS 500 MG: 500 TABLET, CHEWABLE ORAL at 07:46

## 2020-07-29 RX ADMIN — PANTOPRAZOLE SODIUM 40 MG: 40 INJECTION, POWDER, FOR SOLUTION INTRAVENOUS at 04:29

## 2020-07-29 RX ADMIN — SENNOSIDES 8.6 MG: 8.6 TABLET, FILM COATED ORAL at 21:26

## 2020-07-29 RX ADMIN — SODIUM CHLORIDE, PRESERVATIVE FREE 10 ML: 5 INJECTION INTRAVENOUS at 08:01

## 2020-07-29 RX ADMIN — ANTACID TABLETS 500 MG: 500 TABLET, CHEWABLE ORAL at 12:11

## 2020-07-29 RX ADMIN — Medication 30 ML: at 02:46

## 2020-07-29 RX ADMIN — ANTACID TABLETS 500 MG: 500 TABLET, CHEWABLE ORAL at 02:35

## 2020-07-29 RX ADMIN — ACYCLOVIR SODIUM 700 MG: 50 INJECTION, SOLUTION INTRAVENOUS at 08:01

## 2020-07-29 RX ADMIN — SODIUM CHLORIDE 100 ML/HR: 4.5 INJECTION, SOLUTION INTRAVENOUS at 08:45

## 2020-07-29 RX ADMIN — POLYETHYLENE GLYCOL 3350 17 G: 17 POWDER, FOR SOLUTION ORAL at 11:30

## 2020-07-29 RX ADMIN — SODIUM CHLORIDE, PRESERVATIVE FREE 10 ML: 5 INJECTION INTRAVENOUS at 21:30

## 2020-07-29 RX ADMIN — LEVETIRACETAM 500 MG: 5 INJECTION INTRAVENOUS at 21:26

## 2020-07-29 RX ADMIN — LEVETIRACETAM 500 MG: 5 INJECTION INTRAVENOUS at 08:35

## 2020-07-29 RX ADMIN — HEPARIN SODIUM 5000 UNITS: 5000 INJECTION INTRAVENOUS; SUBCUTANEOUS at 06:28

## 2020-07-29 RX ADMIN — PANTOPRAZOLE SODIUM 40 MG: 40 TABLET, DELAYED RELEASE ORAL at 08:35

## 2020-07-29 RX ADMIN — PANTOPRAZOLE SODIUM 40 MG: 40 TABLET, DELAYED RELEASE ORAL at 21:26

## 2020-07-29 RX ADMIN — QUETIAPINE FUMARATE 50 MG: 25 TABLET ORAL at 21:26

## 2020-07-29 RX ADMIN — HEPARIN SODIUM 5000 UNITS: 5000 INJECTION INTRAVENOUS; SUBCUTANEOUS at 21:27

## 2020-07-29 RX ADMIN — QUETIAPINE FUMARATE 25 MG: 25 TABLET ORAL at 08:01

## 2020-07-29 RX ADMIN — AMLODIPINE BESYLATE 5 MG: 5 TABLET ORAL at 08:01

## 2020-07-29 RX ADMIN — SODIUM CHLORIDE 10 ML: 9 INJECTION, SOLUTION INTRAMUSCULAR; INTRAVENOUS; SUBCUTANEOUS at 04:29

## 2020-07-29 RX ADMIN — HYDRALAZINE HYDROCHLORIDE 10 MG: 20 INJECTION INTRAMUSCULAR; INTRAVENOUS at 03:25

## 2020-07-29 RX ADMIN — HEPARIN SODIUM 5000 UNITS: 5000 INJECTION INTRAVENOUS; SUBCUTANEOUS at 15:08

## 2020-07-29 ASSESSMENT — PAIN SCALES - WONG BAKER: WONGBAKER_NUMERICALRESPONSE: 4

## 2020-07-29 ASSESSMENT — PAIN DESCRIPTION - PROGRESSION

## 2020-07-29 ASSESSMENT — PAIN DESCRIPTION - LOCATION
LOCATION: ABDOMEN
LOCATION: ABDOMEN;CHEST
LOCATION: ABDOMEN

## 2020-07-29 ASSESSMENT — PAIN DESCRIPTION - ORIENTATION: ORIENTATION: UPPER;LOWER

## 2020-07-29 ASSESSMENT — PAIN DESCRIPTION - PAIN TYPE
TYPE: ACUTE PAIN

## 2020-07-29 ASSESSMENT — PAIN DESCRIPTION - ONSET: ONSET: AWAKENED FROM SLEEP

## 2020-07-29 ASSESSMENT — PAIN SCALES - GENERAL
PAINLEVEL_OUTOF10: 4
PAINLEVEL_OUTOF10: 0
PAINLEVEL_OUTOF10: 3
PAINLEVEL_OUTOF10: 0
PAINLEVEL_OUTOF10: 0

## 2020-07-29 ASSESSMENT — PAIN DESCRIPTION - FREQUENCY
FREQUENCY: INTERMITTENT
FREQUENCY: INTERMITTENT

## 2020-07-29 NOTE — PROGRESS NOTES
Called bedside to examine the patient. Patient was complaining of epigastric discomfort and was requesting Tums repeatedly. Also reported intermittent spasms/boring pain in the epigastric region. Patient also reported that the pain was activated with eating or drinking. Patient reported that the pain/discomfort was not radiating anywhere. Got an EKG for the patient which looked unremarkable for acute abnormality. However his complains of abdominal discomfort/ epigastric discomfort persisted even after receiving Tums. Ordered IV Protonix 1 time. Also ordered a lipase and a lipid panel for the patient. We will consider the differential diagnosis of gastritis/  gastric ulcers/cholecystitis/pancreatitis for now. Cheyanne Ortega M.D.   Internal Medicine Resident , PGY-3  [facility]

## 2020-07-29 NOTE — PLAN OF CARE
Problem: Anxiety/Stress:  Goal: Level of anxiety will decrease  Description: Level of anxiety will decrease  Outcome: Ongoing     Problem: Mental Status - Impaired:  Goal: Mental status will be restored to baseline  Description: Mental status will be restored to baseline  Outcome: Ongoing     Problem: Falls - Risk of:  Goal: Will remain free from falls  Description: Will remain free from falls  Outcome: Ongoing  Goal: Absence of physical injury  Description: Absence of physical injury  Outcome: Ongoing     Problem: Skin Integrity:  Goal: Will show no infection signs and symptoms  Description: Will show no infection signs and symptoms  Outcome: Ongoing  Goal: Absence of new skin breakdown  Description: Absence of new skin breakdown  Outcome: Ongoing     Problem: Restraint Use - Nonviolent/Non-Self-Destructive Behavior:  Goal: Absence of restraint indications  Description: Absence of restraint indications  Outcome: Met This Shift  Goal: Absence of restraint-related injury  Description: Absence of restraint-related injury  Outcome: Met This Shift     Problem: Confusion - Acute:  Goal: Mental status will be restored to baseline  Description: Mental status will be restored to baseline  Outcome: Ongoing  Goal: Absence of continued neurological deterioration signs and symptoms  Description: Absence of continued neurological deterioration signs and symptoms  Outcome: Ongoing     Problem: Discharge Planning:  Goal: Ability to perform activities of daily living will improve  Description: Ability to perform activities of daily living will improve  Outcome: Ongoing  Goal: Participates in care planning  Description: Participates in care planning  Outcome: Ongoing     Problem: Injury - Risk of, Physical Injury:  Goal: Will remain free from falls  Description: Will remain free from falls  Outcome: Ongoing  Goal: Absence of physical injury  Description: Absence of physical injury  Outcome: Ongoing     Problem: Mood -

## 2020-07-29 NOTE — PROGRESS NOTES
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 Patient was unable to provide any history. Most of the history with regards to symptoms is obtained from medical record. He was initially on doxycycline for abdominal/pelvic pain and dysuria. He was having confusion for which he was evaluated in ED and was noted to be having febrile illness attributed to urinary source. He was also noted to be disoriented; CT head negative; chest x-ray showed diffuse interstitial prominence; COVID 19 swab was negative. Patient was started on doxycycline, Rocephin and acyclovir for suspected encephalitis. Discharge Recommendations:  Continue to assess pending progress   PT Equipment Recommendations  Other: CTA    Assessment   Body structures, Functions, Activity limitations: Decreased functional mobility ; Decreased safe awareness;Decreased cognition  Assessment: Pt agitated and not participating with PT. Prognosis: Good  Decision Making: Low Complexity  Patient Education: Unable at this time. Barriers to Learning: Cognition  REQUIRES PT FOLLOW UP: Yes       Patient Diagnosis(es): There were no encounter diagnoses. has no past medical history on file. has no past surgical history on file. 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   Family / Caregiver Present: No(Friend)  Follows Commands: Impaired          Orientation  Orientation  Overall Orientation Status: Impaired  Orientation Level: Disoriented to mobility Min A  Short term goal 2: Pt to sit EOB Min A  Short term goal 3: Pt to transfer Min A  Short term goal 4: Pt to follow all commands for PT treatment. Short term goal 5: Progress to ambulation as cognition and participation improves. Patient Goals   Patient goals : Be left alone.        Therapy Time   Individual Concurrent Group Co-treatment   Time In 1410         Time Out 1430         Minutes 20         Timed Code Treatment Minutes: [address] Rashi BOLIVAR, PT

## 2020-07-29 NOTE — PROGRESS NOTES
Occupational Therapy   Occupational Therapy Initial Assessment  Date: 2020   Patient Name: Lexii Howell  MRN: [id number]     : 1950    Date of Service: 2020    Discharge Recommendations:  Patient would benefit from continued therapy after discharge  OT Equipment Recommendations  Other: CTA    Assessment   Performance deficits / Impairments: Decreased functional mobility ; Decreased ADL status; Decreased safe awareness;Decreased cognition;Decreased endurance;Decreased balance;Decreased high-level IADLs;Decreased fine motor control;Decreased coordination  Assessment: Pt minimally cooperative throughout OT eval this date. Pt following minimal commands throughout session. Pt answers yes/no questions occasionally. Pt completed bed mobility with Mod to Max A. Pt completed sitting balance exercise at EOB for ~5 minutes requiring Max A throughout as pt tries to return to supine. Pt demo full ROM in BUE during session while seated EOB. Pt returned to supine at end of session, linens changed, call light within reach, and RN in room at OT exit. Pt to require continued OT services to address significant functional deficits listed. Prognosis: Good  Decision Making: Medium Complexity  Patient Education: Pt educated on OT role, OT POC, safety awareness, and importance of continued therapy. Poor return d/t poor cognition  REQUIRES OT FOLLOW UP: Yes  Activity Tolerance  Activity Tolerance: Treatment limited secondary to decreased cognition  Safety Devices  Safety Devices in place: Yes  Type of devices: Nurse notified; Left in bed;Call light within reach  Restraints  Initially in place: No           Patient Diagnosis(es): There were no encounter diagnoses. has no past medical history on file. has no past surgical history on file.            Restrictions  Restrictions/Precautions  Restrictions/Precautions: Fall Risk, Seizure  Required Braces or Orthoses?: No    Subjective   General  Patient assessed for rehabilitation services?: Yes  Family / Caregiver Present: No  General Comment  Comments: RN ok'd pt for OT eval this date. Pt minimally cooperative throughout  Patient Currently in Pain: Yes  Pain Assessment  Pain Assessment: Faces  Qibal-Baker Pain Rating: Hurts little more  Pain Type: Acute pain  Pain Location: Abdomen    Social/Functional History  Social/Functional History  Lives With: Significant other  Type of Home: House  Home Layout: Two level, Bed/Bath upstairs  Home Access: Stairs to enter without rails  Entrance Stairs - Number of Steps: 2-3  Bathroom Shower/Tub: Walk-in shower  ADL Assistance: Independent  Homemaking Assistance: Independent  Ambulation Assistance: Independent  Transfer Assistance: Independent  Active : Yes  Mode of Transportation: Truck  Occupation: Full time employment  Additional Comments: Information obtained from chart. Pt unable to answer prior living questions at this time. Answering some yes/no questions however not appropriate       Objective   Vision: Within Functional Limits  Hearing: Within functional limits    Orientation  Overall Orientation Status: Impaired  Orientation Level: Disoriented to time;Disoriented to situation;Disoriented to place;Oriented to person    Balance  Sitting Balance: Maximum assistance(pt sat EOB ~5 minutes requiring Max A throughout. Pt leaning posterior and L lateral attempting to lay back down multiple times)  Standing Balance: Unable to assess(comment)  Functional Mobility  Functional Mobility Comments: unsafe to attempt this date d/t pt cognition and max A for sitting balance     ADL  Feeding: Minimal assistance;Setup;Verbal cueing; Increased time to complete  Grooming: Minimal assistance;Setup;Verbal cueing; Increased time to complete(pt able to reach to touch/wipe nose with Min A; completed with B hands)  UE Bathing: Maximum assistance; Increased time to complete;Setup;Verbal cueing  LE Bathing: Dependent/Total  UE Dressing: Maximum assistance;Setup;Verbal cueing; Increased time to complete  LE Dressing: Dependent/Total  Toileting: Dependent/Total  Tone RUE  RUE Tone: Normotonic  Tone LUE  LUE Tone: Normotonic  Coordination  Movements Are Fluid And Coordinated: No  Coordination and Movement description: Fine motor impairments;Decreased speed;Decreased accuracy    Bed mobility  Rolling to Left: Moderate assistance  Rolling to Right: Moderate assistance  Supine to Sit: Maximum assistance  Sit to Supine: Maximum assistance  Scooting: Maximal assistance  Comment: pt requires Max A for bed mobility this date; minimal participation noted by pt  Transfers  Transfer Comments: TIFFANY this date    Cognition  Overall Cognitive Status: Exceptions  Following Commands: Inconsistently follows commands  Attention Span: Unable to maintain attention  Safety Judgement: Decreased awareness of need for assistance;Decreased awareness of need for safety  Problem Solving: Decreased awareness of errors  Insights: Not aware of deficits  Initiation: Requires cues for all  Sequencing: Requires cues for all       Sensation  Overall Sensation Status: (TIFFANY)        LUE AROM (degrees)  LUE AROM : WFL  Left Hand AROM (degrees)  Left Hand AROM: WFL  RUE AROM (degrees)  RUE AROM : WFL  Right Hand AROM (degrees)  Right Hand AROM: WFL  LUE Strength  LUE Strength Comment: pt not following commands for MMT this date  RUE Strength  RUE Strength Comment: pt not following commands for MMT this date                   Plan   Plan  Times per week: 2-3 x/wk  Current Treatment Recommendations: Balance Training, Functional Mobility Training, Endurance Training, Cognitive Reorientation, Safety Education & Training, Patient/Caregiver Education & Training, Equipment Evaluation, Education, & procurement, Self-Care / ADL    AM-PAC Score        AM-PAC Inpatient Daily Activity Raw Score: 12 (07/29/20 1552)  AM-PAC Inpatient ADL T-Scale Score : 30.6 (07/29/20 1552)  ADL Inpatient CMS 0-100% Score: 66.57 (07/29/20 1552)  ADL Inpatient CMS G-Code Modifier : CL (07/29/20 1552)    Goals  Short term goals  Time Frame for Short term goals: By discharge, pt will:  Short term goal 1: Demo Mod A for bed mobility to increase safety and independence with ADLs/IADLs  Short term goal 2: Follow 50% of commands throughout therapy session to increase overall functional performance  Short term goal 3: Demo 10+ minutes dynamic sitting to increase safety and independence with ADLs  Short term goal 4: Demo Min A for UB ADLs and grooming tasks with setup and increased time as needed  Short term goal 5: Demo good safety awareness throughout therapy session with <5 VCs  Short term goal 6: NOTIFY OTR TO UPDATE GOALS AS PT PROGRESSES       Therapy Time   Individual Concurrent Group Co-treatment   Time In 1007         Time Out 1033         Minutes 26         Timed Code Treatment Minutes: 23 Minutes       Husam Henderson OTR/L

## 2020-07-29 NOTE — PROGRESS NOTES
Phenylephrine         [] Epinephrine    CENTRAL LINES:     [x] No   [] Yes   (Date of Insertion:   )           If yes -     [] Right IJ     [] Left IJ [] Right Femoral [] Left Femoral                   [] Right Subclavian [] Left Subclavian       FLEMING'S CATHETER:   [] No   [x] Yes  (Date of Insertion:   )     URINE OUTPUT:            [x] Good   [] Low              [] Anuric      OBJECTIVE:     VITAL SIGNS:  BP (!) 151/87   Pulse 77   Temp 97.9 °F (36.6 °C) (Axillary)   Resp 12   Ht 5' 10\" (1.778 m)   Wt 226 lb 6.6 oz (102.7 kg)   SpO2 97%   BMI 32.49 kg/m²     Tmax over 24 hours:  Temp (24hrs), Av.7 °F (37.1 °C), Min:97.7 °F (36.5 °C), Max:100.8 °F (38.2 °C)      Patient Vitals for the past 6 hrs:   BP Temp Temp src Pulse Resp SpO2 Weight   20 0600 (!) 151/87 -- -- 77 12 97 % 226 lb 6.6 oz (102.7 kg)   20 0500 (!) 164/77 -- -- 73 12 96 % --   20 0400 (!) 161/77 97.9 °F (36.6 °C) Axillary 77 14 97 % --   20 0325 (!) 170/80 -- -- -- -- -- --   20 0300 (!) 170/80 -- -- 72 20 97 % --   20 0200 (!) 157/106 -- -- 71 20 98 % --         Intake/Output Summary (Last 24 hours) at 2020 0731  Last data filed at 2020 0626  Gross per 24 hour   Intake 1960.15 ml   Output 2955 ml   Net -994.85 ml     Wt Readings from Last 2 Encounters:   20 226 lb 6.6 oz (102.7 kg)   20 222 lb 3.2 oz (100.8 kg)     Body mass index is 32.49 kg/m².       ROS:  Midepigastric and substernal chest pain, intermittent, spasm sensation  No shortness of breath  No dizziness or lightheadedness, headache, vision changes,  No nausea or vomiting    PHYSICAL EXAM:  General appearance -patient resting comfortably, no acute distress  Neck -supple, trachea midline, no nuchal rigidity  Pulm -lungs clear to auscultation bilaterally, normal saturations on room air  Heart -normal rate, intermittent PACs, normal S1/S2, no murmurs  Abdomen -soft, slightly distended, nontender on palpation, intermittent upper abdominal spasms sensation  Neurological -GCS 15, patient alert and oriented x3, no numbness or weakness, moving all extremities  Skin - normal coloration and turgor, no rashes, no suspicious skin lesions noted      MEDICATIONS:    Scheduled Meds:   QUEtiapine  25 mg Oral BID    pantoprazole  40 mg Oral BID    amLODIPine  5 mg Oral Daily    heparin (porcine)  5,000 Units Subcutaneous 3 times per day    acyclovir  700 mg Intravenous Q24H    sodium chloride flush  10 mL Intravenous 2 times per day    levetiracetam  500 mg Intravenous BID     Continuous Infusions:   sodium chloride 100 mL/hr (07/28/20 2221)     PRN Meds:   calcium carbonate, 500 mg, BID PRN  sodium chloride flush, 10 mL, PRN  acetaminophen, 650 mg, Q6H PRN    Or  acetaminophen, 650 mg, Q6H PRN  polyethylene glycol, 17 g, Daily PRN  promethazine, 12.5 mg, Q6H PRN    Or  ondansetron, 4 mg, Q6H PRN        VENT SETTINGS (Comprehensive) (if applicable):  Vent Information  SpO2: 97 %  Additional Respiratory  Assessments  Pulse: 77  Resp: 12  SpO2: 97 %  Oral Care: Mouth suctioned, Suction toothette      Laboratory findings:    Complete Blood Count:   Recent Labs     07/27/20  0425 07/28/20  0604 07/29/20  0400   WBC 8.6 10.4 7.7   HGB 12.8* 12.3* 12.2*   HCT 37.5* 35.5* 36.0*    159 167        Last 3 Blood Glucose:   Recent Labs     07/28/20  0604 07/28/20 2002 07/29/20  0359   GLUCOSE 148* 123* 116*        PT/INR:    Lab Results   Component Value Date    PROTIME 13.6 07/23/2020    INR 1.1 07/23/2020     PTT:    Lab Results   Component Value Date    APTT 27.9 07/23/2020       Comprehensive Metabolic Profile:   Recent Labs     07/28/20  0604 07/28/20 2002 07/29/20  0359    142 148*   K 3.8 3.9 4.0   * 109* 110*   CO2 21 21 19*   BUN 51* 45* 42*   CREATININE 3.44* 2.94* 2.57*   GLUCOSE 148* 123* 116*   CALCIUM 8.7 8.5* 8.8   PROT 5.4*  --   --    LABALBU 2.8*  --   --    BILITOT 0.57  --   --    ALKPHOS 38*  --   --    AST 16 --   --    ALT 28  --   --       Magnesium:   Lab Results   Component Value Date    MG 2.2 07/28/2020     Phosphorus:   Lab Results   Component Value Date    PHOS 3.8 07/28/2020     Ionized Calcium:   Lab Results   Component Value Date    CAION 1.22 07/28/2020        Urinalysis : negative     Troponin: No results for input(s): TROPONINI in the last 72 hours. Microbiology: CSF + for HSV encephalitis   Cultures during this admission:     Blood cultures:                 [] None drawn      [x] Negative             []  Positive (Details:  )  Urine Culture:                   [] None drawn      [x] Negative             []  Positive (Details:  )  Sputum Culture:               [x] None drawn       [] Negative             []  Positive (Details:  )   Endotracheal aspirate:     [x] None drawn       [] Negative             []  Positive (Details:  )         Radiology/Imaging:   XR CHEST (SINGLE VIEW FRONTAL)   Final Result   Rotated with relatively low lung volumes; no consolidation or sizable pleural   effusion. Mild central vascular congestion consideration. Mild basilar   atelectasis. RECOMMENDATION:   Followup chest x-ray if continued concern for aspiration or vascular   congestion         US RENAL COMPLETE   Final Result   Limited study without evidence of obstructive uropathy. Incidental note of hepatic steatosis limits evaluation for renal parenchymal   echogenicity evaluation. Bladder is not visualized due to catheter placement. MRI BRAIN WO CONTRAST   Final Result   1. No acute intracranial abnormality. No acute infarct. 2. Minimal global parenchymal volume loss with minimal microvascular ischemic   changes. IR LUMBAR PUNCTURE FOR DIAGNOSIS   Final Result   Successful fluoroscopic-guided lumbar puncture.                  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 negative for acute pathology   - MRI: 7/24/20: no abnormalities or infarct  -Off Precedex yesterday  - seroquel 25mg twice daily   - consider dec to 25mg QHS, given somnolence today, will discuss with neuro    Cardiovascular:  Hypertension  · Hemodynamically stable  · MAP goal  · Inc Norvasc to 10 mg daily  · Hydralazine 10 mg IV x1 this morning  · Troponin 23, repeat 23  · Lipid panel WNL  · EKG showed PACs    Pulmonary:  · Maintain oxygen sats >92%  · Pulmonary toilet  · Covid negative  · Normal sats on room air  Vent Information  · SpO2: 97 %     GI/Nutrition  Constipation  · glyclolax PO QD  · Senna 8.6mg PO QHS  · Ulcer Prophylaxis: Switch to Protonix p.o. · Diet:DIET GENERAL;   · Patient removed NG tube.   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  WBC:   Lab Results   Component Value Date    WBC 7.7 2020     Tmax: Temp (24hrs), Av.7 °F (37.1 °C), Min:97.7 °F (36.5 °C), Max:100.8 °F (38.2 °C)  · ID Recs: Acyclovir 700mg QD  · Toxoplasma neg  · Pending Borrelia    Hematology:  Recent Labs     20  0425 20  0604 20  0400   HGB 12.8* 12.3* 12.2*    stable     Endocrine:   glucose controlled - most recent BGL is   Recent Labs     20  0604 20  0359   GLUCOSE 148* 123* 116*     DVT Prophylaxis  · SCD sleeves -thigh high and Heparin   · Heparin prophylaxis    CODE STATUS: Full Code      DISPOSITION:  [x] To remain ICU:   [] OK for out of ICU from [address] [place] [address]  PGY 3  Resident Physician Emergency Medicine  20 7:31 AM

## 2020-07-29 NOTE — PROGRESS NOTES
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 Recommendations:   · Acyclovir 10 mg/kg IV every 24 hours calculated on ideal body weight of 70 kg ie 700 mg/24 hr.  · Slow infusion of acyclovir, hydration. · Monitor renal function. .  · Supportive care      Medical Decision Making/Summary/Discussion:7/29/2020     ·   Infection Control Recommendations   · [place] Precautions    Antimicrobial Stewardship Recommendations     · Targeted therapy  · PK dosing  ·   Coordination of Outpatient Care:   · Estimated Length of IV antimicrobials: To be determined  · Patient will need Midline Catheter Insertion: Yes  · Patient will need PICC line Insertion: No  · Patient will need: Home IV , [place],  SNF,  LTAC: To be determined  · Patient will need outpatient wound care: No    Chief complaint/reason for consultation:   · Altered mental status with progression over a few days      History of Present Illness:   Eloy Solano is a 79y.o.-year-old  male who was initially admitted on 7/23/2020. Patient seen at the request of Hilda Tapia. 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 He is unable to provide any information himself at this point in time. Available information reviewed and discussed with Dr. Lon Davila.    Patient apparently developed onset of some dysuria and pain in the pelvic and right hip area starting about 7 to the 10 days prior to admission. In addition he was exhibiting some fatigue. He has a pre-existing history of prostatic hypertrophy with previous documented urinary tract infections.   The patient apparently felt that he was coming down with a urinary tract infection and had a started doxycycline on 7/20/2020 10.4-->7.7  Hb: 12.8--> 12.3-->12.2  Plat: 160--> 159-->167    Cultures:  Urine:  ·   Blood:  ·   Sputum :  7/23: MRSA screening of the nares is negative  Wound:  ·     CSF:  · 7/23: no growth        COVID-19 test:  · 7/23/2020- negative    · 7/23/2020 CT of the head: No acute abnormality  ·     Viral panel; Herpes simplex type I positive      US Renal:7/26:  Small cyst noted on the left kidney. Evaluation for renal echogenicity is indeterminate secondary to increased   echogenicity of the liver. Hepatic steatosis noted. Discussed with patient, RN CC. I have personally reviewed the past medical history, past surgical history, medications, social history, and family history, and I have updated the database accordingly. Past Medical History:   No past medical history on file. Past Surgical  History:   No past surgical history on file.     Medications:      QUEtiapine  25 mg Oral BID    pantoprazole  40 mg Oral BID    [START ON 7/30/2020] amLODIPine  10 mg Oral Daily    heparin (porcine)  5,000 Units Subcutaneous 3 times per day    acyclovir  700 mg Intravenous Q24H    sodium chloride flush  10 mL Intravenous 2 times per day    levetiracetam  500 mg Intravenous BID       Social History:     Social History     Socioeconomic History    Marital status:      Spouse name: Not on file    Number of children: Not on file    Years of education: Not on file    Highest education level: Not on file   Occupational History    Not on file   Social Needs    Financial resource strain: Not hard at all   [employer] insecurity     Worry: Never true     Inability: Never true   [employer] needs     Medical: Not on file     Non-medical: Not on file   Tobacco Use    Smoking status: Never Smoker    Smokeless tobacco: Never Used   Substance and Sexual Activity    Alcohol use: Not on file    Drug use: Not on file    Sexual activity: Not on file   Lifestyle    Physical activity     Days per week: Not on file     Minutes per session: Not on file    Stress: Not on file   Relationships    Social connections     Talks on phone: Not on file     Gets together: Not on file     Attends [employer] service: Not on file     Active member of club or organization: Not on file     Attends meetings of clubs or organizations: Not on file     Relationship status: Not on file    Intimate partner violence     Fear of current or ex partner: Not on file     Emotionally abused: Not on file     Physically abused: Not on file     Forced sexual activity: Not on file   Other Topics Concern    Not on file   Social History Narrative    Not on file       Family History:   No family history on file. Allergies:   Ibuprofen     Review of Systems:     Unable to provide. Encephalopathic. 7/29/2020      Constitutional: No fevers or chills. No systemic complaints  Head: No headaches  Eyes: No double vision or blurry vision. No conjunctival inflammation. ENT: No sore throat or runny nose. . No hearing loss, tinnitus or vertigo. Cardiovascular: No chest pain or palpitations. No shortness of breath. No DONALDSON  Lung: No shortness of breath or cough. No sputum production  Abdomen: No nausea, vomiting, diarrhea, or abdominal pain. Rebeca Boyd No cramps. Genitourinary: No increased urinary frequency, or dysuria. No hematuria. No suprapubic or CVA pain  Musculoskeletal: No muscle aches or pains. No joint effusions, swelling or deformities  Hematologic: No bleeding or bruising. Neurologic: No headache, weakness, numbness, or tingling. Integument: No rash, no ulcers. Psychiatric: No depression. Endocrine: No polyuria, no polydipsia, no polyphagia.     Physical Examination :     Patient Vitals for the past 8 hrs:   BP Temp Temp src Pulse Resp SpO2 Weight   07/29/20 0800 (!) 166/83 97.7 °F (36.5 °C) Oral 72 18 96 % --   07/29/20 0600 (!) 151/87 -- -- 77 12 97 % 226 lb 6.6 oz (102.7 kg)   07/29/20 0500 (!) 164/77 -- -- 73 12 96 % --   07/29/20 0400 (!) 161/77 97.9 °F (36.6 °C) Axillary 77 14 97 % --   07/29/20 0325 (!) 170/80 -- -- -- -- -- --   07/29/20 0300 (!) 170/80 -- -- 72 20 97 % --     General Appearance: Somnolent  Head:  Normocephalic, no trauma  Eyes: Pupils equal, round, reactive to light; sclera anicteric; conjunctivae pink. No embolic phenomena. ENT: Oropharynx clear, without erythema, exudate, or thrush. No tenderness of sinuses. Mouth/throat: mucosa pink and moist. No lesions. Dentition in good repair. Neck:Supple, without lymphadenopathy. Thyroid normal, No bruits. Pulmonary/Chest: Clear to auscultation, without wheezes, rales, or rhonchi. No dullness to percussion. Cardiovascular: Regular rate and rhythm without murmurs, rubs, or gallops. Abdomen: Soft, non tender. Bowel sounds normal. No organomegaly  All four Extremities: No cyanosis, clubbing, edema, or effusions. . Scar from prior right hip arthroplasty  Neurologic: No gross sensory or motor deficits. Moves all extremities. Encephalopathic  Skin: Warm and dry with good turgor. No signs of peripheral arterial or venous insufficiency. No ulcerations. No open wounds. Medical Decision Making -Laboratory:   I have independently reviewed/ordered the following labs:    CBC with Differential:   Recent Labs     07/28/20 0604 07/29/20  0400   WBC 10.4 7.7   HGB 12.3* 12.2*   HCT 35.5* 36.0*    167   LYMPHOPCT 6* 11*   MONOPCT 9 12     BMP:   Recent Labs     07/28/20 0604 07/28/20 2002 07/29/20  0359    142 148*   K 3.8 3.9 4.0   * 109* 110*   CO2 21 21 19*   BUN 51* 45* 42*   CREATININE 3.44* 2.94* 2.57*   MG 2.2  --   --      Hepatic Function Panel:   Recent Labs     07/28/20 0604 07/29/20  0400   PROT 5.4* 5.7*   LABALBU 2.8* 2.7*   BILIDIR  --  0.16   IBILI  --  0.40   BILITOT 0.57 0.56   ALKPHOS 38* 41   ALT 28 24   AST 16 28     No results for input(s): RPR in the last 72 hours. No results for input(s): HIV in the last 72 hours.   No results for input(s): BC in the last 72 hours. Lab Results   Component Value Date    MUCUS NOT REPORTED 07/26/2020    RBC 3.80 07/29/2020    TRICHOMONAS NOT REPORTED 07/26/2020    WBC 7.7 07/29/2020    YEAST NOT REPORTED 07/26/2020    TURBIDITY CLOUDY 07/26/2020     Lab Results   Component Value Date    CREATININE 2.57 07/29/2020    CREATININE 1.0 01/16/2020    GLUCOSE 116 07/29/2020       Medical Decision Making-Imaging:       EXAMINATION:    MRI OF THE BRAIN WITHOUT CONTRAST  7/24/2020 10:11 am         TECHNIQUE:    Multiplanar multisequence MRI of the brain was performed without the    administration of intravenous contrast.         COMPARISON:    None.         HISTORY:    ORDERING SYSTEM PROVIDED HISTORY: Altered mental status    TECHNOLOGIST PROVIDED HISTORY:    Altered mental status         Initial evaluation.         FINDINGS:    INTRACRANIAL STRUCTURES/VENTRICLES: There is no acute infarct. No mass effect    or midline shift. No evidence of an acute intracranial hemorrhage.  Areas of    T2 FLAIR hyperintensity are seen in the periventricular and subcortical white    matter, which are nonspecific, but may represent chronic microvascular    ischemic change. Gearline Lucinda is prominence of the ventricles and sulci due to    global parenchymal volume loss.  The sellar/suprasellar regions appear    unremarkable.  The normal signal voids within the major intracranial vessels    appear maintained.         ORBITS: The visualized portion of the orbits demonstrate no acute abnormality.         SINUSES: The visualized paranasal sinuses and mastoid air cells are well    aerated.         BONES/SOFT TISSUES: The bone marrow signal intensity appears normal. The soft    tissues demonstrate no acute abnormality.              Impression    1. No acute intracranial abnormality.  No acute infarct. 2. Minimal global parenchymal volume loss with minimal microvascular ischemic    changes.         CT OF THE HEAD WITHOUT CONTRAST  7/23/2020 8:09 am         TECHNIQUE:    CT of the head was performed without the administration of intravenous    contrast. Dose modulation, iterative reconstruction, and/or weight based    adjustment of the mA/kV was utilized to reduce the radiation dose to as low    as reasonably achievable.         COMPARISON:    None.         HISTORY:    ORDERING SYSTEM PROVIDED HISTORY: DELERIUM    TECHNOLOGIST PROVIDED HISTORY:    DELERIUM              FINDINGS:    BRAIN/VENTRICLES: There is no acute intracranial hemorrhage, mass effect or    midline shift.  No abnormal extra-axial fluid collection.  The gray-white    differentiation is maintained without evidence of an acute infarct.  There is    no evidence of hydrocephalus.         ORBITS: The visualized portion of the orbits demonstrate no acute abnormality.         SINUSES: The visualized paranasal sinuses and mastoid air cells demonstrate    no acute abnormality.         SOFT TISSUES/SKULL:  No acute abnormality of the visualized skull or soft    tissues.              Impression    No acute intracranial abnormality. EXAMINATION:    ONE XRAY VIEW OF THE CHEST         7/21/2020 6:32 pm         COMPARISON:    None.         HISTORY:    ORDERING SYSTEM PROVIDED HISTORY: sepsis    TECHNOLOGIST PROVIDED HISTORY:    sepsis         FINDINGS:    Heart size is normal.  Interstitial markings are prominent which may be    related to interstitial pneumonitis.  No effusion, extrapleural air or    localized consolidation is noted.  Osseous and mediastinal structures are    age-appropriate.              Impression    Diffuse prominence of the interstitial markings which may be related to    interstitial pneumonitis.  No focal consolidation.           EXAMINATION:    CT OF THE ABDOMEN AND PELVIS WITH CONTRAST 7/21/2020 4:06 pm         TECHNIQUE:    CT of the abdomen and pelvis was performed with the administration of    intravenous contrast. Multiplanar reformatted images are provided for review.     Dose modulation, iterative osteoarthritis. Medical Decision Cgxchx-[id number]-[id number]:   Results for Zackery Gunderson (MRN [id number]) as of 7/24/2020 10:34   Ref. Range 7/23/2020 16:37   CYTOMEGALOVIRUS (CMV) CSF FILM ARRAY Latest Ref Range: Not Detected  Not Detected   HAEMOPHILUS INFLUENZA CSF FILM ARRAY Latest Ref Range: Not Detected  Not Detected   HHV-6 (HERPESVIRUS 6) CSF FILM ARRAY Latest Ref Range: Not Detected  Not Detected   HSV-1 CSF FILM ARRAY Latest Ref Range: Not Detected  DETECTED (A)   HSV-2 CSF FILM ARRAY Latest Ref Range: Not Detected  Not Detected   PARECHOVIRUS CSF FILM ARRAY Latest Ref Range: Not Detected  Not Detected   ESCHERICHIA COLI K1 CSF FILM ARRAY Latest Ref Range: Not Detected  Not Detected   LISTERIA MONOCYTOGENES CSF FILM ARRAY Latest Ref Range: Not Detected  Not Detected   NEISSERIA MENIGITIDIS CSF FILM ARRAY Latest Ref Range: Not Detected  Not Detected   STREPTOCOCCUS AGALACTIAE CSF FILM ARRAY Latest Ref Range: Not Detected  Not Detected   STREPTOCOCCUS PNEUMONIAE CSF FILM ARRAY Latest Ref Range: Not Detected  Not Detected   CRYPTOCOCCUS NEOFORMANS/SALUD CSF FILM ARR. Latest Ref Range: Not Detected  Not Detected       Medical Decision Making-Other:     Note:  · Labs, medications, radiologic studies were reviewed with personal review of films  · Large amounts of data were reviewed  · Discussed with nursing Staff, Discharge planner  · Infection Control and Prevention measures reviewed  · All prior entries were reviewed  · Administer medications as ordered  · Prognosis: Guarded  · Discharge planning reviewed  · Follow up as outpatient. Thank you for allowing us to participate in the care of this patient. Please call with questions. Margret Goel DPM     ATTESTATION:    I have discussed the case, including pertinent history and exam findings with the residents and students. I have seen and examined the patient and the key elements of the encounter have been performed by me.  I was present when the student

## 2020-07-30 LAB
ABSOLUTE EOS #: <0.03 K/UL (ref 0–0.44)
ABSOLUTE IMMATURE GRANULOCYTE: 0.14 K/UL (ref 0–0.3)
ABSOLUTE LYMPH #: 0.95 K/UL (ref 1.1–3.7)
ABSOLUTE MONO #: 0.88 K/UL (ref 0.1–1.2)
ANION GAP SERPL CALCULATED.3IONS-SCNC: 14 MMOL/L (ref 9–17)
BASOPHILS # BLD: 0 % (ref 0–2)
BASOPHILS ABSOLUTE: 0.03 K/UL (ref 0–0.2)
BUN BLDV-MCNC: 32 MG/DL (ref 8–23)
BUN/CREAT BLD: ABNORMAL (ref 9–20)
CALCIUM SERPL-MCNC: 9 MG/DL (ref 8.6–10.4)
CHLORIDE BLD-SCNC: 108 MMOL/L (ref 98–107)
CO2: 22 MMOL/L (ref 20–31)
CREAT SERPL-MCNC: 1.73 MG/DL (ref 0.7–1.2)
DIFFERENTIAL TYPE: ABNORMAL
EOSINOPHILS RELATIVE PERCENT: 0 % (ref 1–4)
GFR AFRICAN AMERICAN: 48 ML/MIN
GFR NON-AFRICAN AMERICAN: 39 ML/MIN
GFR SERPL CREATININE-BSD FRML MDRD: ABNORMAL ML/MIN/{1.73_M2}
GFR SERPL CREATININE-BSD FRML MDRD: ABNORMAL ML/MIN/{1.73_M2}
GLUCOSE BLD-MCNC: 105 MG/DL (ref 70–99)
HCT VFR BLD CALC: 36.4 % (ref 40.7–50.3)
HEMOGLOBIN: 11.7 G/DL (ref 13–17)
IMMATURE GRANULOCYTES: 2 %
LYMPHOCYTES # BLD: 14 % (ref 24–43)
MCH RBC QN AUTO: 30.7 PG (ref 25.2–33.5)
MCHC RBC AUTO-ENTMCNC: 32.1 G/DL (ref 28.4–34.8)
MCV RBC AUTO: 95.5 FL (ref 82.6–102.9)
MONOCYTES # BLD: 13 % (ref 3–12)
NRBC AUTOMATED: 0 PER 100 WBC
PDW BLD-RTO: 12.6 % (ref 11.8–14.4)
PLATELET # BLD: 181 K/UL (ref 138–453)
PLATELET ESTIMATE: ABNORMAL
PMV BLD AUTO: 11.4 FL (ref 8.1–13.5)
POTASSIUM SERPL-SCNC: 3.5 MMOL/L (ref 3.7–5.3)
RBC # BLD: 3.81 M/UL (ref 4.21–5.77)
RBC # BLD: ABNORMAL 10*6/UL
SEG NEUTROPHILS: 71 % (ref 36–65)
SEGMENTED NEUTROPHILS ABSOLUTE COUNT: 4.89 K/UL (ref 1.5–8.1)
SODIUM BLD-SCNC: 144 MMOL/L (ref 135–144)
WBC # BLD: 6.9 K/UL (ref 3.5–11.3)
WBC # BLD: ABNORMAL 10*3/UL

## 2020-07-30 PROCEDURE — 97168 OT RE-EVAL EST PLAN CARE: CPT

## 2020-07-30 PROCEDURE — 36415 COLL VENOUS BLD VENIPUNCTURE: CPT

## 2020-07-30 PROCEDURE — 6370000000 HC RX 637 (ALT 250 FOR IP): Performed by: STUDENT IN AN ORGANIZED HEALTH CARE EDUCATION/TRAINING PROGRAM

## 2020-07-30 PROCEDURE — 2000000000 HC ICU R&B

## 2020-07-30 PROCEDURE — 6360000002 HC RX W HCPCS: Performed by: STUDENT IN AN ORGANIZED HEALTH CARE EDUCATION/TRAINING PROGRAM

## 2020-07-30 PROCEDURE — 97164 PT RE-EVAL EST PLAN CARE: CPT

## 2020-07-30 PROCEDURE — 2580000003 HC RX 258: Performed by: STUDENT IN AN ORGANIZED HEALTH CARE EDUCATION/TRAINING PROGRAM

## 2020-07-30 PROCEDURE — 6370000000 HC RX 637 (ALT 250 FOR IP): Performed by: INTERNAL MEDICINE

## 2020-07-30 PROCEDURE — 97530 THERAPEUTIC ACTIVITIES: CPT

## 2020-07-30 PROCEDURE — 6370000000 HC RX 637 (ALT 250 FOR IP): Performed by: FAMILY MEDICINE

## 2020-07-30 PROCEDURE — 2580000003 HC RX 258: Performed by: INTERNAL MEDICINE

## 2020-07-30 PROCEDURE — 6360000002 HC RX W HCPCS: Performed by: INTERNAL MEDICINE

## 2020-07-30 PROCEDURE — 80048 BASIC METABOLIC PNL TOTAL CA: CPT

## 2020-07-30 PROCEDURE — 99291 CRITICAL CARE FIRST HOUR: CPT | Performed by: INTERNAL MEDICINE

## 2020-07-30 PROCEDURE — 99232 SBSQ HOSP IP/OBS MODERATE 35: CPT | Performed by: INTERNAL MEDICINE

## 2020-07-30 PROCEDURE — 97535 SELF CARE MNGMENT TRAINING: CPT

## 2020-07-30 PROCEDURE — 97166 OT EVAL MOD COMPLEX 45 MIN: CPT

## 2020-07-30 PROCEDURE — 99233 SBSQ HOSP IP/OBS HIGH 50: CPT | Performed by: INTERNAL MEDICINE

## 2020-07-30 PROCEDURE — 99232 SBSQ HOSP IP/OBS MODERATE 35: CPT | Performed by: NURSE PRACTITIONER

## 2020-07-30 PROCEDURE — 85025 COMPLETE CBC W/AUTO DIFF WBC: CPT

## 2020-07-30 RX ORDER — LEVETIRACETAM 500 MG/1
500 TABLET ORAL 2 TIMES DAILY
Status: DISCONTINUED | OUTPATIENT
Start: 2020-07-30 | End: 2020-08-04 | Stop reason: HOSPADM

## 2020-07-30 RX ORDER — HYDRALAZINE HYDROCHLORIDE 20 MG/ML
5 INJECTION INTRAMUSCULAR; INTRAVENOUS ONCE
Status: COMPLETED | OUTPATIENT
Start: 2020-07-30 | End: 2020-07-30

## 2020-07-30 RX ORDER — SPIRONOLACTONE 25 MG/1
25 TABLET ORAL DAILY
Status: DISCONTINUED | OUTPATIENT
Start: 2020-07-30 | End: 2020-08-04 | Stop reason: HOSPADM

## 2020-07-30 RX ORDER — CHLOROTHIAZIDE SODIUM 500 MG/1
500 INJECTION INTRAVENOUS ONCE
Status: COMPLETED | OUTPATIENT
Start: 2020-07-30 | End: 2020-07-30

## 2020-07-30 RX ADMIN — QUETIAPINE FUMARATE 50 MG: 25 TABLET ORAL at 20:32

## 2020-07-30 RX ADMIN — ANTACID TABLETS 500 MG: 500 TABLET, CHEWABLE ORAL at 17:05

## 2020-07-30 RX ADMIN — LEVETIRACETAM 500 MG: 5 INJECTION INTRAVENOUS at 08:20

## 2020-07-30 RX ADMIN — PANTOPRAZOLE SODIUM 40 MG: 40 TABLET, DELAYED RELEASE ORAL at 08:20

## 2020-07-30 RX ADMIN — HYDRALAZINE HYDROCHLORIDE 5 MG: 20 INJECTION INTRAMUSCULAR; INTRAVENOUS at 18:20

## 2020-07-30 RX ADMIN — HEPARIN SODIUM 5000 UNITS: 5000 INJECTION INTRAVENOUS; SUBCUTANEOUS at 20:32

## 2020-07-30 RX ADMIN — AMLODIPINE BESYLATE 10 MG: 10 TABLET ORAL at 08:20

## 2020-07-30 RX ADMIN — HEPARIN SODIUM 5000 UNITS: 5000 INJECTION INTRAVENOUS; SUBCUTANEOUS at 06:07

## 2020-07-30 RX ADMIN — SENNOSIDES 8.6 MG: 8.6 TABLET, FILM COATED ORAL at 20:32

## 2020-07-30 RX ADMIN — SODIUM CHLORIDE, PRESERVATIVE FREE 10 ML: 5 INJECTION INTRAVENOUS at 20:32

## 2020-07-30 RX ADMIN — HEPARIN SODIUM 5000 UNITS: 5000 INJECTION INTRAVENOUS; SUBCUTANEOUS at 13:21

## 2020-07-30 RX ADMIN — SODIUM CHLORIDE, PRESERVATIVE FREE 10 ML: 5 INJECTION INTRAVENOUS at 08:20

## 2020-07-30 RX ADMIN — ACYCLOVIR SODIUM 700 MG: 50 INJECTION, SOLUTION INTRAVENOUS at 20:32

## 2020-07-30 RX ADMIN — PANTOPRAZOLE SODIUM 40 MG: 40 TABLET, DELAYED RELEASE ORAL at 20:32

## 2020-07-30 RX ADMIN — LEVETIRACETAM 500 MG: 5 INJECTION INTRAVENOUS at 20:32

## 2020-07-30 RX ADMIN — ANTACID TABLETS 500 MG: 500 TABLET, CHEWABLE ORAL at 11:22

## 2020-07-30 RX ADMIN — CHLOROTHIAZIDE SODIUM 500 MG: 500 INJECTION, POWDER, LYOPHILIZED, FOR SOLUTION INTRAVENOUS at 11:15

## 2020-07-30 RX ADMIN — POLYETHYLENE GLYCOL 3350 17 G: 17 POWDER, FOR SOLUTION ORAL at 08:20

## 2020-07-30 RX ADMIN — POTASSIUM BICARBONATE 20 MEQ: 782 TABLET, EFFERVESCENT ORAL at 11:15

## 2020-07-30 RX ADMIN — SPIRONOLACTONE 25 MG: 25 TABLET ORAL at 11:15

## 2020-07-30 RX ADMIN — ACYCLOVIR SODIUM 700 MG: 50 INJECTION, SOLUTION INTRAVENOUS at 08:19

## 2020-07-30 ASSESSMENT — PAIN DESCRIPTION - PAIN TYPE
TYPE: ACUTE PAIN
TYPE: ACUTE PAIN

## 2020-07-30 ASSESSMENT — PAIN DESCRIPTION - PROGRESSION

## 2020-07-30 ASSESSMENT — PAIN SCALES - GENERAL
PAINLEVEL_OUTOF10: 5
PAINLEVEL_OUTOF10: 3
PAINLEVEL_OUTOF10: 0

## 2020-07-30 ASSESSMENT — PAIN - FUNCTIONAL ASSESSMENT: PAIN_FUNCTIONAL_ASSESSMENT: ACTIVITIES ARE NOT PREVENTED

## 2020-07-30 ASSESSMENT — PAIN DESCRIPTION - ONSET: ONSET: ON-GOING

## 2020-07-30 ASSESSMENT — PAIN DESCRIPTION - FREQUENCY: FREQUENCY: INTERMITTENT

## 2020-07-30 ASSESSMENT — PAIN DESCRIPTION - LOCATION
LOCATION: ABDOMEN
LOCATION: ABDOMEN

## 2020-07-30 ASSESSMENT — PAIN DESCRIPTION - DESCRIPTORS: DESCRIPTORS: DISCOMFORT

## 2020-07-30 ASSESSMENT — PAIN DESCRIPTION - ORIENTATION: ORIENTATION: UPPER;LOWER

## 2020-07-30 NOTE — PROGRESS NOTES
upstairs  Home Access: Stairs to enter without rails  Entrance Stairs - Number of Steps: 2-3  Bathroom Shower/Tub: Walk-in shower  ADL Assistance: Independent  Homemaking Assistance: Independent  Ambulation Assistance: Independent  Transfer Assistance: Independent  Active : Yes  Mode of Transportation: Truck  Occupation: Full time employment  Additional Comments: home information obtain from chart. Objective   Vision: Within Functional Limits  Hearing: Exceptions to WFL(pt reports wearing hearing aides)  Hearing Exceptions: Hard of hearing/hearing concerns    Orientation  Overall Orientation Status: Within Functional Limits     Balance  Sitting Balance: Contact guard assistance(~25 minutes while EOB. pt was CGA initially but progressed to SBA)  Standing Balance: Contact guard assistance  Standing Balance  Time: ~3 minutes  Activity: pt complete dynamic standing while at bedside and 3 side steps towards the [facility]  Comment: pt had not LOB or reported dizziness and required less assistance with proglonged standing. Pt was initally mod x2 but progressed to [address]. Pt refused to attempt functional mobility to chair. ADL  Feeding: Modified independent   Grooming: Setup;Contact guard assistance;Verbal cueing  UE Bathing: Setup;Contact guard assistance;Verbal cueing  LE Bathing: Moderate assistance;Setup  UE Dressing: Setup;Contact guard assistance;Verbal cueing(pt donned gown while seated EOB, need VC to intiate and assistance for IV line mngmt)  LE Dressing: Moderate assistance;Setup(pt required assistance with donning B socks while EOB)  Toileting: Moderate assistance  Tone RUE  RUE Tone: Normotonic  Tone LUE  LUE Tone: Normotonic  Coordination  Movements Are Fluid And Coordinated: Yes     Bed mobility  Supine to Sit: Minimal assistance;2 Person assistance  Sit to Supine: Contact guard assistance  Scooting: Contact guard assistance(with VC to intiate)  Transfers  Sit to stand:  Moderate assistance;2 Person assistance(pt attempted 3 times, mod x2 1st trial, min x2 2nd trial and CGA for 3rd trial)  Stand to sit: Contact guard assistance     Cognition  Overall Cognitive Status: Exceptions  Attention Span: Difficulty attending to directions  Initiation: Requires cues for some  Cognition Comment: pt made comments throughout session not appropriate to conversation.         Sensation  Overall Sensation Status: WFL        LUE AROM (degrees)  LUE AROM : WFL  Left Hand AROM (degrees)  Left Hand AROM: WFL  RUE AROM (degrees)  RUE AROM : WFL  Right Hand AROM (degrees)  Right Hand AROM: WFL  LUE Strength  Gross LUE Strength: WFL  L Hand General: 5/5  RUE Strength  Gross RUE Strength: WFL  R Hand General: 5/5     Plan   Plan  Times per week: 3-4 x/wk  Current Treatment Recommendations: Balance Training, Functional Mobility Training, Endurance Training, Cognitive Reorientation, Safety Education & Training, Patient/Caregiver Education & Training, Equipment Evaluation, Education, & procurement, Self-Care / ADL    AM-PAC Score  AM-[facility] Inpatient Daily Activity Raw Score: 16 (07/30/20 1521)  AM-PAC Inpatient ADL T-Scale Score : 35.96 (07/30/20 1521)  ADL Inpatient CMS 0-100% Score: 53.32 (07/30/20 1521)  ADL Inpatient CMS G-Code Modifier : CK (07/30/20 1521)    Goals  Short term goals  Time Frame for Short term goals: By discharge, pt will:  Short term goal 1: Demo supervision for bed mobility to increase safety and independence with ADLs/IADLs  Short term goal 2: participate in functional activity for 25+ minutes to increase ease with ADLs  Short term goal 3: complete dynamic standing balance for 8+ minutes with SBA and LRD as needed to increase ease with functional mobility  Short term goal 4: Demo supervision for UB ADLs and grooming tasks with setup and increased time as needed  Short term goal 5: Demo good safety awareness throughout therapy session with <3 VCs  Short term goal 6: demo SBA for functional mobility/ transfers with

## 2020-07-30 NOTE — PROGRESS NOTES
Critical Care Team - Daily Progress Note      Date and time: 7/30/2020 5:09 PM  Patient's name:  Padma Tuttle  Medical Record Number: [id number]  Patient's account/billing number: [de-identified]  Patient's YOB: 1950  Age: 79 y.o. Date of Admission: 7/23/2020  3:02 PM  Length of stay during current admission: 7      Primary Care Physician: Luis Antonio Richard MD  ICU Attending Physician: Dr. Arabella Laird Status: Full Code    Reason for ICU admission: AMS, encephalitis                SUBJECTIVE:     OVERNIGHT EVENTS:   No acute overnight events. CURRENT EVALUATION:  Patient seen and examined bedside. Labs and chart reviewed. Patient is awake, alert and oriented x3. Slightly weak and tired. Had good night sleep. Continuing on Seroquel 50 mg nightly. Afebrile. Vital signs stable. Creatinine trending down. Hyponatremia improving. Nephrology decrease fluids to 50 mL/h. Diuril times IV 1 dose. Acyclovir changed to 10 mg/kg IV every 12 hours per ID.       AWAKE & FOLLOWING COMMANDS:  [] No   [x] Yes    CURRENT VENTILATION STATUS:     [] Ventilator  [] BIPAP  [] Nasal Cannula [x] Room Air      IF INTUBATED, ET TUBE MARKING AT LOWER LIP:       cms    SECRETIONS Amount:  [] Small [] Moderate  [] Large  [x] None  Color:     [] White [] Colored  [] Bloody    SEDATION:  RAAS Score    PARALYZED:  [x] No    [] Yes    DIARRHEA:                [x] No                [] Yes  (C. Difficile status: [] positive                                                                                                                       [] negative                                                                                                                     [] pending)    VASOPRESSORS:  [x] No    [] Yes    If yes -   [] Levophed       [] Dopamine     [] Vasopressin       [] Dobutamine  [] Phenylephrine         [] Epinephrine    CENTRAL LINES:     [x] No   [] Yes   (Date of Insertion:   )           If yes -     [] Right IJ     [] Left IJ [] Right Femoral [] Left Femoral                   [] Right Subclavian [] Left Subclavian       FLEMING'S CATHETER:   [] No   [x] Yes  (Date of Insertion:   )     URINE OUTPUT:            [x] Good   [] Low              [] Anuric      OBJECTIVE:     VITAL SIGNS:  BP (!) 152/86   Pulse 72   Temp 98.6 °F (37 °C) (Oral)   Resp 14   Ht 5' 10\" (1.778 m)   Wt 226 lb 6.6 oz (102.7 kg)   SpO2 92%   BMI 32.49 kg/m²     Tmax over 24 hours:  Temp (24hrs), Av.8 °F (37.1 °C), Min:98.4 °F (36.9 °C), Max:99.7 °F (37.6 °C)      Patient Vitals for the past 6 hrs:   BP Temp Temp src Pulse Resp SpO2   20 1300 (!) 152/86 -- -- 72 14 92 %   20 1200 (!) 165/87 98.6 °F (37 °C) Oral 77 -- 94 %         Intake/Output Summary (Last 24 hours) at 2020 1709  Last data filed at 2020 1400  Gross per 24 hour   Intake 2533 ml   Output 3675 ml   Net -1142 ml     Wt Readings from Last 2 Encounters:   20 226 lb 6.6 oz (102.7 kg)   20 222 lb 3.2 oz (100.8 kg)     Body mass index is 32.49 kg/m².         PHYSICAL EXAM:  General appearance -patient resting comfortably, no acute distress  Neck -supple, trachea midline, no nuchal rigidity  Pulm -lungs clear to auscultation bilaterally, normal saturations on room air  Heart -normal rate, intermittent PACs, normal S1/S2, no murmurs  Abdomen -soft, slightly distended, nontender on palpation, intermittent upper abdominal spasms sensation  Neurological -GCS 15, patient alert and oriented x3, no numbness or weakness, moving all extremities  Skin - normal coloration and turgor, no rashes, no suspicious skin lesions noted      MEDICATIONS:    Scheduled Meds:   potassium bicarb-citric acid  20 mEq Oral TID    spironolactone  25 mg Oral Daily    acyclovir  700 mg Intravenous Q12H    pantoprazole  40 mg Oral BID    amLODIPine  10 mg Oral Daily    polyethylene glycol  17 g Oral Daily    senna  1 tablet Oral Nightly    QUEtiapine  50 mg Oral Nightly  heparin (porcine)  5,000 Units Subcutaneous 3 times per day    sodium chloride flush  10 mL Intravenous 2 times per day    levetiracetam  500 mg Intravenous BID     Continuous Infusions:   sodium chloride 50 mL/hr (07/30/20 1051)     PRN Meds:   calcium carbonate, 500 mg, BID PRN  sodium chloride flush, 10 mL, PRN  acetaminophen, 650 mg, Q6H PRN    Or  acetaminophen, 650 mg, Q6H PRN  promethazine, 12.5 mg, Q6H PRN    Or  ondansetron, 4 mg, Q6H PRN        VENT SETTINGS (Comprehensive) (if applicable):  Vent Information  SpO2: 92 %  Additional Respiratory  Assessments  Pulse: 72  Resp: 14  SpO2: 92 %  Oral Care: Mouth suctioned, Suction toothette      Laboratory findings:    Complete Blood Count:   Recent Labs     07/28/20  0604 07/29/20  0400 07/30/20  0347   WBC 10.4 7.7 6.9   HGB 12.3* 12.2* 11.7*   HCT 35.5* 36.0* 36.4*    167 181        Last 3 Blood Glucose:   Recent Labs     07/28/20 2002 07/29/20  0359 07/30/20  0347   GLUCOSE 123* 116* 105*        PT/INR:    Lab Results   Component Value Date    PROTIME 13.6 07/23/2020    INR 1.1 07/23/2020     PTT:    Lab Results   Component Value Date    APTT 27.9 07/23/2020       Comprehensive Metabolic Profile:   Recent Labs     07/28/20 2002 07/29/20 0359 07/29/20 0400 07/30/20  0347    148*  --  144   K 3.9 4.0  --  3.5*   * 110*  --  108*   CO2 21 19*  --  22   BUN 45* 42*  --  32*   CREATININE 2.94* 2.57*  --  1.73*   GLUCOSE 123* 116*  --  105*   CALCIUM 8.5* 8.8  --  9.0   PROT  --   --  5.7*  --    LABALBU  --   --  2.7*  --    BILITOT  --   --  0.56  --    ALKPHOS  --   --  41  --    AST  --   --  28  --    ALT  --   --  24  --       Magnesium:   Lab Results   Component Value Date    MG 2.2 07/28/2020     Phosphorus:   Lab Results   Component Value Date    PHOS 3.8 07/28/2020     Ionized Calcium:   Lab Results   Component Value Date    CAION 1.22 07/28/2020        Urinalysis : negative     Troponin: No results for input(s): TROPONINI in the last 72 hours. Microbiology: CSF + for HSV encephalitis   Cultures during this admission:     Blood cultures:                 [] None drawn      [x] Negative             []  Positive (Details:  )  Urine Culture:                   [] None drawn      [x] Negative             []  Positive (Details:  )  Sputum Culture:               [x] None drawn       [] Negative             []  Positive (Details:  )   Endotracheal aspirate:     [x] None drawn       [] Negative             []  Positive (Details:  )         Radiology/Imaging:   XR ABDOMEN (KUB) (SINGLE AP VIEW)   Final Result   No abnormally dilated loops of small bowel. XR CHEST (SINGLE VIEW FRONTAL)   Final Result   Rotated with relatively low lung volumes; no consolidation or sizable pleural   effusion. Mild central vascular congestion consideration. Mild basilar   atelectasis. RECOMMENDATION:   Followup chest x-ray if continued concern for aspiration or vascular   congestion         US RENAL COMPLETE   Final Result   Limited study without evidence of obstructive uropathy. Incidental note of hepatic steatosis limits evaluation for renal parenchymal   echogenicity evaluation. Bladder is not visualized due to catheter placement. MRI BRAIN WO CONTRAST   Final Result   1. No acute intracranial abnormality. No acute infarct. 2. Minimal global parenchymal volume loss with minimal microvascular ischemic   changes. IR LUMBAR PUNCTURE FOR DIAGNOSIS   Final Result   Successful fluoroscopic-guided lumbar puncture.                  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  -Off Precedex. Off pressors.  -Continue Seroquel 50 mg nightly. Cardiovascular:  Hypertension  · Hemodynamically stable  · Continue Norvasc 10 mg daily  · EKG showed PACs    Pulmonary:  · Maintain oxygen sats >92%  · Pulmonary toilet  · Covid negative  · Normal sats on room air  Vent Information  · SpO2: 92 %     GI/Nutrition  Constipation  · glyclolax PO QD  · Senna 8.6mg PO QHS  · Ulcer Prophylaxis: Switch to Protonix p.o. Diet:DIET GENERAL;  · Dietary Nutrition Supplements: Standard High Calorie Oral Supplement   · Patient removed NG tube.   · Zofran/Phenergan

## 2020-07-30 NOTE — PROGRESS NOTES
Comprehensive Nutrition Assessment    Type and Reason for Visit:  Reassess    Nutrition Recommendations/Plan: Continue General diet, Start ONS, Encourage PO intake as tolerated. Will continue to monitor tolerance to diet and adequacy of intake. Nutrition Assessment:  Pt is now on a General diet. Pt reports he is eating well, but family member reports pt is not eating very much at present. Meds/Labs reviewed. Malnutrition Assessment:  Malnutrition Status: At risk for malnutrition (Comment)    Context:  Acute Illness     Findings of the 6 clinical characteristics of malnutrition:  Energy Intake:  1 - 75% or less of estimated energy requirements for 7 or more days  Weight Loss:  No significant weight loss     Body Fat Loss:  No significant body fat loss     Muscle Mass Loss:  No significant muscle mass loss    Fluid Accumulation:  (trace-moderate fluid accumulation) Extremities, Generalized   Strength:  Not Performed    Estimated Daily Nutrient Needs:  Energy (kcal):  3481-5908 kcal/day; Weight Used for Energy Requirements:  Current     Protein (g):  90 g pro/day; Weight Used for Protein Requirements:  Ideal(1.2)          Current Nutrition Therapies:    DIET GENERAL; Anthropometric Measures:  · Height: 5' 10\" (177.8 cm)  · Current Body Weight: 226 lb 6.6 oz (102.7 kg)   · Usual Body Weight: 195 lb (88.5 kg)     · Ideal Body Weight: 166 lbs; % Ideal Body Weight 136 %   · BMI: 32.5  · BMI Categories: Obese Class 1 (BMI 30.0-34. 9)       Nutrition Diagnosis:   · Inadequate oral intake related to cognitive or neurological impairment as evidenced by intake 0-25%    Nutrition Interventions:   Food and/or Nutrient Delivery:  Continue Current Diet, Start Oral Nutrition Supplement  Nutrition Education/Counseling:  Education not indicated   Coordination of Nutrition Care:  Continued Inpatient Monitoring    Goals:  meet % of estimated nutrition needs       Nutrition Monitoring and Evaluation:

## 2020-07-30 NOTE — PLAN OF CARE
Problem: Anxiety/Stress:  Goal: Level of anxiety will decrease  Description: Level of anxiety will decrease  Outcome: Ongoing     Problem: Mental Status - Impaired:  Goal: Mental status will be restored to baseline  Description: Mental status will be restored to baseline  Outcome: Ongoing     Problem: Falls - Risk of:  Goal: Will remain free from falls  Description: Will remain free from falls  Outcome: Ongoing  Goal: Absence of physical injury  Description: Absence of physical injury  Outcome: Ongoing     Problem: Skin Integrity:  Goal: Will show no infection signs and symptoms  Description: Will show no infection signs and symptoms  Outcome: Ongoing  Goal: Absence of new skin breakdown  Description: Absence of new skin breakdown  Outcome: Ongoing     Problem: Restraint Use - Nonviolent/Non-Self-Destructive Behavior:  Goal: Absence of restraint indications  Description: Absence of restraint indications  Outcome: Ongoing  Goal: Absence of restraint-related injury  Description: Absence of restraint-related injury  Outcome: Ongoing     Problem: Discharge Planning:  Goal: Ability to perform activities of daily living will improve  Description: Ability to perform activities of daily living will improve  Outcome: Ongoing  Goal: Participates in care planning  Description: Participates in care planning  Outcome: Ongoing     Problem: Injury - Risk of, Physical Injury:  Goal: Will remain free from falls  Description: Will remain free from falls  Outcome: Ongoing  Goal: Absence of physical injury  Description: Absence of physical injury  Outcome: Ongoing     Problem: Mood - Altered:  Goal: Mood stable  Description: Mood stable  Outcome: Ongoing  Goal: Absence of abusive behavior  Description: Absence of abusive behavior  Outcome: Ongoing  Goal: Verbalizations of feeling emotionally comfortable while being cared for will increase  Description: Verbalizations of feeling emotionally comfortable while being cared for will increase  Outcome: Ongoing     Problem: Psychomotor Activity - Altered:  Goal: Absence of psychomotor disturbance signs and symptoms  Description: Absence of psychomotor disturbance signs and symptoms  Outcome: Ongoing     Problem: Sensory Perception - Impaired:  Goal: Demonstrations of improved sensory functioning will increase  Description: Demonstrations of improved sensory functioning will increase  Outcome: Ongoing  Goal: Decrease in sensory misperception frequency  Description: Decrease in sensory misperception frequency  Outcome: Ongoing  Goal: Able to refrain from responding to false sensory perceptions  Description: Able to refrain from responding to false sensory perceptions  Outcome: Ongoing  Goal: Demonstrates accurate environmental perceptions  Description: Demonstrates accurate environmental perceptions  Outcome: Ongoing  Goal: Able to distinguish between reality-based and nonreality-based thinking  Description: Able to distinguish between reality-based and nonreality-based thinking  Outcome: Ongoing  Goal: Able to interrupt nonreality-based thinking  Description: Able to interrupt nonreality-based thinking  Outcome: Ongoing     Problem: Sleep Pattern Disturbance:  Goal: Appears well-rested  Description: Appears well-rested  Outcome: Ongoing     Problem: Pain:  Goal: Pain level will decrease  Description: Pain level will decrease  Outcome: Ongoing  Goal: Control of acute pain  Description: Control of acute pain  Outcome: Ongoing  Goal: Control of chronic pain  Description: Control of chronic pain  Outcome: Ongoing

## 2020-07-30 NOTE — CARE COORDINATION
nH Predict tool has been uploaded into patient's chart and can be viewed under the media tab. Accompanying email is as follows:    Please see the attached nH predict for Lien Claros ( 1950). Patient is a 79 yr old male, admitting to acute on 20 with a diagnosis Mengitis/Cystitis. nH predict recom. SNF, with a total 16 days. PLOF Indep with all ADLs and driving. Currently the patient requires MaxA/Dep with bed mobility/transfers and is not ambulating.       [facility] RN, MSN  Care Coordinator  C: [phone]        Eliu Koch is Guiding the [employer], insights and analysis from the experts transforming health care  [facility] Essential Insights

## 2020-07-30 NOTE — PROGRESS NOTES
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 Recommendations:   · Acyclovir 10 mg/kg IV every 12 hours calculated on ideal body weight of 70 kg ie 700 mg/24 hr.  · Slow infusion of acyclovir, hydration. · Monitor renal function. .  · Supportive care      Medical Decision Making/Summary/Discussion:7/30/2020     ·   Infection Control Recommendations   · [place] Precautions    Antimicrobial Stewardship Recommendations     · Targeted therapy  · PK dosing  ·   Coordination of Outpatient Care:   · Estimated Length of IV antimicrobials: To be determined  · Patient will need Midline Catheter Insertion: Yes  · Patient will need PICC line Insertion: No  · Patient will need: Home IV , [place],  SNF,  LTAC: To be determined  · Patient will need outpatient wound care: No    Chief complaint/reason for consultation:   · Altered mental status with progression over a few days      History of Present Illness:   Arnoldo Shepherd is a 79y.o.-year-old  male who was initially admitted on 7/23/2020. Patient seen at the request of Wilman Padilla. 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 He is unable to provide any information himself at this point in time. Available information reviewed and discussed with Dr. Aleah Anna.    Patient apparently developed onset of some dysuria and pain in the pelvic and right hip area starting about 7 to the 10 days prior to admission. In addition he was exhibiting some fatigue. He has a pre-existing history of prostatic hypertrophy with previous documented urinary tract infections.   The patient apparently felt that he was coming down with a urinary tract infection and had a started doxycycline on 7/20/2020 without benefit. He seems to have experienced prior problems with use of quinolones that may have influenced his choice of antibiotics. On 7/21/2020 he was noticed to have some mild confusion and problems with cognition were initially attributed to the use of doxycycline. He was evaluated at [facility] and found to have a high-grade fever. He had urine and blood cultures obtained to exclude urinary tract sepsis. The urine and blood cultures have not shown any growth. His urine analysis was not very impressive. He was treated with cefepime with no benefit. The patient showed some cough but had no hypoxia. The chest x-ray did not show any pneumonia but had some mild chronic changes. CT of the abdomen and pelvis was obtained which showed the presence of a large prostate. No other abnormalities were noted. While active emergency he had a temperature 101 °F on 7/22/2020. No additional fevers were noted. Upon arrival to emergency [facility] patient is disoriented, confused, he is not able to provide any information or to answer any questions. His laboratory tests show some mild degree of hyponatremia and mild transaminitis. A spinal tap was done to exclude herpetic encephalitis. The CSF showed the bloody fluid with a protein level of 257 glucose of 43 RBC count of 3000 with 1348 white cells 80% of which were lymphocytes. The spinal tap was described as being mildly traumatic with progressive clearing of the spinal fluid. We will await the results of the infectious CSF panel. Plan to treat for the treatable conditions such as mycoplasma, herpes simplex. The presence of hemorrhage suggests Herpes simplex. CURRENT EVALUATION :7/30/2020    Patient evaluated and examined in the ICU. Afebrile  BP: 154/82  tachypneic    He is on room air. He is watching TV. He is alert, oriented, conversant. Is overall doing better. He is on IV acyclovir.     His urinary output from the last 24 hours is 3285ml. His Bun and creatinine are improving. Woods catheter is in place. Nephrology planning to discontinue Woods catheter. MRI scan of the head on  7/24 shows subacute inflammation in the frontal region. This is an abnormal 3 day video EEG recording,suggesting moderate to severe encephalopathy of nonspecific etiology , no seizure activity noted. He is on Keppra for seizure prophylaxis. 7/23/20 CSF studies show an increase in WBC count with lymphocytic predominance. CSF cultures show no growth. CSF: HSV 1- positive. Patient has developed acute kidney injury after prior CT of the abdomen with contrast on 7/21/2020 and administration of IV acyclovir. Unfortunately there is no alternative of treatment for the herpes encephalitis. We will plan to decrease the dose of acyclovir to 10 mg/kg once daily, based on an estimated creatinine clearance of 15 cc/min. The infusion will need to be given a slowly as rapid administration can lead to creatinine elevation on and off itself. The patient will also need IV fluids as per nephrology. Estimated Cr Cl up to 40 cc/min. Will adjust dosage of Acyclovir to BID. Labs, X rays reviewed: 7/30/2020    BUN: 51-->45-->42--> 32  Cr:  3.44-->2.94-->2.57--> 1.73    WBC:  10.4-->7.7--> 6.9  Hb:  12.3-->12.2--> 11.7  Plat:  159-->167--> 181    Cultures:  Urine:  ·   Blood:  ·   Sputum :  7/23: MRSA screening of the nares is negative  Wound:  ·     CSF:  · 7/23: no growth        COVID-19 test:  · 7/23/2020- negative    · 7/23/2020 CT of the head: No acute abnormality  ·     Viral panel; Herpes simplex type I positive      US Renal:7/26:  Small cyst noted on the left kidney. Evaluation for renal echogenicity is indeterminate secondary to increased   echogenicity of the liver. Hepatic steatosis noted. Discussed with patient, RN CC.     I have personally reviewed the past medical history, past surgical history, medications, social history, and family history, and I have updated the database accordingly. Past Medical History:   No past medical history on file. Past Surgical  History:   No past surgical history on file.     Medications:      pantoprazole  40 mg Oral BID    amLODIPine  10 mg Oral Daily    polyethylene glycol  17 g Oral Daily    senna  1 tablet Oral Nightly    QUEtiapine  50 mg Oral Nightly    heparin (porcine)  5,000 Units Subcutaneous 3 times per day    acyclovir  700 mg Intravenous Q24H    sodium chloride flush  10 mL Intravenous 2 times per day    levetiracetam  500 mg Intravenous BID       Social History:     Social History     Socioeconomic History    Marital status:      Spouse name: Not on file    Number of children: Not on file    Years of education: Not on file    Highest education level: Not on file   Occupational History    Not on file   Social Needs    Financial resource strain: Not hard at all   Michele-Nagi insecurity     Worry: Never true     Inability: Never true   [employer] needs     Medical: Not on file     Non-medical: Not on file   Tobacco Use    Smoking status: Never Smoker    Smokeless tobacco: Never Used   Substance and Sexual Activity    Alcohol use: Not on file    Drug use: Not on file    Sexual activity: Not on file   Lifestyle    Physical activity     Days per week: Not on file     Minutes per session: Not on file    Stress: Not on file   Relationships    Social connections     Talks on phone: Not on file     Gets together: Not on file     Attends Holiness service: Not on file     Active member of club or organization: Not on file     Attends meetings of clubs or organizations: Not on file     Relationship status: Not on file    Intimate partner violence     Fear of current or ex partner: Not on file     Emotionally abused: Not on file     Physically abused: Not on file     Forced sexual activity: Not on file   Other Topics Concern    Not on file   Social History Narrative    Not on file       Family History:   No family history on file. Allergies:   Ibuprofen     Review of Systems:     Unable to provide. Encephalopathic. 7/30/2020      Constitutional: No fevers or chills. No systemic complaints  Head: No headaches  Eyes: No double vision or blurry vision. No conjunctival inflammation. ENT: No sore throat or runny nose. . No hearing loss, tinnitus or vertigo. Cardiovascular: No chest pain or palpitations. No shortness of breath. No DONALDSON  Lung: No shortness of breath or cough. No sputum production  Abdomen: No nausea, vomiting, diarrhea, or abdominal pain. Tommas Baptise No cramps. Genitourinary: No increased urinary frequency, or dysuria. No hematuria. No suprapubic or CVA pain  Musculoskeletal: No muscle aches or pains. No joint effusions, swelling or deformities  Hematologic: No bleeding or bruising. Neurologic: No headache, weakness, numbness, or tingling. Integument: No rash, no ulcers. Psychiatric: No depression. Endocrine: No polyuria, no polydipsia, no polyphagia. Physical Examination :     Patient Vitals for the past 8 hrs:   BP Temp Temp src Pulse Resp SpO2   07/30/20 0800 (!) 154/82 -- -- 67 19 --   07/30/20 0700 (!) 157/81 -- -- 67 24 --   07/30/20 0600 (!) 144/91 -- -- 64 11 --   07/30/20 0500 (!) 154/85 -- -- 68 15 --   07/30/20 0400 (!) 158/85 98.6 °F (37 °C) Oral 72 14 95 %   07/30/20 0300 (!) 163/91 -- -- 71 12 --   07/30/20 0200 (!) 162/86 -- -- 73 25 95 %   07/30/20 0100 (!) 159/82 98.4 °F (36.9 °C) Oral -- 12 96 %     General Appearance: Somnolent  Head:  Normocephalic, no trauma  Eyes: Pupils equal, round, reactive to light; sclera anicteric; conjunctivae pink. No embolic phenomena. ENT: Oropharynx clear, without erythema, exudate, or thrush. No tenderness of sinuses. Mouth/throat: mucosa pink and moist. No lesions. Dentition in good repair. Neck:Supple, without lymphadenopathy. Thyroid normal, No bruits.   Pulmonary/Chest: Clear to auscultation, without wheezes, rales, or midline shift.  No abnormal extra-axial fluid collection.  The gray-white    differentiation is maintained without evidence of an acute infarct.  There is    no evidence of hydrocephalus.         ORBITS: The visualized portion of the orbits demonstrate no acute abnormality.         SINUSES: The visualized paranasal sinuses and mastoid air cells demonstrate    no acute abnormality.         SOFT TISSUES/SKULL:  No acute abnormality of the visualized skull or soft    tissues.              Impression    No acute intracranial abnormality. EXAMINATION:    ONE XRAY VIEW OF THE CHEST         7/21/2020 6:32 pm         COMPARISON:    None.         HISTORY:    ORDERING SYSTEM PROVIDED HISTORY: sepsis    TECHNOLOGIST PROVIDED HISTORY:    sepsis         FINDINGS:    Heart size is normal.  Interstitial markings are prominent which may be    related to interstitial pneumonitis.  No effusion, extrapleural air or    localized consolidation is noted.  Osseous and mediastinal structures are    age-appropriate.              Impression    Diffuse prominence of the interstitial markings which may be related to    interstitial pneumonitis.  No focal consolidation.           EXAMINATION:    CT OF THE ABDOMEN AND PELVIS WITH CONTRAST 7/21/2020 4:06 pm         TECHNIQUE:    CT of the abdomen and pelvis was performed with the administration of    intravenous contrast. Multiplanar reformatted images are provided for review.     Dose modulation, iterative reconstruction, and/or weight based adjustment of    the mA/kV was utilized to reduce the radiation dose to as low as reasonably    achievable.         COMPARISON:    CT urogram January 6, 2020         HISTORY:    ORDERING SYSTEM PROVIDED HISTORY: Lower abdominal pain    TECHNOLOGIST PROVIDED HISTORY:              FINDINGS:    Lower Chest: Clear         Organs:         The abdominal wall appears normal.         The liver, spleen, pancreas, and adrenals appear normal.  Multiple punctate calcified granulomas in the spleen.         Bilateral simple renal cortical cysts.         Bladder is decompressed and circumferentially thickened.         GI/Bowel: The stomach,small bowel, and colon appear normal. Oral contrast    noted throughout the small bowel.  Stool noted throughout the liver large    bowel.  Appendix normal.         Pelvis: Markedly enlarged prostate.  It measures 68 x 83 mm in AP and    transverse dimensions.         Peritoneum/Retroperitoneum: The abdominal aorta and iliac arteries are normal    in caliber. There is no pathologic adenopathy.         Bones/Soft Tissues: Right hip arthroplasty.              Impression    Markedly enlarged prostate.  Possible cystitis.  Increased stool.           EXAMINATION:    ONE XRAY VIEW OF THE PELVIS AND TWO XRAY VIEWS RIGHT HIP         6/25/2020 7:29 am         COMPARISON:    10/23/2017         HISTORY:    ORDERING SYSTEM PROVIDED HISTORY: Acute right hip pain         FINDINGS:    Status post right hip arthroplasty.  No evidence of hardware complications. Stable alignment.         Elsewhere, no acute fracture or dislocation.  Moderate left hip    osteoarthritis.  The SI joints and sacral arcuate lines appear intact. Degenerative changes of the SI joints are also seen.         No soft tissue abnormality.              Impression    1. Status post right hip arthroplasty.  No complication. 2. No acute osseous abnormality of the pelvis. 3. Moderate left hip osteoarthritis. Medical Decision Vfkeji-[id number]-[id number]:   Results for [facility] (MRN [id number]) as of 7/24/2020 10:34   Ref.  Range 7/23/2020 16:37   CYTOMEGALOVIRUS (CMV) CSF FILM ARRAY Latest Ref Range: Not Detected  Not Detected   HAEMOPHILUS INFLUENZA CSF FILM ARRAY Latest Ref Range: Not Detected  Not Detected   HHV-6 (HERPESVIRUS 6) CSF FILM ARRAY Latest Ref Range: Not Detected  Not Detected   HSV-1 CSF FILM ARRAY Latest Ref Range: Not Detected  DETECTED (A)   HSV-2 CSF FILM ARRAY Latest Ref Range: Not Detected  Not Detected   PARECHOVIRUS CSF FILM ARRAY Latest Ref Range: Not Detected  Not Detected   ESCHERICHIA COLI K1 CSF FILM ARRAY Latest Ref Range: Not Detected  Not Detected   LISTERIA MONOCYTOGENES CSF FILM ARRAY Latest Ref Range: Not Detected  Not Detected   NEISSERIA MENIGITIDIS CSF FILM ARRAY Latest Ref Range: Not Detected  Not Detected   STREPTOCOCCUS AGALACTIAE CSF FILM ARRAY Latest Ref Range: Not Detected  Not Detected   STREPTOCOCCUS PNEUMONIAE CSF FILM ARRAY Latest Ref Range: Not Detected  Not Detected   CRYPTOCOCCUS NEOFORMANS/SALUD CSF FILM ARR. Latest Ref Range: Not Detected  Not Detected       Medical Decision Making-Other:     Note:  · Labs, medications, radiologic studies were reviewed with personal review of films  · Large amounts of data were reviewed  · Discussed with nursing Staff, Discharge planner  · Infection Control and Prevention measures reviewed  · All prior entries were reviewed  · Administer medications as ordered  · Prognosis: Guarded  · Discharge planning reviewed  · Follow up as outpatient. Thank you for allowing us to participate in the care of this patient. Please call with questions. Carla Cardoso DPM     ATTESTATION:    I have discussed the case, including pertinent history and exam findings with the residents and students. I have seen and examined the patient and the key elements of the encounter have been performed by me. I was present when the student obtained his information or examined the patient. I have reviewed the laboratory data, other diagnostic studies and discussed them with the residents. I have updated the medical record where necessary. I agree with the assessment, plan and orders as documented by the resident/ student.     Alexandrea Simeon MD.    Pager: [phone] - Office: [phone]

## 2020-07-30 NOTE — PROGRESS NOTES
Renal Progress Note    Patient :  Shaye Spencer; 79 y.o. MRN# [id number]  Location:  0125/0125-01  Attending:  Anthony De Luna DO  Admit Date:  7/23/2020   Hospital Day: 7      Subjective:     Admitted for encephalopathy, based on LP results and CSF PCR he was diagnosed with herpetic encephalitis. Now on acyclovir. Tolerating it well. Developed acute kidney injury both from systemic informatory response syndrome and contrast exposure. Creatinine peaked at 4.2, was oliguric, did not require renal placement therapy now resolving. Has received significant amount of IV fluids and a positive fluid balance of at least 10 to 12 L. No shortness of breath orthopnea. Mental status improving, following commands, oral intake improving. Woods catheter still in place. Does have history of BPH and there was some difficulty in in Woods catheter placement. Even though catheter can come out urology clearance is pending. Hypokalemia persists although better, hypernatremia improving. Outpatient Medications:     Medications Prior to Admission: timolol (TIMOPTIC) 0.5 % ophthalmic solution, 1 drop 2 times daily    Current Medications:     Scheduled Meds:    potassium bicarb-citric acid  20 mEq Oral TID    chlorothiazide  500 mg Intravenous Once    spironolactone  25 mg Oral Daily    pantoprazole  40 mg Oral BID    amLODIPine  10 mg Oral Daily    polyethylene glycol  17 g Oral Daily    senna  1 tablet Oral Nightly    QUEtiapine  50 mg Oral Nightly    heparin (porcine)  5,000 Units Subcutaneous 3 times per day    acyclovir  700 mg Intravenous Q24H    sodium chloride flush  10 mL Intravenous 2 times per day    levetiracetam  500 mg Intravenous BID     Continuous Infusions:    sodium chloride 100 mL/hr (07/29/20 0845)     PRN Meds:  calcium carbonate, sodium chloride flush, acetaminophen **OR** acetaminophen, promethazine **OR** ondansetron    Input/Output:       I/O last 3 completed shifts:   In: 8030 [I.V.:6242]  Out: 3285 [Urine:3285]. Patient Vitals for the past 96 hrs (Last 3 readings):   Weight   20 0600 226 lb 6.6 oz (102.7 kg)   20 0600 225 lb 12 oz (102.4 kg)   20 0625 221 lb 1.9 oz (100.3 kg)       Vital Signs:   Temperature:  Temp: 99.7 °F (37.6 °C)  TMax:   Temp (24hrs), Av.5 °F (36.9 °C), Min:97.7 °F (36.5 °C), Max:99.7 °F (37.6 °C)    Respirations:  Resp: 17  Pulse:   Pulse: 92  BP:    BP: (!) 165/86  BP Range: Systolic (10BQU), HBB:264 , Min:138 , PO:608       Diastolic (61BNL), TDF:69, Min:70, Max:100      Physical Examination:     General:  Awake, follows commands, speech slow and soft,  no accessory muscle use. HEENT: Atraumatic, normocephalic, no throat congestion, moist mucosa. Eyes:   Pupils equal, round and reactive to light,   Neck:   No JVD, no thyromegaly, no lymphadenopathy. Chest:  Bilateral vesicular breath sounds, no rales or wheezes. Cardiac:  S1 S2 RR, no murmurs, gallops or rubs, JVP not raised. Abdomen: Mild distention, bowel sounds present  :   No suprapubic or flank tenderness. Neuro:  Awake, follows commands, moves all 4 extremities, responses overall slow  SKIN:  No rashes, good skin turgor. Extremities:  No edema, palpable peripheral pulses, no calf tenderness.     Labs:       Recent Labs     20  0604 20  0400 20  0347   WBC 10.4 7.7 6.9   RBC 3.89* 3.80* 3.81*   HGB 12.3* 12.2* 11.7*   HCT 35.5* 36.0* 36.4*   MCV 91.3 94.7 95.5   MCH 31.6 32.1 30.7   MCHC 34.6 33.9 32.1   RDW 12.8 13.0 12.6    167 181   MPV 11.7 11.6 11.4      BMP:   Recent Labs     20  0359 20  0347    148* 144   K 3.9 4.0 3.5*   * 110* 108*   CO2 21 19* 22   BUN 45* 42* 32*   CREATININE 2.94* 2.57* 1.73*   GLUCOSE 123* 116* 105*   CALCIUM 8.5* 8.8 9.0      Phosphorus:     Recent Labs     20  0604   PHOS 3.8     Magnesium:    Recent Labs     20  0604   MG 2.2     Albumin:    Recent Labs     20  0604 07/29/20  0400   LABALBU 2.8* 2.7*     BNP:    No results found for: BNP  CARRIE:      Lab Results   Component Value Date    CARRIE NEGATIVE 07/26/2020     SPEP:  Lab Results   Component Value Date    PROT 5.7 07/29/2020     UPEP:   No results found for: LABPE  C3:   No results found for: C3  C4:   No results found for: C4  MPO ANCA:   No results found for: MPO  PR3 ANCA:   No results found for: PR3  Anti-GBM:   No results found for: GBMABIGG  Hep BsAg:       No results found for: HEPBSAG  Hep C AB:        No results found for: HEPCAB    Urinalysis/Chemistries:      Lab Results   Component Value Date    NITRU NEGATIVE 07/26/2020    COLORU YELLOW 07/26/2020    PHUR 5.0 07/26/2020    WBCUA 10 TO 20 07/26/2020    RBCUA 20 TO 50 07/26/2020    MUCUS NOT REPORTED 07/26/2020    TRICHOMONAS NOT REPORTED 07/26/2020    YEAST NOT REPORTED 07/26/2020    BACTERIA NOT REPORTED 07/26/2020    SPECGRAV 1.013 07/26/2020    LEUKOCYTESUR SMALL 07/26/2020    UROBILINOGEN Normal 07/26/2020    BILIRUBINUR NEGATIVE 07/26/2020    GLUCOSEU NEGATIVE 07/26/2020    KETUA TRACE 07/26/2020    AMORPHOUS NOT REPORTED 07/26/2020     Urine Sodium:     Lab Results   Component Value Date    YANI 67 07/26/2020     Urine Potassium:  No results found for: KUR  Urine Chloride:  No results found for: CLUR  Urine Osmolarity: No results found for: OSMOU  Urine Protein:   No components found for: TOTALPROTEIN, URINE   Urine Creatinine:     Lab Results   Component Value Date    LABCREA 93.6 07/26/2020     Urine Eosinophils:  No components found for: UEOS    Radiology:     CXR:     Assessment:     1. Acute Kidney Injury: Secondary to ischemic and nephrotoxic ATN from poor oral intake, systemic inflammatory spine syndrome, contrast exposure baseline creatinine normal peaked up to 4.2, now down to 1.7, resolving, nonoliguric although in positive fluid balance  2. Hypernatremia improving with hypotonic fluids  3. Mild fluid overload  4.   Hypokalemia from resolving

## 2020-07-30 NOTE — PROGRESS NOTES
7/23/2020, for neuro and ID evaluation. At arrival, patient was stable hemodynamically, saturating well on room air, easily arousable, oriented to self only, moving all limbs but unable to follow commands. Patient underwent LP and was diagnosed with herpes encephalitis (HSV-1). He was continued on acyclovir and got loaded with Keppra 1 g IVPB x1 followed by maintenance dose of 500 mg twice a day. EEG was done -result awaited. MRI brain with and without contrast was ordered but patient was unable to complete the scan with contrast due to severe agitation; MRI negative.  potassium bicarb-citric acid  20 mEq Oral TID    chlorothiazide  500 mg Intravenous Once    spironolactone  25 mg Oral Daily    pantoprazole  40 mg Oral BID    amLODIPine  10 mg Oral Daily    polyethylene glycol  17 g Oral Daily    senna  1 tablet Oral Nightly    QUEtiapine  50 mg Oral Nightly    heparin (porcine)  5,000 Units Subcutaneous 3 times per day    acyclovir  700 mg Intravenous Q24H    sodium chloride flush  10 mL Intravenous 2 times per day    levetiracetam  500 mg Intravenous BID       No past medical history on file. No past surgical history on file. PHYSICAL EXAM:      Blood pressure (!) 165/86, pulse 92, temperature 99.7 °F (37.6 °C), temperature source Oral, resp. rate 17, height 5' 10\" (1.778 m), weight 226 lb 6.6 oz (102.7 kg), SpO2 94 %.       Limited Neurological Examination:  Pt was wide awake, alert and oriented x3  Speech and language - intact  Followed commands  Normal visual threat, no nystagmus, no ptosis  Face symmetrical  Hearing intact  Tongue midline  C/O pain in both knees, however he lifted all limbs antigravity with no drift noticed  Sensations -grossly intact  DTRs -intact  Plantars -flexor response      DATA    Lab Results   Component Value Date    WBC 6.9 07/30/2020    HGB 11.7 (L) 07/30/2020    HCT 36.4 (L) 07/30/2020     07/30/2020    ALT 24 07/29/2020    AST 28 07/29/2020     07/30/2020    K 3.5 (L) 07/30/2020     (H) 07/30/2020    CREATININE 1.73 (H) 07/30/2020    BUN 32 (H) 07/30/2020    CO2 22 07/30/2020    TSH 1.33 07/23/2020    PSA 14.54 (H) 01/03/2020    INR 1.1 07/23/2020    DCHTMJNJ24 616 07/23/2020    FOLATE 17.6 07/23/2020     Lab Results   Component Value Date    CHOL 137 07/29/2020     Lab Results   Component Value Date    TRIG 96 07/29/2020     Lab Results   Component Value Date    HDL 34 (L) 07/29/2020    HDL 56 11/17/2017     Lab Results   Component Value Date    LDLCHOLESTEROL 84 07/29/2020    LDLCHOLESTEROL 116 11/17/2017     Lab Results   Component Value Date    VLDL NOT REPORTED 07/29/2020    VLDL NOT REPORTED 11/17/2017     Lab Results   Component Value Date    CHOLHDLRATIO 4.0 07/29/2020    CHOLHDLRATIO 3.3 11/17/2017 7/23/2020 19:09   Total IgG 767     CARRIE       Negative      7/21/2020 08:00   CRP 0.3   ESR 4       CSF STUDY:   7/23/2020 19:09   Albumin Index 339.8 (H)   Albumin, CSF 1393 (H)   Appearance, CSF Bloody   Glucose, CSF 43   IgG Index, CSF 0.54   IgG Synthesis Rate 10.7 (H)   IgG, CSF 14.0 (H)   Oligo Bands    Myelin basic protein    Protein, .4 (H)   RBC, CSF 3,000 (H)   WBC, CSF 1,348 (H)   Neutrophils, CSF 0   Lymphs, CSF 87   Cx Negative   HSV-1 Detected   LD    Toxoplasma AB    Borrelia Burgdorferi      HIV           Negative      DIAGNOSTIC DATA:  CT HEAD (7/23/2020): No acute intracranial abnormality     MRI BRAIN (7/24/2020): No acute intracranial abnormality     LTME (7/24/2020): This EEG shows the presence of mild diffuse slowing. This EEG is concordant with diffuse encephalopathy. No clear lateralized or epileptiform disturbance is seen. IMPRESSION & PLAN: 79 y.o.  male admitted with  Acute encephalopathy in the setting of acute herpes simplex encephalitis (HSV-1); on acyclovir. Patient was awake and alert, oriented x3; followed commands, moving all limbs antigravity; was working with OT staff.   Is on Seroquel 50 mg at bedtime    Continue Keppra 500 mg PO BID for seizure prophylaxis    BALBIR; nephrology on board    Continue PT/OT; he walked 3' without any assistive device    Comorbid conditions - chronic prostatism, UTIs, chronic low back pain    Will follow    Please note that this note was generated using a voice recognition dictation software. Although every effort was made to ensure the accuracy of this automated transcription, some errors in transcription may have occurred.

## 2020-07-30 NOTE — PROGRESS NOTES
Physical Therapy    Facility/Department: [address] MICU  Reassessment    NAME: Anushka Dillard  : 1950  MRN: [id number]  Reassessment completed following improved cognition and alertness to establish ambulation and standing goals. Date of Service: 2020    Discharge Recommendations: Further therapy recommended at discharge, will continue to assess pending progress. PT Equipment Recommendations  Equipment Needed: (CTA, pt would currently require RW)    Assessment   Body structures, Functions, Activity limitations: Decreased functional mobility ; Decreased safe awareness;Decreased cognition;Decreased balance;Decreased strength;Decreased endurance  Assessment: The pt demonstrated significant improvement in mobility this date, performing supine to sit with Min A x2 and progressing to CGA for sit to stand transfers and ambulating 3ft. Recommend continued acute PT to maximize safety and progress toward prior level of independence. Prognosis: Good  Decision Making: Medium Complexity  PT Education: Goals;PT Role;Plan of Care;Transfer Training;Functional Mobility Training  Barriers to Learning: none  REQUIRES PT FOLLOW UP: Yes  Activity Tolerance  Activity Tolerance: Patient Tolerated treatment well       Patient Diagnosis(es): There were no encounter diagnoses. has no past medical history on file. has no past surgical history on file. 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 Family / Caregiver Present: Yes(Pt's dtr arrived during mobility)  Follows Commands: Within Functional Limits  Subjective  Subjective: RN and pt agreeable to PT. Pt supine in bed upon arrival, pleasant throughout. Required encouragement to stand.   Pain Screening  Patient Currently in Pain: Denies  Vital Signs  Patient Currently in Pain: Denies       Orientation  Orientation  Overall Orientation Status: Within Functional Limits  Social/Functional History  Social/Functional History  Lives With: Significant other  Type of Home: House  Home Layout: Two level, Bed/Bath upstairs  Home Access: Stairs to enter without rails  Entrance Stairs - Number of Steps: 2-3  Bathroom Shower/Tub: Walk-in shower  ADL Assistance: Independent  Homemaking Assistance: Independent  Ambulation Assistance: Independent  Transfer Assistance: Independent  Active : Yes  Mode of Transportation: Truck  Occupation: Full time employment  Additional Comments: Information obtained from chart. Pt unable to answer prior living questions at this time. Answering some yes/no questions however not appropriate  Cognition   Cognition  Overall Cognitive Status: WFL    Objective          Joint Mobility  Spine: WFL  ROM RLE: WFL  ROM LLE: WFL  ROM RUE: WFL  ROM LUE: WFL  Strength RLE  Strength RLE: WFL  Strength LLE  Strength LLE: WFL  Strength RUE  Strength RUE: WFL  Strength LUE  Strength LUE: WFL  Tone RLE  RLE Tone: Normotonic  Tone LLE  LLE Tone: Normotonic  Motor Control  Gross Motor?: WFL  Sensation  Overall Sensation Status: WFL  Bed mobility  Supine to Sit: Minimal assistance;2 Person assistance  Sit to Supine: Contact guard assistance  Scooting: Contact guard assistance  Comment: Verbal cues to initiate mobility  Transfers  Sit to Stand: Contact guard assistance;2 Person Assistance;Minimal Assistance  Stand to sit: Contact guard assistance  Lateral Transfers: Contact guard assistance  Comment: Pt performed 3 sit to stand transfers with Mod A x2 on initial, progressing to Min A x2 and finally CGA.   Ambulation  Ambulation?: Yes  Ambulation 1  Surface: level tile  Device: No Device  Assistance: Contact guard assistance  Quality of Gait: flexed posture, decreased endurance  Gait Deviations: Increased TORSTEN;Decreased step length;Decreased step height;Slow Surekha  Distance: 3ft EOB  Stairs/Curb  Stairs?: No     Balance  Posture: Good  Sitting - Static: Fair;+  Sitting - Dynamic: Fair  Standing - Static: Fair  Standing - Dynamic: Fair;-  Comments: standing balance assessed without AD        Plan   Plan  Times per week: 5-6x/wk  Current Treatment Recommendations: Strengthening, Transfer Training, ROM, Balance Training, Functional Mobility Training, Safety Education & Training, Endurance Training, Home Exercise Program, Equipment Evaluation, Education, & procurement, Gait Training, Stair training, Positioning  Safety Devices  Type of devices: Nurse notified, Call light within reach, Gait belt, Left in bed  Restraints  Initially in place: No    G-Code       OutComes Score                                                  AM-PAC Score  AM-PAC Inpatient Mobility Raw Score : 16 (07/30/20 1456)  AM-PAC Inpatient T-Scale Score : 40.78 (07/30/20 1456)  Mobility Inpatient CMS 0-100% Score: 54.16 (07/30/20 1456)  Mobility Inpatient CMS G-Code Modifier : CK (07/30/20 1456)          Goals  Short term goals  Time Frame for Short term goals: 14 visits  Short term goal 1: Perform bed mobility and functional transfers independently  Short term goal 2: Ambulate 300ft with least restrictive AD independently  Short term goal 3: Demo Good dynamic standing balance to decrease risk of falls  Short term goal 4: Participate in 30 minutes of therapy to demo increased endurance  Short term goal 5: Ascend/descend 4 steps with HR and SBA  Patient Goals   Patient goals : Be left alone.        Therapy Time   Individual Concurrent Group Co-treatment   Time In 6690         Time Out 1409         Minutes 34         Timed Code Treatment Minutes: 21 Minutes       Arnaldo Franco, PT

## 2020-07-31 LAB
ABSOLUTE EOS #: 0.09 K/UL (ref 0–0.44)
ABSOLUTE IMMATURE GRANULOCYTE: 0.21 K/UL (ref 0–0.3)
ABSOLUTE LYMPH #: 1.3 K/UL (ref 1.1–3.7)
ABSOLUTE MONO #: 0.93 K/UL (ref 0.1–1.2)
ANION GAP SERPL CALCULATED.3IONS-SCNC: 14 MMOL/L (ref 9–17)
BASOPHILS # BLD: 1 % (ref 0–2)
BASOPHILS ABSOLUTE: 0.04 K/UL (ref 0–0.2)
BUN BLDV-MCNC: 23 MG/DL (ref 8–23)
BUN/CREAT BLD: ABNORMAL (ref 9–20)
CALCIUM SERPL-MCNC: 9.6 MG/DL (ref 8.6–10.4)
CHLORIDE BLD-SCNC: 105 MMOL/L (ref 98–107)
CO2: 23 MMOL/L (ref 20–31)
CREAT SERPL-MCNC: 1.38 MG/DL (ref 0.7–1.2)
DIFFERENTIAL TYPE: ABNORMAL
EOSINOPHILS RELATIVE PERCENT: 1 % (ref 1–4)
GFR AFRICAN AMERICAN: >60 ML/MIN
GFR NON-AFRICAN AMERICAN: 51 ML/MIN
GFR SERPL CREATININE-BSD FRML MDRD: ABNORMAL ML/MIN/{1.73_M2}
GFR SERPL CREATININE-BSD FRML MDRD: ABNORMAL ML/MIN/{1.73_M2}
GLUCOSE BLD-MCNC: 106 MG/DL (ref 70–99)
HCT VFR BLD CALC: 39.7 % (ref 40.7–50.3)
HEMOGLOBIN: 13.3 G/DL (ref 13–17)
IMMATURE GRANULOCYTES: 3 %
LYMPHOCYTES # BLD: 16 % (ref 24–43)
MCH RBC QN AUTO: 31.7 PG (ref 25.2–33.5)
MCHC RBC AUTO-ENTMCNC: 33.5 G/DL (ref 28.4–34.8)
MCV RBC AUTO: 94.5 FL (ref 82.6–102.9)
MONOCYTES # BLD: 12 % (ref 3–12)
NRBC AUTOMATED: 0 PER 100 WBC
PDW BLD-RTO: 12.7 % (ref 11.8–14.4)
PLATELET # BLD: 189 K/UL (ref 138–453)
PLATELET ESTIMATE: ABNORMAL
PMV BLD AUTO: 11 FL (ref 8.1–13.5)
POTASSIUM SERPL-SCNC: 3.2 MMOL/L (ref 3.7–5.3)
RBC # BLD: 4.2 M/UL (ref 4.21–5.77)
RBC # BLD: ABNORMAL 10*6/UL
SEG NEUTROPHILS: 67 % (ref 36–65)
SEGMENTED NEUTROPHILS ABSOLUTE COUNT: 5.5 K/UL (ref 1.5–8.1)
SEND OUT REPORT: NORMAL
SODIUM BLD-SCNC: 142 MMOL/L (ref 135–144)
TEST NAME: NORMAL
WBC # BLD: 8.1 K/UL (ref 3.5–11.3)
WBC # BLD: ABNORMAL 10*3/UL

## 2020-07-31 PROCEDURE — 6370000000 HC RX 637 (ALT 250 FOR IP): Performed by: NURSE PRACTITIONER

## 2020-07-31 PROCEDURE — 2580000003 HC RX 258: Performed by: STUDENT IN AN ORGANIZED HEALTH CARE EDUCATION/TRAINING PROGRAM

## 2020-07-31 PROCEDURE — 6360000002 HC RX W HCPCS: Performed by: INTERNAL MEDICINE

## 2020-07-31 PROCEDURE — 99232 SBSQ HOSP IP/OBS MODERATE 35: CPT | Performed by: PSYCHIATRY & NEUROLOGY

## 2020-07-31 PROCEDURE — 36415 COLL VENOUS BLD VENIPUNCTURE: CPT

## 2020-07-31 PROCEDURE — 6370000000 HC RX 637 (ALT 250 FOR IP): Performed by: INTERNAL MEDICINE

## 2020-07-31 PROCEDURE — 99233 SBSQ HOSP IP/OBS HIGH 50: CPT | Performed by: INTERNAL MEDICINE

## 2020-07-31 PROCEDURE — 1200000000 HC SEMI PRIVATE

## 2020-07-31 PROCEDURE — 6370000000 HC RX 637 (ALT 250 FOR IP): Performed by: STUDENT IN AN ORGANIZED HEALTH CARE EDUCATION/TRAINING PROGRAM

## 2020-07-31 PROCEDURE — 97110 THERAPEUTIC EXERCISES: CPT

## 2020-07-31 PROCEDURE — 99232 SBSQ HOSP IP/OBS MODERATE 35: CPT | Performed by: INTERNAL MEDICINE

## 2020-07-31 PROCEDURE — 85025 COMPLETE CBC W/AUTO DIFF WBC: CPT

## 2020-07-31 PROCEDURE — 80048 BASIC METABOLIC PNL TOTAL CA: CPT

## 2020-07-31 PROCEDURE — 97116 GAIT TRAINING THERAPY: CPT

## 2020-07-31 PROCEDURE — 2580000003 HC RX 258: Performed by: INTERNAL MEDICINE

## 2020-07-31 PROCEDURE — 6360000002 HC RX W HCPCS: Performed by: STUDENT IN AN ORGANIZED HEALTH CARE EDUCATION/TRAINING PROGRAM

## 2020-07-31 RX ORDER — POTASSIUM CHLORIDE 20 MEQ/1
40 TABLET, EXTENDED RELEASE ORAL
Status: DISPENSED | OUTPATIENT
Start: 2020-07-31 | End: 2020-08-01

## 2020-07-31 RX ORDER — POTASSIUM CHLORIDE 20 MEQ/1
40 TABLET, EXTENDED RELEASE ORAL
Status: DISCONTINUED | OUTPATIENT
Start: 2020-07-31 | End: 2020-07-31

## 2020-07-31 RX ADMIN — POLYETHYLENE GLYCOL 3350 17 G: 17 POWDER, FOR SOLUTION ORAL at 08:02

## 2020-07-31 RX ADMIN — LEVETIRACETAM 500 MG: 500 TABLET, FILM COATED ORAL at 20:09

## 2020-07-31 RX ADMIN — SPIRONOLACTONE 25 MG: 25 TABLET ORAL at 07:48

## 2020-07-31 RX ADMIN — SODIUM CHLORIDE, PRESERVATIVE FREE 10 ML: 5 INJECTION INTRAVENOUS at 20:09

## 2020-07-31 RX ADMIN — LEVETIRACETAM 500 MG: 500 TABLET, FILM COATED ORAL at 07:49

## 2020-07-31 RX ADMIN — POTASSIUM CHLORIDE 40 MEQ: 1500 TABLET, EXTENDED RELEASE ORAL at 08:13

## 2020-07-31 RX ADMIN — HEPARIN SODIUM 5000 UNITS: 5000 INJECTION INTRAVENOUS; SUBCUTANEOUS at 20:09

## 2020-07-31 RX ADMIN — PANTOPRAZOLE SODIUM 40 MG: 40 TABLET, DELAYED RELEASE ORAL at 07:48

## 2020-07-31 RX ADMIN — AMLODIPINE BESYLATE 10 MG: 10 TABLET ORAL at 07:49

## 2020-07-31 RX ADMIN — ACYCLOVIR SODIUM 700 MG: 50 INJECTION, SOLUTION INTRAVENOUS at 20:09

## 2020-07-31 RX ADMIN — HEPARIN SODIUM 5000 UNITS: 5000 INJECTION INTRAVENOUS; SUBCUTANEOUS at 07:50

## 2020-07-31 RX ADMIN — ACYCLOVIR SODIUM 700 MG: 50 INJECTION, SOLUTION INTRAVENOUS at 07:42

## 2020-07-31 RX ADMIN — HEPARIN SODIUM 5000 UNITS: 5000 INJECTION INTRAVENOUS; SUBCUTANEOUS at 14:22

## 2020-07-31 ASSESSMENT — PAIN DESCRIPTION - PROGRESSION
CLINICAL_PROGRESSION: GRADUALLY WORSENING

## 2020-07-31 ASSESSMENT — ENCOUNTER SYMPTOMS
ABDOMINAL PAIN: 1
BLOATING: 1
CONSTIPATION: 1
EYES NEGATIVE: 1
BACK PAIN: 1
ALLERGIC/IMMUNOLOGIC NEGATIVE: 1
RESPIRATORY NEGATIVE: 1
CHANGE IN BOWEL HABIT: 1

## 2020-07-31 ASSESSMENT — PAIN SCALES - GENERAL
PAINLEVEL_OUTOF10: 0
PAINLEVEL_OUTOF10: 0
PAINLEVEL_OUTOF10: 10
PAINLEVEL_OUTOF10: 0
PAINLEVEL_OUTOF10: 0

## 2020-07-31 ASSESSMENT — PAIN DESCRIPTION - DESCRIPTORS: DESCRIPTORS: DISCOMFORT

## 2020-07-31 ASSESSMENT — PAIN DESCRIPTION - PAIN TYPE: TYPE: ACUTE PAIN

## 2020-07-31 ASSESSMENT — PAIN DESCRIPTION - FREQUENCY: FREQUENCY: INTERMITTENT

## 2020-07-31 ASSESSMENT — PAIN DESCRIPTION - LOCATION: LOCATION: ABDOMEN

## 2020-07-31 ASSESSMENT — PAIN DESCRIPTION - ORIENTATION: ORIENTATION: UPPER

## 2020-07-31 NOTE — PROGRESS NOTES
Physical Therapy  Facility/Department: [address] MICU  Daily Treatment Note  NAME: Maryann Gaston  : 1950  MRN: [id number]    Date of Service: 2020    Discharge Recommendations:  Patient would benefit from continued therapy after discharge   PT Equipment Recommendations  Equipment Needed: (CTA, pt would currently require RW)    Assessment   Body structures, Functions, Activity limitations: Decreased functional mobility ; Decreased safe awareness;Decreased cognition;Decreased balance;Decreased strength;Decreased endurance  Assessment: The pt able to amb short distance in room with RW CGA,  Recommend continued acute PT to maximize safety and progress toward prior level of independence. Prognosis: Good  Decision Making: Medium Complexity  PT Education: Goals;PT Role;Transfer Training;Functional Mobility Training;Gait Training;General Safety  Barriers to Learning: none  REQUIRES PT FOLLOW UP: Yes  Activity Tolerance  Activity Tolerance: Patient Tolerated treatment well     Patient Diagnosis(es): There were no encounter diagnoses. has no past medical history on file. has no past surgical history on file. Restrictions  Restrictions/Precautions  Restrictions/Precautions: Fall Risk, Seizure, Up as Tolerated  Required Braces or Orthoses?: No  Position Activity Restriction  Other position/activity restrictions: kirk catheter  Subjective   General  Response To Previous Treatment: Patient with no complaints from previous session.   Family / Caregiver Present: No  Subjective  Subjective: Rn and pt agreed to PT, pt awake in bed upon arrival and anxious to get up OOB  Pain Screening  Patient Currently in Pain: Yes  Vital Signs  Patient Currently in Pain: Yes       Orientation  Orientation  Overall Orientation Status: Within Functional Limits       Objective   Bed mobility  Supine to Sit: Minimal assistance  Sit to Supine: (Left up in chair)  Scooting: Minimal assistance  Transfers  Sit to Stand: Contact guard assistance;Minimal Assistance(Monika progressing to CGA)  Stand to sit: Contact guard assistance(vc's for controlled descent with good follow through)  Bed to Chair: Minimal assistance(HHA)  Stand Pivot Transfers: Minimal Assistance(HHA)  Ambulation  Ambulation?: Yes  Ambulation 1  Surface: level tile  Device: Rolling Walker  Assistance: Contact guard assistance  Quality of Gait: flexed posture, decreased endurance  Gait Deviations: Decreased step length;Decreased step height;Slow Surekha; Increased TORSTEN  Distance: 6ft forward/6ft backward x 2  Stairs/Curb  Stairs?: No     Balance  Posture: Good  Sitting - Static: Good  Sitting - Dynamic: Fair  Standing - Static: Fair  Standing - Dynamic: Fair;-  Comments: Pt sat EOB x 5mins SBA  Exercises  Seated LE exercise program: Long Arc Quads, hip abduction/adduction, heel/toe raises, and marches. Reps:  x 20  Sit to stands x 8                    Goals  Short term goals  Time Frame for Short term goals: 14 visits  Short term goal 1: Perform bed mobility and functional transfers independently  Short term goal 2: Ambulate 300ft with least restrictive AD independently  Short term goal 3: Demo Good dynamic standing balance to decrease risk of falls  Short term goal 4: Participate in 30 minutes of therapy to demo increased endurance  Short term goal 5: Ascend/descend 4 steps with HR and SBA  Patient Goals   Patient goals : Be left alone.     Plan    Plan  Times per week: 5-6x/wk  Current Treatment Recommendations: Strengthening, Transfer Training, ROM, Balance Training, Functional Mobility Training, Safety Education & Training, Endurance Training, Home Exercise Program, Equipment Evaluation, Education, & procurement, Gait Training, Stair training, Positioning  Safety Devices  Type of devices: Nurse notified, Call light within reach, Gait belt, Left in chair, Chair alarm in place  Restraints  Initially in place: No     Therapy Time   Individual Concurrent Group Co-treatment   Time In 1319         Time Out 1351         Minutes 32         Timed Code Treatment Minutes: [address], PTA

## 2020-07-31 NOTE — PROGRESS NOTES
Renal Progress Note    Patient :  Obinna Lee; 79 y.o. MRN# [id number]  Location:  0125/0125-01  Attending:  Shaista Acharya DO  Admit Date:  7/23/2020   Hospital Day: 8      Subjective:     Admitted for encephalopathy, based on LP results and CSF PCR he was diagnosed with herpetic encephalitis. Now on acyclovir. Tolerating it well. Developed acute kidney injury both from systemic informatory response syndrome and contrast exposure. Creatinine peaked at 4.2, was oliguric, did not require renal placement therapy now resolving, creat down to 1.3. Has received significant amount of IV fluids and a positive fluid balance, which is now improving with post ATN diuresis and a dose of Diuril yesterday. Hyponatremia improved as well with Diuril and half saline. .  No shortness of breath orthopnea. Mental status back to baseline, oral intake improving, although mostly doing well with fluids solids are still less than optimal..  Woods catheter still in place. Does have history of BPH and there was some difficulty in in Woods catheter placement. Urology cleared patient for catheter removal, patient wants to wait till later on today. Hypokalemia persists although better, hypernatremia improving. Blood pressures fluctuate, was placed on Aldactone yesterday with some benefit. Still requiring potassium supplements.   Outpatient Medications:     Medications Prior to Admission: timolol (TIMOPTIC) 0.5 % ophthalmic solution, 1 drop 2 times daily    Current Medications:     Scheduled Meds:    potassium chloride  40 mEq Oral TID WC    spironolactone  25 mg Oral Daily    acyclovir  700 mg Intravenous Q12H    levETIRAcetam  500 mg Oral BID    pantoprazole  40 mg Oral BID    amLODIPine  10 mg Oral Daily    polyethylene glycol  17 g Oral Daily    senna  1 tablet Oral Nightly    QUEtiapine  50 mg Oral Nightly    heparin (porcine)  5,000 Units Subcutaneous 3 times per day    sodium chloride flush  10 mL Intravenous 2 times per day     Continuous Infusions:    sodium chloride 50 mL/hr (20 1051)     PRN Meds:  calcium carbonate, sodium chloride flush, acetaminophen **OR** acetaminophen, promethazine **OR** ondansetron    Input/Output:       I/O last 3 completed shifts: In: 6638 [P.O.:960; I.V.:1774]  Out: 5205 [Urine:5205]. Patient Vitals for the past 96 hrs (Last 3 readings):   Weight   20 0600 226 lb 6.6 oz (102.7 kg)   20 0600 225 lb 12 oz (102.4 kg)       Vital Signs:   Temperature:  Temp: 98.6 °F (37 °C)  TMax:   Temp (24hrs), Av.3 °F (36.8 °C), Min:98 °F (36.7 °C), Max:98.6 °F (37 °C)    Respirations:  Resp: 18  Pulse:   Pulse: 96  BP:    BP: (!) 165/84  BP Range: Systolic (34UHC), GJH:838 , Min:130 , RDD:037       Diastolic (40TMT), DAVID:39, Min:66, Max:98      Physical Examination:     General:  Awake, follows commands, speech slow and soft,  no accessory muscle use. HEENT: Atraumatic, normocephalic, no throat congestion, moist mucosa. Eyes:   Pupils equal, round and reactive to light,   Neck:   No JVD, no thyromegaly, no lymphadenopathy. Chest:  Bilateral vesicular breath sounds, no rales or wheezes. Cardiac:  S1 S2 RR, no murmurs, gallops or rubs, JVP not raised. Abdomen: Mild distention, bowel sounds present  :   No suprapubic or flank tenderness. Neuro:  Awake, follows commands, moves all 4 extremities, responses overall slow  SKIN:  No rashes, good skin turgor. Extremities:  No edema, palpable peripheral pulses, no calf tenderness.     Labs:       Recent Labs     20  0400 20  0347 20  0613   WBC 7.7 6.9 8.1   RBC 3.80* 3.81* 4.20*   HGB 12.2* 11.7* 13.3   HCT 36.0* 36.4* 39.7*   MCV 94.7 95.5 94.5   MCH 32.1 30.7 31.7   MCHC 33.9 32.1 33.5   RDW 13.0 12.6 12.7    181 189   MPV 11.6 11.4 11.0      BMP:   Recent Labs     20  0359 20  0347 20  0613   * 144 142   K 4.0 3.5* 3.2*   * 108* 105   CO2 19* 22 23   BUN 42* 32* 23   CREATININE 2.57* 1.73* 1.38*   GLUCOSE 116* 105* 106*   CALCIUM 8.8 9.0 9.6      Phosphorus:     No results for input(s): PHOS in the last 72 hours. Magnesium:    No results for input(s): MG in the last 72 hours. Albumin:    Recent Labs     07/29/20  0400   LABALBU 2.7*     BNP:    No results found for: BNP  CARRIE:      Lab Results   Component Value Date    CARRIE NEGATIVE 07/26/2020     SPEP:  Lab Results   Component Value Date    PROT 5.7 07/29/2020     UPEP:   No results found for: LABPE  C3:   No results found for: C3  C4:   No results found for: C4  MPO ANCA:   No results found for: MPO  PR3 ANCA:   No results found for: PR3  Anti-GBM:   No results found for: GBMABIGG  Hep BsAg:       No results found for: HEPBSAG  Hep C AB:        No results found for: HEPCAB    Urinalysis/Chemistries:      Lab Results   Component Value Date    NITRU NEGATIVE 07/26/2020    COLORU YELLOW 07/26/2020    PHUR 5.0 07/26/2020    WBCUA 10 TO 20 07/26/2020    RBCUA 20 TO 50 07/26/2020    MUCUS NOT REPORTED 07/26/2020    TRICHOMONAS NOT REPORTED 07/26/2020    YEAST NOT REPORTED 07/26/2020    BACTERIA NOT REPORTED 07/26/2020    SPECGRAV 1.013 07/26/2020    LEUKOCYTESUR SMALL 07/26/2020    UROBILINOGEN Normal 07/26/2020    BILIRUBINUR NEGATIVE 07/26/2020    GLUCOSEU NEGATIVE 07/26/2020    KETUA TRACE 07/26/2020    AMORPHOUS NOT REPORTED 07/26/2020     Urine Sodium:     Lab Results   Component Value Date    YANI 67 07/26/2020     Urine Potassium:  No results found for: KUR  Urine Chloride:  No results found for: CLUR  Urine Osmolarity: No results found for: OSMOU  Urine Protein:   No components found for: TOTALPROTEIN, URINE   Urine Creatinine:     Lab Results   Component Value Date    LABCREA 93.6 07/26/2020     Urine Eosinophils:  No components found for: UEOS    Radiology:     CXR:     Assessment:     1.  Acute Kidney Injury: Secondary to ischemic and nephrotoxic ATN from poor oral intake, systemic inflammatory spine syndrome, contrast exposure

## 2020-07-31 NOTE — PROGRESS NOTES
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 Recommendations:   · Acyclovir 10 mg/kg IV every 12 hours calculated on ideal body weight of 70 kg ie 700 mg/24 hr.  · With improving renal function he may be able to go to q 8 hr dosing on 8-1-20  · Slow infusion of acyclovir, hydration. · Monitor renal function. .  · Supportive care      Medical Decision Making/Summary/Discussion:7/31/2020     ·   Infection Control Recommendations   · [place] Precautions    Antimicrobial Stewardship Recommendations     · Targeted therapy  · PK dosing  ·   Coordination of Outpatient Care:   · Estimated Length of IV antimicrobials: To be determined  · Patient will need Midline Catheter Insertion: Yes  · Patient will need PICC line Insertion: No  · Patient will need: Home IV , [place],  SNF,  LTAC: To be determined  · Patient will need outpatient wound care: No    Chief complaint/reason for consultation:   · Altered mental status with progression over a few days      History of Present Illness:   Eva Cerrato is a 79y.o.-year-old  male who was initially admitted on 7/23/2020. Patient seen at the request of Angi Mckeon. 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 He is unable to provide any information himself at this point in time. Available information reviewed and discussed with Dr. Antonio Nielsen.    Patient apparently developed onset of some dysuria and pain in the pelvic and right hip area starting about 7 to the 10 days prior to admission. In addition he was exhibiting some fatigue. He has a pre-existing history of prostatic hypertrophy with previous documented urinary tract infections.   The patient apparently felt that he was coming down with a urinary tract infection and had a started doxycycline on 7/20/2020 without benefit. He seems to have experienced prior problems with use of quinolones that may have influenced his choice of antibiotics. On 7/21/2020 he was noticed to have some mild confusion and problems with cognition were initially attributed to the use of doxycycline. He was evaluated at [facility] and found to have a high-grade fever. He had urine and blood cultures obtained to exclude urinary tract sepsis. The urine and blood cultures have not shown any growth. His urine analysis was not very impressive. He was treated with cefepime with no benefit. The patient showed some cough but had no hypoxia. The chest x-ray did not show any pneumonia but had some mild chronic changes. CT of the abdomen and pelvis was obtained which showed the presence of a large prostate. No other abnormalities were noted. While active emergency he had a temperature 101 °F on 7/22/2020. No additional fevers were noted. Upon arrival to emergency [facility] patient is disoriented, confused, he is not able to provide any information or to answer any questions. His laboratory tests show some mild degree of hyponatremia and mild transaminitis. A spinal tap was done to exclude herpetic encephalitis. The CSF showed the bloody fluid with a protein level of 257 glucose of 43 RBC count of 3000 with 1348 white cells 80% of which were lymphocytes. The spinal tap was described as being mildly traumatic with progressive clearing of the spinal fluid. We will await the results of the infectious CSF panel. Plan to treat for the treatable conditions such as mycoplasma, herpes simplex. The presence of hemorrhage suggests Herpes simplex. CURRENT EVALUATION :7/31/2020    Patient evaluated and examined in the ICU. Plan is to move pt out of ICU today.      Afebrile  Recent BP: 179/90    On room air, SpO2 91%  He is alert, oriented, conversant. Is overall doing better. He is in general cogent but has a tendency to drift and be repetitive about subjects. Mentation not completely back to baseline. Urinary output  last 24 hours is 5,205 ml. Woods catheter is in place. Cleared for Woods removal; patient wants to wait until later today. Bun and creatinine are improving. MRI scan of the head on  7/24 shows subacute inflammation in the frontal region. This is an abnormal 3 day video EEG recording,suggesting moderate to severe encephalopathy of nonspecific etiology , no seizure activity noted. He is on Keppra for seizure prophylaxis. 7/23/20 CSF studies show an increase in WBC count with lymphocytic predominance. CSF cultures show no growth. CSF: HSV 1- positive. Patient developed acute kidney injury after prior CT of the abdomen with contrast . Fortunately he is showing rapid improvement. Will be able to increase Acyclovir dosage to q 8 Hr by 8-1-20. Labs, X rays reviewed: 7/31/2020    BUN: 51-->45-->42--> 32>23  Cr:  3.44-->2.94-->2.57--> 1.73>1.38    WBC:  10.4-->7.7--> 6.9>8.1  Hb:  12.3-->12.2--> 11.7>13.3  Plat:  159-->167--> 181>189    Cultures:  Urine:  ·   Blood:  ·   Sputum :  7/23: MRSA screening of the nares is negative  Wound:  ·     CSF:  · 7/23: no growth    COVID-19 test:  · 7/23/2020- negative    · 7/23/2020 CT of the head: No acute abnormality  ·     Viral panel; Herpes simplex type I positive      US Renal:7/26:  Small cyst noted on the left kidney. Evaluation for renal echogenicity is indeterminate secondary to increased   echogenicity of the liver. Hepatic steatosis noted. Discussed with patient, RN CC., family. I have personally reviewed the past medical history, past surgical history, medications, social history, and family history, and I have updated the database accordingly. Past Medical History:   No past medical history on file.     Past Surgical  History:   No past surgical history on file.    Medications:      potassium chloride  40 mEq Oral TID WC    spironolactone  25 mg Oral Daily    acyclovir  700 mg Intravenous Q12H    levETIRAcetam  500 mg Oral BID    pantoprazole  40 mg Oral BID    amLODIPine  10 mg Oral Daily    polyethylene glycol  17 g Oral Daily    senna  1 tablet Oral Nightly    QUEtiapine  50 mg Oral Nightly    heparin (porcine)  5,000 Units Subcutaneous 3 times per day    sodium chloride flush  10 mL Intravenous 2 times per day       Social History:     Social History     Socioeconomic History    Marital status:      Spouse name: Not on file    Number of children: Not on file    Years of education: Not on file    Highest education level: Not on file   Occupational History    Not on file   Social Needs    Financial resource strain: Not hard at all   [employer] insecurity     Worry: Never true     Inability: Never true   [employer] needs     Medical: Not on file     Non-medical: Not on file   Tobacco Use    Smoking status: Never Smoker    Smokeless tobacco: Never Used   Substance and Sexual Activity    Alcohol use: Not on file    Drug use: Not on file    Sexual activity: Not on file   Lifestyle    Physical activity     Days per week: Not on file     Minutes per session: Not on file    Stress: Not on file   Relationships    Social connections     Talks on phone: Not on file     Gets together: Not on file     Attends Roman [employer] service: Not on file     Active member of club or organization: Not on file     Attends meetings of clubs or organizations: Not on file     Relationship status: Not on file    Intimate partner violence     Fear of current or ex partner: Not on file     Emotionally abused: Not on file     Physically abused: Not on file     Forced sexual activity: Not on file   Other Topics Concern    Not on file   Social History Narrative    Not on file       Family History:   No family history on file.      Allergies:   Ibuprofen     Review of Systems:     Unable to provide. Encephalopathic. 7/31/2020      Constitutional: No fevers or chills. No systemic complaints  Head: No headaches  Eyes: No double vision or blurry vision. No conjunctival inflammation. ENT: No sore throat or runny nose. . No hearing loss, tinnitus or vertigo. Cardiovascular: No chest pain or palpitations. No shortness of breath. No DONALDSON  Lung: No shortness of breath or cough. No sputum production  Abdomen: No nausea, vomiting, diarrhea, or abdominal pain. Lorraine Sleet No cramps. Genitourinary: No increased urinary frequency, or dysuria. No hematuria. No suprapubic or CVA pain  Musculoskeletal: No muscle aches or pains. No joint effusions, swelling or deformities  Hematologic: No bleeding or bruising. Neurologic: No headache, weakness, numbness, or tingling. Integument: No rash, no ulcers. Psychiatric: No depression. Endocrine: No polyuria, no polydipsia, no polyphagia. Physical Examination :     Patient Vitals for the past 8 hrs:   BP Temp Temp src Pulse Resp SpO2 Weight   07/31/20 1100 (!) 179/90 -- -- 80 11 91 % --   07/31/20 1000 (!) 155/84 -- -- 80 17 92 % 215 lb 6.2 oz (97.7 kg)   07/31/20 0900 (!) 165/84 -- -- 96 18 90 % --   07/31/20 0800 (!) 151/85 98.6 °F (37 °C) Oral 70 11 93 % --   07/31/20 0700 (!) 177/98 -- -- 75 19 91 % --   07/31/20 0600 (!) 143/93 -- -- 71 17 91 % --   07/31/20 0500 138/76 -- -- 67 15 93 % --   07/31/20 0400 (!) 159/85 98 °F (36.7 °C) Oral 76 23 93 % --     General Appearance: Somnolent  Head:  Normocephalic, no trauma  Eyes: Pupils equal, round, reactive to light; sclera anicteric; conjunctivae pink. No embolic phenomena. ENT: Oropharynx clear, without erythema, exudate, or thrush. No tenderness of sinuses. Mouth/throat: mucosa pink and moist. No lesions. Dentition in good repair. Neck:Supple, without lymphadenopathy. Thyroid normal, No bruits. Pulmonary/Chest: Clear to auscultation, without wheezes, rales, or rhonchi. No dullness to percussion. Cardiovascular: Regular rate and rhythm without murmurs, rubs, or gallops. Abdomen: Soft, non tender. Bowel sounds normal. No organomegaly  All four Extremities: No cyanosis, clubbing, edema, or effusions. . Scar from prior right hip arthroplasty  Neurologic: No gross sensory or motor deficits. Moves all extremities. Encephalopathic  Skin: Warm and dry with good turgor. No signs of peripheral arterial or venous insufficiency. No ulcerations. No open wounds. Medical Decision Making -Laboratory:   I have independently reviewed/ordered the following labs:    CBC with Differential:   Recent Labs     07/30/20 0347 07/31/20  0613   WBC 6.9 8.1   HGB 11.7* 13.3   HCT 36.4* 39.7*    189   LYMPHOPCT 14* 16*   MONOPCT 13* 12     BMP:   Recent Labs     07/30/20 0347 07/31/20  0613    142   K 3.5* 3.2*   * 105   CO2 22 23   BUN 32* 23   CREATININE 1.73* 1.38*     Hepatic Function Panel:   Recent Labs     07/29/20  0400   PROT 5.7*   LABALBU 2.7*   BILIDIR 0.16   IBILI 0.40   BILITOT 0.56   ALKPHOS 41   ALT 24   AST 28     No results for input(s): RPR in the last 72 hours. No results for input(s): HIV in the last 72 hours. No results for input(s): BC in the last 72 hours. Lab Results   Component Value Date    MUCUS NOT REPORTED 07/26/2020    RBC 4.20 07/31/2020    TRICHOMONAS NOT REPORTED 07/26/2020    WBC 8.1 07/31/2020    YEAST NOT REPORTED 07/26/2020    TURBIDITY CLOUDY 07/26/2020     Lab Results   Component Value Date    CREATININE 1.38 07/31/2020    CREATININE 1.0 01/16/2020    GLUCOSE 106 07/31/2020       Medical Decision Making-Imaging:     Abdominal Xray, 7/29/20:  FINDINGS:    Mild gaseous distention of the stomach.  Oral contrast within the colon.  No    abnormally dilated loops of small bowel.              Impression    No abnormally dilated loops of small bowel.           Chest Xray, 7/27/20:  Impression    Rotated with relatively low lung volumes; no consolidation or sizable pleural effusion.  Mild central vascular congestion consideration.  Mild basilar    atelectasis.         RECOMMENDATION:    Followup chest x-ray if continued concern for aspiration or vascular    congestion          EXAMINATION:    MRI OF THE BRAIN WITHOUT CONTRAST  7/24/2020 10:11 am         TECHNIQUE:    Multiplanar multisequence MRI of the brain was performed without the    administration of intravenous contrast.         COMPARISON:    None.         HISTORY:    ORDERING SYSTEM PROVIDED HISTORY: Altered mental status    TECHNOLOGIST PROVIDED HISTORY:    Altered mental status         Initial evaluation.         FINDINGS:    INTRACRANIAL STRUCTURES/VENTRICLES: There is no acute infarct. No mass effect    or midline shift. No evidence of an acute intracranial hemorrhage.  Areas of    T2 FLAIR hyperintensity are seen in the periventricular and subcortical white    matter, which are nonspecific, but may represent chronic microvascular    ischemic change. Ciara Jessica is prominence of the ventricles and sulci due to    global parenchymal volume loss.  The sellar/suprasellar regions appear    unremarkable.  The normal signal voids within the major intracranial vessels    appear maintained.         ORBITS: The visualized portion of the orbits demonstrate no acute abnormality.         SINUSES: The visualized paranasal sinuses and mastoid air cells are well    aerated.         BONES/SOFT TISSUES: The bone marrow signal intensity appears normal. The soft    tissues demonstrate no acute abnormality.              Impression    1. No acute intracranial abnormality.  No acute infarct. 2. Minimal global parenchymal volume loss with minimal microvascular ischemic    changes.         CT OF THE HEAD WITHOUT CONTRAST  7/23/2020 8:09 am         TECHNIQUE:    CT of the head was performed without the administration of intravenous    contrast. Dose modulation, iterative reconstruction, and/or weight based    adjustment of the mA/kV was utilized to 6, 2020         HISTORY:    ORDERING SYSTEM PROVIDED HISTORY: Lower abdominal pain    TECHNOLOGIST PROVIDED HISTORY:              FINDINGS:    Lower Chest: Clear         Organs:         The abdominal wall appears normal.         The liver, spleen, pancreas, and adrenals appear normal.  Multiple punctate    calcified granulomas in the spleen.         Bilateral simple renal cortical cysts.         Bladder is decompressed and circumferentially thickened.         GI/Bowel: The stomach,small bowel, and colon appear normal. Oral contrast    noted throughout the small bowel.  Stool noted throughout the liver large    bowel.  Appendix normal.         Pelvis: Markedly enlarged prostate.  It measures 68 x 83 mm in AP and    transverse dimensions.         Peritoneum/Retroperitoneum: The abdominal aorta and iliac arteries are normal    in caliber. There is no pathologic adenopathy.         Bones/Soft Tissues: Right hip arthroplasty.              Impression    Markedly enlarged prostate.  Possible cystitis.  Increased stool.           EXAMINATION:    ONE XRAY VIEW OF THE PELVIS AND TWO XRAY VIEWS RIGHT HIP         6/25/2020 7:29 am         COMPARISON:    10/23/2017         HISTORY:    ORDERING SYSTEM PROVIDED HISTORY: Acute right hip pain         FINDINGS:    Status post right hip arthroplasty.  No evidence of hardware complications. Stable alignment.         Elsewhere, no acute fracture or dislocation.  Moderate left hip    osteoarthritis.  The SI joints and sacral arcuate lines appear intact. Degenerative changes of the SI joints are also seen.         No soft tissue abnormality.              Impression    1. Status post right hip arthroplasty.  No complication. 2. No acute osseous abnormality of the pelvis. 3. Moderate left hip osteoarthritis. Medical Decision [id number]:   Results for Pooja Tolliver (MRN [id number]) as of 7/24/2020 10:34   Ref.  Range 7/23/2020 16:37   CYTOMEGALOVIRUS (CMV) CSF FILM ARRAY Latest Ref Range: Not Detected  Not Detected   HAEMOPHILUS INFLUENZA CSF FILM ARRAY Latest Ref Range: Not Detected  Not Detected   HHV-6 (HERPESVIRUS 6) CSF FILM ARRAY Latest Ref Range: Not Detected  Not Detected   HSV-1 CSF FILM ARRAY Latest Ref Range: Not Detected  DETECTED (A)   HSV-2 CSF FILM ARRAY Latest Ref Range: Not Detected  Not Detected   PARECHOVIRUS CSF FILM ARRAY Latest Ref Range: Not Detected  Not Detected   ESCHERICHIA COLI K1 CSF FILM ARRAY Latest Ref Range: Not Detected  Not Detected   LISTERIA MONOCYTOGENES CSF FILM ARRAY Latest Ref Range: Not Detected  Not Detected   NEISSERIA MENIGITIDIS CSF FILM ARRAY Latest Ref Range: Not Detected  Not Detected   STREPTOCOCCUS AGALACTIAE CSF FILM ARRAY Latest Ref Range: Not Detected  Not Detected   STREPTOCOCCUS PNEUMONIAE CSF FILM ARRAY Latest Ref Range: Not Detected  Not Detected   CRYPTOCOCCUS NEOFORMANS/SALUD CSF FILM ARR. Latest Ref Range: Not Detected  Not Detected       Medical Decision Making-Other:     Note:  · Labs, medications, radiologic studies were reviewed with personal review of films  · Large amounts of data were reviewed  · Discussed with nursing Staff, Discharge planner  · Infection Control and Prevention measures reviewed  · All prior entries were reviewed  · Administer medications as ordered  · Prognosis: Guarded  · Discharge planning reviewed  · Follow up as outpatient. Thank you for allowing us to participate in the care of this patient. Please call with questions. Amarilys Khan DPM     ATTESTATION:    I have discussed the case, including pertinent history and exam findings with the residents and students. I have seen and examined the patient and the key elements of the encounter have been performed by me. I was present when the student obtained his information or examined the patient. I have reviewed the laboratory data, other diagnostic studies and discussed them with the residents.  I have updated the medical record

## 2020-07-31 NOTE — PLAN OF CARE
Problem: Anxiety/Stress:  Goal: Level of anxiety will decrease  Description: Level of anxiety will decrease  Outcome: Ongoing     Problem: Mental Status - Impaired:  Goal: Mental status will be restored to baseline  Description: Mental status will be restored to baseline  Outcome: Ongoing     Problem: Falls - Risk of:  Goal: Will remain free from falls  Description: Will remain free from falls  Outcome: Ongoing  Goal: Absence of physical injury  Description: Absence of physical injury  Outcome: Ongoing     Problem: Skin Integrity:  Goal: Will show no infection signs and symptoms  Description: Will show no infection signs and symptoms  Outcome: Ongoing  Goal: Absence of new skin breakdown  Description: Absence of new skin breakdown  Outcome: Ongoing     Problem: Confusion - Acute:  Goal: Mental status will be restored to baseline  Description: Mental status will be restored to baseline  Outcome: Ongoing  Goal: Absence of continued neurological deterioration signs and symptoms  Description: Absence of continued neurological deterioration signs and symptoms  Outcome: Ongoing     Problem: Discharge Planning:  Goal: Ability to perform activities of daily living will improve  Description: Ability to perform activities of daily living will improve  Outcome: Ongoing  Goal: Participates in care planning  Description: Participates in care planning  Outcome: Ongoing     Problem: Injury - Risk of, Physical Injury:  Goal: Will remain free from falls  Description: Will remain free from falls  Outcome: Ongoing  Goal: Absence of physical injury  Description: Absence of physical injury  Outcome: Ongoing     Problem: Mood - Altered:  Goal: Mood stable  Description: Mood stable  Outcome: Ongoing  Goal: Absence of abusive behavior  Description: Absence of abusive behavior  Outcome: Ongoing  Goal: Verbalizations of feeling emotionally comfortable while being cared for will increase  Description: Verbalizations of feeling emotionally comfortable while being cared for will increase  Outcome: Ongoing     Problem: Psychomotor Activity - Altered:  Goal: Absence of psychomotor disturbance signs and symptoms  Description: Absence of psychomotor disturbance signs and symptoms  Outcome: Ongoing     Problem: Sensory Perception - Impaired:  Goal: Demonstrations of improved sensory functioning will increase  Description: Demonstrations of improved sensory functioning will increase  Outcome: Ongoing  Goal: Decrease in sensory misperception frequency  Description: Decrease in sensory misperception frequency  Outcome: Ongoing  Goal: Able to refrain from responding to false sensory perceptions  Description: Able to refrain from responding to false sensory perceptions  Outcome: Ongoing  Goal: Demonstrates accurate environmental perceptions  Description: Demonstrates accurate environmental perceptions  Outcome: Ongoing  Goal: Able to distinguish between reality-based and nonreality-based thinking  Description: Able to distinguish between reality-based and nonreality-based thinking  Outcome: Ongoing  Goal: Able to interrupt nonreality-based thinking  Description: Able to interrupt nonreality-based thinking  Outcome: Ongoing     Problem: Sleep Pattern Disturbance:  Goal: Appears well-rested  Description: Appears well-rested  Outcome: Ongoing     Problem: Nutrition  Goal: Optimal nutrition therapy  Description: Nutrition Problem #1: Inadequate oral intake  Intervention: Food and/or Nutrient Delivery: Start oral diet and supplements as able. If TF needed (and able to keep NG in place), suggest Standard without Fiber goal 65 mL/hr (1872 kcal and 87 g pro/day).   Nutritional Goal: meet % of estimated nutrition needs     Outcome: Ongoing     Problem: Pain:  Goal: Pain level will decrease  Description: Pain level will decrease  Outcome: Ongoing  Goal: Control of acute pain  Description: Control of acute pain  Outcome: Ongoing  Goal: Control of chronic pain  Description: Control of chronic pain  Outcome: Ongoing

## 2020-07-31 NOTE — PLAN OF CARE
deterioration signs and symptoms  7/31/2020 1036 by Shanthi Umanzor RN  Outcome: Ongoing  7/31/2020 0356 by Anselmo Gomez RN  Outcome: Ongoing     Problem: Discharge Planning:  Goal: Ability to perform activities of daily living will improve  Description: Ability to perform activities of daily living will improve  7/31/2020 1036 by Shanthi Umanzor RN  Outcome: Ongoing  7/31/2020 0356 by Anselmo Gomez RN  Outcome: Ongoing  Goal: Participates in care planning  Description: Participates in care planning  7/31/2020 1036 by Shanthi Umanzor RN  Outcome: Ongoing  7/31/2020 0356 by Aneslmo Gomez RN  Outcome: Ongoing     Problem: Injury - Risk of, Physical Injury:  Goal: Will remain free from falls  Description: Will remain free from falls  7/31/2020 1036 by Shanthi Umanzor RN  Outcome: Ongoing  7/31/2020 0356 by Anselmo Gomez RN  Outcome: Ongoing  Goal: Absence of physical injury  Description: Absence of physical injury  7/31/2020 1036 by Shanthi Umanzor RN  Outcome: Ongoing  7/31/2020 0356 by Anselmo Gomez RN  Outcome: Ongoing     Problem: Mood - Altered:  Goal: Mood stable  Description: Mood stable  7/31/2020 1036 by Shanthi Umanzor RN  Outcome: Ongoing  7/31/2020 0356 by Anselmo Gomez RN  Outcome: Ongoing  Goal: Absence of abusive behavior  Description: Absence of abusive behavior  7/31/2020 1036 by Shanthi Umanzor RN  Outcome: Ongoing  7/31/2020 0356 by Anselmo Gomez RN  Outcome: Ongoing  Goal: Verbalizations of feeling emotionally comfortable while being cared for will increase  Description: Verbalizations of feeling emotionally comfortable while being cared for will increase  7/31/2020 1036 by Shanthi Umanzor RN  Outcome: Ongoing  7/31/2020 0356 by Anselmo Gomez RN  Outcome: Ongoing     Problem: Sensory Perception - Impaired:  Goal: Demonstrations of improved sensory functioning will increase  Description: Demonstrations of improved sensory functioning will increase  7/31/2020 1036 by Adelso Pimentel RN  Outcome: Ongoing  7/31/2020 0356 by Guadalupe Mandel RN  Outcome: Ongoing  Goal: Decrease in sensory misperception frequency  Description: Decrease in sensory misperception frequency  7/31/2020 1036 by Adelso Pimentel RN  Outcome: Ongoing  7/31/2020 0356 by Guadalupe Mandel RN  Outcome: Ongoing  Goal: Able to refrain from responding to false sensory perceptions  Description: Able to refrain from responding to false sensory perceptions  7/31/2020 1036 by Adelso Pimentel RN  Outcome: Ongoing  7/31/2020 0356 by Guadalupe Mandel RN  Outcome: Ongoing  Goal: Demonstrates accurate environmental perceptions  Description: Demonstrates accurate environmental perceptions  7/31/2020 1036 by Adelso Pimentel RN  Outcome: Ongoing  7/31/2020 0356 by Guadalupe Mandel RN  Outcome: Ongoing  Goal: Able to distinguish between reality-based and nonreality-based thinking  Description: Able to distinguish between reality-based and nonreality-based thinking  7/31/2020 1036 by Adelso Pimentel RN  Outcome: Ongoing  7/31/2020 0356 by Guadalupe Mandel RN  Outcome: Ongoing  Goal: Able to interrupt nonreality-based thinking  Description: Able to interrupt nonreality-based thinking  7/31/2020 1036 by Adelso Pimentel RN  Outcome: Ongoing  7/31/2020 0356 by Guadalupe Mandel RN  Outcome: Ongoing     Problem: Sleep Pattern Disturbance:  Goal: Appears well-rested  Description: Appears well-rested  7/31/2020 1036 by Adelso Pimentel RN  Outcome: Ongoing  7/31/2020 0356 by Guadalupe Mandel RN  Outcome: Ongoing

## 2020-07-31 NOTE — PROGRESS NOTES
07/27/2020    FIO2 UNKNOWN 07/28/2020         DATA:  Complete Blood Count:   Recent Labs     07/29/20 0400 07/30/20  0347   WBC 7.7 6.9   RBC 3.80* 3.81*   HGB 12.2* 11.7*   HCT 36.0* 36.4*   MCV 94.7 95.5   MCH 32.1 30.7   MCHC 33.9 32.1   RDW 13.0 12.6    181   MPV 11.6 11.4        Last 3 Blood Glucose:   Recent Labs     07/28/20 2002 07/29/20 0359 07/30/20  0347   GLUCOSE 123* 116* 105*        PT/INR:    Lab Results   Component Value Date    PROTIME 13.6 07/23/2020    INR 1.1 07/23/2020     PTT:    Lab Results   Component Value Date    APTT 27.9 07/23/2020       Comprehensive Metabolic Profile:   Recent Labs     07/28/20 2002 07/29/20 0359 07/29/20 0400 07/30/20  0347    148*  --  144   K 3.9 4.0  --  3.5*   * 110*  --  108*   CO2 21 19*  --  22   BUN 45* 42*  --  32*   CREATININE 2.94* 2.57*  --  1.73*   GLUCOSE 123* 116*  --  105*   CALCIUM 8.5* 8.8  --  9.0   PROT  --   --  5.7*  --    LABALBU  --   --  2.7*  --    BILITOT  --   --  0.56  --    ALKPHOS  --   --  41  --    AST  --   --  28  --    ALT  --   --  24  --       Magnesium:   Lab Results   Component Value Date    MG 2.2 07/28/2020    MG 2.1 07/23/2020    MG 2.0 07/22/2020     Phosphorus:   Lab Results   Component Value Date    PHOS 3.8 07/28/2020    PHOS 2.6 07/23/2020    PHOS 2.8 07/22/2020     Ionized Calcium:   Lab Results   Component Value Date    CAION 1.22 07/28/2020        Urinalysis:   Lab Results   Component Value Date    NITRU NEGATIVE 07/26/2020    COLORU YELLOW 07/26/2020    PHUR 5.0 07/26/2020    WBCUA 10 TO 20 07/26/2020    RBCUA 20 TO 50 07/26/2020    MUCUS NOT REPORTED 07/26/2020    TRICHOMONAS NOT REPORTED 07/26/2020    YEAST NOT REPORTED 07/26/2020    BACTERIA NOT REPORTED 07/26/2020    SPECGRAV 1.013 07/26/2020    LEUKOCYTESUR SMALL 07/26/2020    UROBILINOGEN Normal 07/26/2020    BILIRUBINUR NEGATIVE 07/26/2020    GLUCOSEU NEGATIVE 07/26/2020    KETUA TRACE 07/26/2020    AMORPHOUS NOT REPORTED 07/26/2020 HgBA1c:  No results found for: LABA1C  TSH:    Lab Results   Component Value Date    TSH 1.33 07/23/2020     Lactic Acid:   Lab Results   Component Value Date    LACTA NOT REPORTED 07/23/2020    LACTA 1.5 07/22/2020      Troponin: No results for input(s): TROPONINI in the last 72 hours. 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)     Disorientation     Sepsis ([facility] [address] 07/21/2020    Acute cystitis without hematuria 07/21/2020    Incomplete emptying of bladder due to benign prostatic hyperplasia 07/21/2020    Cervical spinal stenosis 03/11/2020    Spinal stenosis of lumbar region 03/11/2020          PLAN:       HOME MEDS RECONCILED: [] No  [x] Yes    CONSULTATION NEEDED:  [x] No  [] Yes    FAMILY UPDATED:    [] No  [] Yes    TRANSFER OUT OF ICU:   [] No  [x] Yes            Plan:    Overnight Events:  None     1, AMS 2/2 HSV encephalitis - improving , acycolvir cont 10 mg/kg IV every 12 hours per ID. 2. BALBIR - improving , cont 1/2 NS 50cc/hr , nephro following, aldactone, d/c kirk  3. Hypokalemia - replace K 3.2  4. Constipation - bowel regimen     Hypertension  · Hemodynamically stable  · Continue Norvasc 10 mg daily     Pulmonary:  · Maintain oxygen sats >92%  · Pulmonary toilet  · Covid negative  · Normal sats on room air  · SpO2: 92 %      GI/Nutrition  Constipation  · glyclolax PO QD  · Senna 8.6mg PO QHS  · Ulcer Prophylaxis: Switch to Protonix p.o.   · Diet:DIET GENERAL;  · Dietary Nutrition Supplements: Standard High Calorie Oral Supplement   · Zofran/Phenergan PRN    DVT Prophylaxis  · SCD sleeves -thigh high and Heparin   · Heparin prophylaxis     CODE STATUS: Full Code      63-year-old gentleman admitted with altered mental status secondary to HSV encephalitis currently on acyclovir. BALBIR improving creatinine 1.38 BUN 23.  +2.6 L since admission, 1.3 cc/kg/h output. Hypokalemia 3.2 - replace with 80mEq  Hemodynamically stable, hemoglobin 11.7, minimal decrease from 12.2 likely secondary to frequent lab draws versus dilution.   Hypertension amlodipine 10 mg  Followed by neurology and ID   VT prophylaxis with heparin  BM x2 no kirk  Dec appt general diet, shakes PRN      Zacarias Heap, DO  Emergency Medicine PGY-2  Critical Care Resident  7/31/2020 6:35 AM

## 2020-08-01 LAB
ABSOLUTE EOS #: 0.11 K/UL (ref 0–0.44)
ABSOLUTE IMMATURE GRANULOCYTE: 0.27 K/UL (ref 0–0.3)
ABSOLUTE LYMPH #: 1.42 K/UL (ref 1.1–3.7)
ABSOLUTE MONO #: 1.07 K/UL (ref 0.1–1.2)
ANION GAP SERPL CALCULATED.3IONS-SCNC: 16 MMOL/L (ref 9–17)
BASOPHILS # BLD: 1 % (ref 0–2)
BASOPHILS ABSOLUTE: 0.05 K/UL (ref 0–0.2)
BUN BLDV-MCNC: 21 MG/DL (ref 8–23)
BUN/CREAT BLD: ABNORMAL (ref 9–20)
CALCIUM SERPL-MCNC: 9.3 MG/DL (ref 8.6–10.4)
CHLORIDE BLD-SCNC: 105 MMOL/L (ref 98–107)
CO2: 20 MMOL/L (ref 20–31)
CREAT SERPL-MCNC: 1.27 MG/DL (ref 0.7–1.2)
DIFFERENTIAL TYPE: ABNORMAL
EOSINOPHILS RELATIVE PERCENT: 1 % (ref 1–4)
GFR AFRICAN AMERICAN: >60 ML/MIN
GFR NON-AFRICAN AMERICAN: 56 ML/MIN
GFR SERPL CREATININE-BSD FRML MDRD: ABNORMAL ML/MIN/{1.73_M2}
GFR SERPL CREATININE-BSD FRML MDRD: ABNORMAL ML/MIN/{1.73_M2}
GLUCOSE BLD-MCNC: 105 MG/DL (ref 70–99)
HCT VFR BLD CALC: 41 % (ref 40.7–50.3)
HEMOGLOBIN: 13.7 G/DL (ref 13–17)
IMMATURE GRANULOCYTES: 3 %
LYMPHOCYTES # BLD: 16 % (ref 24–43)
MCH RBC QN AUTO: 31.1 PG (ref 25.2–33.5)
MCHC RBC AUTO-ENTMCNC: 33.4 G/DL (ref 28.4–34.8)
MCV RBC AUTO: 93.2 FL (ref 82.6–102.9)
MONOCYTES # BLD: 12 % (ref 3–12)
NRBC AUTOMATED: 0 PER 100 WBC
PDW BLD-RTO: 12.4 % (ref 11.8–14.4)
PLATELET # BLD: 189 K/UL (ref 138–453)
PLATELET ESTIMATE: ABNORMAL
PMV BLD AUTO: 10.5 FL (ref 8.1–13.5)
POTASSIUM SERPL-SCNC: 3.2 MMOL/L (ref 3.7–5.3)
RBC # BLD: 4.4 M/UL (ref 4.21–5.77)
RBC # BLD: ABNORMAL 10*6/UL
SEG NEUTROPHILS: 67 % (ref 36–65)
SEGMENTED NEUTROPHILS ABSOLUTE COUNT: 5.79 K/UL (ref 1.5–8.1)
SODIUM BLD-SCNC: 141 MMOL/L (ref 135–144)
URIC ACID: 6.4 MG/DL (ref 3.4–7)
WBC # BLD: 8.7 K/UL (ref 3.5–11.3)
WBC # BLD: ABNORMAL 10*3/UL

## 2020-08-01 PROCEDURE — 99231 SBSQ HOSP IP/OBS SF/LOW 25: CPT | Performed by: INTERNAL MEDICINE

## 2020-08-01 PROCEDURE — 6360000002 HC RX W HCPCS: Performed by: INTERNAL MEDICINE

## 2020-08-01 PROCEDURE — 6370000000 HC RX 637 (ALT 250 FOR IP): Performed by: STUDENT IN AN ORGANIZED HEALTH CARE EDUCATION/TRAINING PROGRAM

## 2020-08-01 PROCEDURE — 6370000000 HC RX 637 (ALT 250 FOR IP): Performed by: INTERNAL MEDICINE

## 2020-08-01 PROCEDURE — 99223 1ST HOSP IP/OBS HIGH 75: CPT | Performed by: INTERNAL MEDICINE

## 2020-08-01 PROCEDURE — 80048 BASIC METABOLIC PNL TOTAL CA: CPT

## 2020-08-01 PROCEDURE — 97116 GAIT TRAINING THERAPY: CPT

## 2020-08-01 PROCEDURE — 2580000003 HC RX 258: Performed by: INTERNAL MEDICINE

## 2020-08-01 PROCEDURE — 99232 SBSQ HOSP IP/OBS MODERATE 35: CPT | Performed by: INTERNAL MEDICINE

## 2020-08-01 PROCEDURE — 99232 SBSQ HOSP IP/OBS MODERATE 35: CPT | Performed by: PSYCHIATRY & NEUROLOGY

## 2020-08-01 PROCEDURE — 6360000002 HC RX W HCPCS: Performed by: STUDENT IN AN ORGANIZED HEALTH CARE EDUCATION/TRAINING PROGRAM

## 2020-08-01 PROCEDURE — APPSS30 APP SPLIT SHARED TIME 16-30 MINUTES: Performed by: NURSE PRACTITIONER

## 2020-08-01 PROCEDURE — 36415 COLL VENOUS BLD VENIPUNCTURE: CPT

## 2020-08-01 PROCEDURE — 1200000000 HC SEMI PRIVATE

## 2020-08-01 PROCEDURE — 84550 ASSAY OF BLOOD/URIC ACID: CPT

## 2020-08-01 PROCEDURE — 2580000003 HC RX 258: Performed by: STUDENT IN AN ORGANIZED HEALTH CARE EDUCATION/TRAINING PROGRAM

## 2020-08-01 PROCEDURE — 97110 THERAPEUTIC EXERCISES: CPT

## 2020-08-01 PROCEDURE — 85025 COMPLETE CBC W/AUTO DIFF WBC: CPT

## 2020-08-01 RX ADMIN — POTASSIUM CHLORIDE 40 MEQ: 1500 TABLET, EXTENDED RELEASE ORAL at 09:17

## 2020-08-01 RX ADMIN — ACETAMINOPHEN 650 MG: 325 TABLET ORAL at 04:12

## 2020-08-01 RX ADMIN — ACYCLOVIR SODIUM 700 MG: 50 INJECTION, SOLUTION INTRAVENOUS at 17:35

## 2020-08-01 RX ADMIN — HEPARIN SODIUM 5000 UNITS: 5000 INJECTION INTRAVENOUS; SUBCUTANEOUS at 14:19

## 2020-08-01 RX ADMIN — ANTACID TABLETS 500 MG: 500 TABLET, CHEWABLE ORAL at 09:36

## 2020-08-01 RX ADMIN — SODIUM CHLORIDE, PRESERVATIVE FREE 10 ML: 5 INJECTION INTRAVENOUS at 09:42

## 2020-08-01 RX ADMIN — SPIRONOLACTONE 25 MG: 25 TABLET ORAL at 09:17

## 2020-08-01 RX ADMIN — HEPARIN SODIUM 5000 UNITS: 5000 INJECTION INTRAVENOUS; SUBCUTANEOUS at 09:36

## 2020-08-01 RX ADMIN — PANTOPRAZOLE SODIUM 40 MG: 40 TABLET, DELAYED RELEASE ORAL at 09:17

## 2020-08-01 RX ADMIN — ACYCLOVIR SODIUM 700 MG: 50 INJECTION, SOLUTION INTRAVENOUS at 09:17

## 2020-08-01 RX ADMIN — AMLODIPINE BESYLATE 10 MG: 10 TABLET ORAL at 09:17

## 2020-08-01 RX ADMIN — HEPARIN SODIUM 5000 UNITS: 5000 INJECTION INTRAVENOUS; SUBCUTANEOUS at 22:26

## 2020-08-01 RX ADMIN — SODIUM CHLORIDE: 4.5 INJECTION, SOLUTION INTRAVENOUS at 01:13

## 2020-08-01 RX ADMIN — PANTOPRAZOLE SODIUM 40 MG: 40 TABLET, DELAYED RELEASE ORAL at 22:26

## 2020-08-01 ASSESSMENT — PAIN DESCRIPTION - PROGRESSION
CLINICAL_PROGRESSION: GRADUALLY WORSENING

## 2020-08-01 ASSESSMENT — PAIN SCALES - GENERAL
PAINLEVEL_OUTOF10: 0
PAINLEVEL_OUTOF10: 7
PAINLEVEL_OUTOF10: 0
PAINLEVEL_OUTOF10: 5
PAINLEVEL_OUTOF10: 5

## 2020-08-01 ASSESSMENT — PAIN DESCRIPTION - PAIN TYPE: TYPE: ACUTE PAIN

## 2020-08-01 ASSESSMENT — PAIN DESCRIPTION - ORIENTATION: ORIENTATION: RIGHT

## 2020-08-01 ASSESSMENT — ENCOUNTER SYMPTOMS
GASTROINTESTINAL NEGATIVE: 1
RESPIRATORY NEGATIVE: 1

## 2020-08-01 ASSESSMENT — PAIN DESCRIPTION - LOCATION: LOCATION: KNEE

## 2020-08-01 NOTE — PROGRESS NOTES
Critical care team - Resident sign-out to medicine service      Date and time: 8/1/2020 6:38 AM  Patient's name:  Adelina Mcguire Record Number: [id number]  Patient's account/billing number: [de-identified]  Patient's YOB: 1950  Age: 79 y.o. Date of Admission: 7/23/2020  3:02 PM  Length of stay during current admission: 9    Primary Care Physician: Lyndon Adorno MD    Code Status: Full Code    Mode of physician to physician communication:        [x] Via telephone   [] In person     Date and time of sign-out: 8/1/2020 6:38 AM    Accepting Internal Medicine NP: Ms Caitlyn Hutchison    Accepting Medicine team: [facility]    Accepting team's attending: Dr. Gerard Millan    Patient's current ICU Bed:  125    Patient's assigned bed on floor:  443        [x] Med-Surg Monitored [] Step-down       [] Psychiatry ICU       [] Psych floor     Reason for ICU admission:     Encephalopathy    ICU course summary:     Patient is a 70-year-old male with history of chronic prostatism and obstructive uropathy who was initially admitted to [facility] with complaints of abdomen pain and dysuria, CT showed cystitis changes and large prostate enlargement and treated for UTI. During the course of the treatment, patient's mentation worsened and started spiking fever 101-102 and he was transferred to [facility] for further management and concern of meningitis. Patient was empirically started on Rocephin, doxycycline and acyclovir for suspected enterocolitis, LP with CSF analysis consistent with HSV-1 encephalitis. He was continued treatment with acyclovir and also received Keppra as per neurology. CT head and MRA did not show any acute abnormalities. LTM showed mild diffuse encephalopathic slowing. Patient also had BALBIR during the course of the disease and improved with fluids, nephrology following. Urology was consulted and cleared to discontinue [place].     Procedures during patient's ICU stay:     LP    Current Vitals:     BP (!) 155/84   Pulse 84   Temp 97.7 °F (36.5 °C) (Oral)   Resp 20   Ht 5' 10\" (1.778 m)   Wt 215 lb 6.2 oz (97.7 kg)   SpO2 98%   BMI 30.91 kg/m²       Cultures:     Blood cultures:                 [] None drawn      [] Negative             []  Positive (Details:  )  Urine Culture:                   [] None drawn      [] Negative             []  Positive (Details:  )  Sputum Culture:               [] None drawn       [] Negative             []  Positive (Details:  )   Endotracheal aspirate:     [] None drawn       [] Negative             []  Positive (Details:  )       Consults:     1. Nephro  2. ID  3. 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 *    Recommended Follow-up:     1. Nephro follow up for BALBIR  2. Watch mentation. On Seroquel  3. Neurology following. Continue Keppra  4. Acyclovir, stop date as per ID  5. Critical care sign off out of ICU    Above mentioned assessment and plan was discussed by me with the admitting medicine resident. The medicine team assigned to the patient by medicine admitting resident will be following up the patient from now onwards on the floor. Dequan Savage M.D.   Critical care resident,  Department of Internal Medicine/ Critical care  [facility]             8/1/2020, 6:38 AM

## 2020-08-01 NOTE — PROGRESS NOTES
[facility]  Progress Note    Kamilah Vizcarra  MRN: [id number]  Date: 8/1/2020    Reason for F/U :   HSV encephalitis    Impression :   1. 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    Recommendations:   · Continue intravenous antimicrobial therapy with acyclovir  · Given improving renal parameters I will increase the frequency to every 8 hourly from every 12 hourly  · Clinically the patient is doing better  · He will need physical therapy and Occupational Therapy    Infection Control Recommendations:   Universal precautions    Discharge Planning:   Estimated Length of IV antimicrobials: 14 to 21 days  Patient will need Midline Catheter Insertion/ PICC line Insertion: No  Patient will need: Home IV , [place],  SNF,  LTAC: Undetermined  Patient willneed outpatient wound care: No    MedicalDecision making / Summary of Stay:   Kamilah Vizcarra is a 79y.o.-year-old  male who was initially admitted on 7/23/2020. Patient seen at the request of .     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 He is unable to provide any information himself at this point in time.     Available information reviewed and discussed with Dr. Shamir Whitley.     Patient apparently developed onset of some dysuria and pain in the pelvic and right hip area starting about 7 to the 10 days prior to admission. In addition he was exhibiting some fatigue. He has a pre-existing history of prostatic hypertrophy with previous documented urinary tract infections. The patient apparently felt that he was coming down with a urinary tract infection and had a started doxycycline on 7/20/2020 without benefit. He seems to have experienced prior problems with use of quinolones that may have influenced his choice of antibiotics.     On 7/21/2020 he was noticed to have some mild confusion and problems with cognition were initially attributed to the use of doxycycline.   He was evaluated at [facility]   Constitutional: Negative. Respiratory: Negative. Cardiovascular: Negative. Gastrointestinal: Negative. Genitourinary: Negative. Musculoskeletal: Negative. Neurological: Negative. Psychiatric/Behavioral: Negative. Physical Examination :     Physical Exam  Constitutional:       Appearance: He is well-developed. HENT:      Head: Normocephalic and atraumatic. Neck:      Musculoskeletal: Normal range of motion and neck supple. Cardiovascular:      Rate and Rhythm: Normal rate. Heart sounds: Normal heart sounds. No friction rub. No gallop. Pulmonary:      Effort: Pulmonary effort is normal.      Breath sounds: Normal breath sounds. No wheezing. Abdominal:      General: Bowel sounds are normal.      Palpations: Abdomen is soft. There is no mass. Tenderness: There is no abdominal tenderness. Musculoskeletal: Normal range of motion. Lymphadenopathy:      Cervical: No cervical adenopathy. Skin:     General: Skin is warm and dry. Neurological:      Mental Status: He is alert and oriented to person, place, and time. Laboratory data:   I have independently reviewed the followinglabs:  CBC with Differential:   Recent Labs     07/31/20  0613 08/01/20  0339   WBC 8.1 8.7   HGB 13.3 13.7   HCT 39.7* 41.0    189   LYMPHOPCT 16* 16*   MONOPCT 12 12     BMP:   Recent Labs     07/31/20  0613 08/01/20  0339    141   K 3.2* 3.2*    105   CO2 23 20   BUN 23 21   CREATININE 1.38* 1.27*     Hepatic Function Panel: No results for input(s): PROT, LABALBU, BILIDIR, IBILI, BILITOT, ALKPHOS, ALT, AST in the last 72 hours. No results for input(s): VANCOTROUGH in the last 72 hours. Lab Results   Component Value Date    CRP 0.7 07/23/2020     Lab Results   Component Value Date    SEDRATE 4 07/21/2020       No results for input(s): PROCAL in the last 72 hours.     Imaging Studies:   No new imaging    Cultures:     Culture, CSF [[id number]]   Collected: sodium chloride flush  10 mL Intravenous 2 times per day           [facility]  Abbey Dykes MD  Perfect Serve messaging  OFFICE: [phone]      Electronically signed by Abbey Dykes MD on 8/1/2020 at 6:04 PM  Thank you for allowing us to participate in the care of this patient. Please call with questions. This note iscreated with the assistance of a speech recognition program.  While intending to generate a document that actually reflects the content of the visit, the document can still have some errors including those of syntax andsound a like substitutions which may escape proof reading. In such instances, actual meaning can be extrapolated by contextual diversion.

## 2020-08-01 NOTE — PROGRESS NOTES
Neurology Nurse Practitioner Progress Note      INTERVAL HISTORY: This is a 79 y.o.  male admitted 7/23/2020 for Sepsis ([facility] [A41.9]. This is a follow-up neurology progress note. The patient was examined and the chart was reviewed. Discussed with the RN. Patient has been much more alert, awake and oriented. He was moved to stepdown unit on 8/1/2020. Pt c/o L hand weakness; on examination has weak L hand  & weak fine motor. Will order MRI brain. HPI: Caro Wing is a 79 y.o. male with H/O chronic prostatism and obstructive voiding symptoms, chronic low back pain, who was admitted as a transfer from [facility], ED on 7/23/2020 for Sepsis ([facility] [A41.9]. According to medical records patient had dysuria along with abdominal/pelvic discomfort for the past 10 days. He also complained of deep right hip pain, radiating into the pelvis. Due to history of chronic prostatism and obstructive voiding symptoms, occasionally patient does bladder catheterizations. With urine turning dark, he started himself on doxycycline with no significant improvement. There was some concern of insect bite on his left inner thigh while he was in the [place] last week. Denied any rash. On 7/21, patient went to work but was unable to recognize his own staff. His wife took him to [facility] ED in the evening of 7/21/2020; patient thought confusion was the side effect to doxycycline. Patient was febrile with temp of 101.3 °F; he was admitted in ICU and sepsis work-up was started. CT abdomen/pelvis showed large prostate along with right hip arthroplasty changes. CT head was negative. Patient requested Xanax and Dilaudid due to agitation and low back pain. He was started on doxycycline, Rocephin, and acyclovir (for suspected encephalitis). Patient stayed confused, unable to recognize his wife, febrile and restless. COVID was negative.   Patient was evaluated by Dr. Sruthi Bedoya who recommended transfer to [facility], on 7/23/2020, for neuro and ID evaluation. At arrival, patient was stable hemodynamically, saturating well on room air, easily arousable, oriented to self only, moving all limbs but unable to follow commands. Patient underwent LP and was diagnosed with herpes encephalitis (HSV-1). He was continued on acyclovir and got loaded with Keppra 1 g IVPB x1 followed by maintenance dose of 500 mg twice a day. EEG was done -result awaited. MRI brain with and without contrast was ordered but patient was unable to complete the scan with contrast due to severe agitation; MRI negative.  potassium chloride  40 mEq Oral TID WC    spironolactone  25 mg Oral Daily    acyclovir  700 mg Intravenous Q12H    levETIRAcetam  500 mg Oral BID    pantoprazole  40 mg Oral BID    amLODIPine  10 mg Oral Daily    polyethylene glycol  17 g Oral Daily    senna  1 tablet Oral Nightly    QUEtiapine  50 mg Oral Nightly    heparin (porcine)  5,000 Units Subcutaneous 3 times per day    sodium chloride flush  10 mL Intravenous 2 times per day       No past medical history on file. No past surgical history on file. PHYSICAL EXAM:      Blood pressure (!) 155/93, pulse 71, temperature 98.1 °F (36.7 °C), temperature source Oral, resp. rate 18, height 5' 10\" (1.778 m), weight 215 lb 6.2 oz (97.7 kg), SpO2 96 %.       Limited Neurological Examination:  Pt was wide awake, alert and oriented x3  Speech and language - intact  Followed commands  Normal visual threat, no nystagmus, no ptosis  Face symmetrical  Hearing intact  Tongue midline  Pain in both knees, however he lifted all limbs antigravity with no drift noticed  L hand with weaker  & weak fine motor  Sensations -grossly intact  DTRs -intact  Plantars -flexor response      DATA    Lab Results   Component Value Date    WBC 8.7 08/01/2020    HGB 13.7 08/01/2020    HCT 41.0 08/01/2020     08/01/2020    ALT 24 07/29/2020    AST 28 07/29/2020     08/01/2020    K 3.2 (L) 08/01/2020  08/01/2020    CREATININE 1.27 (H) 08/01/2020    BUN 21 08/01/2020    CO2 20 08/01/2020    TSH 1.33 07/23/2020    PSA 14.54 (H) 01/03/2020    INR 1.1 07/23/2020    CLEATRDI70 616 07/23/2020    FOLATE 17.6 07/23/2020     Lab Results   Component Value Date    CHOL 137 07/29/2020     Lab Results   Component Value Date    TRIG 96 07/29/2020     Lab Results   Component Value Date    HDL 34 (L) 07/29/2020    HDL 56 11/17/2017     Lab Results   Component Value Date    LDLCHOLESTEROL 84 07/29/2020    LDLCHOLESTEROL 116 11/17/2017     Lab Results   Component Value Date    VLDL NOT REPORTED 07/29/2020    VLDL NOT REPORTED 11/17/2017     Lab Results   Component Value Date    CHOLHDLRATIO 4.0 07/29/2020    CHOLHDLRATIO 3.3 11/17/2017 7/23/2020 19:09   Total IgG 767     CARRIE       Negative      7/21/2020 08:00   CRP 0.3   ESR 4       CSF STUDY:   7/23/2020 19:09   Albumin Index 339.8 (H)   Albumin, CSF 1393 (H)   Appearance, CSF Bloody   Glucose, CSF 43   IgG Index, CSF 0.54   IgG Synthesis Rate 10.7 (H)   IgG, CSF 14.0 (H)   Oligo Bands 0   Myelin basic protein 1.76   Protein, .4 (H)   RBC, CSF 3,000 (H)   WBC, CSF 1,348 (H)   Neutrophils, CSF 0   Lymphs, CSF 87   Cx Negative   HSV-1 Detected   LD 56   Toxoplasma AB Negative     HIV           Negative      DIAGNOSTIC DATA:  CT HEAD (7/23/2020): No acute intracranial abnormality     MRI BRAIN (7/24/2020): No acute intracranial abnormality     LTME (7/24/2020): This EEG shows the presence of mild diffuse slowing. This EEG is concordant with diffuse encephalopathy. No clear lateralized or epileptiform disturbance is seen. IMPRESSION & PLAN: 79 y.o.  male admitted with  Acute encephalopathy in the setting of acute herpes simplex encephalitis (HSV-1). Patient has been much more alert, awake and oriented. He was moved to stepdown unit on 8/1/2020, under medicine service.  Pt is on acyclovir & Seroquel 50 mg HS      Pt c/o L hand weakness;

## 2020-08-01 NOTE — PROGRESS NOTES
Physical Therapy  Facility/Department: [facility] ONC/MED SURG  Daily Treatment Note  NAME: Akilah Session  : 1950  MRN: [id number]    Date of Service: 2020    Discharge Recommendations:  Patient would benefit from continued therapy after discharge   PT Equipment Recommendations  Equipment Needed: (CTA, may need RW)    Assessment   Body structures, Functions, Activity limitations: Decreased functional mobility ; Decreased endurance;Decreased balance;Decreased strength  Assessment: Improved activity tolerance with increased ambulation distance. Pt progressing well with mobility and displays no LOB ambulating with RW. Prognosis: Good  PT Education: General Safety;Gait Training;Functional Mobility Training;Home Exercise Program;Transfer Training  Patient Education: importance of mobility  REQUIRES PT FOLLOW UP: Yes  Activity Tolerance  Activity Tolerance: Patient Tolerated treatment well     Patient Diagnosis(es): There were no encounter diagnoses. has no past medical history on file. has no past surgical history on file. Restrictions  Restrictions/Precautions  Restrictions/Precautions: Fall Risk, Seizure, Up as Tolerated  Required Braces or Orthoses?: No  Position Activity Restriction  Other position/activity restrictions: kirk catheter  Subjective   General  Chart Reviewed: Yes  Response To Previous Treatment: Patient with no complaints from previous session. Family / Caregiver Present: Yes(daughter)  Subjective  Subjective: Pt in chair; c/o 5/10 R knee pain. Agreeable to PT  General Comment  Comments: Pt ambulated 150ft.  Had BM on commode and then assisted back to chair at end of session; call light in reach  Pain Screening  Patient Currently in Pain: Yes  Pain Assessment  Pain Assessment: 0-10  Pain Level: 5  Pain Type: Acute pain  Pain Location: Knee  Pain Orientation: Right  Vital Signs  Patient Currently in Pain: Yes       Orientation  Orientation  Overall Orientation Status: Within Normal

## 2020-08-01 NOTE — PROGRESS NOTES
Pulmonary Progress Note  [facility]      Patient - Kush Polanco,  Age - 79 y.o.    - 1950      Room Number - 7070/9142-30   MRN -  [id number]   Acct # - [de-identified]  Date of Admission -  2020  3:02 PM    SUBJECTIVE  Transferred out of ICU last night. Currently up in chair. Hard of hearing. Some confusion although mostly lucid and conversant. Denies pain. Generalized weakness. Believe he would benefit by acute rehab. OBJECTIVE    VITALS    height is 5' 10\" (1.778 m) and weight is 215 lb 6.2 oz (97.7 kg). His oral temperature is 97.8 °F (36.6 °C). His blood pressure is 142/91 (abnormal) and his pulse is 55. His respiration is 18 and oxygen saturation is 97%. Temperature Range: Temp: 97.8 °F (36.6 °C) Temp  Av.9 °F (36.6 °C)  Min: 97.7 °F (36.5 °C)  Max: 98.1 °F (36.7 °C)  BP Range:  Systolic (47RUM), QDX:630 , Min:128 , DEIDRE:457     Diastolic (41QJG), IJD:51, Min:84, Max:110    Pulse Range: Pulse  Av.7  Min: 55  Max: 84  Respiration Range: Resp  Av  Min: 14  Max: 23  Current Pulse Ox[de-identified]  SpO2: 97 %  24HR Pulse Ox Range:  SpO2  Av %  Min: 96 %  Max: 98 %  Oxygen Amount and Delivery:  O2 Flow Rate (L/min): 2 L/min      Wt Readings from Last 3 Encounters:   20 215 lb 6.2 oz (97.7 kg)   20 222 lb 3.2 oz (100.8 kg)   20 216 lb (98 kg)       I/O (24 Hours)    Intake/Output Summary (Last 24 hours) at 2020 1644  Last data filed at 2020 0400  Gross per 24 hour   Intake 231 ml   Output 915 ml   Net -684 ml     EXAM  General Appearance  Alert, Oriented, and Cooperative = 0noneoriented to person, place, time, and general circumstances  HEENT - Normal, Head is normocephalic, atraumatic. Neck - Supple, symmetrical, noneNo cervical, supraclavicular or axillary lymphadenopathy noted. Lungs - Percussion normal. Good diaphragmatic excursion.  Lungs clear to auscultation bilaterally  Cardiovascular - Cor RRR and No murmurs

## 2020-08-01 NOTE — PROGRESS NOTES
----- Message from Anai Cooper sent at 11/1/2017 10:07 AM CDT -----  Contact: [phone]  Patient states she has a yeast infection and would like to speak with you   Renal Progress Note    Patient :  Ernestine Smith; 79 y.o. MRN# [id number]  Location:  0536/9599-25  Attending:  Carlos Paige MD  Admit Date:  7/23/2020   Hospital Day: 9      Subjective:     Admitted for encephalopathy, based on LP results and CSF PCR he was diagnosed with herpetic encephalitis. Now on acyclovir. Tolerating it well. Developed acute kidney injury both from systemic informatory response syndrome and contrast exposure. Creatinine peaked at 4.2, was oliguric, did not require renal placement therapy now resolving, creat down to 1.27. Has received significant amount of IV fluids and a positive fluid balance, which is now improving with post ATN diuresis over last 24 hours he is negative 1 L made 2.1 L of urine in last 24 hours. Still about 1.6 L + since admission. Hyponatremia resolved. No shortness of breath orthopnea. Mental status back to baseline, oral intake improving  Hypokalemia persists although better, hypernatremia improving. 3.2 today. Will need replacement per protocol. Blood pressures fluctuate, was placed on Aldactone yesterday with some benefit. Patient seen and evaluated at bedside.      Outpatient Medications:     Medications Prior to Admission: timolol (TIMOPTIC) 0.5 % ophthalmic solution, 1 drop 2 times daily    Current Medications:     Scheduled Meds:    potassium chloride  40 mEq Oral TID WC    spironolactone  25 mg Oral Daily    acyclovir  700 mg Intravenous Q12H    levETIRAcetam  500 mg Oral BID    pantoprazole  40 mg Oral BID    amLODIPine  10 mg Oral Daily    polyethylene glycol  17 g Oral Daily    senna  1 tablet Oral Nightly    QUEtiapine  50 mg Oral Nightly    heparin (porcine)  5,000 Units Subcutaneous 3 times per day    sodium chloride flush  10 mL Intravenous 2 times per day     Continuous Infusions:    sodium chloride 50 mL/hr at 08/01/20 0113     PRN Meds:  HYDROmorphone, calcium carbonate, sodium chloride flush, acetaminophen **OR** acetaminophen, promethazine **OR** ondansetron    Input/Output:       I/O last 3 completed shifts: In:  [I.V.:1143]  Out:  [Urine:]. Patient Vitals for the past 96 hrs (Last 3 readings):   Weight   20 1000 215 lb 6.2 oz (97.7 kg)   20 0600 226 lb 6.6 oz (102.7 kg)       Vital Signs:   Temperature:  Temp: 98.1 °F (36.7 °C)  TMax:   Temp (24hrs), Av.3 °F (36.8 °C), Min:97.7 °F (36.5 °C), Max:98.8 °F (37.1 °C)    Respirations:  Resp: 18  Pulse:   Pulse: 71  BP:    BP: (!) 155/93  BP Range: Systolic (68BJL), RBB:829 , Min:128 , VVD:023       Diastolic (21XNE), TDN:22, Min:84, Max:110      Physical Examination:     General:  Awake, follows commands, speech appropriate, stumbles on words but makes sense. No accessory muscle use. HEENT: Atraumatic, normocephalic, no throat congestion, moist mucosa. Eyes:   Pupils equal, round and reactive to light,   Neck:   No JVD, no thyromegaly, no lymphadenopathy. Chest:   Bilateral vesicular breath sounds, no rales or wheezes. Cardiac:  S1 S2 RR, no murmurs, gallops or rubs, JVP not raised. Abdomen: Mild distention, bowel sounds present  :   No suprapubic or flank tenderness. Neuro:  Awake, follows commands, moves all 4 extremities, responses overall slow  SKIN:  No rashes, good skin turgor. Extremities:  No edema, palpable peripheral pulses, no calf tenderness.     Labs:       Recent Labs     20  0347 20  0613 20  0339   WBC 6.9 8.1 8.7   RBC 3.81* 4.20* 4.40   HGB 11.7* 13.3 13.7   HCT 36.4* 39.7* 41.0   MCV 95.5 94.5 93.2   MCH 30.7 31.7 31.1   MCHC 32.1 33.5 33.4   RDW 12.6 12.7 12.4    189 189   MPV 11.4 11.0 10.5      BMP:   Recent Labs     20  0347 20  0613 20  0339    142 141   K 3.5* 3.2* 3.2*   * 105 105   CO2 22 23 20   BUN 32* 23 21   CREATININE 1.73* 1.38* 1.27*   GLUCOSE 105* 106* 105*   CALCIUM 9.0 9.6 9.3      CARRIE:      Lab Results   Component Value Date    CARRIE NEGATIVE 07/26/2020     SPEP:  Lab Results   Component Value Date    PROT 5.7 07/29/2020     Urinalysis/Chemistries:      Lab Results   Component Value Date    NITRU NEGATIVE 07/26/2020    COLORU YELLOW 07/26/2020    PHUR 5.0 07/26/2020    WBCUA 10 TO 20 07/26/2020    RBCUA 20 TO 50 07/26/2020    MUCUS NOT REPORTED 07/26/2020    TRICHOMONAS NOT REPORTED 07/26/2020    YEAST NOT REPORTED 07/26/2020    BACTERIA NOT REPORTED 07/26/2020    SPECGRAV 1.013 07/26/2020    LEUKOCYTESUR SMALL 07/26/2020    UROBILINOGEN Normal 07/26/2020    BILIRUBINUR NEGATIVE 07/26/2020    GLUCOSEU NEGATIVE 07/26/2020    KETUA TRACE 07/26/2020    AMORPHOUS NOT REPORTED 07/26/2020     Urine Sodium:     Lab Results   Component Value Date    YANI 67 07/26/2020     Urine Creatinine:     Lab Results   Component Value Date    LABCREA 93.6 07/26/2020     Radiology:     CXR: Reviewed as available. Assessment:       1. Acute kidney injury nonoliguric secondary to ischemic ( poor PO/Infection) and nephrotoxic (contrast) ATN -improving. Creatinine peaked to 4.2. Baseline creatinine normal  2. H SV encephalitis  3. BPH  4. HTN  5. Hypernatremia - RESOLVED     Plan:   1. Continue half saline at 50 an hour  2. Continue potassium replacement  3. Continue Aldactone 25 daily  4. Continue Norvasc  5. Expect renal function to improve further   6. We will follow with you      Nutrition   Please ensure that patient is on a renal diet/TF. Avoid nephrotoxic drugs/contrast exposure. We will continue to follow along with you. Electronically signed by Lee Gamble CNP, APRN - CNP on 8/1/2020 at 10:22 AM   Attending Physician Statement  I have discussed the care of Akilah Session, including pertinent history and exam findings,  with the CNP. I have reviewed the key elements of all parts of the encounter with the CNP. I agree with the assessment, plan and orders as documented.     Alex Barnett MD, MRCP Alonzo Pedro, FACP   8/1/2020 2:27 PM    Nephrology [address] [place] [address]

## 2020-08-01 NOTE — PROGRESS NOTES
Pharmacy Note     Renal Dose Adjustment    Lisa Murillo is a 79 y.o. male. Pharmacist assessment of renally cleared medications. Recent Labs     07/31/20  0613 08/01/20  0339   BUN 23 21       Recent Labs     07/31/20  0613 08/01/20  0339   CREATININE 1.38* 1.27*       Estimated Creatinine Clearance: 63 mL/min (A) (based on SCr of 1.27 mg/dL (H)). Estimated CrCl using Ideal Body Weight: 55.9 mL/min (based on IBW 73 kg)    Height:   Ht Readings from Last 1 Encounters:   07/28/20 5' 10\" (1.778 m)     Weight:  Wt Readings from Last 1 Encounters:   07/31/20 215 lb 6.2 oz (97.7 kg)       The following medication dose has been adjusted based upon renal function per P&T Guidelines:             Acyclovir 700 mg IV Q12H to 700 mg IV Q8H.     Nick Valdes, PharmD 8/1/2020 12:41 PM

## 2020-08-01 NOTE — PLAN OF CARE
Problem: Anxiety/Stress:  Goal: Level of anxiety will decrease  Description: Level of anxiety will decrease  Outcome: Ongoing     Problem: Mental Status - Impaired:  Goal: Mental status will be restored to baseline  Description: Mental status will be restored to baseline  Outcome: Ongoing     Problem: Falls - Risk of:  Goal: Will remain free from falls  Description: Will remain free from falls  Outcome: Ongoing  Goal: Absence of physical injury  Description: Absence of physical injury  Outcome: Ongoing     Problem: Skin Integrity:  Goal: Will show no infection signs and symptoms  Description: Will show no infection signs and symptoms  Outcome: Ongoing  Goal: Absence of new skin breakdown  Description: Absence of new skin breakdown  Outcome: Ongoing     Problem: Restraint Use - Nonviolent/Non-Self-Destructive Behavior:  Goal: Absence of restraint indications  Description: Absence of restraint indications  Outcome: Ongoing  Goal: Absence of restraint-related injury  Description: Absence of restraint-related injury  Outcome: Ongoing     Problem: Confusion - Acute:  Goal: Mental status will be restored to baseline  Description: Mental status will be restored to baseline  Outcome: Ongoing  Goal: Absence of continued neurological deterioration signs and symptoms  Description: Absence of continued neurological deterioration signs and symptoms  Outcome: Ongoing     Problem: Discharge Planning:  Goal: Ability to perform activities of daily living will improve  Description: Ability to perform activities of daily living will improve  Outcome: Ongoing  Goal: Participates in care planning  Description: Participates in care planning  Outcome: Ongoing     Problem: Injury - Risk of, Physical Injury:  Goal: Will remain free from falls  Description: Will remain free from falls  Outcome: Ongoing  Goal: Absence of physical injury  Description: Absence of physical injury  Outcome: Ongoing     Problem: Mood - Altered:  Goal: Mood stable  Description: Mood stable  Outcome: Ongoing  Goal: Absence of abusive behavior  Description: Absence of abusive behavior  Outcome: Ongoing  Goal: Verbalizations of feeling emotionally comfortable while being cared for will increase  Description: Verbalizations of feeling emotionally comfortable while being cared for will increase  Outcome: Ongoing     Problem: Psychomotor Activity - Altered:  Goal: Absence of psychomotor disturbance signs and symptoms  Description: Absence of psychomotor disturbance signs and symptoms  Outcome: Ongoing     Problem: Sensory Perception - Impaired:  Goal: Demonstrations of improved sensory functioning will increase  Description: Demonstrations of improved sensory functioning will increase  Outcome: Ongoing  Goal: Decrease in sensory misperception frequency  Description: Decrease in sensory misperception frequency  Outcome: Ongoing  Goal: Able to refrain from responding to false sensory perceptions  Description: Able to refrain from responding to false sensory perceptions  Outcome: Ongoing  Goal: Demonstrates accurate environmental perceptions  Description: Demonstrates accurate environmental perceptions  Outcome: Ongoing  Goal: Able to distinguish between reality-based and nonreality-based thinking  Description: Able to distinguish between reality-based and nonreality-based thinking  Outcome: Ongoing  Goal: Able to interrupt nonreality-based thinking  Description: Able to interrupt nonreality-based thinking  Outcome: Ongoing     Problem: Sleep Pattern Disturbance:  Goal: Appears well-rested  Description: Appears well-rested  Outcome: Ongoing     Problem: Nutrition  Goal: Optimal nutrition therapy  Description: Nutrition Problem #1: Inadequate oral intake  Intervention: Food and/or Nutrient Delivery: Start oral diet and supplements as able. If TF needed (and able to keep NG in place), suggest Standard without Fiber goal 65 mL/hr (1872 kcal and 87 g pro/day).   Nutritional Goal: meet % of estimated nutrition needs     Outcome: Ongoing     Problem: Pain:  Goal: Pain level will decrease  Description: Pain level will decrease  Outcome: Ongoing  Goal: Control of acute pain  Description: Control of acute pain  Outcome: Ongoing  Goal: Control of chronic pain  Description: Control of chronic pain  Outcome: Ongoing

## 2020-08-01 NOTE — H&P
[address]     HISTORY AND PHYSICAL EXAMINATION            Date:   8/1/2020  Patient name:  Minor Mansfield  Date of admission:  7/23/2020  3:02 PM  MRN:   [id number]  Account:  [de-identified]  YOB: 1950  PCP:    Irlanda Huerta MD  Room:   5887/0426-48  Code Status:    Full Code    Chief Complaint:     Headache    History Obtained From:     patient, electronic medical record    History of Present Illness:     Minor Mansfield is a 79 y.o. Non-/non  male who presents with No chief complaint on file. 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 79years old male was transferred from [place] for abdominal and pelvic pain and dysuria for 10 days on 7/21 patient was on oral antibiotics doxycycline self prescribed at home patient started not feeling well starting to have confusion associated with fever CO VID 19 was negative as patient have chest x-ray shows diffuse interstitial prominence patient also was complaining of headache lumbar puncture was done was concern for encephalitis patient was started on acyclovir for suspected encephalitis patient also was found to have ATN nephrology was consulted kidney function is improving MRI shows no abnormal finding EEG no evidence of seizure  Past Medical History:     No past medical history on file. Past Surgical History:     No past surgical history on file. Medications Prior to Admission:     Prior to Admission medications    Medication Sig Start Date End Date Taking? Authorizing Provider   timolol (TIMOPTIC) 0.5 % ophthalmic solution 1 drop 2 times daily    Historical Provider, MD        Allergies:     Ibuprofen    Social History:     Tobacco:    reports that he has never smoked. He has never used smokeless tobacco.  Alcohol:      has no history on file for alcohol.   Drug Use:  has no history on file for drug.    Family History:     No family history on file. Review of Systems:     Positive and Negative as described in HPI. CONSTITUTIONAL:  negative for fevers  HEENT:  negative for vision, hearing changes, runny nose, throat pain  RESPIRATORY:  negative for shortness of breath, cough, congestion, wheezing  CARDIOVASCULAR:  negative for chest pain, palpitations  GASTROINTESTINAL:  negative for nausea, vomiting, diarrhea, constipation, change in bowel habits, abdominal pain   GENITOURINARY:  negative for difficulty of urination, burning with urination, frequency   INTEGUMENT:  negative for rash, skin lesions, easy bruising   HEMATOLOGIC/LYMPHATIC:  negative for swelling/edema   ALLERGIC/IMMUNOLOGIC:  negative for urticaria , itching  ENDOCRINE:  negative increase in drinking, increase in urination, hot or cold intolerance  MUSCULOSKELETAL:  negative joint pains, muscle aches, swelling of joints  NEUROLOGICAL: Still has episodes of intermittent foggy thinking  BEHAVIOR/PSYCH:  negative for depression, anxiety    Physical Exam:   BP (!) 155/93   Pulse 71   Temp 98.1 °F (36.7 °C) (Oral)   Resp 18   Ht 5' 10\" (1.778 m)   Wt 215 lb 6.2 oz (97.7 kg)   SpO2 96%   BMI 30.91 kg/m²   Temp (24hrs), Av.3 °F (36.8 °C), Min:97.7 °F (36.5 °C), Max:98.8 °F (37.1 °C)    No results for input(s): POCGLU in the last 72 hours.     Intake/Output Summary (Last 24 hours) at 2020 1037  Last data filed at 2020 0400  Gross per 24 hour   Intake 1143 ml   Output 1765 ml   Net -622 ml       General Appearance: alert, well appearing, and in no acute distress  Mental status: oriented to person, place, and time  Head: normocephalic, atraumatic  Eye: no icterus, redness, pupils equal and reactive, extraocular eye movements intact, conjunctiva clear  Ear: normal external ear, no discharge, hearing intact  Nose: no drainage noted  Mouth: mucous membranes moist  Neck: supple, no carotid bruits, thyroid not palpable  Lungs: 16 9 - 17 mmol/L    GFR Non-African American 56 (L) >60 mL/min    GFR African American >60 >60 mL/min    GFR Comment          GFR Staging NOT REPORTED    Uric Acid    Collection Time: 08/01/20  3:39 AM   Result Value Ref Range    Uric Acid 6.4 3.4 - 7.0 mg/dL       Imaging/Diagnostics:  Xr Chest (single View Frontal)    Result Date: 7/27/2020  Rotated with relatively low lung volumes; no consolidation or sizable pleural effusion. Mild central vascular congestion consideration. Mild basilar atelectasis. RECOMMENDATION: Followup chest x-ray if continued concern for aspiration or vascular congestion     Xr Abdomen (kub) (single Ap View)    Result Date: 7/29/2020  No abnormally dilated loops of small bowel. Us Renal Complete    Result Date: 7/26/2020  Limited study without evidence of obstructive uropathy. Incidental note of hepatic steatosis limits evaluation for renal parenchymal echogenicity evaluation. Bladder is not visualized due to catheter placement.        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          Plan:     Patient status inpatient in the Progressive Unit/Step down  Principal Problem:    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) on acyclovir complicated as patient also have acute renal failure  Active Problems:    Sepsis ([address] secondary to UTI and encephalitis patient was treated in ICU transferred to medical floor        COVID-19 ruled out by laboratory testing    UTI treated with antibiotics    Acute metabolic encephalopathy secondary to encephalitis resolved      Transaminitis monitor CBC    Acute hyponatremia treated with hydration      Benign prostatic hyperplasia without lower urinary tract symptoms follow-up with PCP follow-up with urology  Patient has elevated PSA in the past      BALBIR (acute kidney injury) [address] associated with ATN nephrology following      Probable anterior colitis treated with antibiotics    Consultations:   IP CONSULT TO INFECTIOUS DISEASES  IP CONSULT TO NEUROLOGY  IP CONSULT TO IV TEAM  IP CONSULT TO NEPHROLOGY  IP CONSULT TO UROLOGY     Patient is admitted as inpatient status because of co-morbidities listed above, severity of signs and symptoms as outlined, requirement for current medical therapies and most importantly because of direct risk to patient if care not provided in a hospital setting. Expected length of stay > 48 hours.     Param Markham MD  8/1/2020  10:37 AM    Copy sent to Dr. Maynor Alston MD

## 2020-08-02 ENCOUNTER — APPOINTMENT (OUTPATIENT)
Dept: MRI IMAGING | Age: 70
DRG: 871 | End: 2020-08-02
Attending: INTERNAL MEDICINE
Payer: MEDICARE

## 2020-08-02 LAB
ABSOLUTE EOS #: 0.07 K/UL (ref 0–0.44)
ABSOLUTE IMMATURE GRANULOCYTE: 0.16 K/UL (ref 0–0.3)
ABSOLUTE LYMPH #: 1.84 K/UL (ref 1.1–3.7)
ABSOLUTE MONO #: 1.01 K/UL (ref 0.1–1.2)
AMMONIA: 25 UMOL/L (ref 16–60)
ANION GAP SERPL CALCULATED.3IONS-SCNC: 14 MMOL/L (ref 9–17)
BASOPHILS # BLD: 1 % (ref 0–2)
BASOPHILS ABSOLUTE: 0.06 K/UL (ref 0–0.2)
BUN BLDV-MCNC: 18 MG/DL (ref 8–23)
BUN/CREAT BLD: ABNORMAL (ref 9–20)
CALCIUM SERPL-MCNC: 9.6 MG/DL (ref 8.6–10.4)
CHLORIDE BLD-SCNC: 103 MMOL/L (ref 98–107)
CO2: 21 MMOL/L (ref 20–31)
CREAT SERPL-MCNC: 1.13 MG/DL (ref 0.7–1.2)
DIFFERENTIAL TYPE: ABNORMAL
EOSINOPHILS RELATIVE PERCENT: 1 % (ref 1–4)
GFR AFRICAN AMERICAN: >60 ML/MIN
GFR NON-AFRICAN AMERICAN: >60 ML/MIN
GFR SERPL CREATININE-BSD FRML MDRD: ABNORMAL ML/MIN/{1.73_M2}
GFR SERPL CREATININE-BSD FRML MDRD: ABNORMAL ML/MIN/{1.73_M2}
GLUCOSE BLD-MCNC: 103 MG/DL (ref 70–99)
HCT VFR BLD CALC: 40.8 % (ref 40.7–50.3)
HEMOGLOBIN: 13.4 G/DL (ref 13–17)
IMMATURE GRANULOCYTES: 2 %
LYMPHOCYTES # BLD: 20 % (ref 24–43)
MCH RBC QN AUTO: 30.4 PG (ref 25.2–33.5)
MCHC RBC AUTO-ENTMCNC: 32.8 G/DL (ref 28.4–34.8)
MCV RBC AUTO: 92.5 FL (ref 82.6–102.9)
MONOCYTES # BLD: 11 % (ref 3–12)
NRBC AUTOMATED: 0 PER 100 WBC
PDW BLD-RTO: 12.4 % (ref 11.8–14.4)
PLATELET # BLD: 197 K/UL (ref 138–453)
PLATELET ESTIMATE: ABNORMAL
PMV BLD AUTO: 10.7 FL (ref 8.1–13.5)
POTASSIUM SERPL-SCNC: 3 MMOL/L (ref 3.7–5.3)
RBC # BLD: 4.41 M/UL (ref 4.21–5.77)
RBC # BLD: ABNORMAL 10*6/UL
SEG NEUTROPHILS: 65 % (ref 36–65)
SEGMENTED NEUTROPHILS ABSOLUTE COUNT: 5.96 K/UL (ref 1.5–8.1)
SODIUM BLD-SCNC: 138 MMOL/L (ref 135–144)
WBC # BLD: 9.1 K/UL (ref 3.5–11.3)
WBC # BLD: ABNORMAL 10*3/UL

## 2020-08-02 PROCEDURE — 6370000000 HC RX 637 (ALT 250 FOR IP): Performed by: STUDENT IN AN ORGANIZED HEALTH CARE EDUCATION/TRAINING PROGRAM

## 2020-08-02 PROCEDURE — 2580000003 HC RX 258: Performed by: STUDENT IN AN ORGANIZED HEALTH CARE EDUCATION/TRAINING PROGRAM

## 2020-08-02 PROCEDURE — 6370000000 HC RX 637 (ALT 250 FOR IP): Performed by: INTERNAL MEDICINE

## 2020-08-02 PROCEDURE — 6360000002 HC RX W HCPCS: Performed by: STUDENT IN AN ORGANIZED HEALTH CARE EDUCATION/TRAINING PROGRAM

## 2020-08-02 PROCEDURE — 99231 SBSQ HOSP IP/OBS SF/LOW 25: CPT | Performed by: INTERNAL MEDICINE

## 2020-08-02 PROCEDURE — 36415 COLL VENOUS BLD VENIPUNCTURE: CPT

## 2020-08-02 PROCEDURE — 99232 SBSQ HOSP IP/OBS MODERATE 35: CPT | Performed by: PSYCHIATRY & NEUROLOGY

## 2020-08-02 PROCEDURE — 1200000000 HC SEMI PRIVATE

## 2020-08-02 PROCEDURE — APPSS30 APP SPLIT SHARED TIME 16-30 MINUTES: Performed by: NURSE PRACTITIONER

## 2020-08-02 PROCEDURE — 2580000003 HC RX 258: Performed by: INTERNAL MEDICINE

## 2020-08-02 PROCEDURE — 85025 COMPLETE CBC W/AUTO DIFF WBC: CPT

## 2020-08-02 PROCEDURE — 6360000002 HC RX W HCPCS: Performed by: INTERNAL MEDICINE

## 2020-08-02 PROCEDURE — 99232 SBSQ HOSP IP/OBS MODERATE 35: CPT | Performed by: INTERNAL MEDICINE

## 2020-08-02 PROCEDURE — 99233 SBSQ HOSP IP/OBS HIGH 50: CPT | Performed by: INTERNAL MEDICINE

## 2020-08-02 PROCEDURE — 80048 BASIC METABOLIC PNL TOTAL CA: CPT

## 2020-08-02 PROCEDURE — 82140 ASSAY OF AMMONIA: CPT

## 2020-08-02 PROCEDURE — 70551 MRI BRAIN STEM W/O DYE: CPT

## 2020-08-02 RX ORDER — POTASSIUM CHLORIDE 7.45 MG/ML
10 INJECTION INTRAVENOUS PRN
Status: DISCONTINUED | OUTPATIENT
Start: 2020-08-02 | End: 2020-08-04 | Stop reason: HOSPADM

## 2020-08-02 RX ORDER — LORAZEPAM 2 MG/ML
0.5 INJECTION INTRAMUSCULAR
Status: COMPLETED | OUTPATIENT
Start: 2020-08-02 | End: 2020-08-02

## 2020-08-02 RX ORDER — LORAZEPAM 0.5 MG/1
0.5 TABLET ORAL EVERY 6 HOURS PRN
Status: DISCONTINUED | OUTPATIENT
Start: 2020-08-02 | End: 2020-08-04 | Stop reason: HOSPADM

## 2020-08-02 RX ORDER — POTASSIUM CHLORIDE 20 MEQ/1
40 TABLET, EXTENDED RELEASE ORAL PRN
Status: DISCONTINUED | OUTPATIENT
Start: 2020-08-02 | End: 2020-08-04 | Stop reason: HOSPADM

## 2020-08-02 RX ADMIN — AMLODIPINE BESYLATE 10 MG: 10 TABLET ORAL at 09:02

## 2020-08-02 RX ADMIN — POTASSIUM CHLORIDE 10 MEQ: 10 INJECTION, SOLUTION INTRAVENOUS at 11:10

## 2020-08-02 RX ADMIN — LORAZEPAM 0.5 MG: 0.5 TABLET ORAL at 20:08

## 2020-08-02 RX ADMIN — POTASSIUM CHLORIDE 10 MEQ: 10 INJECTION, SOLUTION INTRAVENOUS at 12:51

## 2020-08-02 RX ADMIN — SODIUM CHLORIDE, PRESERVATIVE FREE 10 ML: 5 INJECTION INTRAVENOUS at 20:13

## 2020-08-02 RX ADMIN — SODIUM CHLORIDE, PRESERVATIVE FREE 10 ML: 5 INJECTION INTRAVENOUS at 09:03

## 2020-08-02 RX ADMIN — ACETAMINOPHEN 650 MG: 325 TABLET ORAL at 07:19

## 2020-08-02 RX ADMIN — ACETAMINOPHEN 325 MG: 325 TABLET ORAL at 13:35

## 2020-08-02 RX ADMIN — ACETAMINOPHEN 325 MG: 325 TABLET ORAL at 22:49

## 2020-08-02 RX ADMIN — POTASSIUM CHLORIDE 10 MEQ: 10 INJECTION, SOLUTION INTRAVENOUS at 15:32

## 2020-08-02 RX ADMIN — HEPARIN SODIUM 5000 UNITS: 5000 INJECTION INTRAVENOUS; SUBCUTANEOUS at 07:58

## 2020-08-02 RX ADMIN — LORAZEPAM 0.5 MG: 2 INJECTION INTRAMUSCULAR; INTRAVENOUS at 14:00

## 2020-08-02 RX ADMIN — SPIRONOLACTONE 25 MG: 25 TABLET ORAL at 09:02

## 2020-08-02 RX ADMIN — HEPARIN SODIUM 5000 UNITS: 5000 INJECTION INTRAVENOUS; SUBCUTANEOUS at 17:15

## 2020-08-02 RX ADMIN — ACYCLOVIR SODIUM 700 MG: 50 INJECTION, SOLUTION INTRAVENOUS at 01:38

## 2020-08-02 RX ADMIN — PANTOPRAZOLE SODIUM 40 MG: 40 TABLET, DELAYED RELEASE ORAL at 09:02

## 2020-08-02 RX ADMIN — POTASSIUM CHLORIDE 10 MEQ: 10 INJECTION, SOLUTION INTRAVENOUS at 09:03

## 2020-08-02 RX ADMIN — ACYCLOVIR SODIUM 700 MG: 50 INJECTION, SOLUTION INTRAVENOUS at 09:03

## 2020-08-02 RX ADMIN — ACYCLOVIR SODIUM 700 MG: 50 INJECTION, SOLUTION INTRAVENOUS at 17:15

## 2020-08-02 RX ADMIN — POTASSIUM CHLORIDE 10 MEQ: 10 INJECTION, SOLUTION INTRAVENOUS at 10:08

## 2020-08-02 ASSESSMENT — PAIN SCALES - GENERAL
PAINLEVEL_OUTOF10: 8
PAINLEVEL_OUTOF10: 0
PAINLEVEL_OUTOF10: 5
PAINLEVEL_OUTOF10: 0
PAINLEVEL_OUTOF10: 3

## 2020-08-02 NOTE — PROGRESS NOTES
Renal Progress Note    Patient :  Arnoldo Shepherd; 79 y.o. MRN# [id number]  Location:  [id number]/[id number]  Attending:  Carolyn Win MD  Admit Date:  7/23/2020   Hospital Day: 10      Subjective:     Patient seen and examined at bedside. No acute complaints. Developed acute kidney injury both from systemic informatory response syndrome and contrast exposure. Creatinine peaked at 4.2, was oliguric, did not require renal placement therapy now resolving, creat down to 1.13. Has received significant amount of IV fluids and a positive fluid balance, which is now improving with post ATN diuresis over last 24 hours he is negative - 650 mL made 650 mL of urine in last 24 hours. Still about 715 mL + since admission. Mental status back to baseline, oral intake improving  Hypokalemia persists although better, 3.0 today. Will need replacement per protocol. Outpatient Medications:     Medications Prior to Admission: timolol (TIMOPTIC) 0.5 % ophthalmic solution, 1 drop 2 times daily    Current Medications:     Scheduled Meds:    acyclovir  700 mg Intravenous Q8H    spironolactone  25 mg Oral Daily    levETIRAcetam  500 mg Oral BID    pantoprazole  40 mg Oral BID    amLODIPine  10 mg Oral Daily    polyethylene glycol  17 g Oral Daily    senna  1 tablet Oral Nightly    QUEtiapine  50 mg Oral Nightly    heparin (porcine)  5,000 Units Subcutaneous 3 times per day    sodium chloride flush  10 mL Intravenous 2 times per day     Continuous Infusions:    sodium chloride 50 mL/hr at 08/01/20 0113     PRN Meds:  LORazepam, potassium chloride **OR** potassium alternative oral replacement **OR** potassium chloride, HYDROmorphone, calcium carbonate, sodium chloride flush, acetaminophen **OR** acetaminophen, promethazine **OR** ondansetron    Input/Output:       I/O last 3 completed shifts:  In: -   Out: 650 [Urine:650].       Patient Vitals for the past 96 hrs (Last 3 readings):   Weight   07/31/20 1000 215 lb 6.2 oz (97.7 kg) Vital Signs:   Temperature:  Temp: 97 °F (36.1 °C)  TMax:   Temp (24hrs), Av.8 °F (36.6 °C), Min:97 °F (36.1 °C), Max:98.5 °F (36.9 °C)    Respirations:  Resp: 18  Pulse:   Pulse: 62  BP:    BP: (!) 140/76  BP Range: Systolic (81JMA), TUV:784 , Min:140 , HB       Diastolic (10EGE), CBB:27, Min:76, Max:98      Physical Examination:     General:  Awake, follows commands, speech appropriate, stumbles on words but makes sense. No accessory muscle use. HEENT: Atraumatic, normocephalic, no throat congestion, moist mucosa. Eyes:   Pupils equal, round and reactive to light,   Neck:   No JVD, no thyromegaly, no lymphadenopathy. Chest:   Bilateral vesicular breath sounds, no rales or wheezes. Cardiac:  S1 S2 RR, no murmurs, gallops or rubs, JVP not raised. Abdomen: Mild distention, bowel sounds present  :   No suprapubic or flank tenderness. Neuro:  Awake, follows commands, moves all 4 extremities, responses overall slow  SKIN:  No rashes, good skin turgor. Extremities:  No edema, palpable peripheral pulses, no calf tenderness.     Labs:       Recent Labs     20  0533   WBC 8.1 8.7 9.1   RBC 4.20* 4.40 4.41   HGB 13.3 13.7 13.4   HCT 39.7* 41.0 40.8   MCV 94.5 93.2 92.5   MCH 31.7 31.1 30.4   MCHC 33.5 33.4 32.8   RDW 12.7 12.4 12.4    189 197   MPV 11.0 10.5 10.7      BMP:   Recent Labs     20  0533    141 138   K 3.2* 3.2* 3.0*    105 103   CO2 23 20 21   BUN 23 21 18   CREATININE 1.38* 1.27* 1.13   GLUCOSE 106* 105* 103*   CALCIUM 9.6 9.3 9.6      CARRIE:      Lab Results   Component Value Date    CARRIE NEGATIVE 2020     SPEP:  Lab Results   Component Value Date    PROT 5.7 2020     Urinalysis/Chemistries:      Lab Results   Component Value Date    NITRU NEGATIVE 2020    COLORU YELLOW 2020    PHUR 5.0 2020    WBCUA 10 TO 20 2020    RBCUA 20 TO 50 2020    MUCUS NOT REPORTED 07/26/2020    TRICHOMONAS NOT REPORTED 07/26/2020    YEAST NOT REPORTED 07/26/2020    BACTERIA NOT REPORTED 07/26/2020    SPECGRAV 1.013 07/26/2020    LEUKOCYTESUR SMALL 07/26/2020    UROBILINOGEN Normal 07/26/2020    BILIRUBINUR NEGATIVE 07/26/2020    GLUCOSEU NEGATIVE 07/26/2020    KETUA TRACE 07/26/2020    AMORPHOUS NOT REPORTED 07/26/2020     Urine Sodium:     Lab Results   Component Value Date    YANI 67 07/26/2020     Urine Creatinine:     Lab Results   Component Value Date    LABCREA 93.6 07/26/2020     Radiology:     CXR: Reviewed as available. Assessment:       1. Acute kidney injury nonoliguric secondary to ischemic ( poor PO/Infection) and nephrotoxic (contrast) ATN -improving. Creatinine peaked to 4. 2. Now normal 1.13  Baseline creatinine normal  2. H SV encephalitis  3. BPH  4. HTN  5. Hypernatremia - RESOLVED   6. Hypokalemia - 3.0 today    Plan:   1. Replace potassium  2. Continue fluids  3. Creatinine normal  4. Will sign off     Nutrition   Please ensure that patient is on a renal diet/TF. Avoid nephrotoxic drugs/contrast exposure. We will continue to follow along with you. Electronically signed by Tamela Hinson CNP, APRN - CNP on 8/2/2020 at 11:19 AM   Nephrology [address] [place] [address]    Attending Physician Statement  I have discussed the care of Terrence Corbin, including pertinent history and exam findings,  with the CNP. I have reviewed the key elements of all parts of the encounter with the CNP. I agree with the assessment, plan and orders as documented.     Cee Holland MD, MRCP Jaja Batista, FACP   8/2/2020 12:30 PM    Nephrology [address] [place] [address]

## 2020-08-02 NOTE — PROGRESS NOTES
Neurology Nurse Practitioner Progress Note      INTERVAL HISTORY: This is a 79 y.o.  male admitted 7/23/2020 for Sepsis ([facility] 75.) [A41.9]. This is a follow-up neurology progress note. The patient was examined and the chart was reviewed. Discussed with the RN. Reportedly, patient did not sleep well last night and was confused and a bit agitated this morning. He refused Seroquel last night; is on Ativan 0.5 mg PO Q6 hours PRN. At the time of examination, patient was alert and oriented x3, back to his baseline. He was anxious to be discharged. HPI: Lexii Howell is a 79 y.o. male with H/O chronic prostatism and obstructive voiding symptoms, chronic low back pain, who was admitted as a transfer from [facility], ED on 7/23/2020 for Sepsis ([facility] 75.) [A41.9]. According to medical records patient had dysuria along with abdominal/pelvic discomfort for the past 10 days. He also complained of deep right hip pain, radiating into the pelvis. Due to history of chronic prostatism and obstructive voiding symptoms, occasionally patient does bladder catheterizations. With urine turning dark, he started himself on doxycycline with no significant improvement. There was some concern of insect bite on his left inner thigh while he was in the [place] last week. Denied any rash. On 7/21, patient went to work but was unable to recognize his own staff. His wife took him to [facility] ED in the evening of 7/21/2020; patient thought confusion was the side effect to doxycycline. Patient was febrile with temp of 101.3 °F; he was admitted in ICU and sepsis work-up was started. CT abdomen/pelvis showed large prostate along with right hip arthroplasty changes. CT head was negative. Patient requested Xanax and Dilaudid due to agitation and low back pain. He was started on doxycycline, Rocephin, and acyclovir (for suspected encephalitis). Patient stayed confused, unable to recognize his wife, febrile and restless.   COVID was negative. Patient was evaluated by Dr. Gisell Shine who recommended transfer to [facility], on 7/23/2020, for neuro and ID evaluation. At arrival, patient was stable hemodynamically, saturating well on room air, easily arousable, oriented to self only, moving all limbs but unable to follow commands. Patient underwent LP and was diagnosed with herpes encephalitis (HSV-1). He was continued on acyclovir and got loaded with Keppra 1 g IVPB x1 followed by maintenance dose of 500 mg twice a day. EEG was done -result awaited. MRI brain with and without contrast was ordered but patient was unable to complete the scan with contrast due to severe agitation; MRI negative.  acyclovir  700 mg Intravenous Q8H    spironolactone  25 mg Oral Daily    levETIRAcetam  500 mg Oral BID    pantoprazole  40 mg Oral BID    amLODIPine  10 mg Oral Daily    polyethylene glycol  17 g Oral Daily    senna  1 tablet Oral Nightly    QUEtiapine  50 mg Oral Nightly    heparin (porcine)  5,000 Units Subcutaneous 3 times per day    sodium chloride flush  10 mL Intravenous 2 times per day       No past medical history on file. No past surgical history on file. PHYSICAL EXAM:      Blood pressure (!) 140/76, pulse 62, temperature 97 °F (36.1 °C), temperature source Oral, resp. rate 18, height 5' 10\" (1.778 m), weight 215 lb 6.2 oz (97.7 kg), SpO2 98 %.       Limited Neurological Examination:  Pt was awake, alert and oriented x3  Speech and language - intact  Followed commands  Normal visual threat, no nystagmus, no ptosis  Face symmetrical  Hearing intact  Tongue midline  Pain in both knees, however he has been using all limbs purposefully   Reported that left hand weakness has improved   Sensations -grossly intact  DTRs -intact  Plantars -flexor response      DATA    Lab Results   Component Value Date    WBC 9.1 08/02/2020    HGB 13.4 08/02/2020    HCT 40.8 08/02/2020     08/02/2020    ALT 24 07/29/2020    AST 28 07/29/2020    NA 138 08/02/2020    K 3.0 (L) 08/02/2020     08/02/2020    CREATININE 1.13 08/02/2020    BUN 18 08/02/2020    CO2 21 08/02/2020    TSH 1.33 07/23/2020    PSA 14.54 (H) 01/03/2020    INR 1.1 07/23/2020    [id number] 616 07/23/2020    FOLATE 17.6 07/23/2020     Lab Results   Component Value Date    CHOL 137 07/29/2020     Lab Results   Component Value Date    TRIG 96 07/29/2020     Lab Results   Component Value Date    HDL 34 (L) 07/29/2020    HDL 56 11/17/2017     Lab Results   Component Value Date    LDLCHOLESTEROL 84 07/29/2020    LDLCHOLESTEROL 116 11/17/2017     Lab Results   Component Value Date    VLDL NOT REPORTED 07/29/2020    VLDL NOT REPORTED 11/17/2017     Lab Results   Component Value Date    CHOLHDLRATIO 4.0 07/29/2020    CHOLHDLRATIO 3.3 11/17/2017 7/23/2020 19:09   Total IgG 767     CARRIE       Negative      7/21/2020 08:00   CRP 0.3   ESR 4       CSF STUDY:   7/23/2020 19:09   Albumin Index 339.8 (H)   Albumin, CSF 1393 (H)   Appearance, CSF Bloody   Glucose, CSF 43   IgG Index, CSF 0.54   IgG Synthesis Rate 10.7 (H)   IgG, CSF 14.0 (H)   Oligo Bands 0   Myelin basic protein 1.76   Protein, .4 (H)   RBC, CSF 3,000 (H)   WBC, CSF 1,348 (H)   Neutrophils, CSF 0   Lymphs, CSF 87   Cx Negative   HSV-1 Detected   LD 56   Toxoplasma AB Negative     HIV           Negative      DIAGNOSTIC DATA:  CT HEAD (7/23/2020): No acute intracranial abnormality     MRI BRAIN (7/24/2020): No acute intracranial abnormality     REPEAT MRI BRAIN (8/1/2020): No acute intracranial abnormality     LTME (7/24/2020): This EEG shows the presence of mild diffuse slowing. This EEG is concordant with diffuse encephalopathy. No clear lateralized or epileptiform disturbance is seen. IMPRESSION & PLAN: 79 y.o.  male admitted with  Acute encephalopathy in the setting of acute herpes simplex encephalitis (HSV-1). Patient did not sleep well last night; was confused and a bit agitated this morning. He refused Seroquel last night; is on Ativan 0.5 mg PO Q6 hours PRN. Now back to his baseline. Pt continues on acyclovir       Yesterday, L hand weakness. Repeat MRI brain (8/1)- negative for acute pathology. Pt reported much improvement    Continue Keppra 500 mg PO BID for seizure prophylaxis    BALBIR; improved; nephrology on board    Continue PT/OT; he walked 150' with RW    Comorbid conditions - chronic prostatism, UTIs, chronic low back pain     Plan for [facility] acute rehab    No further neurological testing. Pt is neurologically stable for D/C    We will sign off at this time. Please, call with any questions or concerns. Thank you    Please note that this note was generated using a voice recognition dictation software. Although every effort was made to ensure the accuracy of this automated transcription, some errors in transcription may have occurred.

## 2020-08-02 NOTE — PLAN OF CARE
Problem: Anxiety/Stress:  Goal: Level of anxiety will decrease  Description: Level of anxiety will decrease  Outcome: Ongoing     Problem: Mental Status - Impaired:  Goal: Mental status will be restored to baseline  Description: Mental status will be restored to baseline  Outcome: Ongoing     Problem: Falls - Risk of:  Goal: Will remain free from falls  Description: Will remain free from falls  Outcome: Ongoing  Goal: Absence of physical injury  Description: Absence of physical injury  Outcome: Ongoing     Problem: Skin Integrity:  Goal: Will show no infection signs and symptoms  Description: Will show no infection signs and symptoms  Outcome: Ongoing  Goal: Absence of new skin breakdown  Description: Absence of new skin breakdown  Outcome: Ongoing     Problem: Restraint Use - Nonviolent/Non-Self-Destructive Behavior:  Goal: Absence of restraint indications  Description: Absence of restraint indications  Outcome: Ongoing  Goal: Absence of restraint-related injury  Description: Absence of restraint-related injury  Outcome: Ongoing     Problem: Confusion - Acute:  Goal: Mental status will be restored to baseline  Description: Mental status will be restored to baseline  Outcome: Ongoing  Goal: Absence of continued neurological deterioration signs and symptoms  Description: Absence of continued neurological deterioration signs and symptoms  Outcome: Ongoing     Problem: Discharge Planning:  Goal: Ability to perform activities of daily living will improve  Description: Ability to perform activities of daily living will improve  Outcome: Ongoing  Goal: Participates in care planning  Description: Participates in care planning  Outcome: Ongoing     Problem: Injury - Risk of, Physical Injury:  Goal: Will remain free from falls  Description: Will remain free from falls  Outcome: Ongoing  Goal: Absence of physical injury  Description: Absence of physical injury  Outcome: Ongoing     Problem: Mood - Altered:  Goal: Mood meet % of estimated nutrition needs     Outcome: Ongoing     Problem: Pain:  Goal: Pain level will decrease  Description: Pain level will decrease  Outcome: Ongoing  Goal: Control of acute pain  Description: Control of acute pain  Outcome: Ongoing  Goal: Control of chronic pain  Description: Control of chronic pain  Outcome: Ongoing

## 2020-08-02 NOTE — PLAN OF CARE
Problem: Anxiety/Stress:  Goal: Level of anxiety will decrease  Description: Level of anxiety will decrease  8/2/2020 1712 by Ramón Delgado RN  Outcome: Ongoing  8/2/2020 0730 by Stewart Garcia RN  Outcome: Ongoing     Problem: Mental Status - Impaired:  Goal: Mental status will be restored to baseline  Description: Mental status will be restored to baseline  8/2/2020 1712 by Ramón Delgado RN  Outcome: Ongoing  8/2/2020 0730 by Stewart Garcia RN  Outcome: Ongoing     Problem: Falls - Risk of:  Goal: Will remain free from falls  Description: Will remain free from falls  8/2/2020 1712 by Ramón Delgado RN  Outcome: Ongoing  8/2/2020 0730 by Stewart Garcia RN  Outcome: Ongoing  Goal: Absence of physical injury  Description: Absence of physical injury  8/2/2020 1712 by Ramón Delgado RN  Outcome: Ongoing  8/2/2020 0730 by Stewart Garcia RN  Outcome: Ongoing     Problem: Skin Integrity:  Goal: Will show no infection signs and symptoms  Description: Will show no infection signs and symptoms  8/2/2020 1712 by Ramón Delgado RN  Outcome: Ongoing  8/2/2020 0730 by Stewart Garcia RN  Outcome: Ongoing  Goal: Absence of new skin breakdown  Description: Absence of new skin breakdown  8/2/2020 1712 by Ramón Delgado RN  Outcome: Ongoing  8/2/2020 0730 by Stewart Garcia RN  Outcome: Ongoing     Problem: Restraint Use - Nonviolent/Non-Self-Destructive Behavior:  Goal: Absence of restraint indications  Description: Absence of restraint indications  8/2/2020 1712 by Ramón Delgado RN  Outcome: Ongoing  8/2/2020 0730 by Stewart Garcia RN  Outcome: Ongoing  Goal: Absence of restraint-related injury  Description: Absence of restraint-related injury  8/2/2020 1712 by Ramón Delgado RN  Outcome: Ongoing  8/2/2020 0730 by Stewart Garcia RN  Outcome: Ongoing     Problem: Confusion - Acute:  Goal: Mental status will be restored to baseline  Description: Mental status will be restored to baseline  8/2/2020 1712 by Ramón Delgado RN  Outcome: Ongoing  8/2/2020 0730 by Vivian Red RN  Outcome: Ongoing  Goal: Absence of continued neurological deterioration signs and symptoms  Description: Absence of continued neurological deterioration signs and symptoms  8/2/2020 1712 by Diann Carter RN  Outcome: Ongoing  8/2/2020 0730 by Vivian Red RN  Outcome: Ongoing     Problem: Discharge Planning:  Goal: Ability to perform activities of daily living will improve  Description: Ability to perform activities of daily living will improve  8/2/2020 1712 by Diann Carter RN  Outcome: Ongoing  8/2/2020 0730 by Vivian Red RN  Outcome: Ongoing  Goal: Participates in care planning  Description: Participates in care planning  8/2/2020 1712 by Diann Carter RN  Outcome: Ongoing  8/2/2020 0730 by Vivian Red RN  Outcome: Ongoing     Problem: Injury - Risk of, Physical Injury:  Goal: Will remain free from falls  Description: Will remain free from falls  8/2/2020 1712 by Diann Carter RN  Outcome: Ongoing  8/2/2020 0730 by Vivian Red RN  Outcome: Ongoing  Goal: Absence of physical injury  Description: Absence of physical injury  8/2/2020 1712 by Diann Carter RN  Outcome: Ongoing  8/2/2020 0730 by Vivian Red RN  Outcome: Ongoing     Problem: Mood - Altered:  Goal: Mood stable  Description: Mood stable  8/2/2020 1712 by Diann Carter RN  Outcome: Ongoing  8/2/2020 0730 by Vivian Red RN  Outcome: Ongoing  Goal: Absence of abusive behavior  Description: Absence of abusive behavior  8/2/2020 1712 by Diann Carter RN  Outcome: Ongoing  8/2/2020 0730 by Vivian Red RN  Outcome: Ongoing  Goal: Verbalizations of feeling emotionally comfortable while being cared for will increase  Description: Verbalizations of feeling emotionally comfortable while being cared for will increase  8/2/2020 1712 by Diann Carter RN  Outcome: Ongoing  8/2/2020 0730 by Vivian Red RN  Outcome: Ongoing     Problem: Psychomotor Activity - Altered:  Goal: Absence of psychomotor disturbance signs and symptoms  Description: Absence of psychomotor disturbance signs and symptoms  8/2/2020 1712 by Day Mera RN  Outcome: Ongoing  8/2/2020 0730 by Ozzy Johnson RN  Outcome: Ongoing     Problem: Sensory Perception - Impaired:  Goal: Demonstrations of improved sensory functioning will increase  Description: Demonstrations of improved sensory functioning will increase  8/2/2020 1712 by Day Mera RN  Outcome: Ongoing  8/2/2020 0730 by Ozzy Johnson RN  Outcome: Ongoing  Goal: Decrease in sensory misperception frequency  Description: Decrease in sensory misperception frequency  8/2/2020 1712 by Day Mera RN  Outcome: Ongoing  8/2/2020 0730 by Ozzy Johnson RN  Outcome: Ongoing  Goal: Able to refrain from responding to false sensory perceptions  Description: Able to refrain from responding to false sensory perceptions  8/2/2020 1712 by Day Mera RN  Outcome: Ongoing  8/2/2020 0730 by Ozzy Johnson RN  Outcome: Ongoing  Goal: Demonstrates accurate environmental perceptions  Description: Demonstrates accurate environmental perceptions  8/2/2020 1712 by Day Mera RN  Outcome: Ongoing  8/2/2020 0730 by Ozzy Johnson RN  Outcome: Ongoing  Goal: Able to distinguish between reality-based and nonreality-based thinking  Description: Able to distinguish between reality-based and nonreality-based thinking  8/2/2020 1712 by Day Mera RN  Outcome: Ongoing  8/2/2020 0730 by Ozzy Johnson RN  Outcome: Ongoing  Goal: Able to interrupt nonreality-based thinking  Description: Able to interrupt nonreality-based thinking  8/2/2020 1712 by Day Mera RN  Outcome: Ongoing  8/2/2020 0730 by Ozzy Johnson RN  Outcome: Ongoing     Problem: Sleep Pattern Disturbance:  Goal: Appears well-rested  Description: Appears well-rested  8/2/2020 1712 by Day Mera RN  Outcome: Ongoing  8/2/2020 0730 by Ozzy Johnson RN  Outcome: Ongoing     Problem: Nutrition  Goal: Optimal nutrition therapy  Description: Nutrition Problem #1: Inadequate oral intake  Intervention: Food and/or Nutrient Delivery: Start oral diet and supplements as able. If TF needed (and able to keep NG in place), suggest Standard without Fiber goal 65 mL/hr (1872 kcal and 87 g pro/day).   Nutritional Goal: meet % of estimated nutrition needs     8/2/2020 1712 by Davonte Cuellar RN  Outcome: Ongoing  8/2/2020 0730 by Rakesh Ramirez RN  Outcome: Ongoing     Problem: Pain:  Goal: Pain level will decrease  Description: Pain level will decrease  8/2/2020 1712 by Davonte Cuellar RN  Outcome: Ongoing  8/2/2020 0730 by Rakesh Ramirez RN  Outcome: Ongoing  Goal: Control of acute pain  Description: Control of acute pain  8/2/2020 1712 by Davonte Cuellar RN  Outcome: Ongoing  8/2/2020 0730 by Rakesh Ramirez RN  Outcome: Ongoing  Goal: Control of chronic pain  Description: Control of chronic pain  8/2/2020 1712 by Davonte Cuellar RN  Outcome: Ongoing  8/2/2020 0730 by Rakesh Ramirez RN  Outcome: Ongoing

## 2020-08-02 NOTE — CARE COORDINATION
RN asked if I could talk with patient and his (new 8/1/2020) wife. Patient is very confused and agitated. He states that he wants to be transferred to [facility] for his IV antibiotics. We discussed the need for intense PT and IV antibiotics. The decision was made to make a referral to [facility]. Asked physician for order. Awaiting response. Outside the room I discussed with the wife and his medical partner Dr. Steven Birmingham about the need for PT/OT, and IV antibiotics. We also discussed the fact that going home is not safe at this time and he will need twenty four hour care if he is to go home. Everyone is in agreement for a referral to [facility]. Spoke to Dr. Anastasia Horton regarding this issue he also agrees that ARU is the best option.

## 2020-08-02 NOTE — PROGRESS NOTES
Robson Mart[facility] [address]    Progress Note    8/2/2020    10:10 AM    Name:   Maryann Gaston  MRN:     [id number]     Acct:      [de-identified]   Room:   [address] [place] [address] Day:  8  Admit Date:  7/23/2020  3:02 PM    PCP:   Bharati Leyva MD  Code Status:  Full Code    Subjective:     C/C: No chief complaint on file. Interval History Status: worsened. Patient seen and examined  Patient is more confused today plan for mri of brain   Patient denies fever chest pain shortness of breath  I am seeing the patient for ams    Brief History:       Maryann Gaston is a 79 y.o. Non-/non  male who presents with No chief complaint on file. 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 79years old male was transferred from [place] for abdominal and pelvic pain and dysuria for 10 days on 7/21 patient was on oral antibiotics doxycycline self prescribed at home patient started not feeling well starting to have confusion associated with fever CO VID 19 was negative as patient have chest x-ray shows diffuse interstitial prominence patient also was complaining of headache lumbar puncture was done was concern for encephalitis patient was started on acyclovir for suspected encephalitis patient also was found to have ATN nephrology was consulted kidney function is improving MRI shows no abnormal finding EEG no evidence of seizure    Allergies:     Allergies   Allergen Reactions    Ibuprofen        Current Meds:   Scheduled Meds:    acyclovir  700 mg Intravenous Q8H    spironolactone  25 mg Oral Daily    levETIRAcetam  500 mg Oral BID    pantoprazole  40 mg Oral BID    amLODIPine  10 mg Oral Daily    polyethylene glycol  17 g Oral Daily    senna  1 tablet Oral Nightly    QUEtiapine  50 mg Oral Nightly    heparin (porcine)  5,000 Units Subcutaneous 3 times per day    sodium chloride flush  10 mL Intravenous 2 times per day     Continuous Infusions:    sodium chloride 50 mL/hr at 20 0113     PRN Meds: LORazepam, potassium chloride **OR** potassium alternative oral replacement **OR** potassium chloride, HYDROmorphone, calcium carbonate, sodium chloride flush, acetaminophen **OR** acetaminophen, promethazine **OR** ondansetron    Data:     Past Medical History:   has no past medical history on file. Social History:   reports that he has never smoked. He has never used smokeless tobacco.     Family History: No family history on file. Vitals:  BP (!) 140/76   Pulse 62   Temp 97 °F (36.1 °C) (Oral)   Resp 18   Ht 5' 10\" (1.778 m)   Wt 215 lb 6.2 oz (97.7 kg)   SpO2 98%   BMI 30.91 kg/m²   Temp (24hrs), Av.8 °F (36.6 °C), Min:97 °F (36.1 °C), Max:98.5 °F (36.9 °C)    No results for input(s): POCGLU in the last 72 hours. I/O (24Hr):     Intake/Output Summary (Last 24 hours) at 2020 1010  Last data filed at 2020 0734  Gross per 24 hour   Intake --   Output 890 ml   Net -890 ml       Labs:  Hematology:  Recent Labs     20  0533   WBC 8.1 8.7 9.1   RBC 4.20* 4.40 4.41   HGB 13.3 13.7 13.4   HCT 39.7* 41.0 40.8   MCV 94.5 93.2 92.5   MCH 31.7 31.1 30.4   MCHC 33.5 33.4 32.8   RDW 12.7 12.4 12.4    189 197   MPV 11.0 10.5 10.7     Chemistry:  Recent Labs     20  03320  0533    141 138   K 3.2* 3.2* 3.0*    105 103   CO2 23 20 21   GLUCOSE 106* 105* 103*   BUN 23 21 18   CREATININE 1.38* 1.27* 1.13   ANIONGAP 14 16 14   LABGLOM 51* 56* >60   GFRAA >60 >60 >60   CALCIUM 9.6 9.3 9.6     Recent Labs     20  0339   URICACID 6.4     ABG:  Lab Results   Component Value Date    POCPH 7.325 2020    POCPCO2 31.3 2020    POCPO2 99.8 2020    POCHCO3 16.3 2020    NBEA 8 2020    PBEA NOT REPORTED 2020    SON5TSL 17 2020    NTZH3QBL 97 2020    FIO2 UNKNOWN 07/28/2020     Lab Results   Component Value Date/Time    SPECIAL NOT REPORTED 07/23/2020 09:59 PM     Lab Results   Component Value Date/Time    CULTURE  07/23/2020 09:59 PM     NEGATIVE FOR: METHICILLIN RESISTANT STAPHYLOCOCCUS AUREUS       Radiology:  Xr Chest (single View Frontal)    Result Date: 7/27/2020  Rotated with relatively low lung volumes; no consolidation or sizable pleural effusion. Mild central vascular congestion consideration. Mild basilar atelectasis. RECOMMENDATION: Followup chest x-ray if continued concern for aspiration or vascular congestion     Xr Abdomen (kub) (single Ap View)    Result Date: 7/29/2020  No abnormally dilated loops of small bowel. Us Renal Complete    Result Date: 7/26/2020  Limited study without evidence of obstructive uropathy. Incidental note of hepatic steatosis limits evaluation for renal parenchymal echogenicity evaluation. Bladder is not visualized due to catheter placement.        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

## 2020-08-03 LAB
ABSOLUTE EOS #: 0.1 K/UL (ref 0–0.44)
ABSOLUTE IMMATURE GRANULOCYTE: 0.13 K/UL (ref 0–0.3)
ABSOLUTE LYMPH #: 1.75 K/UL (ref 1.1–3.7)
ABSOLUTE MONO #: 0.97 K/UL (ref 0.1–1.2)
ANION GAP SERPL CALCULATED.3IONS-SCNC: 14 MMOL/L (ref 9–17)
BASOPHILS # BLD: 1 % (ref 0–2)
BASOPHILS ABSOLUTE: 0.08 K/UL (ref 0–0.2)
BUN BLDV-MCNC: 17 MG/DL (ref 8–23)
BUN/CREAT BLD: ABNORMAL (ref 9–20)
CALCIUM SERPL-MCNC: 9.7 MG/DL (ref 8.6–10.4)
CHLORIDE BLD-SCNC: 102 MMOL/L (ref 98–107)
CO2: 23 MMOL/L (ref 20–31)
CREAT SERPL-MCNC: 1.11 MG/DL (ref 0.7–1.2)
DIFFERENTIAL TYPE: ABNORMAL
EOSINOPHILS RELATIVE PERCENT: 1 % (ref 1–4)
GFR AFRICAN AMERICAN: >60 ML/MIN
GFR NON-AFRICAN AMERICAN: >60 ML/MIN
GFR SERPL CREATININE-BSD FRML MDRD: ABNORMAL ML/MIN/{1.73_M2}
GFR SERPL CREATININE-BSD FRML MDRD: ABNORMAL ML/MIN/{1.73_M2}
GLUCOSE BLD-MCNC: 105 MG/DL (ref 70–99)
GLUCOSE BLD-MCNC: 107 MG/DL (ref 75–110)
GLUCOSE BLD-MCNC: 136 MG/DL (ref 75–110)
HCT VFR BLD CALC: 41.8 % (ref 40.7–50.3)
HEMOGLOBIN: 13.9 G/DL (ref 13–17)
IMMATURE GRANULOCYTES: 2 %
LD, CSF: 56 U/L
LYMPHOCYTES # BLD: 22 % (ref 24–43)
MAGNESIUM: 1.9 MG/DL (ref 1.6–2.6)
MCH RBC QN AUTO: 31.2 PG (ref 25.2–33.5)
MCHC RBC AUTO-ENTMCNC: 33.3 G/DL (ref 28.4–34.8)
MCV RBC AUTO: 93.9 FL (ref 82.6–102.9)
MONOCYTES # BLD: 12 % (ref 3–12)
NRBC AUTOMATED: 0 PER 100 WBC
PDW BLD-RTO: 12.5 % (ref 11.8–14.4)
PLATELET # BLD: 262 K/UL (ref 138–453)
PLATELET ESTIMATE: ABNORMAL
PMV BLD AUTO: 10.9 FL (ref 8.1–13.5)
POTASSIUM SERPL-SCNC: 3 MMOL/L (ref 3.7–5.3)
RBC # BLD: 4.45 M/UL (ref 4.21–5.77)
RBC # BLD: ABNORMAL 10*6/UL
SEG NEUTROPHILS: 62 % (ref 36–65)
SEGMENTED NEUTROPHILS ABSOLUTE COUNT: 4.88 K/UL (ref 1.5–8.1)
SODIUM BLD-SCNC: 139 MMOL/L (ref 135–144)
SOURCE: NORMAL
WBC # BLD: 7.9 K/UL (ref 3.5–11.3)
WBC # BLD: ABNORMAL 10*3/UL

## 2020-08-03 PROCEDURE — 6370000000 HC RX 637 (ALT 250 FOR IP): Performed by: INTERNAL MEDICINE

## 2020-08-03 PROCEDURE — 76937 US GUIDE VASCULAR ACCESS: CPT

## 2020-08-03 PROCEDURE — 6370000000 HC RX 637 (ALT 250 FOR IP): Performed by: STUDENT IN AN ORGANIZED HEALTH CARE EDUCATION/TRAINING PROGRAM

## 2020-08-03 PROCEDURE — 85025 COMPLETE CBC W/AUTO DIFF WBC: CPT

## 2020-08-03 PROCEDURE — 6370000000 HC RX 637 (ALT 250 FOR IP): Performed by: NURSE PRACTITIONER

## 2020-08-03 PROCEDURE — 99232 SBSQ HOSP IP/OBS MODERATE 35: CPT | Performed by: STUDENT IN AN ORGANIZED HEALTH CARE EDUCATION/TRAINING PROGRAM

## 2020-08-03 PROCEDURE — 36415 COLL VENOUS BLD VENIPUNCTURE: CPT

## 2020-08-03 PROCEDURE — 99232 SBSQ HOSP IP/OBS MODERATE 35: CPT | Performed by: INTERNAL MEDICINE

## 2020-08-03 PROCEDURE — 6360000002 HC RX W HCPCS: Performed by: INTERNAL MEDICINE

## 2020-08-03 PROCEDURE — C1751 CATH, INF, PER/CENT/MIDLINE: HCPCS

## 2020-08-03 PROCEDURE — 97535 SELF CARE MNGMENT TRAINING: CPT

## 2020-08-03 PROCEDURE — 6360000002 HC RX W HCPCS: Performed by: STUDENT IN AN ORGANIZED HEALTH CARE EDUCATION/TRAINING PROGRAM

## 2020-08-03 PROCEDURE — 97530 THERAPEUTIC ACTIVITIES: CPT

## 2020-08-03 PROCEDURE — 2580000003 HC RX 258: Performed by: INTERNAL MEDICINE

## 2020-08-03 PROCEDURE — 80048 BASIC METABOLIC PNL TOTAL CA: CPT

## 2020-08-03 PROCEDURE — 82947 ASSAY GLUCOSE BLOOD QUANT: CPT

## 2020-08-03 PROCEDURE — 2580000003 HC RX 258: Performed by: STUDENT IN AN ORGANIZED HEALTH CARE EDUCATION/TRAINING PROGRAM

## 2020-08-03 PROCEDURE — 97116 GAIT TRAINING THERAPY: CPT

## 2020-08-03 PROCEDURE — 83735 ASSAY OF MAGNESIUM: CPT

## 2020-08-03 PROCEDURE — 99231 SBSQ HOSP IP/OBS SF/LOW 25: CPT | Performed by: INTERNAL MEDICINE

## 2020-08-03 PROCEDURE — 1200000000 HC SEMI PRIVATE

## 2020-08-03 RX ORDER — POTASSIUM CHLORIDE 20 MEQ/1
40 TABLET, EXTENDED RELEASE ORAL 2 TIMES DAILY WITH MEALS
Status: DISPENSED | OUTPATIENT
Start: 2020-08-03 | End: 2020-08-04

## 2020-08-03 RX ORDER — POLYETHYLENE GLYCOL 3350 17 G/17G
17 POWDER, FOR SOLUTION ORAL DAILY
Qty: 30 EACH | Refills: 0 | Status: SHIPPED | OUTPATIENT
Start: 2020-08-04 | End: 2020-09-03

## 2020-08-03 RX ORDER — POTASSIUM CHLORIDE 20 MEQ/1
20 TABLET, EXTENDED RELEASE ORAL 2 TIMES DAILY WITH MEALS
Status: DISCONTINUED | OUTPATIENT
Start: 2020-08-03 | End: 2020-08-03

## 2020-08-03 RX ORDER — POTASSIUM CHLORIDE 20 MEQ/1
20 TABLET, EXTENDED RELEASE ORAL DAILY
Qty: 3 TABLET | Refills: 0 | Status: SHIPPED | OUTPATIENT
Start: 2020-08-03 | End: 2020-09-09

## 2020-08-03 RX ORDER — LEVETIRACETAM 500 MG/1
500 TABLET ORAL 2 TIMES DAILY
Qty: 60 TABLET | Refills: 0 | Status: SHIPPED | OUTPATIENT
Start: 2020-08-03 | End: 2020-09-09

## 2020-08-03 RX ORDER — SPIRONOLACTONE 25 MG/1
25 TABLET ORAL DAILY
Qty: 30 TABLET | Refills: 3 | Status: SHIPPED | OUTPATIENT
Start: 2020-08-04 | End: 2020-09-09

## 2020-08-03 RX ORDER — AMLODIPINE BESYLATE 10 MG/1
10 TABLET ORAL DAILY
Qty: 30 TABLET | Refills: 0 | Status: SHIPPED | OUTPATIENT
Start: 2020-08-04 | End: 2020-09-09

## 2020-08-03 RX ORDER — CALCIUM CARBONATE 200(500)MG
500 TABLET,CHEWABLE ORAL 2 TIMES DAILY PRN
Qty: 30 TABLET | Refills: 0 | Status: SHIPPED | OUTPATIENT
Start: 2020-08-03 | End: 2020-09-09

## 2020-08-03 RX ORDER — QUETIAPINE FUMARATE 50 MG/1
50 TABLET, FILM COATED ORAL NIGHTLY
Qty: 3 TABLET | Refills: 0 | Status: SHIPPED | OUTPATIENT
Start: 2020-08-03 | End: 2020-08-03 | Stop reason: HOSPADM

## 2020-08-03 RX ADMIN — SODIUM CHLORIDE, PRESERVATIVE FREE 10 ML: 5 INJECTION INTRAVENOUS at 23:02

## 2020-08-03 RX ADMIN — PANTOPRAZOLE SODIUM 40 MG: 40 TABLET, DELAYED RELEASE ORAL at 21:09

## 2020-08-03 RX ADMIN — LEVETIRACETAM 500 MG: 500 TABLET, FILM COATED ORAL at 09:05

## 2020-08-03 RX ADMIN — ACETAMINOPHEN 325 MG: 325 TABLET ORAL at 23:08

## 2020-08-03 RX ADMIN — HEPARIN SODIUM 5000 UNITS: 5000 INJECTION INTRAVENOUS; SUBCUTANEOUS at 17:04

## 2020-08-03 RX ADMIN — HEPARIN SODIUM 5000 UNITS: 5000 INJECTION INTRAVENOUS; SUBCUTANEOUS at 09:05

## 2020-08-03 RX ADMIN — LORAZEPAM 0.5 MG: 0.5 TABLET ORAL at 23:02

## 2020-08-03 RX ADMIN — ACETAMINOPHEN 325 MG: 325 TABLET ORAL at 15:06

## 2020-08-03 RX ADMIN — SPIRONOLACTONE 25 MG: 25 TABLET ORAL at 09:05

## 2020-08-03 RX ADMIN — AMLODIPINE BESYLATE 10 MG: 10 TABLET ORAL at 09:05

## 2020-08-03 RX ADMIN — ACYCLOVIR SODIUM 700 MG: 50 INJECTION, SOLUTION INTRAVENOUS at 17:04

## 2020-08-03 RX ADMIN — POTASSIUM CHLORIDE 40 MEQ: 1500 TABLET, EXTENDED RELEASE ORAL at 09:05

## 2020-08-03 RX ADMIN — ACYCLOVIR SODIUM 700 MG: 50 INJECTION, SOLUTION INTRAVENOUS at 02:28

## 2020-08-03 RX ADMIN — ACYCLOVIR SODIUM 700 MG: 50 INJECTION, SOLUTION INTRAVENOUS at 09:05

## 2020-08-03 ASSESSMENT — PAIN DESCRIPTION - LOCATION: LOCATION: ANKLE

## 2020-08-03 ASSESSMENT — PAIN SCALES - GENERAL
PAINLEVEL_OUTOF10: 0
PAINLEVEL_OUTOF10: 0
PAINLEVEL_OUTOF10: 3

## 2020-08-03 ASSESSMENT — PAIN DESCRIPTION - ORIENTATION: ORIENTATION: LEFT

## 2020-08-03 ASSESSMENT — PAIN DESCRIPTION - PAIN TYPE: TYPE: ACUTE PAIN

## 2020-08-03 NOTE — PROGRESS NOTES
Notified Arash Sesay [address], that per Dr Rosalinda Katz patient is ambulating 150 feet and pt needs to require 2 of the 3 therapies to qualify for acute rehab. Requested updated OT notes and initial ST eval to determine pt's appropriateness for ARU. Notified Anna Marie Phillips that per Dr Rosalinda Katz, pt is not currently appropriate for ARU as pt does not have need for 2 therapies.

## 2020-08-03 NOTE — PROGRESS NOTES
Robson Martini [address]    Progress Note    8/3/2020    8:58 AM    Name:   Rina Velazquez  MRN:     [id number]     Acct:      [de-identified]   Room:   [address] [place] [address] Day:  6  Admit Date:  7/23/2020  3:02 PM    PCP:   Lyndon Adorno MD  Code Status:  Full Code    Subjective:     C/C: No chief complaint on file. AMS  Interval History Status: significantly improved. Patient seen and examined sitting in bed. Wife sitting next to him. Patient admits to feeling confused yesterday but is feeling better. Patient was able to explain the pathology of his illness but was unsure of timeline of events. Does want to get better to enjoy care home. No issues when receiving acyclovir by IV. Potassium slightly low today. Brief History:     Patient was admitted to the hospital for Herpes encephalitis after being transferred from [place] for abdominal pain and dysurial. Patient had contion issues 7/21/2020 after starting doxycycline, then changed to cefepime. Was noted to have fever of 101 in [place] then transferred to [facility]. Where he was treated for HSV encephalitis. ID, Pulmonology, Nephrology, neurlogy have been following the patient. MRI 8/2/2020:  1. No acute intracranial abnormality. 2. Senescent parenchymal volume loss with mild chronic white matter    microvascular ischemic changes. Review of Systems:     Constitutional:  negative for chills, fevers, sweats  Respiratory:  negative for cough, dyspnea on exertion, shortness of breath, wheezing  Cardiovascular:  negative for chest pain, chest pressure/discomfort, lower extremity edema, palpitations  Gastrointestinal:  negative for abdominal pain, constipation, diarrhea, nausea, vomiting  Neurological:  negative for dizziness, headache    Medications: Allergies:     Allergies   Allergen Reactions    Ibuprofen        Current Meds:   Scheduled Meds:    potassium chloride  40 mEq Oral BID WC  acyclovir  700 mg Intravenous Q8H    spironolactone  25 mg Oral Daily    levETIRAcetam  500 mg Oral BID    pantoprazole  40 mg Oral BID    amLODIPine  10 mg Oral Daily    polyethylene glycol  17 g Oral Daily    senna  1 tablet Oral Nightly    QUEtiapine  50 mg Oral Nightly    heparin (porcine)  5,000 Units Subcutaneous 3 times per day    sodium chloride flush  10 mL Intravenous 2 times per day     Continuous Infusions:    sodium chloride 50 mL/hr at 20 0113     PRN Meds: potassium chloride **OR** potassium alternative oral replacement **OR** potassium chloride, LORazepam, HYDROmorphone, calcium carbonate, sodium chloride flush, acetaminophen **OR** acetaminophen, promethazine **OR** ondansetron    Data:     Past Medical History:   has no past medical history on file. Social History:   reports that he has never smoked. He has never used smokeless tobacco.     Family History: No family history on file. Vitals:  BP (!) 142/94   Pulse 60   Temp 98 °F (36.7 °C) (Oral)   Resp 20   Ht 5' 10\" (1.778 m)   Wt 215 lb 6.2 oz (97.7 kg)   SpO2 95%   BMI 30.91 kg/m²   Temp (24hrs), Av.3 °F (36.8 °C), Min:98 °F (36.7 °C), Max:98.5 °F (36.9 °C)    No results for input(s): POCGLU in the last 72 hours. I/O (24Hr):     Intake/Output Summary (Last 24 hours) at 8/3/2020 0858  Last data filed at 2020  Gross per 24 hour   Intake --   Output 100 ml   Net -100 ml       Labs:  Hematology:  Recent Labs     20  0339 20  0533 20  0521   WBC 8.7 9.1 7.9   RBC 4.40 4.41 4.45   HGB 13.7 13.4 13.9   HCT 41.0 40.8 41.8   MCV 93.2 92.5 93.9   MCH 31.1 30.4 31.2   MCHC 33.4 32.8 33.3   RDW 12.4 12.4 12.5    197 262   MPV 10.5 10.7 10.9     Chemistry:  Recent Labs     20  0339 20  0533 20  0521    138 139   K 3.2* 3.0* 3.0*    103 102   CO2 20 21 23   GLUCOSE 105* 103* 105*   BUN 21 18 17   CREATININE 1.27* 1.13 1.11   MG  --   --  1.9   ANIONGAP 16 14 14   LABGLOM 56* >60 >60   GFRAA >60 >60 >60   CALCIUM 9.3 9.6 9.7     Recent Labs     08/01/20  0339 08/02/20  1508   AMMONIA  --  25   URICACID 6.4  --      ABG:  Lab Results   Component Value Date    POCPH 7.325 07/27/2020    POCPCO2 31.3 07/27/2020    POCPO2 99.8 07/27/2020    POCHCO3 16.3 07/27/2020    NBEA 8 07/27/2020    PBEA NOT REPORTED 07/27/2020    RFI2LXE 17 07/27/2020    YTWN6ZPR 97 07/27/2020    FIO2 UNKNOWN 07/28/2020     Lab Results   Component Value Date/Time    SPECIAL NOT REPORTED 07/23/2020 09:59 PM     Lab Results   Component Value Date/Time    CULTURE  07/23/2020 09:59 PM     NEGATIVE FOR: METHICILLIN RESISTANT STAPHYLOCOCCUS AUREUS       Radiology:  Xr Chest (single View Frontal)    Result Date: 7/27/2020  Rotated with relatively low lung volumes; no consolidation or sizable pleural effusion. Mild central vascular congestion consideration. Mild basilar atelectasis. RECOMMENDATION: Followup chest x-ray if continued concern for aspiration or vascular congestion     Xr Abdomen (kub) (single Ap View)    Result Date: 7/29/2020  No abnormally dilated loops of small bowel. Mri Brain Wo Contrast    Result Date: 8/2/2020  1. No acute intracranial abnormality. 2. Senescent parenchymal volume loss with mild chronic white matter microvascular ischemic changes. Physical Examination:        General appearance:  alert, cooperative and no distress  Mental Status:  oriented to person, place and time and normal affect  Lungs:  clear to auscultation bilaterally, normal effort  Heart:  regular rate and rhythm, no murmur  Abdomen:  soft, nontender, nondistended, normal bowel sounds, no masses, hepatomegaly, splenomegaly  Extremities:  no edema, redness, tenderness in the calves, pain with drosiflexion of right foot.   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 7/24/2020 Yes    COVID-19 ruled out by laboratory testing 7/23/2020 Yes    Disorientation 7/23/2020 Yes    Altered mental status 7/26/2020 Yes    Transaminitis 7/26/2020 Yes    Hyponatremia 7/26/2020 Yes    Benign prostatic hyperplasia without lower urinary tract symptoms 7/26/2020 Yes    BALBIR (acute kidney injury) ([facility] 7/26/2020 Yes    Intestinal occlusion ([facility] 7/27/2020 Yes          Plan:        1. Encephalopathy due to Herpes Encephalitis. ID recs appreciated. Acyclovir 10 mg/kg every 8 hours with stop date 8/7/2020. Patient has refused seroquel for the past few days. Keppra 500 mg BID for prophylaxis  2. BALBIR for ATN and poor oral intake. IV fluids, encourage oral hydration  3. HTN. Norvasc 10 mg, aldactone  4. Hypokalemia, replace with potassium PO  5. Glycolax for constipation  6. PMR consulted for potential Acute Rehab  7. Continue with PT and OT  8.  Plan for discharge tomorrow pending acyclovir being arranged    Karly Hart MD  8/3/2020  8:58 AM

## 2020-08-03 NOTE — CARE COORDINATION
Spoke with patient and he wants to go home with spouse and home care. [facility] and [facility] for IV antibiotics. Wants a hospital bed and shower chair.

## 2020-08-03 NOTE — PROGRESS NOTES
rebound, no masses  Neurologic - There are no focal motor or sensory deficits  Skin - No bruising or bleeding  Extremities - No cyanosis, clubbing none    MEDS   potassium chloride  40 mEq Oral BID WC    acyclovir  700 mg Intravenous Q8H    spironolactone  25 mg Oral Daily    levETIRAcetam  500 mg Oral BID    pantoprazole  40 mg Oral BID    amLODIPine  10 mg Oral Daily    polyethylene glycol  17 g Oral Daily    senna  1 tablet Oral Nightly    heparin (porcine)  5,000 Units Subcutaneous 3 times per day    sodium chloride flush  10 mL Intravenous 2 times per day       potassium chloride **OR** potassium alternative oral replacement **OR** potassium chloride, LORazepam, HYDROmorphone, calcium carbonate, sodium chloride flush, acetaminophen **OR** acetaminophen, promethazine **OR** ondansetron    LABS  CBC   Recent Labs     08/03/20  0521   WBC 7.9   HGB 13.9   HCT 41.8   MCV 93.9        BMP:   Recent Labs     08/03/20 0521      K 3.0*      CO2 23   BUN 17   CREATININE 1.11   GLUCOSE 105*   MG 1.9     No results found for: PH, PCO2, PO2, HCO3, O2SAT  Lab Results   Component Value Date    MODE NOT REPORTED 07/28/2020     LIVER PROFILE No results for input(s): AST, ALT, LIPASE, BILIDIR, BILITOT, ALKPHOS in the last 72 hours. Invalid input(s): AMYLASE,  ALB  INR No results for input(s): INR in the last 72 hours.   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 PM and R evaluation  2. Continue intravenous acyclovir. 3. Discussed with patient and family.     Electronically signed by Ruchi San DO on 8/3/2020 at 7:48 PM

## 2020-08-03 NOTE — PROGRESS NOTES
IADLs  Assessment: Pt willing to work with OT this am, pt up in chair upon entrance. pt set up with adl supplies, noted L IV leaking, RN informed and in to take a look and apply tape. Pt continued with adls. Pt with good effort and thorough. Pt with no sob and reports mild light headedness. Pt talkative throughout session and requires rediresction at times for follow through with good results. Pt ed on OT POC, safety awareness tech and proper hand placement for transfers with good understanding. Pt completed all bathing except odalys anal care due to pt wanting IV looked at again or taken out before going to [facility] to have a bowel moveement. RN informed. Pt retired to chair with call light in reach and 2 dtrs present upon exit. Treatment Diagnosis: Encephalitis Due to Human Herpes Simplex Virus  Prognosis: Good  REQUIRES OT FOLLOW UP: Yes  Activity Tolerance  Activity Tolerance: Patient Tolerated treatment well  Safety Devices  Safety Devices in place: Yes  Type of devices: Call light within reach; Left in chair;Nurse notified  Restraints  Initially in place: No       Patient Diagnosis(es): There were no encounter diagnoses. has no past medical history on file. has no past surgical history on file. Restrictions  Restrictions/Precautions  Restrictions/Precautions: Fall Risk, Seizure, Up as Tolerated  Required Braces or Orthoses?: No  Position Activity Restriction  Other position/activity restrictions: kirk catheter  Subjective   General  Patient assessed for rehabilitation services?: Yes  Response to previous treatment: Patient with no complaints from previous session  Family / Caregiver Present: Yes(2 dtrs present throughout session and supportive)  General Comment  Comments: RN ok'd for therapy this after morning. Pt was agreeable and cooperative throughout.   Pain Assessment  Pain Assessment: 0-10(pt relates pain to having a restraint on ankle days ago)  Pain Level: 3  Pain Type: Acute pain  Pain Location: Ankle  Pain Orientation: Left  Vital Signs  Patient Currently in Pain: Yes  Oxygen Therapy  O2 Device: None (Room air)   Orientation  Orientation  Overall Orientation Status: Within Functional Limits  Orientation Level: Oriented X4  Objective    ADL  Grooming: Independent;Setup  UE Bathing: Minimal assistance;Setup(assist for back)  LE Bathing: Setup;Contact guard assistance  UE Dressing: Independent(unsnapping and untying hosp gown)  LE Dressing: Contact guard assistance;Setup  Toileting: Contact guard assistance     Balance  Sitting Balance: Independent(pt seated in chair upon arrival and able to scoot to edge of chair with good unsupported sitting balance)  Toilet Transfers  Toilet - Technique: Ambulating  Toilet Transfer: Contact guard assistance  Bed mobility  Comment: pt seated up in chair upon entrance to room  Transfers  Sit to stand: Contact guard assistance  Stand to sit: Contact guard assistance     Cognition  Overall Cognitive Status: WFL  Following Commands: Follows one step commands consistently; Follows all commands without difficulty  Attention Span: Attends with cues to redirect  Safety Judgement: Decreased awareness of need for assistance  Problem Solving: Able to problem solve independently  Insights: Decreased awareness of deficits  Initiation: Requires cues for some  Sequencing: Requires cues for some      Plan   Plan  Times per week: 3-4 x/wk  Current Treatment Recommendations: Balance Training, Functional Mobility Training, Endurance Training, Safety Education & Training, Patient/Caregiver Education & Training, Equipment Evaluation, Education, & procurement, Self-Care / ADL    AM-PAC Score  -[facility] Inpatient Daily Activity Raw Score: 20 (08/03/20 1054)  AM-PAC Inpatient ADL T-Scale Score : 42.03 (08/03/20 1054)  ADL Inpatient CMS 0-100% Score: 38.32 (08/03/20 1054)  ADL Inpatient CMS G-Code Modifier : Yulia Sánchez (08/03/20 1054)    Goals  Short term goals  Time Frame for Short term goals: By discharge, pt will:  Short term goal 1: Demo supervision for bed mobility to increase safety and independence with ADLs/IADLs  Short term goal 2: participate in functional activity for 25+ minutes to increase ease with ADLs  Short term goal 3: complete dynamic standing balance for 8+ minutes with SBA and LRD as needed to increase ease with functional mobility  Short term goal 4: Demo supervision for UB ADLs and grooming tasks with setup and increased time as needed  Short term goal 5: Demo good safety awareness throughout therapy session with <3 VCs  Short term goal 6: demo SBA for functional mobility/ transfers with LRD  Short term goal 7: demo LB ADLs and toileting with min A and AE as needed       Therapy Time   Individual Concurrent Group Co-treatment   Time In 0936         Time Out 1022         Minutes 46         Timed Code Treatment Minutes: [address], OTR/L

## 2020-08-03 NOTE — CONSULTS
Physical Medicine & Rehabilitation  Consult Note - RECORD REVIEW ONLY      Admitting Physician:   Saurav Denny MD    Primary Care Provider:   Twin Clement MD     Reason for Consult:  Acute Inpatient Rehabilitation    Chief Complaint: Confusion    History of Present Illness:  Referring Provider is requesting an evaluation for appropriate placement upon discharge from acute care. History from chart review. 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 He was originally admitted to [facility] 7/21/2020 for confusion and AMS developed after self-treating for pelvic pain and dysuria with doxycycline at home for 10 days. 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 PULMONOLOGY: Initial COVID 19 PCR was negative. Head CT was WNL. Initial hospital treatment included vancomycin and cefepime. NEUROLOGY: He was initially unable to follow commands. CSF confirmed HSV-1 encephalitis. LTME consistent with diffuse encephalopathy, no clear lateralized or epileptiform disturbances, mild diffuse slowing. Intermittent confusion and agitation. On Keppra BID for seizure prophylaxis. INFECTIOUS DISEASE: Acyclovir q 12 hrs for 2-3 weeks. NEPHROLOGY: consulted for recurrent UTI with known history BPH. Treated BALBIR likely with ATN secondary to dehydration and contrast dye/acyclovir nephrotoxins. Renal US showed B simple renal cysts, no obstructive uropathy. IV fluid repletion improved creatinine to 1.13.     UROLOGY: consulted for traumatic kirk removal with gross hematuria during patient care. Recommending Kirk until creatinine evens.      Review of Systems:  Record Review Only     Premorbid function:  Independent    Current function:    PT:  Restrictions/Precautions: Fall Risk, Seizure, Up as Tolerated  Other position/activity restrictions: kirk catheter   Transfers  Sit to Stand: Contact guard assistance, level: Not on file   Occupational History    Not on file   Social Needs    Financial resource strain: Not hard at all    Food insecurity     Worry: Never true     Inability: Never true   [employer] needs     Medical: Not on file     Non-medical: Not on file   Tobacco Use    Smoking status: Never Smoker    Smokeless tobacco: Never Used   Substance and Sexual Activity    Alcohol use: Not on file    Drug use: Not on file    Sexual activity: Not on file   Lifestyle    Physical activity     Days per week: Not on file     Minutes per session: Not on file    Stress: Not on file   Relationships    Social connections     Talks on phone: Not on file     Gets together: Not on file     Attends [employer]ism service: Not on file     Active member of club or organization: Not on file     Attends meetings of clubs or organizations: Not on file     Relationship status: Not on file    Intimate partner violence     Fear of current or ex partner: Not on file     Emotionally abused: Not on file     Physically abused: Not on file     Forced sexual activity: Not on file   Other Topics Concern    Not on file   Social History Narrative    Not on file       Family History:   No family history on file. Radiology:  MRI OF THE BRAIN WITHOUT CONTRAST  8/2/2020 2:04 pm         TECHNIQUE:    Multiplanar multisequence MRI of the brain was performed without the    administration of intravenous contrast.         COMPARISON:    07/24/2020         HISTORY:    ORDERING SYSTEM PROVIDED HISTORY: L hand weakness    TECHNOLOGIST PROVIDED HISTORY:    L hand weakness    Reason for Exam: L hand weakness         FINDINGS:    INTRACRANIAL STRUCTURES/VENTRICLES: There is no acute infarct or mass. Vladimir Hearing    is stable senescent parenchymal volume loss with mild chronic white matter    microvascular ischemic changes.  No mass effect or midline shift.  No evidence    of an acute intracranial hemorrhage.  The ventricles and sulci are normal in weakness  2. Seizure prophylaxis  3. BALBIR: improving  4. BPH with recurrent UTIs/Sepsis  5. Transaminitis  6. Hyponatremia and Hypokalemia    Recommendations:    1. Diagnosis:  Encephalitis  2. Therapy: Has PT/OT needs. Need speech evaluation  3. Medical Necessity: As above  4. Support: Lives with supportive significant other. Has supportive family. 5. Rehab Recommendation: Need speech therapy evaluation. Pain would need to be managed without IV pain medication. Will evaluate once speech therapy complete and have updated OT notes regarding ADLs. Patient is ambulating 150 feet and needs to require 2 of the 3 therapies to qualify for acute rehab. 6. DVT Prophylaxis: on heparin    It was my pleasure to evaluate the chart of Ernestine Smith today. Please call with questions. Antwon Bentley MD        This note is created with the assistance of a speech recognition program.  While intending to generate a document that actually reflects the content of the visit, the document can still have some errors including those of syntax and sound a like substitutions which may escape proof reading. In such instances, actual meaning can be extrapolated by contextual diversion.

## 2020-08-03 NOTE — PROGRESS NOTES
Physical Therapy  Facility/Department: [facility]  Daily Treatment Note  NAME: Regla Spine  : 1950  MRN: [id number]    Date of Service: 8/3/2020    Discharge Recommendations:  Patient would benefit from continued therapy after discharge     Patient would benefit from continued therapy after discharge   PT Equipment Recommendations  Equipment Needed: None    Assessment     Body structures, Functions, Activity limitations: Decreased functional mobility ; Decreased endurance;Decreased balance;Decreased strength  Assessment: Improved activity tolerance with increased ambulation distance. Pt progressing well with mobility and displays no LOB ambulating with RW. 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 A diagnosis of Encephalitis was also pertinent to this visit. has no past medical history on file. has no past surgical history on file. Restrictions  Restrictions/Precautions  Restrictions/Precautions: Fall Risk, Seizure, Up as Tolerated  Required Braces or Orthoses?: No  Position Activity Restriction  Other position/activity restrictions: kirk catheter  Subjective    Pt supine in bed. Pt has multiple family members present. Pt c/o 3/10 L ankle pain. .        Orientation    Overall Orientation Status: Within Normal Limits     Objective    Bed Nobility  Supine to sit: SBA  Scooting: SBA    Transfers  Sit to Stand: Contact guard assistance;Minimal Assistance(completed from chair with CGA;   Stand to sit: Contact guard assistance(cues to reach back with good return)  Ambulation  Ambulation?: Yes  Ambulation 1  Surface: level tile  Device: Rolling Walker  Assistance: Contact guard assistance;Stand by assistance  Quality of Gait: decreased pace, shortened step length, no LOB  Distance: 300ft   Stairs: Yes,  Pt ascended/descended 3 steps using one HR. Min. A x 1  Balance  Posture: Good  Sitting - Static: Good  Sitting - Dynamic: Fair;+  Standing - Static: Fair;+  Standing - Dynamic: Fair   Goals  Short term goals  Time Frame for Short term goals: 14 visits  Short term goal 1: Perform bed mobility and functional transfers independently  Short term goal 2: Ambulate 300ft with least restrictive AD independently  Short term goal 3: Demo Good dynamic standing balance to decrease risk of falls  Short term goal 4: Participate in 30 minutes of therapy to demo increased endurance  Short term goal 5: Ascend/descend 4 steps with HR and SBA  Patient Goals   Patient goals : Be left alone. Plan    Plan  Times per week: 5-6x/wk  Current Treatment Recommendations: Strengthening, Transfer Training, ROM, Balance Training, Functional Mobility Training, Safety Education & Training, Endurance Training, Home Exercise Program, Equipment Evaluation, Education, & procurement, Gait Training, Stair training, Positioning  Safety Devices  Type of devices:  All fall risk precautions in place, Left in chair, Call light within reach, Gait belt, Nurse notified  Restraints  Initially in place: No     Therapy Time   Individual Concurrent Group Co-treatment   Time In  250         Time Out  320         Minutes  [address] [place] [address]

## 2020-08-03 NOTE — PLAN OF CARE
stable  Description: Mood stable  Outcome: Ongoing  Goal: Absence of abusive behavior  Description: Absence of abusive behavior  Outcome: Ongoing  Goal: Verbalizations of feeling emotionally comfortable while being cared for will increase  Description: Verbalizations of feeling emotionally comfortable while being cared for will increase  Outcome: Ongoing     Problem: Psychomotor Activity - Altered:  Goal: Absence of psychomotor disturbance signs and symptoms  Description: Absence of psychomotor disturbance signs and symptoms  Outcome: Ongoing     Problem: Sensory Perception - Impaired:  Goal: Demonstrations of improved sensory functioning will increase  Description: Demonstrations of improved sensory functioning will increase  Outcome: Ongoing  Goal: Decrease in sensory misperception frequency  Description: Decrease in sensory misperception frequency  Outcome: Ongoing  Goal: Able to refrain from responding to false sensory perceptions  Description: Able to refrain from responding to false sensory perceptions  Outcome: Ongoing  Goal: Demonstrates accurate environmental perceptions  Description: Demonstrates accurate environmental perceptions  Outcome: Ongoing  Goal: Able to distinguish between reality-based and nonreality-based thinking  Description: Able to distinguish between reality-based and nonreality-based thinking  Outcome: Ongoing  Goal: Able to interrupt nonreality-based thinking  Description: Able to interrupt nonreality-based thinking  Outcome: Ongoing     Problem: Sleep Pattern Disturbance:  Goal: Appears well-rested  Description: Appears well-rested  Outcome: Ongoing     Problem: Nutrition  Goal: Optimal nutrition therapy  Description: Nutrition Problem #1: Inadequate oral intake  Intervention: Food and/or Nutrient Delivery: Start oral diet and supplements as able. If TF needed (and able to keep NG in place), suggest Standard without Fiber goal 65 mL/hr (1872 kcal and 87 g pro/day).   Nutritional Goal: meet % of estimated nutrition needs     Outcome: Ongoing     Problem: Pain:  Goal: Pain level will decrease  Description: Pain level will decrease  Outcome: Ongoing  Goal: Control of acute pain  Description: Control of acute pain  Outcome: Ongoing  Goal: Control of chronic pain  Description: Control of chronic pain  Outcome: Ongoing

## 2020-08-03 NOTE — PROGRESS NOTES
PULMONARY & CRITICAL CARE MEDICINE PROGRESS  NOTE     Patient:  Lexii Howell  MRN: [id number]  Admit date: 7/23/2020  CODE Status: Full Code     SUBJECTIVE     I personally interviewed/examined the patient, reviewed interval history and interpreted all available radiographic, laboratory data at the time of service. Chief Compliant/Reason for Initial Consult: encephalitis    Brief Hospital Course: The patient is a 79 y.o. male presented for confusion. Reported he had initially had UTI symptoms, self prescribed doxycycline with no improvement. He then noted confusion and presented to [place]. COVID negative. Cxr showed diffuse interstitial prominence. Pt complained of headache, CT scan head normal, lumbar puncture done showed concern for encephalitis Pt started on acyclovir, rocephin, doxycycline empircally. Pt also found to have ATN. CT abd only showed enlarged prostate. Neurology consulted. EEG and MRI showed no acute abnormalities. Analysis showed positive for herpes simplex type I. Pt began to improve, abx d/c.      Interval History:  08/03/20  Patient seen and examined, afebrile  VS stable, SpO2 95% on RA, /99  Pleasant, only very mild confusion, hard of hearing  Agreeable to acute rehab at [facility] at this time    Review of Systems  Review of Systems:  General: negative for chills, fatigue or fever  ENT: negative for headaches, nasal congestion, sore throat or visual changes  Allergy and Immunology: negative for postnasal drip or seasonal allergies  Hematological and Lymphatic: negative for bleeding problems, swollen lymph nodes  Respiratory: negative for shortness of breath  Cardiovascular: negative for edema or palpitations  Gastrointestinal: negative for abdominal pain, change in bowel habits or nausea/vomiting  Genito-Urinary: negative for dysuria or urinary frequency/urgency  Musculoskeletal: negative for joint pain or joint swelling  Neurological: negative for numbness/tingling, seizures or weakness  Dermatological: negative for pruritus or rash    OBJECTIVE     VITAL SIGNS:   LAST-  BP (!) 132/99   Pulse 92   Temp 98.5 °F (36.9 °C) (Oral)   Resp 18   Ht 5' 10\" (1.778 m)   Wt 215 lb 6.2 oz (97.7 kg)   SpO2 98%   BMI 30.91 kg/m²   8-24 HR RANGE-  TEMP Temp  Av.5 °F (36.9 °C)  Min: 98.5 °F (36.9 °C)  Max: 98.5 °F (55.6 °C)   BP Systolic (67NTB), QQE:270 , Min:132 , RNV:626      Diastolic (07BML), IUN:91, Min:99, Max:99     PULSE Pulse  Av  Min: 92  Max: 92   RR No data recorded   O2 SAT No data recorded   OXYGEN DELIVERY No data recorded     Systemic Examination:   Physical Exam  General appearance - looks comfortable and in no acute distress  Mental status - alert, oriented to person, place, and time  Eyes - pupils equal and reactive, extraocular eye movements intact  Mouth - mucous membranes moist, pharynx normal without lesions  Neck - supple, no significant adenopathy  Chest - Chest was symmetrical without dullness to percussion. Breath sounds bilaterally were clear to auscultation. There were no wheezes, rhonchi or rales.   There is no intercostal recession or use of accessory muscles  Heart - normal rate, regular rhythm, normal S1, S2, no murmurs, rubs, clicks or gallops  Abdomen - soft, nontender, nondistended, no masses or organomegaly  Neurological - alert, oriented, normal speech, no focal findings or movement disorder noted  Extremities - peripheral pulses normal, no pedal edema, no clubbing or cyanosis  Skin - normal coloration and turgor, no rashes, no suspicious skin lesions noted     DATA REVIEW     Medications:  Scheduled Meds:   acyclovir  700 mg Intravenous Q8H    spironolactone  25 mg Oral Daily    levETIRAcetam  500 mg Oral BID    pantoprazole  40 mg Oral BID    amLODIPine  10 mg Oral Daily    polyethylene glycol  17 g Oral Daily    senna  1 tablet Oral Nightly    QUEtiapine  50 mg Oral Nightly    heparin (porcine)  5,000 Units Subcutaneous 3 times per day    sodium chloride flush  10 mL Intravenous 2 times per day     Continuous Infusions:   sodium chloride 50 mL/hr at 08/01/20 0113     LABS:-  ABG:   No results for input(s): POCPH, POCPCO2, POCPO2, POCHCO3, OEZW8EMD in the last 72 hours. CBC:   Recent Labs     08/01/20  0339 08/02/20  0533 08/03/20  0521   WBC 8.7 9.1 7.9   HGB 13.7 13.4 13.9   HCT 41.0 40.8 41.8   MCV 93.2 92.5 93.9    197 262   LYMPHOPCT 16* 20* 22*   RBC 4.40 4.41 4.45   MCH 31.1 30.4 31.2   MCHC 33.4 32.8 33.3   RDW 12.4 12.4 12.5     BMP:   Recent Labs     08/01/20  0339 08/02/20 0533 08/03/20  0521    138 139   K 3.2* 3.0* 3.0*    103 102   CO2 20 21 23   BUN 21 18 17   CREATININE 1.27* 1.13 1.11   GLUCOSE 105* 103* 105*     Liver Function Test:   No results for input(s): PROT, LABALBU, ALT, AST, GGT, ALKPHOS, BILITOT in the last 72 hours. Amylase/Lipase:  No results for input(s): AMYLASE, LIPASE in the last 72 hours. Coagulation Profile:   No results for input(s): INR, PROTIME, APTT in the last 72 hours. Cardiac Enzymes:  No results for input(s): CKTOTAL, CKMB, CKMBINDEX, TROPONINI in the last 72 hours.   Lactic Acid:  Lab Results   Component Value Date    LACTA NOT REPORTED 07/23/2020    LACTA 1.5 07/22/2020     BNP:   No results found for: BNP  D-Dimer:  No results found for: DDIMER  Others:   Lab Results   Component Value Date    TSH 1.33 07/23/2020     Lab Results   Component Value Date    CARRIE NEGATIVE 07/26/2020    SEDRATE 4 07/21/2020    CRP 0.7 07/23/2020     Lab Results   Component Value Date    URICACID 6.4 08/01/2020     No results found for: IRON, TIBC, FERRITIN  No results found for: SPEP, UPEP  Lab Results   Component Value Date    PSA 14.54 01/03/2020       Input/Output:    Intake/Output Summary (Last 24 hours) at 8/3/2020 0802  Last data filed at 8/2/2020 2012  Gross per 24 hour   Intake --   Output 100 ml   Net -100 ml Microbiology:  Urine Culture:  No components found for: CURINE  Blood Culture:  No components found for: CBLOOD, CFUNGUSBL  No results for input(s): SPECDESC, SPECDESC, SPECIAL, CULTURE, CULTURE, STATUS, ORG, CDIFFTOXPCR, CAMPYLOBPCR, SALMONELLAPC, SHIGAPCR, SHIGELLAPCR, MPNEUG, MPNEUM, LACTOQL in the last 72 hours. Pathology:    Radiology images:    Radiology reports:  MRI BRAIN WO CONTRAST   Final Result   1. No acute intracranial abnormality. 2. Senescent parenchymal volume loss with mild chronic white matter   microvascular ischemic changes. XR ABDOMEN (KUB) (SINGLE AP VIEW)   Final Result   No abnormally dilated loops of small bowel. XR CHEST (SINGLE VIEW FRONTAL)   Final Result   Rotated with relatively low lung volumes; no consolidation or sizable pleural   effusion. Mild central vascular congestion consideration. Mild basilar   atelectasis. RECOMMENDATION:   Followup chest x-ray if continued concern for aspiration or vascular   congestion         US RENAL COMPLETE   Final Result   Limited study without evidence of obstructive uropathy. Incidental note of hepatic steatosis limits evaluation for renal parenchymal   echogenicity evaluation. Bladder is not visualized due to catheter placement. MRI BRAIN WO CONTRAST   Final Result   1. No acute intracranial abnormality. No acute infarct. 2. Minimal global parenchymal volume loss with minimal microvascular ischemic   changes. IR LUMBAR PUNCTURE FOR DIAGNOSIS   Final Result   Successful fluoroscopic-guided lumbar puncture. Echocardiogram:   No results found for this or any previous visit. Cardiac Catheterization:   No results found for this or any previous visit.     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 *    Plan:  Continue acyclovir q12 per ID, 2-3wk course, PICC line to be placed  Awaiting PMR for evaluation for [facility] rehab      I updated the patient regarding the current clinical condition, provisional diagnosis and management plan. I addressed concerns and answered all questions to the best of my abilities. It was my pleasure to evaluate Carlyon Schaumann today. We will continue to follow. I would like to thank you for allowing me to participate in the care of this patient. Please feel free to call with any further questions or concerns. Brie Cha M.D. Pulmonary and Critical Care Medicine           8/3/2020, 8:02 AM    Please note that this chart was generated using voice recognition Dragon dictation software. Although every effort was made to ensure the accuracy of this automated transcription, some errors in transcription may have occurred.

## 2020-08-03 NOTE — PROGRESS NOTES
Pulmonary Progress Note  [facility]       Patient - Evelyne Walters,  Age - 79 y.o.    - 1950      Room Number - 0532/9993-86   MRN -  [id number]   Acct # - [de-identified]  Date of Admission -  2020  3:02 PM    SUBJECTIVE  Not sleep well last night. Declined Seroquel. Wife states that he did not fall asleep until 4 AM.  Does not normally use sedative hypnotic. Asking for small dose of Ativan. MRI done earlier today (not contrast). Report states no intracranial abnormality. Specifically, no structural brain injury. Agitated and mildly confused at times. Still quite weak. Believe he would benefit by acute rehab and patient and wife are agreeable. OBJECTIVE    VITALS    height is 5' 10\" (1.778 m) and weight is 215 lb 6.2 oz (97.7 kg). His oral temperature is 98.5 °F (36.9 °C). His blood pressure is 132/99 (abnormal) and his pulse is 92. His respiration is 18 and oxygen saturation is 98%. Temperature Range: Temp: 98.5 °F (36.9 °C) Temp  Av.8 °F (36.6 °C)  Min: 97 °F (36.1 °C)  Max: 98.5 °F (36.9 °C)  BP Range:  Systolic (38TCP), Synagogue:906 , Min:132 , AVD:526     Diastolic (12DSL), XTV:54, Min:76, Max:99    Pulse Range: Pulse  Av  Min: 62  Max: 92  Respiration Range: Resp  Av  Min: 18  Max: 18  Current Pulse Ox[de-identified]  SpO2: 98 %  24HR Pulse Ox Range:  SpO2  Av %  Min: 98 %  Max: 98 %  Oxygen Amount and Delivery:  O2 Flow Rate (L/min): 2 L/min      Wt Readings from Last 3 Encounters:   20 215 lb 6.2 oz (97.7 kg)   20 222 lb 3.2 oz (100.8 kg)   20 216 lb (98 kg)       I/O (24 Hours)    Intake/Output Summary (Last 24 hours) at 2020  Last data filed at 2020  Gross per 24 hour   Intake --   Output 340 ml   Net -340 ml     EXAM  General Appearance  Alert, Oriented, and Cooperative = 0noneoriented to person, place, time, and general circumstances  HEENT - Normal, Head is normocephalic, atraumatic.    Neck - Supple, symmetrical, n/aNo cervical, supraclavicular or axillary lymphadenopathy noted. Lungs - negative  Cardiovascular - Cor RRR and No murmurs                                                                            \"}  Abdomen - soft, nontender, no HSM, no guarding, no rebound, no masses  Neurologic - There are no focal motor or sensory deficits  Skin - No bruising or bleeding  Extremities - No cyanosis, clubbing none    MEDS   acyclovir  700 mg Intravenous Q8H    spironolactone  25 mg Oral Daily    levETIRAcetam  500 mg Oral BID    pantoprazole  40 mg Oral BID    amLODIPine  10 mg Oral Daily    polyethylene glycol  17 g Oral Daily    senna  1 tablet Oral Nightly    QUEtiapine  50 mg Oral Nightly    heparin (porcine)  5,000 Units Subcutaneous 3 times per day    sodium chloride flush  10 mL Intravenous 2 times per day      sodium chloride 50 mL/hr at 08/01/20 0113     potassium chloride **OR** potassium alternative oral replacement **OR** potassium chloride, LORazepam, HYDROmorphone, calcium carbonate, sodium chloride flush, acetaminophen **OR** acetaminophen, promethazine **OR** ondansetron    LABS  CBC   Recent Labs     08/02/20  0533   WBC 9.1   HGB 13.4   HCT 40.8   MCV 92.5        BMP:   Recent Labs     08/02/20  0533      K 3.0*      CO2 21   BUN 18   CREATININE 1.13   GLUCOSE 103*     No results found for: PH, PCO2, PO2, HCO3, O2SAT  Lab Results   Component Value Date    MODE NOT REPORTED 07/28/2020     LIVER PROFILE No results for input(s): AST, ALT, LIPASE, BILIDIR, BILITOT, ALKPHOS in the last 72 hours. Invalid input(s): AMYLASE,  ALB  INR No results for input(s): INR in the last 72 hours.   PTT   Lab Results   Component Value Date    APTT 27.9 07/23/2020     CBC:   Lab Results   Component Value Date    WBC 9.1 08/02/2020    RBC 4.41 08/02/2020    HGB 13.4 08/02/2020    HCT 40.8 08/02/2020    MCV 92.5 08/02/2020    MCH 30.4 08/02/2020    MCHC 32.8 08/02/2020    RDW 12.4 08/02/2020     08/02/2020 MPV 10.7 08/02/2020     BMP:    Lab Results   Component Value Date     08/02/2020    K 3.0 08/02/2020     08/02/2020    CO2 21 08/02/2020    BUN 18 08/02/2020    LABALBU 2.7 07/29/2020    CREATININE 1.13 08/02/2020    CREATININE 1.0 01/16/2020    CALCIUM 9.6 08/02/2020    GFRAA >60 08/02/2020    LABGLOM >60 08/02/2020    GLUCOSE 103 08/02/2020       CULTURES  Urine Culture:  No components found for: CURINE  Blood Culture:  No components found for: CBLOOD, CFUNGUSBL  Sputum Culture:  No components found for: CSPUTUM    RADIOLOGY  TECHNIQUE:    Multiplanar multisequence MRI of the brain was performed without the    administration of intravenous contrast.         COMPARISON:    07/24/2020         HISTORY:    ORDERING SYSTEM PROVIDED HISTORY: L hand weakness    TECHNOLOGIST PROVIDED HISTORY:    L hand weakness    Reason for Exam: L hand weakness         FINDINGS:    INTRACRANIAL STRUCTURES/VENTRICLES: There is no acute infarct or mass. Lisandra Kail    is stable senescent parenchymal volume loss with mild chronic white matter    microvascular ischemic changes.  No mass effect or midline shift. No evidence    of an acute intracranial hemorrhage.  The ventricles and sulci are normal in    size and configuration.  The sellar/suprasellar regions appear unremarkable. The normal signal voids within the major intracranial vessels appear    maintained.         ORBITS: Bilateral intra-ocular lens implants.  Otherwise, normal.         SINUSES: The visualized paranasal sinuses and mastoid air cells are well    aerated.         BONES/SOFT TISSUES: The bone marrow signal intensity appears normal. The soft    tissues demonstrate no acute abnormality.              Impression    1. No acute intracranial abnormality. 2. Senescent parenchymal volume loss with mild chronic white matter    microvascular ischemic changes.           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 Small dose of Ativan for sleep. 2. Continue acyclovir. Will need PICC line to complete course (2-3 weeks). 3. PM and R evaluation for acute rehab placement. 4. Discussed with family.       Electronically signed by Gelacio Pandya DO on 8/2/2020 at 9:52 PM

## 2020-08-03 NOTE — PROGRESS NOTES
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 Recommendations:   · Acyclovir 10 mg/kg IV every 8 hours calculated on ideal body weight of 70 kg ie 700 mg/24 hr.. Stop date 8-14-20. · Slow infusion of acyclovir, hydration. · Monitor renal function. .  · Supportive care  · Patient is to go to [facility] where he will complete the acyclovir. · Will ask Dr Jason Yip to follow him while there. Medical Decision Making/Summary/Discussion:8/3/2020     ·   Infection Control Recommendations   · [place] Precautions    Antimicrobial Stewardship Recommendations     · Targeted therapy  · PK dosing  ·   Coordination of Outpatient Care:   · Estimated Length of IV antimicrobials: To be determined  · Patient will need Midline Catheter Insertion: Yes  · Patient will need PICC line Insertion: No  · Patient will need: Home IV , [place],  SNF,  LTAC: To be determined  · Patient will need outpatient wound care: No    Chief complaint/reason for consultation:   · Altered mental status with progression over a few days      History of Present Illness:   Jessica Soliz is a 79y.o.-year-old  male who was initially admitted on 7/23/2020. Patient seen at the request of Blayne Ash. 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 He is unable to provide any information himself at this point in time. Available information reviewed and discussed with Dr. Isaiah Castaneda.    Patient apparently developed onset of some dysuria and pain in the pelvic and right hip area starting about 7 to the 10 days prior to admission. In addition he was exhibiting some fatigue.   He has a pre-existing history of prostatic hypertrophy with previous documented urinary tract oriented, conversant. Is overall doing better. MRI scan of the head on  7/24 showed subacute inflammation in the frontal region. On 8/1/2020, patient complained of left hand weakness, on examination had weak handgrip and weak fine motor. On 8/2/2020 MRI brain without contrast shows no acute intracranial abnormality. Urinary output in the last 24 hours is 340 mL. Woods catheter was removed. Bun and creatinine are improving.    7/23/20 CSF studies show an increase in WBC count with lymphocytic predominance. CSF cultures show no growth. CSF: HSV 1- positive. Patient developed acute kidney injury after prior CT of the abdomen with contrast . Fortunately he is showing rapid improvement. We were able to increase Acyclovir dosage to q 8 Hr on 8-1-20. I discussed the options for treatment with him and his 2 daughters:  · Treatment with Acyclovir for 2 weeks. LP before completion of treatment to determine if additional treatment would be needed. · Treatment for 3 weeks without repeating LP. Dr Jose Alberto Moreno flatly refuses another LP. He is happy to complete 21 days total rx with Acyclovir for HSV 1 encephalitis. He is on IV acyclovir 700 mg every 8 hours. Stop date 8-14-20. Labs, X rays reviewed: 8/3/2020    BUN:42--> 32>23> 21 > 18 >17  Cr:   1.73>1.38 > 1.27> 1.13> 1.11    WBC:  10.4-->7.7--> 6.9>8.1> 7.9  Hb:  12.3-->12.2--> 11.7>13.3> 13.9  Plat:  159-->167--> 181>189> 262    Cultures:  Urine:  ·   Blood:  ·   Sputum :  7/23: MRSA screening of the nares is negative  Wound:  ·     CSF:  · 7/23: no growth    COVID-19 test:  · 7/23/2020- negative    · 7/23/2020 CT of the head: No acute abnormality  ·     Viral panel; Herpes simplex type I positive      US Renal:7/26:  Small cyst noted on the left kidney. Evaluation for renal echogenicity is indeterminate secondary to increased   echogenicity of the liver. Hepatic steatosis noted. Discussed with patient, RN CC., family.     I have personally reviewed the past medical history, past surgical history, medications, social history, and family history, and I have updated the database accordingly. Past Medical History:   No past medical history on file. Past Surgical  History:   No past surgical history on file.     Medications:      potassium chloride  40 mEq Oral BID WC    acyclovir  700 mg Intravenous Q8H    spironolactone  25 mg Oral Daily    levETIRAcetam  500 mg Oral BID    pantoprazole  40 mg Oral BID    amLODIPine  10 mg Oral Daily    polyethylene glycol  17 g Oral Daily    senna  1 tablet Oral Nightly    QUEtiapine  50 mg Oral Nightly    heparin (porcine)  5,000 Units Subcutaneous 3 times per day    sodium chloride flush  10 mL Intravenous 2 times per day       Social History:     Social History     Socioeconomic History    Marital status:      Spouse name: Not on file    Number of children: Not on file    Years of education: Not on file    Highest education level: Not on file   Occupational History    Not on file   Social Needs    Financial resource strain: Not hard at all   [employer] insecurity     Worry: Never true     Inability: Never true   [employer] needs     Medical: Not on file     Non-medical: Not on file   Tobacco Use    Smoking status: Never Smoker    Smokeless tobacco: Never Used   Substance and Sexual Activity    Alcohol use: Not on file    Drug use: Not on file    Sexual activity: Not on file   Lifestyle    Physical activity     Days per week: Not on file     Minutes per session: Not on file    Stress: Not on file   Relationships    Social connections     Talks on phone: Not on file     Gets together: Not on file     Attends Mu-ism service: Not on file     Active member of club or organization: Not on file     Attends meetings of clubs or organizations: Not on file     Relationship status: Not on file    Intimate partner violence     Fear of current or ex partner: Not on file non tender. Bowel sounds normal. No organomegaly  All four Extremities: No cyanosis, clubbing, edema, or effusions. . Scar from prior right hip arthroplasty  Neurologic: No gross sensory or motor deficits. Moves all extremities. Encephalopathic  Skin: Warm and dry with good turgor. No signs of peripheral arterial or venous insufficiency. No ulcerations. No open wounds. Medical Decision Making -Laboratory:   I have independently reviewed/ordered the following labs:    CBC with Differential:   Recent Labs     08/02/20  0533 08/03/20  0521   WBC 9.1 7.9   HGB 13.4 13.9   HCT 40.8 41.8    262   LYMPHOPCT 20* 22*   MONOPCT 11 12     BMP:   Recent Labs     08/02/20  0533 08/03/20  0521    139   K 3.0* 3.0*    102   CO2 21 23   BUN 18 17   CREATININE 1.13 1.11   MG  --  1.9     Hepatic Function Panel:   No results for input(s): PROT, LABALBU, BILIDIR, IBILI, BILITOT, ALKPHOS, ALT, AST in the last 72 hours. No results for input(s): RPR in the last 72 hours. No results for input(s): HIV in the last 72 hours. No results for input(s): BC in the last 72 hours. Lab Results   Component Value Date    MUCUS NOT REPORTED 07/26/2020    RBC 4.45 08/03/2020    TRICHOMONAS NOT REPORTED 07/26/2020    WBC 7.9 08/03/2020    YEAST NOT REPORTED 07/26/2020    TURBIDITY CLOUDY 07/26/2020     Lab Results   Component Value Date    CREATININE 1.11 08/03/2020    CREATININE 1.0 01/16/2020    GLUCOSE 105 08/03/2020       Medical Decision Making-Imaging:     Abdominal Xray, 7/29/20:  FINDINGS:    Mild gaseous distention of the stomach.  Oral contrast within the colon.  No    abnormally dilated loops of small bowel.              Impression    No abnormally dilated loops of small bowel.           Chest Xray, 7/27/20:  Impression    Rotated with relatively low lung volumes; no consolidation or sizable pleural    effusion.  Mild central vascular congestion consideration.  Mild basilar    atelectasis.         RECOMMENDATION: Followup chest x-ray if continued concern for aspiration or vascular    congestion          EXAMINATION:    MRI OF THE BRAIN WITHOUT CONTRAST  7/24/2020 10:11 am         TECHNIQUE:    Multiplanar multisequence MRI of the brain was performed without the    administration of intravenous contrast.         COMPARISON:    None.         HISTORY:    ORDERING SYSTEM PROVIDED HISTORY: Altered mental status    TECHNOLOGIST PROVIDED HISTORY:    Altered mental status         Initial evaluation.         FINDINGS:    INTRACRANIAL STRUCTURES/VENTRICLES: There is no acute infarct. No mass effect    or midline shift. No evidence of an acute intracranial hemorrhage.  Areas of    T2 FLAIR hyperintensity are seen in the periventricular and subcortical white    matter, which are nonspecific, but may represent chronic microvascular    ischemic change. Ciara Jessica is prominence of the ventricles and sulci due to    global parenchymal volume loss.  The sellar/suprasellar regions appear    unremarkable.  The normal signal voids within the major intracranial vessels    appear maintained.         ORBITS: The visualized portion of the orbits demonstrate no acute abnormality.         SINUSES: The visualized paranasal sinuses and mastoid air cells are well    aerated.         BONES/SOFT TISSUES: The bone marrow signal intensity appears normal. The soft    tissues demonstrate no acute abnormality.              Impression    1. No acute intracranial abnormality.  No acute infarct. 2. Minimal global parenchymal volume loss with minimal microvascular ischemic    changes.         CT OF THE HEAD WITHOUT CONTRAST  7/23/2020 8:09 am         TECHNIQUE:    CT of the head was performed without the administration of intravenous    contrast. Dose modulation, iterative reconstruction, and/or weight based    adjustment of the mA/kV was utilized to reduce the radiation dose to as low    as reasonably achievable.         COMPARISON:    None.         HISTORY: ORDERING SYSTEM PROVIDED HISTORY: DELERIUM    TECHNOLOGIST PROVIDED HISTORY:    DELERIUM              FINDINGS:    BRAIN/VENTRICLES: There is no acute intracranial hemorrhage, mass effect or    midline shift.  No abnormal extra-axial fluid collection.  The gray-white    differentiation is maintained without evidence of an acute infarct.  There is    no evidence of hydrocephalus.         ORBITS: The visualized portion of the orbits demonstrate no acute abnormality.         SINUSES: The visualized paranasal sinuses and mastoid air cells demonstrate    no acute abnormality.         SOFT TISSUES/SKULL:  No acute abnormality of the visualized skull or soft    tissues.              Impression    No acute intracranial abnormality. EXAMINATION:    ONE XRAY VIEW OF THE CHEST         7/21/2020 6:32 pm         COMPARISON:    None.         HISTORY:    ORDERING SYSTEM PROVIDED HISTORY: sepsis    TECHNOLOGIST PROVIDED HISTORY:    sepsis         FINDINGS:    Heart size is normal.  Interstitial markings are prominent which may be    related to interstitial pneumonitis.  No effusion, extrapleural air or    localized consolidation is noted.  Osseous and mediastinal structures are    age-appropriate.              Impression    Diffuse prominence of the interstitial markings which may be related to    interstitial pneumonitis.  No focal consolidation.           EXAMINATION:    CT OF THE ABDOMEN AND PELVIS WITH CONTRAST 7/21/2020 4:06 pm         TECHNIQUE:    CT of the abdomen and pelvis was performed with the administration of    intravenous contrast. Multiplanar reformatted images are provided for review.     Dose modulation, iterative reconstruction, and/or weight based adjustment of    the mA/kV was utilized to reduce the radiation dose to as low as reasonably    achievable.         COMPARISON:    CT urogram January 6, 2020         HISTORY:    ORDERING SYSTEM PROVIDED HISTORY: Lower abdominal pain    TECHNOLOGIST PROVIDED HISTORY:              FINDINGS:    Lower Chest: Clear         Organs:         The abdominal wall appears normal.         The liver, spleen, pancreas, and adrenals appear normal.  Multiple punctate    calcified granulomas in the spleen.         Bilateral simple renal cortical cysts.         Bladder is decompressed and circumferentially thickened.         GI/Bowel: The stomach,small bowel, and colon appear normal. Oral contrast    noted throughout the small bowel.  Stool noted throughout the liver large    bowel.  Appendix normal.         Pelvis: Markedly enlarged prostate.  It measures 68 x 83 mm in AP and    transverse dimensions.         Peritoneum/Retroperitoneum: The abdominal aorta and iliac arteries are normal    in caliber. There is no pathologic adenopathy.         Bones/Soft Tissues: Right hip arthroplasty.              Impression    Markedly enlarged prostate.  Possible cystitis.  Increased stool.           EXAMINATION:    ONE XRAY VIEW OF THE PELVIS AND TWO XRAY VIEWS RIGHT HIP         6/25/2020 7:29 am         COMPARISON:    10/23/2017         HISTORY:    ORDERING SYSTEM PROVIDED HISTORY: Acute right hip pain         FINDINGS:    Status post right hip arthroplasty.  No evidence of hardware complications. Stable alignment.         Elsewhere, no acute fracture or dislocation.  Moderate left hip    osteoarthritis.  The SI joints and sacral arcuate lines appear intact. Degenerative changes of the SI joints are also seen.         No soft tissue abnormality.              Impression    1. Status post right hip arthroplasty.  No complication. 2. No acute osseous abnormality of the pelvis. 3. Moderate left hip osteoarthritis. Medical Decision [id number]:   Results for Emmanuel Williamsonmichellegeri (MRN [id number]) as of 7/24/2020 10:34   Ref.  Range 7/23/2020 16:37   CYTOMEGALOVIRUS (CMV) CSF FILM ARRAY Latest Ref Range: Not Detected  Not Detected   HAEMOPHILUS INFLUENZA CSF FILM ARRAY Latest Ref Range: Not Detected  Not Detected   HHV-6 (HERPESVIRUS 6) CSF FILM ARRAY Latest Ref Range: Not Detected  Not Detected   HSV-1 CSF FILM ARRAY Latest Ref Range: Not Detected  DETECTED (A)   HSV-2 CSF FILM ARRAY Latest Ref Range: Not Detected  Not Detected   PARECHOVIRUS CSF FILM ARRAY Latest Ref Range: Not Detected  Not Detected   ESCHERICHIA COLI K1 CSF FILM ARRAY Latest Ref Range: Not Detected  Not Detected   LISTERIA MONOCYTOGENES CSF FILM ARRAY Latest Ref Range: Not Detected  Not Detected   NEISSERIA MENIGITIDIS CSF FILM ARRAY Latest Ref Range: Not Detected  Not Detected   STREPTOCOCCUS AGALACTIAE CSF FILM ARRAY Latest Ref Range: Not Detected  Not Detected   STREPTOCOCCUS PNEUMONIAE CSF FILM ARRAY Latest Ref Range: Not Detected  Not Detected   CRYPTOCOCCUS NEOFORMANS/SALUD CSF FILM ARR. Latest Ref Range: Not Detected  Not Detected       Medical Decision Making-Other:     Note:  · Labs, medications, radiologic studies were reviewed with personal review of films  · Large amounts of data were reviewed  · Discussed with nursing Staff, Discharge planner  · Infection Control and Prevention measures reviewed  · All prior entries were reviewed  · Administer medications as ordered  · Prognosis: Guarded  · Discharge planning reviewed  · Follow up as outpatient. Thank you for allowing us to participate in the care of this patient. Please call with questions. Marie Dumas, DPM     ATTESTATION:    I have discussed the case, including pertinent history and exam findings with the residents and students. I have seen and examined the patient and the key elements of the encounter have been performed by me. I was present when the student obtained his information or examined the patient. I have reviewed the laboratory data, other diagnostic studies and discussed them with the residents. I have updated the medical record where necessary.     I agree with the assessment, plan and orders as documented by the resident/ student.     Zee Lozano MD.    Pager: [phone] - Office: [phone]

## 2020-08-04 VITALS
RESPIRATION RATE: 20 BRPM | BODY MASS INDEX: 30.84 KG/M2 | HEART RATE: 60 BPM | WEIGHT: 215.39 LBS | SYSTOLIC BLOOD PRESSURE: 146 MMHG | HEIGHT: 70 IN | TEMPERATURE: 98.2 F | OXYGEN SATURATION: 97 % | DIASTOLIC BLOOD PRESSURE: 87 MMHG

## 2020-08-04 PROBLEM — E87.6 HYPOKALEMIA: Status: ACTIVE | Noted: 2020-08-04

## 2020-08-04 LAB
ABSOLUTE EOS #: 0.09 K/UL (ref 0–0.44)
ABSOLUTE IMMATURE GRANULOCYTE: 0.08 K/UL (ref 0–0.3)
ABSOLUTE LYMPH #: 1.84 K/UL (ref 1.1–3.7)
ABSOLUTE MONO #: 0.96 K/UL (ref 0.1–1.2)
ANION GAP SERPL CALCULATED.3IONS-SCNC: 13 MMOL/L (ref 9–17)
BASOPHILS # BLD: 1 % (ref 0–2)
BASOPHILS ABSOLUTE: 0.04 K/UL (ref 0–0.2)
BUN BLDV-MCNC: 17 MG/DL (ref 8–23)
BUN/CREAT BLD: ABNORMAL (ref 9–20)
CALCIUM SERPL-MCNC: 9.3 MG/DL (ref 8.6–10.4)
CHLORIDE BLD-SCNC: 105 MMOL/L (ref 98–107)
CO2: 21 MMOL/L (ref 20–31)
CREAT SERPL-MCNC: 1.07 MG/DL (ref 0.7–1.2)
DIFFERENTIAL TYPE: ABNORMAL
EOSINOPHILS RELATIVE PERCENT: 1 % (ref 1–4)
GFR AFRICAN AMERICAN: >60 ML/MIN
GFR NON-AFRICAN AMERICAN: >60 ML/MIN
GFR SERPL CREATININE-BSD FRML MDRD: ABNORMAL ML/MIN/{1.73_M2}
GFR SERPL CREATININE-BSD FRML MDRD: ABNORMAL ML/MIN/{1.73_M2}
GLUCOSE BLD-MCNC: 112 MG/DL (ref 70–99)
HCT VFR BLD CALC: 38.9 % (ref 40.7–50.3)
HEMOGLOBIN: 12.6 G/DL (ref 13–17)
IMMATURE GRANULOCYTES: 1 %
LYMPHOCYTES # BLD: 26 % (ref 24–43)
MCH RBC QN AUTO: 30.5 PG (ref 25.2–33.5)
MCHC RBC AUTO-ENTMCNC: 32.4 G/DL (ref 28.4–34.8)
MCV RBC AUTO: 94.2 FL (ref 82.6–102.9)
MONOCYTES # BLD: 14 % (ref 3–12)
NRBC AUTOMATED: 0 PER 100 WBC
PDW BLD-RTO: 12.4 % (ref 11.8–14.4)
PLATELET # BLD: 181 K/UL (ref 138–453)
PLATELET ESTIMATE: ABNORMAL
PMV BLD AUTO: 11 FL (ref 8.1–13.5)
POTASSIUM SERPL-SCNC: 3.1 MMOL/L (ref 3.7–5.3)
RBC # BLD: 4.13 M/UL (ref 4.21–5.77)
RBC # BLD: ABNORMAL 10*6/UL
SEG NEUTROPHILS: 57 % (ref 36–65)
SEGMENTED NEUTROPHILS ABSOLUTE COUNT: 4.11 K/UL (ref 1.5–8.1)
SODIUM BLD-SCNC: 139 MMOL/L (ref 135–144)
WBC # BLD: 7.1 K/UL (ref 3.5–11.3)
WBC # BLD: ABNORMAL 10*3/UL

## 2020-08-04 PROCEDURE — 2580000003 HC RX 258: Performed by: STUDENT IN AN ORGANIZED HEALTH CARE EDUCATION/TRAINING PROGRAM

## 2020-08-04 PROCEDURE — 6370000000 HC RX 637 (ALT 250 FOR IP): Performed by: STUDENT IN AN ORGANIZED HEALTH CARE EDUCATION/TRAINING PROGRAM

## 2020-08-04 PROCEDURE — 99231 SBSQ HOSP IP/OBS SF/LOW 25: CPT | Performed by: INTERNAL MEDICINE

## 2020-08-04 PROCEDURE — 99232 SBSQ HOSP IP/OBS MODERATE 35: CPT | Performed by: INTERNAL MEDICINE

## 2020-08-04 PROCEDURE — 6370000000 HC RX 637 (ALT 250 FOR IP): Performed by: INTERNAL MEDICINE

## 2020-08-04 PROCEDURE — 6360000002 HC RX W HCPCS: Performed by: STUDENT IN AN ORGANIZED HEALTH CARE EDUCATION/TRAINING PROGRAM

## 2020-08-04 PROCEDURE — 99239 HOSP IP/OBS DSCHRG MGMT >30: CPT | Performed by: INTERNAL MEDICINE

## 2020-08-04 PROCEDURE — 2580000003 HC RX 258: Performed by: INTERNAL MEDICINE

## 2020-08-04 PROCEDURE — 80048 BASIC METABOLIC PNL TOTAL CA: CPT

## 2020-08-04 PROCEDURE — 36415 COLL VENOUS BLD VENIPUNCTURE: CPT

## 2020-08-04 PROCEDURE — 85025 COMPLETE CBC W/AUTO DIFF WBC: CPT

## 2020-08-04 PROCEDURE — 6360000002 HC RX W HCPCS: Performed by: INTERNAL MEDICINE

## 2020-08-04 RX ORDER — POTASSIUM CHLORIDE 20 MEQ/1
20 TABLET, EXTENDED RELEASE ORAL 2 TIMES DAILY
Status: DISCONTINUED | OUTPATIENT
Start: 2020-08-04 | End: 2020-08-04 | Stop reason: HOSPADM

## 2020-08-04 RX ADMIN — HEPARIN SODIUM 5000 UNITS: 5000 INJECTION INTRAVENOUS; SUBCUTANEOUS at 01:37

## 2020-08-04 RX ADMIN — SODIUM CHLORIDE, PRESERVATIVE FREE 10 ML: 5 INJECTION INTRAVENOUS at 09:42

## 2020-08-04 RX ADMIN — HEPARIN SODIUM 5000 UNITS: 5000 INJECTION INTRAVENOUS; SUBCUTANEOUS at 08:42

## 2020-08-04 RX ADMIN — POTASSIUM CHLORIDE 20 MEQ: 1500 TABLET, EXTENDED RELEASE ORAL at 10:28

## 2020-08-04 RX ADMIN — ACETAMINOPHEN 325 MG: 325 TABLET ORAL at 10:27

## 2020-08-04 RX ADMIN — ACETAMINOPHEN 325 MG: 325 TABLET ORAL at 06:37

## 2020-08-04 RX ADMIN — SPIRONOLACTONE 25 MG: 25 TABLET ORAL at 08:42

## 2020-08-04 RX ADMIN — ACYCLOVIR SODIUM 700 MG: 50 INJECTION, SOLUTION INTRAVENOUS at 01:31

## 2020-08-04 RX ADMIN — ACYCLOVIR SODIUM 700 MG: 50 INJECTION, SOLUTION INTRAVENOUS at 08:42

## 2020-08-04 RX ADMIN — PANTOPRAZOLE SODIUM 40 MG: 40 TABLET, DELAYED RELEASE ORAL at 08:42

## 2020-08-04 ASSESSMENT — PAIN SCALES - GENERAL
PAINLEVEL_OUTOF10: 2
PAINLEVEL_OUTOF10: 4

## 2020-08-04 NOTE — PLAN OF CARE
Problem: Anxiety/Stress:  Goal: Level of anxiety will decrease  Description: Level of anxiety will decrease  8/4/2020 0227 by Paulette Gamboa RN  Outcome: Ongoing  8/3/2020 1757 by Annel Rubio RN  Outcome: Ongoing     Problem: Anxiety/Stress:  Goal: Level of anxiety will decrease  Description: Level of anxiety will decrease  8/4/2020 0227 by Paulette Gamboa RN  Outcome: Ongoing  8/3/2020 1757 by Annel Rubio RN  Outcome: Ongoing     Problem: Anxiety/Stress:  Goal: Level of anxiety will decrease  Description: Level of anxiety will decrease  8/4/2020 0227 by Paulette Gamboa RN  Outcome: Ongoing  8/3/2020 1757 by Annel Rubio RN  Outcome: Ongoing

## 2020-08-04 NOTE — CARE COORDINATION
Spoke with Liset at [facility]. She is going to speak with Dr. James Bhakta for patient review and inform writer if they can accept.

## 2020-08-04 NOTE — DISCHARGE SUMMARY
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 *      Admission Condition:  poor     Discharged Condition: good    Hospital Stay:     Hospital Course:  Jessica Soliz is a 79 y.o. 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 Per my colleague:  \"   Patient was admitted to the hospital for Herpes encephalitis after being transferred from [place] for abdominal pain and dysuria. Patient had contion issues 2020 after starting doxycycline, then changed to cefepime. Was noted to have fever of 101 in [place] then transferred to [facility]. Where he was treated for HSV encephalitis. ID, Pulmonology, Nephrology, neurlogy have been following the patient.      MRI 2020:  1. No acute intracranial abnormality. 2. Senescent parenchymal volume loss with mild chronic white matter    microvascular ischemic changes.      His renal function improved. He was treated with IV Acyclovir every 8 hours with marked improvement of his mentation.   He was also treated with Keppra BID for seizure prophylaxis by Neurology. ID wants to him to complete his course of IV Acyclovir through 8/14/2020. Home care was arranged. He wanted to go home to complete PT/OT there. His wife is aware and will help. He is stable for discharge. Significant therapeutic interventions: see above    Significant Diagnostic Studies:     Radiology:  Xr Abdomen (kub) (single Ap View)    Result Date: 7/29/2020  No abnormally dilated loops of small bowel. Mri Brain Wo Contrast    Result Date: 8/2/2020  1. No acute intracranial abnormality. 2. Senescent parenchymal volume loss with mild chronic white matter microvascular ischemic changes. Consultations:    Consults:     Final Specialist Recommendations/Findings:   IP CONSULT TO INFECTIOUS DISEASES  IP CONSULT TO NEUROLOGY  IP CONSULT TO IV TEAM  IP CONSULT TO NEPHROLOGY  IP CONSULT TO UROLOGY  IP CONSULT TO PHYSICAL MEDICINE REHAB  IP CONSULT TO IV TEAM  IP CONSULT TO HOME CARE NEEDS      The patient was seen and examined on day of discharge and this discharge summary is in conjunction with any daily progress note from day of discharge. Discharge plan:     Disposition: Home    Physician Follow Up:     Cheyanne Fortune MD  [address]  [phone]    In 2 weeks  For elevated PSA evaluation.     Nubia Fields MD  [address] [facility]              Diet: regular diet    Activity: As tolerated    Instructions to Patient: Complete IV Acyclovir    Discharge Medications:      Medication List      START taking these medications    acyclovir  infusion  Commonly known as:  ZOVIRAX  Infuse 700 mg intravenously every 8 hours for 10 days Compound per protocol     amLODIPine 10 MG tablet  Commonly known as:  NORVASC  Take 1 tablet by mouth daily     calcium carbonate 500 MG chewable tablet  Commonly known as:  TUMS  Take 1 tablet by mouth 2 times daily as needed for Heartburn     levETIRAcetam 500 MG tablet  Commonly known as:  KEPPRA  Take 1 tablet by mouth 2 times daily     polyethylene glycol 17 g packet  Commonly known as:  GLYCOLAX  Take 17 g by mouth daily     potassium chloride 20 MEQ extended release tablet  Commonly known as:  KLOR-CON M  Take 1 tablet by mouth daily for 3 days     spironolactone 25 MG tablet  Commonly known as:  ALDACTONE  Take 1 tablet by mouth daily        CONTINUE taking these medications    timolol 0.5 % ophthalmic solution  Commonly known as:  TIMOPTIC           Where to Get Your Medications      These medications were sent to [facility] [address] [facility], [address] [facility]  [address], [address] [address]    Phone:  [phone]   · amLODIPine 10 MG tablet  · calcium carbonate 500 MG chewable tablet  · levETIRAcetam 500 MG tablet  · polyethylene glycol 17 g packet  · potassium chloride 20 MEQ extended release tablet  · spironolactone 25 MG tablet     You can get these medications from any pharmacy    Bring a paper prescription for each of these medications  · acyclovir  infusion         Discharge Procedure Orders   Basic Metabolic Panel   Standing Status: Future Standing Exp. Date: 08/03/21     DME Order for [facility]) as OP   Order Comments: - DME device ordered - Shower chair   - Diagnosis: Deconditioned due to medical illness  - Length of Need: 3 Months       Time Spent on discharge is  20 mins in patient examination, evaluation, counseling as well as medication reconciliation, prescriptions for required medications, discharge plan and follow up. Electronically signed by   James Swartz MD  8/4/2020  10:55 AM      Thank you Dr. Keyla Ochoa MD for the opportunity to be involved in this patient's care.

## 2020-08-04 NOTE — CARE COORDINATION
Called Cyndi at [facility]. Informed her that the patient will be discharged today. IV atx are set for delivery at the patient's home.

## 2020-08-04 NOTE — PROGRESS NOTES
[facility]    Progress Note    8/4/2020    9:04 AM    Name:   Lou Peacock  MRN:     [id number]     Acct:      [de-identified]   Room:   [address] [place] [address] Day:  15  Admit Date:  7/23/2020  3:02 PM    PCP:   Leslie Lopez MD  Code Status:  Full Code    Subjective:     C/C: Altered mental status, abd pain    Interval History Status: improved. Patient is feeling well and wants to go home. He denies any HA, fevers or dizziness. He does still need a walker with ambulating. He is set to go home with IV Acyclovir and home care. Wife is present. Brief History:     Per my colleague:  \"   Patient was admitted to the hospital for Herpes encephalitis after being transferred from [place] for abdominal pain and dysurial. Patient had contion issues 7/21/2020 after starting doxycycline, then changed to cefepime. Was noted to have fever of 101 in [place] then transferred to [address] [place] [address] Where he was treated for HSV encephalitis. ID, Pulmonology, Nephrology, neurlogy have been following the patient.      MRI 8/2/2020:  1. No acute intracranial abnormality. 2. Senescent parenchymal volume loss with mild chronic white matter    microvascular ischemic changes.       \"    Review of Systems:     Constitutional:  negative for chills, fevers, sweats  Respiratory:  negative for cough, dyspnea on exertion, shortness of breath, wheezing  Cardiovascular:  negative for chest pain, chest pressure/discomfort, lower extremity edema, palpitations  Gastrointestinal:  negative for abdominal pain, constipation, diarrhea, nausea, vomiting  Neurological:  negative for dizziness, headache    Medications: Allergies:     Allergies   Allergen Reactions    Ibuprofen        Current Meds:   Scheduled Meds:    potassium chloride  20 mEq Oral BID    acyclovir  700 mg Intravenous Q8H    spironolactone  25 mg Oral Daily    levETIRAcetam  500 mg Oral BID    pantoprazole  40 mg Oral BID    amLODIPine  10 mg Oral Daily    polyethylene glycol  17 g Oral Daily    senna  1 tablet Oral Nightly    heparin (porcine)  5,000 Units Subcutaneous 3 times per day    sodium chloride flush  10 mL Intravenous 2 times per day     Continuous Infusions:   PRN Meds: potassium chloride **OR** potassium alternative oral replacement **OR** potassium chloride, LORazepam, HYDROmorphone, calcium carbonate, sodium chloride flush, acetaminophen **OR** acetaminophen, promethazine **OR** ondansetron    Data:     Past Medical History:   has no past medical history on file. Social History:   reports that he has never smoked. He has never used smokeless tobacco.     Family History: No family history on file. Vitals:  BP (!) 146/87   Pulse 60   Temp 98.2 °F (36.8 °C) (Oral)   Resp 20   Ht 5' 10\" (1.778 m)   Wt 215 lb 6.2 oz (97.7 kg)   SpO2 97%   BMI 30.91 kg/m²   Temp (24hrs), Av.5 °F (36.9 °C), Min:98.2 °F (36.8 °C), Max:98.8 °F (37.1 °C)    Recent Labs     20  1117 20  1757   POCGLU 107 136*       I/O (24Hr):     Intake/Output Summary (Last 24 hours) at 2020 0904  Last data filed at 2020 0550  Gross per 24 hour   Intake --   Output 550 ml   Net -550 ml       Labs:  Hematology:  Recent Labs     20  0533 20  0521 20  0507   WBC 9.1 7.9 7.1   RBC 4.41 4.45 4.13*   HGB 13.4 13.9 12.6*   HCT 40.8 41.8 38.9*   MCV 92.5 93.9 94.2   MCH 30.4 31.2 30.5   MCHC 32.8 33.3 32.4   RDW 12.4 12.5 12.4    262 181   MPV 10.7 10.9 11.0     Chemistry:  Recent Labs     20  0533 20  0521 20  0507    139 139   K 3.0* 3.0* 3.1*    102 105   CO2 21 23 21   GLUCOSE 103* 105* 112*   BUN 18 17 17   CREATININE 1.13 1.11 1.07   MG  --  1.9  --    ANIONGAP 14 14 13   LABGLOM >60 >60 >60   GFRAA >60 >60 >60   CALCIUM 9.6 9.7 9.3     Recent Labs     20  1508 20  1117 20  1757   AMMONIA 25  --   --    POCGLU  --  107 136*     ABG:  Lab Results   Component Value Date    POCPH 7.325 07/27/2020    POCPCO2 31.3 07/27/2020    POCPO2 99.8 07/27/2020    POCHCO3 16.3 07/27/2020    NBEA 8 07/27/2020    PBEA NOT REPORTED 07/27/2020    SIJ8VIB 17 07/27/2020    PSNV9XNS 97 07/27/2020    FIO2 UNKNOWN 07/28/2020     Lab Results   Component Value Date/Time    SPECIAL NOT REPORTED 07/23/2020 09:59 PM     Lab Results   Component Value Date/Time    CULTURE  07/23/2020 09:59 PM     NEGATIVE FOR: METHICILLIN RESISTANT STAPHYLOCOCCUS AUREUS       Radiology:  Xr Abdomen (kub) (single Ap View)    Result Date: 7/29/2020  No abnormally dilated loops of small bowel. Mri Brain Wo Contrast    Result Date: 8/2/2020  1. No acute intracranial abnormality. 2. Senescent parenchymal volume loss with mild chronic white matter microvascular ischemic changes. 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  HSV encephalitis- on IV Acyclovir until 8/14/2020 per ID, Keppra BID for prophylaxis, mentation markedly improved, almost back to baseline per wife  2. BALBIR- resolved  3. Hypokalemia- replace, on Aldactone  4. Sepsis- resolved  5. Gi/ DVT proph  6. PT/OT- will do this at home    Long discussion with pt and wife. Okay to DC once arrangements made.   F/u with PCp and ID in 1-2 weeks    Tigist Spencer MD  8/4/2020  9:04 AM

## 2020-08-04 NOTE — PROGRESS NOTES
CLINICAL PHARMACY NOTE: MEDS TO [address] Select Patient?: No  Total # of Prescriptions Filled: 1   The following medications were delivered to the patient:  · spironalactone 25 mg tab  Total # of Interventions Completed: 1  Time Spent (min): 60    Additional Documentation:We delivered 1 medication, patient did not want any of the other medications.

## 2020-08-04 NOTE — PROGRESS NOTES
PULMONARY & CRITICAL CARE MEDICINE PROGRESS  NOTE     Patient:  Mila De Dios  MRN: [id number]  Admit date: 7/23/2020  CODE Status: Full Code     SUBJECTIVE     I personally interviewed/examined the patient, reviewed interval history and interpreted all available radiographic, laboratory data at the time of service. Chief Compliant/Reason for Initial Consult: encephalitis    Brief Hospital Course: The patient is a 79 y.o. male presented for confusion. Reported he had initially had UTI symptoms, self prescribed doxycycline with no improvement. He then noted confusion and presented to [place]. COVID negative. Cxr showed diffuse interstitial prominence. Pt complained of headache, CT scan head normal, lumbar puncture done showed concern for encephalitis Pt started on acyclovir, rocephin, doxycycline empircally. Pt also found to have ATN. CT abd only showed enlarged prostate. Neurology consulted. EEG and MRI showed no acute abnormalities. Analysis showed positive for herpes simplex type I. Pt began to improve, abx d/c.      Interval History:  08/04/20  Patient seen and examined  Afebrile VS stable  Denies confusions, SOB, able to ambulate    Review of Systems  Review of Systems:  General: negative for chills, fatigue or fever  ENT: negative for headaches, nasal congestion, sore throat or visual changes  Allergy and Immunology: negative for postnasal drip or seasonal allergies  Hematological and Lymphatic: negative for bleeding problems, swollen lymph nodes  Respiratory: negative for shortness of breath  Cardiovascular: negative for edema or palpitations  Gastrointestinal: negative for abdominal pain, change in bowel habits or nausea/vomiting  Genito-Urinary: negative for dysuria or urinary frequency/urgency  Musculoskeletal: negative for joint pain or joint swelling  Neurological: negative for numbness/tingling, seizures or weakness  Dermatological: negative for pruritus or rash    OBJECTIVE     VITAL SIGNS:   LAST-  BP (!) 146/87   Pulse 60   Temp 98.2 °F (36.8 °C) (Oral)   Resp 20   Ht 5' 10\" (1.778 m)   Wt 215 lb 6.2 oz (97.7 kg)   SpO2 97%   BMI 30.91 kg/m²   8-24 HR RANGE-  TEMP Temp  Av.5 °F (36.9 °C)  Min: 98.2 °F (36.8 °C)  Max: 98.8 °F (76.8 °C)   BP Systolic (44WHO), OFP:107 , Min:146 , UOR:701      Diastolic (17HQZ), OTZ:82, Min:85, Max:87     PULSE Pulse  Av  Min: 60  Max: 72   RR Resp  Av  Min: 20  Max: 20   O2 SAT SpO2  Av %  Min: 97 %  Max: 97 %   OXYGEN DELIVERY No data recorded     Systemic Examination:   Physical Exam  General appearance - looks comfortable and in no acute distress  Mental status - alert, oriented to person, place, and time  Eyes - pupils equal and reactive, extraocular eye movements intact  Mouth - mucous membranes moist, pharynx normal without lesions  Neck - supple, no significant adenopathy  Chest - Chest was symmetrical without dullness to percussion. Breath sounds bilaterally were clear to auscultation. There were no wheezes, rhonchi or rales.   There is no intercostal recession or use of accessory muscles  Heart - normal rate, regular rhythm, normal S1, S2, no murmurs, rubs, clicks or gallops  Abdomen - soft, nontender, nondistended, no masses or organomegaly  Neurological - alert, oriented, normal speech, no focal findings or movement disorder noted  Extremities - peripheral pulses normal, no pedal edema, no clubbing or cyanosis  Skin - normal coloration and turgor, no rashes, no suspicious skin lesions noted     DATA REVIEW     Medications:  Scheduled Meds:   potassium chloride  20 mEq Oral BID    acyclovir  700 mg Intravenous Q8H    spironolactone  25 mg Oral Daily    levETIRAcetam  500 mg Oral BID    pantoprazole  40 mg Oral BID    amLODIPine  10 mg Oral Daily    polyethylene glycol  17 g Oral Daily    senna  1 tablet Oral Nightly    heparin (porcine)  5,000 Units Subcutaneous 3 times per day    sodium chloride flush  10 mL Intravenous 2 times per day     Continuous Infusions:    LABS:-  ABG:   No results for input(s): POCPH, POCPCO2, POCPO2, POCHCO3, NFPP7BAG in the last 72 hours. CBC:   Recent Labs     08/02/20  0533 08/03/20  0521 08/04/20  0507   WBC 9.1 7.9 7.1   HGB 13.4 13.9 12.6*   HCT 40.8 41.8 38.9*   MCV 92.5 93.9 94.2    262 181   LYMPHOPCT 20* 22* 26   RBC 4.41 4.45 4.13*   MCH 30.4 31.2 30.5   MCHC 32.8 33.3 32.4   RDW 12.4 12.5 12.4     BMP:   Recent Labs     08/02/20  0533 08/03/20  0521 08/04/20  0507    139 139   K 3.0* 3.0* 3.1*    102 105   CO2 21 23 21   BUN 18 17 17   CREATININE 1.13 1.11 1.07   GLUCOSE 103* 105* 112*     Liver Function Test:   No results for input(s): PROT, LABALBU, ALT, AST, GGT, ALKPHOS, BILITOT in the last 72 hours. Amylase/Lipase:  No results for input(s): AMYLASE, LIPASE in the last 72 hours. Coagulation Profile:   No results for input(s): INR, PROTIME, APTT in the last 72 hours. Cardiac Enzymes:  No results for input(s): CKTOTAL, CKMB, CKMBINDEX, TROPONINI in the last 72 hours.   Lactic Acid:  Lab Results   Component Value Date    LACTA NOT REPORTED 07/23/2020    LACTA 1.5 07/22/2020     BNP:   No results found for: BNP  D-Dimer:  No results found for: DDIMER  Others:   Lab Results   Component Value Date    TSH 1.33 07/23/2020     Lab Results   Component Value Date    CARRIE NEGATIVE 07/26/2020    SEDRATE 4 07/21/2020    CRP 0.7 07/23/2020     Lab Results   Component Value Date    URICACID 6.4 08/01/2020     No results found for: IRON, TIBC, FERRITIN  No results found for: SPEP, UPEP  Lab Results   Component Value Date    PSA 14.54 01/03/2020       Input/Output:    Intake/Output Summary (Last 24 hours) at 8/4/2020 1351  Last data filed at 8/4/2020 0550  Gross per 24 hour   Intake --   Output 550 ml   Net -550 ml       Microbiology:  Urine Culture:  No components found for: JACLYN  Blood Culture:  No components found for: CBLOOD, CFUNGUSBL  No results for input(s): SPECDESC, SPECDESC, SPECIAL, CULTURE, CULTURE, STATUS, ORG, CDIFFTOXPCR, CAMPYLOBPCR, SALMONELLAPC, SHIGAPCR, SHIGELLAPCR, MPNEUG, MPNEUM, LACTOQL in the last 72 hours. Pathology:    Radiology images:    Radiology reports:  MRI BRAIN WO CONTRAST   Final Result   1. No acute intracranial abnormality. 2. Senescent parenchymal volume loss with mild chronic white matter   microvascular ischemic changes. XR ABDOMEN (KUB) (SINGLE AP VIEW)   Final Result   No abnormally dilated loops of small bowel. XR CHEST (SINGLE VIEW FRONTAL)   Final Result   Rotated with relatively low lung volumes; no consolidation or sizable pleural   effusion. Mild central vascular congestion consideration. Mild basilar   atelectasis. RECOMMENDATION:   Followup chest x-ray if continued concern for aspiration or vascular   congestion         US RENAL COMPLETE   Final Result   Limited study without evidence of obstructive uropathy. Incidental note of hepatic steatosis limits evaluation for renal parenchymal   echogenicity evaluation. Bladder is not visualized due to catheter placement. MRI BRAIN WO CONTRAST   Final Result   1. No acute intracranial abnormality. No acute infarct. 2. Minimal global parenchymal volume loss with minimal microvascular ischemic   changes. IR LUMBAR PUNCTURE FOR DIAGNOSIS   Final Result   Successful fluoroscopic-guided lumbar puncture. Echocardiogram:   No results found for this or any previous visit. Cardiac Catheterization:   No results found for this or any previous visit.     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 *    Plan:  Continue acyclovir q12 per ID, 2-3wk course, PICC line to be placed  PMR evaluation, not appropriate for rehab, ok to send home with home care      I updated the patient regarding the current clinical condition, provisional diagnosis and management plan. I addressed concerns and answered all questions to the best of my abilities. It was my pleasure to evaluate Mila De Dios today. We will continue to follow. I would like to thank you for allowing me to participate in the care of this patient. Please feel free to call with any further questions or concerns. Leticia Slaughter M.D. Pulmonary and Critical Care Medicine           8/4/2020, 1:51 PM    Please note that this chart was generated using voice recognition Dragon dictation software. Although every effort was made to ensure the accuracy of this automated transcription, some errors in transcription may have occurred.

## 2020-08-04 NOTE — CARE COORDINATION
Discharge [facility]  Written by: Tanner Perry RN    Patient Name: Evelyne Walters  Attending Provider: No att. providers found  Admit Date: 2020  3:02 PM  MRN: [id number]  Account: [de-identified]                     : 1950  Discharge Date: 2020      Disposition: home with family support, [facility] and [place] for IV antibiotics.     Tanner Perry RN

## 2020-08-04 NOTE — PROGRESS NOTES
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 Recommendations:   · Acyclovir 10 mg/kg IV every 8 hours calculated on ideal body weight of 70 kg ie 700 mg/24 hr.. Stop date 8-14-20. · Slow infusion of acyclovir, hydration. · Monitor renal function. .  · Supportive care  · Patient is to go to home to complete the acyclovir. Medical Decision Making/Summary/Discussion:8/4/2020     ·   Infection Control Recommendations   · [place] Precautions    Antimicrobial Stewardship Recommendations     · Targeted therapy  · PK dosing  ·   Coordination of Outpatient Care:   · Estimated Length of IV antimicrobials: To be determined  · Patient will need Midline Catheter Insertion: Yes  · Patient will need PICC line Insertion: No  · Patient will need: Home IV , [place],  SNF,  LTAC: To be determined  · Patient will need outpatient wound care: No    Chief complaint/reason for consultation:   · Altered mental status with progression over a few days      History of Present Illness:   Carlyon Schaumann is a 79y.o.-year-old  male who was initially admitted on 7/23/2020. Patient seen at the request of Ok Emanuel. 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 He is unable to provide any information himself at this point in time. Available information reviewed and discussed with Dr. Kym Moore.    Patient apparently developed onset of some dysuria and pain in the pelvic and right hip area starting about 7 to the 10 days prior to admission. In addition he was exhibiting some fatigue. He has a pre-existing history of prostatic hypertrophy with previous documented urinary tract infections.   The patient apparently felt that he was coming down with a urinary tract infection and had a started doxycycline on 7/20/2020 without benefit. He seems to have experienced prior problems with use of quinolones that may have influenced his choice of antibiotics. On 7/21/2020 he was noticed to have some mild confusion and problems with cognition were initially attributed to the use of doxycycline. He was evaluated at [facility] and found to have a high-grade fever. He had urine and blood cultures obtained to exclude urinary tract sepsis. The urine and blood cultures have not shown any growth. His urine analysis was not very impressive. He was treated with cefepime with no benefit. The patient showed some cough but had no hypoxia. The chest x-ray did not show any pneumonia but had some mild chronic changes. CT of the abdomen and pelvis was obtained which showed the presence of a large prostate. No other abnormalities were noted. While active emergency he had a temperature 101 °F on 7/22/2020. No additional fevers were noted. Upon arrival to emergency [facility] patient is disoriented, confused, he is not able to provide any information or to answer any questions. His laboratory tests show some mild degree of hyponatremia and mild transaminitis. A spinal tap was done to exclude herpetic encephalitis. The CSF showed the bloody fluid with a protein level of 257 glucose of 43 RBC count of 3000 with 1348 white cells 80% of which were lymphocytes. The spinal tap was described as being mildly traumatic with progressive clearing of the spinal fluid. We will await the results of the infectious CSF panel. Plan to treat for the treatable conditions such as mycoplasma, herpes simplex. The presence of hemorrhage suggests Herpes simplex. CURRENT EVALUATION :8/4/2020    Patient evaluated and examined at bedside. Afebrile  Recent BP: 146/87    On room air. He is alert, oriented, conversant. Is overall doing better.     A midline catheter was inserted yesterday. He is on IV acyclovir 700 mg every 8 hours. Stop date 8-14-20. Urinary output in the last 24 hours is 550 mL. Woods catheter was removed. Bun and creatinine are improving. MRI scan of the head on  7/24 showed subacute inflammation in the frontal region. On 8/1/2020, patient complained of left hand weakness, on examination had weak handgrip and weak fine motor. On 8/2/2020 MRI brain without contrast shows no acute intracranial abnormality. 7/23/20 CSF studies show an increase in WBC count with lymphocytic predominance. CSF cultures show no growth. CSF: HSV 1- positive. Patient developed acute kidney injury after prior CT of the abdomen with contrast . Fortunately he is showing rapid improvement. We were able to increase Acyclovir dosage to q 8 Hr on 8-1-20. I discussed the options for treatment with him and his 2 daughters on 8-3-20:  · Treatment with Acyclovir for 2 weeks. LP before completion of treatment to determine if additional treatment would be needed. · Treatment for 3 weeks total without repeating LP. 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 He is happy to complete 21 days total rx with Acyclovir for HSV 1 encephalitis. Current plans are for him to complete treatment at home. Labs, X rays reviewed: 8/4/2020    BUN:42--> 32>23> 21 > 18 >17>17  Cr:   1.73>1.38 > 1.27> 1.13> 1.11>1.07    WBC:  10.4-->7.7--> 6.9>8.1> 7.9>7.1  Hb:  12.3-->12.2--> 11.7>13.3> 13.9>12.6  Plat:  159-->167--> 181>189> 262>181    Cultures:  Urine:  ·   Blood:  ·   Sputum :  7/23: MRSA screening of the nares is negative  Wound:  ·     CSF:  · 7/23: no growth    COVID-19 test:  · 7/23/2020- negative    · 7/23/2020 CT of the head: No acute abnormality  ·     Viral panel; Herpes simplex type I positive      US Renal:7/26:  Small cyst noted on the left kidney. Evaluation for renal echogenicity is indeterminate secondary to increased   echogenicity of the liver.  Hepatic steatosis noted.      Discussed with patient, RN CC., family. I have personally reviewed the past medical history, past surgical history, medications, social history, and family history, and I have updated the database accordingly. Past Medical History:   No past medical history on file. Past Surgical  History:   No past surgical history on file.     Medications:      acyclovir  700 mg Intravenous Q8H    spironolactone  25 mg Oral Daily    levETIRAcetam  500 mg Oral BID    pantoprazole  40 mg Oral BID    amLODIPine  10 mg Oral Daily    polyethylene glycol  17 g Oral Daily    senna  1 tablet Oral Nightly    heparin (porcine)  5,000 Units Subcutaneous 3 times per day    sodium chloride flush  10 mL Intravenous 2 times per day       Social History:     Social History     Socioeconomic History    Marital status:      Spouse name: Not on file    Number of children: Not on file    Years of education: Not on file    Highest education level: Not on file   Occupational History    Not on file   Social Needs    Financial resource strain: Not hard at all   [employer] insecurity     Worry: Never true     Inability: Never true   [employer] needs     Medical: Not on file     Non-medical: Not on file   Tobacco Use    Smoking status: Never Smoker    Smokeless tobacco: Never Used   Substance and Sexual Activity    Alcohol use: Not on file    Drug use: Not on file    Sexual activity: Not on file   Lifestyle    Physical activity     Days per week: Not on file     Minutes per session: Not on file    Stress: Not on file   Relationships    Social connections     Talks on phone: Not on file     Gets together: Not on file     Attends Jain service: Not on file     Active member of club or organization: Not on file     Attends meetings of clubs or organizations: Not on file     Relationship status: Not on file    Intimate partner violence     Fear of current or ex partner: Not on file     Emotionally abused: Not on file     Physically abused: Not on file     Forced sexual activity: Not on file   Other Topics Concern    Not on file   Social History Narrative    Not on file       Family History:   No family history on file. Allergies:   Ibuprofen     Review of Systems:     Unable to provide. Encephalopathic. 8/4/2020      Constitutional: No fevers or chills. No systemic complaints  Head: No headaches  Eyes: No double vision or blurry vision. No conjunctival inflammation. ENT: No sore throat or runny nose. . No hearing loss, tinnitus or vertigo. Cardiovascular: No chest pain or palpitations. No shortness of breath. No DONALDSON  Lung: No shortness of breath or cough. No sputum production  Abdomen: No nausea, vomiting, diarrhea, or abdominal pain. Osie Ra No cramps. Genitourinary: No increased urinary frequency, or dysuria. No hematuria. No suprapubic or CVA pain  Musculoskeletal: No muscle aches or pains. No joint effusions, swelling or deformities  Hematologic: No bleeding or bruising. Neurologic: No headache, weakness, numbness, or tingling. Integument: No rash, no ulcers. Psychiatric: No depression. Endocrine: No polyuria, no polydipsia, no polyphagia. Physical Examination :     Patient Vitals for the past 8 hrs:   BP Temp Temp src Pulse Resp SpO2   08/04/20 0816 (!) 146/87 98.2 °F (36.8 °C) Oral 60 20 97 %     General Appearance: Somnolent  Head:  Normocephalic, no trauma  Eyes: Pupils equal, round, reactive to light; sclera anicteric; conjunctivae pink. No embolic phenomena. ENT: Oropharynx clear, without erythema, exudate, or thrush. No tenderness of sinuses. Mouth/throat: mucosa pink and moist. No lesions. Dentition in good repair. Neck:Supple, without lymphadenopathy. Thyroid normal, No bruits. Pulmonary/Chest: Clear to auscultation, without wheezes, rales, or rhonchi. No dullness to percussion. Cardiovascular: Regular rate and rhythm without murmurs, rubs, or gallops. Abdomen: Soft, non tender.  Bowel sounds normal. No organomegaly  All four Extremities: No cyanosis, clubbing, edema, or effusions. . Scar from prior right hip arthroplasty  Neurologic: No gross sensory or motor deficits. Moves all extremities. Encephalopathic  Skin: Warm and dry with good turgor. No signs of peripheral arterial or venous insufficiency. No ulcerations. No open wounds. Medical Decision Making -Laboratory:   I have independently reviewed/ordered the following labs:    CBC with Differential:   Recent Labs     08/03/20  0521 08/04/20  0507   WBC 7.9 7.1   HGB 13.9 12.6*   HCT 41.8 38.9*    181   LYMPHOPCT 22* 26   MONOPCT 12 14*     BMP:   Recent Labs     08/03/20  0521 08/04/20  0507    139   K 3.0* 3.1*    105   CO2 23 21   BUN 17 17   CREATININE 1.11 1.07   MG 1.9  --      Hepatic Function Panel:   No results for input(s): PROT, LABALBU, BILIDIR, IBILI, BILITOT, ALKPHOS, ALT, AST in the last 72 hours. No results for input(s): RPR in the last 72 hours. No results for input(s): HIV in the last 72 hours. No results for input(s): BC in the last 72 hours. Lab Results   Component Value Date    MUCUS NOT REPORTED 07/26/2020    RBC 4.13 08/04/2020    TRICHOMONAS NOT REPORTED 07/26/2020    WBC 7.1 08/04/2020    YEAST NOT REPORTED 07/26/2020    TURBIDITY CLOUDY 07/26/2020     Lab Results   Component Value Date    CREATININE 1.07 08/04/2020    CREATININE 1.0 01/16/2020    GLUCOSE 112 08/04/2020       Medical Decision Making-Imaging:     Abdominal Xray, 7/29/20:  FINDINGS:    Mild gaseous distention of the stomach.  Oral contrast within the colon.  No    abnormally dilated loops of small bowel.              Impression    No abnormally dilated loops of small bowel.           Chest Xray, 7/27/20:  Impression    Rotated with relatively low lung volumes; no consolidation or sizable pleural    effusion.  Mild central vascular congestion consideration.  Mild basilar    atelectasis.         RECOMMENDATION:    Followup chest x-ray if continued concern for aspiration or vascular    congestion          EXAMINATION:    MRI OF THE BRAIN WITHOUT CONTRAST  7/24/2020 10:11 am         TECHNIQUE:    Multiplanar multisequence MRI of the brain was performed without the    administration of intravenous contrast.         COMPARISON:    None.         HISTORY:    ORDERING SYSTEM PROVIDED HISTORY: Altered mental status    TECHNOLOGIST PROVIDED HISTORY:    Altered mental status         Initial evaluation.         FINDINGS:    INTRACRANIAL STRUCTURES/VENTRICLES: There is no acute infarct. No mass effect    or midline shift. No evidence of an acute intracranial hemorrhage.  Areas of    T2 FLAIR hyperintensity are seen in the periventricular and subcortical white    matter, which are nonspecific, but may represent chronic microvascular    ischemic change. Maria Elena Knows is prominence of the ventricles and sulci due to    global parenchymal volume loss.  The sellar/suprasellar regions appear    unremarkable.  The normal signal voids within the major intracranial vessels    appear maintained.         ORBITS: The visualized portion of the orbits demonstrate no acute abnormality.         SINUSES: The visualized paranasal sinuses and mastoid air cells are well    aerated.         BONES/SOFT TISSUES: The bone marrow signal intensity appears normal. The soft    tissues demonstrate no acute abnormality.              Impression    1. No acute intracranial abnormality.  No acute infarct. 2. Minimal global parenchymal volume loss with minimal microvascular ischemic    changes.         CT OF THE HEAD WITHOUT CONTRAST  7/23/2020 8:09 am         TECHNIQUE:    CT of the head was performed without the administration of intravenous    contrast. Dose modulation, iterative reconstruction, and/or weight based    adjustment of the mA/kV was utilized to reduce the radiation dose to as low    as reasonably achievable.         COMPARISON:    None.         HISTORY:    ORDERING SYSTEM PROVIDED HISTORY: DELERIUM    TECHNOLOGIST PROVIDED HISTORY:    DELERIUM              FINDINGS:    BRAIN/VENTRICLES: There is no acute intracranial hemorrhage, mass effect or    midline shift.  No abnormal extra-axial fluid collection.  The gray-white    differentiation is maintained without evidence of an acute infarct.  There is    no evidence of hydrocephalus.         ORBITS: The visualized portion of the orbits demonstrate no acute abnormality.         SINUSES: The visualized paranasal sinuses and mastoid air cells demonstrate    no acute abnormality.         SOFT TISSUES/SKULL:  No acute abnormality of the visualized skull or soft    tissues.              Impression    No acute intracranial abnormality. EXAMINATION:    ONE XRAY VIEW OF THE CHEST         7/21/2020 6:32 pm         COMPARISON:    None.         HISTORY:    ORDERING SYSTEM PROVIDED HISTORY: sepsis    TECHNOLOGIST PROVIDED HISTORY:    sepsis         FINDINGS:    Heart size is normal.  Interstitial markings are prominent which may be    related to interstitial pneumonitis.  No effusion, extrapleural air or    localized consolidation is noted.  Osseous and mediastinal structures are    age-appropriate.              Impression    Diffuse prominence of the interstitial markings which may be related to    interstitial pneumonitis.  No focal consolidation.           EXAMINATION:    CT OF THE ABDOMEN AND PELVIS WITH CONTRAST 7/21/2020 4:06 pm         TECHNIQUE:    CT of the abdomen and pelvis was performed with the administration of    intravenous contrast. Multiplanar reformatted images are provided for review.     Dose modulation, iterative reconstruction, and/or weight based adjustment of    the mA/kV was utilized to reduce the radiation dose to as low as reasonably    achievable.         COMPARISON:    CT urogram January 6, 2020         HISTORY:    ORDERING SYSTEM PROVIDED HISTORY: Lower abdominal pain    TECHNOLOGIST PROVIDED HISTORY:           Detected   HHV-6 (HERPESVIRUS 6) CSF FILM ARRAY Latest Ref Range: Not Detected  Not Detected   HSV-1 CSF FILM ARRAY Latest Ref Range: Not Detected  DETECTED (A)   HSV-2 CSF FILM ARRAY Latest Ref Range: Not Detected  Not Detected   PARECHOVIRUS CSF FILM ARRAY Latest Ref Range: Not Detected  Not Detected   ESCHERICHIA COLI K1 CSF FILM ARRAY Latest Ref Range: Not Detected  Not Detected   LISTERIA MONOCYTOGENES CSF FILM ARRAY Latest Ref Range: Not Detected  Not Detected   NEISSERIA MENIGITIDIS CSF FILM ARRAY Latest Ref Range: Not Detected  Not Detected   STREPTOCOCCUS AGALACTIAE CSF FILM ARRAY Latest Ref Range: Not Detected  Not Detected   STREPTOCOCCUS PNEUMONIAE CSF FILM ARRAY Latest Ref Range: Not Detected  Not Detected   CRYPTOCOCCUS NEOFORMANS/SALUD CSF FILM ARR. Latest Ref Range: Not Detected  Not Detected       Medical Decision Making-Other:     Note:  · Labs, medications, radiologic studies were reviewed with personal review of films  · Large amounts of data were reviewed  · Discussed with nursing Staff, Discharge planner  · Infection Control and Prevention measures reviewed  · All prior entries were reviewed  · Administer medications as ordered  · Prognosis: Guarded  · Discharge planning reviewed  · Follow up as outpatient. Thank you for allowing us to participate in the care of this patient. Please call with questions. Sera Joiner DPM     ATTESTATION:    I have discussed the case, including pertinent history and exam findings with the residents and students. I have seen and examined the patient and the key elements of the encounter have been performed by me. I was present when the student obtained his information or examined the patient. I have reviewed the laboratory data, other diagnostic studies and discussed them with the residents. I have updated the medical record where necessary. I agree with the assessment, plan and orders as documented by the resident/ student.     [place], MD.    Pager: [phone] - Office: [phone]

## 2020-08-05 ENCOUNTER — CARE COORDINATION (OUTPATIENT)
Dept: CASE MANAGEMENT | Age: 70
End: 2020-08-05

## 2020-08-05 NOTE — CARE COORDINATION
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 .. Admission (Discharged) Juan R Fleiz MD           Spoke with: Moody, patient's S.O. Facility: [employer] with patient's S. O. who states he is doing ok today. She was upset that patient did not have a hospital bed waiting for them and they had to handle this on their own yesterday when they were discharged. 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 She states he is doing ok today, has his bed and and shower chair, has no fever or chills or confusion, just mild anxiety over getting equipment to the home last night. She reports that Barry delivered medications around 9 pm yesterday and [facility] has been out to infuse. Patient to follow up with urology and ID in the next 2 weeks and Dr. Nacho Amin is coming to make home visit later today. Patient is not taking any medications at home as listed on his discharge list other than his eye drops and his Acyclovir infusion. They were reminded he is part of a Medicare initiative, BPCI and would receive further phone calls over the course of the next 90 days and they were agreeable.      Non-face-to-face services provided:  Scheduled appointment with PCP-Home visit today by Dr. Kentrell Sakggs and reviewed discharge summary and/or continuity of care documents    Care Transitions 24 Hour Call    Schedule Follow Up Appointment with PCP:  Completed  Do you have any ongoing symptoms?:  Yes  Patient-reported symptoms:  Other (Comment: reported some anxiety last night)  Do you have a copy of your discharge instructions?:  Yes  Do you have all of your prescriptions and are they filled?:  No  Have you been contacted by a [address]?:  No  Have you scheduled your follow up appointment?:  Yes (Comment: Dr. Khadar Posey to make home visit today)  How are you going to get to your appointment?:  Car - family or friend to transport  Were you discharged with any Home Care or Post Acute Services:  Yes  Post Acute Services:  [address], Other (Comment: [address] [facility] and [facility])  Do you feel like you have everything you need to keep you well at home?:  Yes  Care Transitions Interventions         Follow Up  No future appointments.     Swapna Nath RN

## 2020-08-06 ENCOUNTER — TELEPHONE (OUTPATIENT)
Dept: INFECTIOUS DISEASES | Age: 70
End: 2020-08-06

## 2020-08-06 PROCEDURE — 99999 PR OFFICE/OUTPT VISIT,PROCEDURE ONLY: CPT | Performed by: FAMILY MEDICINE

## 2020-08-06 NOTE — TELEPHONE ENCOUNTER
----- Message from Yessica Graham sent at 8/6/2020  7:08 AM EDT -----  Theodora Cullen, please see message from Dr Mindi Salazar and call to schedule. Thank you.   ----- Message -----  From: Niurka Reyes MD  Sent: 8/5/2020   4:20 PM EDT  To: [facility] Clinical Staff    Please schedule for an appt.    Thank You  ----- Message -----  From: Dilshad Patricio MD  Sent: 8/4/2020   2:55 PM EDT  To: Niurka Reyes MD

## 2020-08-07 ENCOUNTER — HOSPITAL ENCOUNTER (OUTPATIENT)
Age: 70
Setting detail: SPECIMEN
Discharge: HOME | End: 2020-08-07
Payer: MEDICARE

## 2020-08-07 LAB
ANION GAP SERPL CALCULATED.3IONS-SCNC: 12 MMOL/L (ref 9–17)
BUN BLDV-MCNC: 12 MG/DL (ref 8–23)
BUN/CREAT BLD: 14 (ref 9–20)
CALCIUM SERPL-MCNC: 9.7 MG/DL (ref 8.6–10.4)
CHLORIDE BLD-SCNC: 104 MMOL/L (ref 98–107)
CO2: 21 MMOL/L (ref 20–31)
CREAT SERPL-MCNC: 0.84 MG/DL (ref 0.7–1.2)
GFR AFRICAN AMERICAN: >60 ML/MIN
GFR NON-AFRICAN AMERICAN: >60 ML/MIN
GFR SERPL CREATININE-BSD FRML MDRD: ABNORMAL ML/MIN/{1.73_M2}
GFR SERPL CREATININE-BSD FRML MDRD: ABNORMAL ML/MIN/{1.73_M2}
GLUCOSE BLD-MCNC: 157 MG/DL (ref 70–99)
POTASSIUM SERPL-SCNC: 3 MMOL/L (ref 3.7–5.3)
SODIUM BLD-SCNC: 137 MMOL/L (ref 135–144)

## 2020-08-07 PROCEDURE — 80048 BASIC METABOLIC PNL TOTAL CA: CPT

## 2020-08-11 LAB
LYME WESTERN BLOT IGG CSF: NEGATIVE
LYME WESTERN BLOT IGM CSF: NEGATIVE

## 2020-08-13 ENCOUNTER — TELEPHONE (OUTPATIENT)
Dept: INFECTIOUS DISEASES | Age: 70
End: 2020-08-13

## 2020-08-13 NOTE — TELEPHONE ENCOUNTER
Dr Miesha Gilliland removal after last dose. Faxed to [facility] [phone] - gave verbal order to IZA Hernandez his wife.

## 2020-08-14 ENCOUNTER — HOSPITAL ENCOUNTER (OUTPATIENT)
Age: 70
Setting detail: SPECIMEN
Discharge: HOME | End: 2020-08-14
Payer: MEDICARE

## 2020-08-14 LAB
ANION GAP SERPL CALCULATED.3IONS-SCNC: 13 MMOL/L (ref 9–17)
BUN BLDV-MCNC: 14 MG/DL (ref 8–23)
BUN/CREAT BLD: 15 (ref 9–20)
CALCIUM SERPL-MCNC: 10.2 MG/DL (ref 8.6–10.4)
CHLORIDE BLD-SCNC: 103 MMOL/L (ref 98–107)
CO2: 20 MMOL/L (ref 20–31)
CREAT SERPL-MCNC: 0.93 MG/DL (ref 0.7–1.2)
GFR AFRICAN AMERICAN: >60 ML/MIN
GFR NON-AFRICAN AMERICAN: >60 ML/MIN
GFR SERPL CREATININE-BSD FRML MDRD: ABNORMAL ML/MIN/{1.73_M2}
GFR SERPL CREATININE-BSD FRML MDRD: ABNORMAL ML/MIN/{1.73_M2}
GLUCOSE BLD-MCNC: 134 MG/DL (ref 70–99)
POTASSIUM SERPL-SCNC: 4 MMOL/L (ref 3.7–5.3)
SODIUM BLD-SCNC: 136 MMOL/L (ref 135–144)

## 2020-08-14 PROCEDURE — 80048 BASIC METABOLIC PNL TOTAL CA: CPT

## 2020-08-17 ENCOUNTER — OUTSIDE SERVICES (OUTPATIENT)
Dept: FAMILY MEDICINE CLINIC | Age: 70
End: 2020-08-17

## 2020-08-17 PROCEDURE — VIRTUALHLTH VIRTUAL HEALTH SAME DAY: Performed by: FAMILY MEDICINE

## 2020-08-17 NOTE — PROGRESS NOTES
Visit took place on 08/15/2020     [address] [address]  [place] [address]  Dept: [phone]    Alice GRAY RMA, am scribing for and in the presence of Dr. Amaya Castaneda. 08/18/2020 2:35 pm PAUL Peacock is a 79 y.o. male being seen for his weekly follow up. Location of visit: patient home    HPI:  Admission History:  He is seen again in his home for follow up visit. He completed 3 weeks of IV acyclovir course per 's recommendation. He performed a BMP prior to discontinuation and his potassium, BUN, and creatinine have alll returned to baseline. The potassium was felt to contribute to his persistent reflux. His weakness Is improving. He is ambulatory with walker for safety and is now in his own upstairs bedroom. His wife is at his side, continuously and his phone has been taken away from him due to concerns for cognitive stress. He is doing casual reading for pleasure and his ability to interpret the content is good. He continues to have intermittent agitation. His wife reports he is better in the morning but fatigues quickly during the day. With fatigue, comes altered cognition. His sleep and exertional fatigue are improving. He is steady on his feet but continues to use walker for added safety. He also continues with physical therapy. Conversation is light but varied topics. We again avoid discussion of return to work as this has previously precipitated anxiety. He is aware of the statistical probability of him never returning to work. ROS:  General Constitutional: Denies fever. Denies lightheadedness. Respiratory: Denies cough. Denies shortness of breath. Denies wheezing. Cardiovascular: Denies chest pain at rest.  Gastrointestinal: Denies abdominal pain. Denies blood in the stool. Denies constipation. Denies diarrhea. Denies nausea. Denies vomiting. Genitourinary: Denies blood in the urine. Denies painful

## 2020-08-17 NOTE — PROGRESS NOTES
dry  Heart: regular rate and rhythm. No murmurs. S1, S2 normal, no gallops. Lungs: clear to auscultation bilaterally. Abdomen:soft, nontender, nondistended, no masses or organomegaly. Extremities: no cyanosis or edema. Peripheral Pulses: 2+ throughout, symetric. Neurologic: verbally responsive, moves extremities. no focal neurologic deficits  Psych: normal affect, speech fluent. ASSESSMENT:   Diagnosis Orders   1. Herpes encephalitis      resolving   2. Cognitive and behavioral changes      Persistent Cognitivie Psychiatric Complications   3. Hypokalemia     4. Benign prostatic hyperplasia, unspecified whether lower urinary tract symptoms present     5. Gastroesophageal reflux disease, esophagitis presence not specified     6. Aspiration into airway, sequela         PLAN:  He is experiencing urinary freqency but this is his baseline. I would like him to continue IV acyclovir to complete three weeks of treatment and continue therapy with Uriel Flirt. At this time, there is no discussion regarding return to work. I, Dr. Daniella Magana, personally performed the services described in this documentation as scribed by BRITTANY Sandhu in my presence, and is both accurate and complete.

## 2020-08-19 ENCOUNTER — CARE COORDINATION (OUTPATIENT)
Dept: CASE MANAGEMENT | Age: 70
End: 2020-08-19

## 2020-08-19 NOTE — CARE COORDINATION
[facility] Transitions Bundled Payments for Care Improvement (BP) Follow Up Call  Qualifying Diagnosis of Qualifying Diagnosis Sepsis  8/19/2020  Patient Name:  Evelyne Walters   YOB: 1950  Discharge Date:  8/4/20  RARS:  Readmission Risk Score: 16    PCP:  Nadiya Norman MD     Attempted to contact patient, no answer. Care Transitions will continue to follow per [facility].  Jhon Amin RN, CTN   No future appointments.

## 2020-08-25 ENCOUNTER — TELEPHONE (OUTPATIENT)
Dept: NEUROLOGY | Age: 70
End: 2020-08-25

## 2020-08-25 NOTE — TELEPHONE ENCOUNTER
[facility] paperwork was received. Blank copy of form has been scanned.  Patient notified it will take up to 7-10 business days for completion

## 2020-08-26 ENCOUNTER — TELEPHONE (OUTPATIENT)
Dept: PULMONOLOGY | Age: 70
End: 2020-08-26

## 2020-08-26 NOTE — TELEPHONE ENCOUNTER
Faxed to Amol De La Torre is requesting forms from [facility] physicians- she will have him sign a release for records and will include when submitting all completed forms.

## 2020-09-01 ENCOUNTER — CARE COORDINATION (OUTPATIENT)
Dept: CASE MANAGEMENT | Age: 70
End: 2020-09-01

## 2020-09-01 NOTE — CARE COORDINATION
[facility] Trans[facility] Follow Up Call    2020    Patient: Kush Polanco  Patient : 1950   MRN: <[id number]>  Reason for Admission:   Discharge Date: 20 RARS: Readmission Risk Score: 16      Follow Up: Attempted to contact patient for BPCI-A follow up. Unable to reach patient. No answer. Will try again at a later time.         Future Appointments   Date Time Provider Mendy Graham   2020  3:40 PM Adriano Brar MD [id number], RN

## 2020-09-08 ENCOUNTER — OUTSIDE SERVICES (OUTPATIENT)
Dept: FAMILY MEDICINE CLINIC | Age: 70
End: 2020-09-08

## 2020-09-08 PROCEDURE — 99999 PR OFFICE/OUTPT VISIT,PROCEDURE ONLY: CPT | Performed by: FAMILY MEDICINE

## 2020-09-08 NOTE — PROGRESS NOTES
memory but this seems to be improving.     -He did have a L ankle injury while hospitalized believes it was a bone contusion as this is feeling better now.     -He continues to have troubles with reflux but no aspiration cough. He continues on prilosec. He is also having headaches in which he has never had in the past. He has reduced his caffeine intake, following two weeks without caffeine intake during his hospitalization.     -He remains on disability. ROS:  General Constitutional: Denies fever. Admits lightheadedness. Admits heat intolerance  Respiratory: Denies cough. Denies shortness of breath. Denies wheezing. Cardiovascular: Denies chest pain at rest. Admits irregular rhythm  Gastrointestinal: Denies abdominal pain. Denies blood in the stool. Denies constipation. Denies diarrhea. Denies nausea. Denies vomiting. Genitourinary: Denies blood in the urine. Denies painful urination. Skin:  Denies rash. Neurologic: Denies falls. Admits dizziness. Psychiatric: Denies sleep disturbance. Denies anxiety. Denies depressed mood. PHYSICAL EXAM:    General Appearance: in no acute distress, well nourished. alert, orient. Weak but steady on feet. Eyes: pupils equal, round reactive to light and accommodation. Oral Cavity: mucosa moist.  Neck/Thyroid:  no cervical lymphadenopathy, no thyromegaly  Skin: warm and dry  Heart: regular rate and rhythm. No murmurs. S1, S2 normal, no gallops. Lungs: clear to auscultation bilaterally. Abdomen:soft, nontender, nondistended, no masses or organomegaly. Extremities: no cyanosis or edema. Peripheral Pulses: 2+ throughout, symetric. Neurologic: verbally responsive, moves extremities. Psych: normal affect, content of conversation appropriate, speech fluent. ASSESSMENT:   Diagnosis Orders   1. Paroxysmal atrial fibrillation (HCC)     2. Heat intolerance         PLAN:  He is stable. I will not make any changes, today. I will revisit again in a couple of weeks.

## 2020-09-09 ENCOUNTER — OFFICE VISIT (OUTPATIENT)
Dept: NEUROLOGY | Age: 70
End: 2020-09-09
Payer: MEDICARE

## 2020-09-09 VITALS
SYSTOLIC BLOOD PRESSURE: 143 MMHG | HEIGHT: 70 IN | HEART RATE: 63 BPM | DIASTOLIC BLOOD PRESSURE: 91 MMHG | BODY MASS INDEX: 30.91 KG/M2 | TEMPERATURE: 98.5 F

## 2020-09-09 PROBLEM — R41.89 COGNITIVE IMPAIRMENT: Status: ACTIVE | Noted: 2020-09-09

## 2020-09-09 PROBLEM — F07.89: Status: ACTIVE | Noted: 2020-09-09

## 2020-09-09 PROCEDURE — G8427 DOCREV CUR MEDS BY ELIG CLIN: HCPCS | Performed by: PSYCHIATRY & NEUROLOGY

## 2020-09-09 PROCEDURE — 4040F PNEUMOC VAC/ADMIN/RCVD: CPT | Performed by: PSYCHIATRY & NEUROLOGY

## 2020-09-09 PROCEDURE — 1123F ACP DISCUSS/DSCN MKR DOCD: CPT | Performed by: PSYCHIATRY & NEUROLOGY

## 2020-09-09 PROCEDURE — 3017F COLORECTAL CA SCREEN DOC REV: CPT | Performed by: PSYCHIATRY & NEUROLOGY

## 2020-09-09 PROCEDURE — 1036F TOBACCO NON-USER: CPT | Performed by: PSYCHIATRY & NEUROLOGY

## 2020-09-09 PROCEDURE — 99214 OFFICE O/P EST MOD 30 MIN: CPT | Performed by: PSYCHIATRY & NEUROLOGY

## 2020-09-09 PROCEDURE — G8417 CALC BMI ABV UP PARAM F/U: HCPCS | Performed by: PSYCHIATRY & NEUROLOGY

## 2020-09-09 NOTE — PROGRESS NOTES
NEUROLOGY FOLLOW-UP VISIT   Patient Name:       Caro Wing  :        1950  Clinic Visit Date:    2020    Dear Dr. Talha Norris MD     I had the opportunity to see your patient, Mr. Caro Wing in neurology consultation today. As you know he  is a 79 y.o. right handed  male was brought to the clinic for follow-up on recent hospitalization at [facility] on 20 for herpes simplex encephalitis. He has completed 3 weeks of IV acyclovir therapy on 20 as per Dr. Samantha Farah rec. He was also on Keppra for seizure prophylaxis and it was d/c'ed as LTME with no seizure activity. CT head 2020: No acute intracranial abnormality. MRI brain 2020: No acute intracranial abnormality. No acute infarct. Minimal global parenchymal volume loss with minimal microvascular ischemic changes. LP through IR 2020: Mildly traumatic tap. CSF (2020): RBC 3000, WBC 1348 (87%lymphocytes), glucose 43,  protein 257, IgG index 0.54, CSF HSV PCR positive  LTME with moderate to severe diffuse slowing with no seizure activity    Today he is brought to the clinic by his wife stating that patient has had significant difficulties with memory including short-term and long-term memory immediately after discharge from the hospital.  Patient did not want to go to occupational therapy but he has had physical therapy with the significant improvement in stamina. He has been having trouble continuing conversations and trouble finding words in his speech. He has difficulty recalling name of \"uber\". He never has had memory problems in the past.  Since after discharge from the hospital; he has been having significant difficulties with it. He has no difficulties to carry on ADLs. He is not driving anymore. He is wondering about returning back to work. His wife also stated that he has worsened heat exhaustion.   He has episodes of confusion and disorientation with heart racing and question irregular heart rate with atrial fibrillation with lightheadedness. 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 That made him extremely exhausted making him \"more confused\". He also has had headaches with aching/pressure-like pain located in both eyes associated with severe photophobia. He never had any nausea or vomiting. His pain is almost always tolerable. He never wanted to take NSAIDs including Tylenol. He thought his headaches were related to \"not having caffeine during hospital stay\" he also stated that he has cut down his caffeine intake with a significant improvement in headaches. He has had gait difficulties attributed to prior left ankle injury. It has gotten better. Patient is able to carry on basic ADLs such as brushing, toileting, bathing and dressing and feeding, etc.  Patient denies bladder and bowel incontinence. Patient is also able to use the telephone and carry on conversations without any problems. He has not been driving. Review of systems done by staff reviewed and pertinent positives include Fatigue, memory problems and headaches. Current Outpatient Medications on File Prior to Visit   Medication Sig Dispense Refill    Omeprazole (PRILOSEC PO) Take by mouth every morning      timolol (TIMOPTIC) 0.5 % ophthalmic solution 1 drop 2 times daily       No current facility-administered medications on file prior to visit. Allergies: Akilah Abdullahi is allergic to ibuprofen. 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 No pertinent surgical history. Social History: Akilah Abdullahi  reports that he has never smoked.  He has never used smokeless tobacco. He reports current alcohol use. He reports that he does not use drugs. History reviewed. No pertinent family history. On exam: Blood pressure (!) 143/91, pulse 63, temperature 98.5 °F (36.9 °C), height 5' 10\" (1.778 m). NEUROLOGIC EXAMINATION  GENERAL  Appears comfortable and in no distress   HEENT  NC/ AT   NECK  Supple and no bruits heard   MENTAL STATUS:  Alert, oriented, intact memory, no confusion, normal speech, normal language, no hallucination or delusion   CRANIAL NERVES: II     -      Pupils bilaterally reactive; Visual fields intact to confrontation  III,IV,VI -  EOMs full, no afferent defect, no JASSI, no ptosis  V     -     Normal facial sensation  VII    -     Normal facial symmetry  VIII   -     Intact hearing  IX,X -     Symmetrical palate  XI    -     Symmetrical shoulder shrug  XII   -     Midline tongue, no atrophy    MOTOR FUNCTION:  significant for good strength of grade 5/5 in bilateral proximal and distal muscle groups of both upper and lower extremities with normal bulk, normal tone and no involuntary movements, no tremor   SENSORY FUNCTION:  Normal touch, normal pin, normal vibration, normal proprioception   CEREBELLAR FUNCTION:  Intact fine motor control over upper limbs   REFLEX FUNCTION:  Symmetric, no perverted reflex, no Babinski sign   STATION and GAIT  Normal station, normal gait     Diagnostic data reviewed with the patient:   CT head 7/23/2020: No acute intracranial abnormality. MRI brain 7/24/2020: No acute intracranial abnormality. No acute infarct. Minimal global parenchymal volume loss with minimal microvascular ischemic changes. LP through IR 7/23/2020: Mildly traumatic tap.   CSF (7/23/2020): RBC 3000, WBC 1348 (87%lymphocytes), glucose 43,  protein 257, IgG index 0.54, CSF HSV PCR positive    LTME with moderate to severe diffuse slowing with no seizure activity    MOCA TESTING (9/9/2020):    Patient is unable to do the trail making test, able to copy the cube,

## 2020-09-09 NOTE — LETTER
[facility] Neurology  [address] [place] [address]  Phone: [phone]  Fax: [phone]    Nazia Alegre MD        2020     Mya Kumar MD  [address]  [address] [address]    Patient: Denise Chau  MR Number: [id number]  YOB: 1950  Date of Visit: 2020    Dear Dr. Mya Kumar:    Thank you for the request for consultation for Aryan Evangelina to me for the evaluation of Diego Tracy  Below are the relevant portions of my assessment and plan of care. NEUROLOGY CONSULT  Patient Name:       Denise Chau  :        1950  Clinic Visit Date:    2020    Dear Dr. Mya Kumar MD     I had the opportunity to see your patient, Mr. Denise Chau in neurology consultation today. As you know he  is a 79 y.o. 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 He has completed 3 weeks of IV acyclovir therapy on 20 as per Dr. Zenia Kumar rec. He was also on Keppra for seizure prophylaxis and it was d/c'ed as LTME with no seizure activity. CT head 2020: No acute intracranial abnormality. MRI brain 2020: No acute intracranial abnormality. No acute infarct. Minimal global parenchymal volume loss with minimal microvascular ischemic changes. LP through IR 2020: Mildly traumatic tap.   CSF (2020): RBC 3000, WBC 1348 (87%lymphocytes), glucose 43,  protein 257, IgG index 0.54, CSF HSV PCR positive  LTME with moderate to severe diffuse slowing with no seizure activity    Today he is brought to the clinic by his wife stating that patient has had significant difficulties with memory including short-term and long-term memory immediately after discharge from the hospital.  Patient did not want to go to occupational therapy but he has had physical therapy with the significant improvement in stamina. He has been having trouble continuing conversations and trouble finding words in his speech. He has difficulty recalling name of \"uber\". He never has had memory problems in the past.  Since after discharge from the hospital; he has been having significant difficulties with it. He has no difficulties to carry on ADLs. He is not driving anymore. He is wondering about returning back to work. His wife also stated that he has worsened heat exhaustion. He has episodes of confusion and disorientation with heart racing and question irregular heart rate with atrial fibrillation with lightheadedness. 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 That made him extremely exhausted making him \"more confused\". He also has had headaches with aching/pressure-like pain located in both eyes associated with severe photophobia. He never had any nausea or vomiting. His pain is almost always tolerable. He never wanted to take NSAIDs including Tylenol. He thought his headaches were related to \"not having caffeine during hospital stay\" he also stated that he has cut down his caffeine intake with a significant improvement in headaches. He has had gait difficulties attributed to prior left ankle injury. It has gotten better. Patient is able to carry on basic ADLs such as brushing, toileting, bathing and dressing and feeding, etc.  Patient denies bladder and bowel incontinence. Patient is also able to use the telephone and carry on conversations without any problems. He has not been driving. Review of systems done by staff reviewed and pertinent positives include Fatigue, memory problems and headaches.     Current Outpatient Medications on File Prior to Visit   Medication Sig Dispense Refill    Omeprazole (PRILOSEC PO) Take by mouth every morning      timolol (TIMOPTIC) 0.5 % ophthalmic solution 1 drop 2 times daily No current facility-administered medications on file prior to visit. Allergies: Maryann Gaston is allergic to ibuprofen. 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 No pertinent surgical history. Social History: Maryann Gaston  reports that he has never smoked. He has never used smokeless tobacco. He reports current alcohol use. He reports that he does not use drugs. History reviewed. No pertinent family history. On exam: Blood pressure (!) 143/91, pulse 63, temperature 98.5 °F (36.9 °C), height 5' 10\" (1.778 m).     NEUROLOGIC EXAMINATION  GENERAL  Appears comfortable and in no distress   HEENT  NC/ AT   NECK  Supple and no bruits heard   MENTAL STATUS:  Alert, oriented, intact memory, no confusion, normal speech, normal language, no hallucination or delusion   CRANIAL NERVES: II     -      Pupils bilaterally reactive; Visual fields intact to confrontation  III,IV,VI -  EOMs full, no afferent defect, no JASSI, no ptosis  V     -     Normal facial sensation  VII    -     Normal facial symmetry  VIII   -     Intact hearing  IX,X -     Symmetrical palate  XI    -     Symmetrical shoulder shrug  XII   -     Midline tongue, no atrophy    MOTOR FUNCTION:  significant for good strength of grade 5/5 in bilateral proximal and distal muscle groups of both upper and lower extremities with normal bulk, normal tone and no involuntary movements, no tremor   SENSORY FUNCTION:  Normal touch, normal pin, normal vibration, normal proprioception   CEREBELLAR FUNCTION:  Intact fine motor control over upper limbs   REFLEX FUNCTION:  Symmetric, no perverted reflex, no Babinski sign   STATION and GAIT  Normal station, normal gait Diagnostic data reviewed with the patient:   CT head 7/23/2020: No acute intracranial abnormality. MRI brain 7/24/2020: No acute intracranial abnormality. No acute infarct. Minimal global parenchymal volume loss with minimal microvascular ischemic changes. LP through IR 7/23/2020: Mildly traumatic tap. CSF (7/23/2020): RBC 3000, WBC 1348 (87%lymphocytes), glucose 43,  protein 257, IgG index 0.54, CSF HSV PCR positive    LTME with moderate to severe diffuse slowing with no seizure activity    MOCA TESTING (9/9/2020):    Patient is unable to do the trail making test, able to copy the cube, able to draw the clock and able to put the hands but unable to put the numbers; able to name 3 out of 3 animals; able to do digit backwards but not digit forward; able to do target detection;  able to do the serial subtraction; scored 2/2 on verbal abstraction; could not do phonemic fluency; able to repeat only 1 out of 2 sentences; scored 0 out of 5 on delayed recall and oriented to place, city, date, day of the week, month and year. On [address] [place] [address] patient scored 19/30. Impression and Plan: Mr. Marcos Jones is a 79 y.o. 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  Cognitive impairment; recent herpes simplex encephalitis s/p IV acyclovir therapy x 3 wks    On MOCA testing; he scored 19/30.,   Patient is unable to do the trail making test, able to copy the cube, able to draw the clock and able to put the hands but unable to put the numbers; able to name 3 out of 3 animals; able to do digit backwards but not digit forward; able to do target detection;  able to do the serial subtraction; scored 2/2 on verbal abstraction; could not do phonemic fluency; able to repeat only 1 out of 2 sentences; scored 0 out of 5 on delayed recall and oriented to place, city, date, day of the week, month and year.      Will refer him to neuropsych testing; will also get OT eval for simulated Driving evaluation; no driving until cleared by driving eval.   Discussion done regarding flu vaccine;  the standard flu shot is an inactivated vaccine containing only killed flu viruses. so, its safe. Follow up in neurology clinic in 6 weeks or sooner for further questions or concerns. Please note that portions of this note were completed with a voice recognition program.  Although every effort was made to ensure the accuracy of this automated transcription, some errors in transcription may have occurred; occasionally words are mis-transcribed. Thank you for understanding. If you have questions, please do not hesitate to call me. I look forward to following Hugo Brunner along with you.     Sincerely,        Carlos Sosa MD

## 2020-09-10 ENCOUNTER — CARE COORDINATION (OUTPATIENT)
Dept: CASE MANAGEMENT | Age: 70
End: 2020-09-10

## 2020-09-10 NOTE — CARE COORDINATION
Attempted to reach pt for BPCI-A follow up call. Left message requesting call back.     Ernie Luther RN  Care Transition Coordinator  [phone]

## 2020-09-17 ENCOUNTER — CARE COORDINATION (OUTPATIENT)
Dept: CASE MANAGEMENT | Age: 70
End: 2020-09-17

## 2020-09-17 NOTE — CARE COORDINATION
Attempted to reach pt for BPCI-A follow up call. Left message requesting call back.     Yulissa Rivera RN  Care Transition Coordinator  [phone]

## 2020-09-24 ENCOUNTER — CARE COORDINATION (OUTPATIENT)
Dept: CASE MANAGEMENT | Age: 70
End: 2020-09-24

## 2020-10-13 ENCOUNTER — CARE COORDINATION (OUTPATIENT)
Dept: CASE MANAGEMENT | Age: 70
End: 2020-10-13

## 2020-10-16 NOTE — TELEPHONE ENCOUNTER
The completed, signed forms were faxed to both Thang Sandoval at Dr. Danny Choe office and [facility] as requested by Mrs. Alexandria Leonard.

## 2020-10-20 ENCOUNTER — CARE COORDINATION (OUTPATIENT)
Dept: CASE MANAGEMENT | Age: 70
End: 2020-10-20

## 2020-10-20 NOTE — CARE COORDINATION
[facility] Trans[facility] Follow Up Call    10/20/2020    Patient: Isis Kruger  Patient : 1950   MRN: <[id number]>  Reason for Admission:   Discharge Date: 20 RARS: Readmission Risk Score: 16       Attempted to reach patient by phone for follow up; BPCI-A. HIPAA compliant message left on patient's voicemail; CTN contact information provided.      Jennifer Walsh RN

## 2020-10-26 ENCOUNTER — TELEPHONE (OUTPATIENT)
Dept: FAMILY MEDICINE CLINIC | Age: 70
End: 2020-10-26

## 2020-10-26 NOTE — TELEPHONE ENCOUNTER
Dictated by Dr. Brianna Carrasquillo below:    Spoke with Aaron Bermudez on 10/23/2020,     He called stating earlier in the week he had an episode of A-fib, heart racing, lasted 5 minutes. No dyspnea or chest pain but he did have mild orthstatic symptoms. Spontaneous remitted, checking with Dr. Delaney Dubois on this. He found that this occurred while outdoors doing more activity than usual. He has numbness and tingling in hands and all fingers, worse at night-time. No suggestion of radicular components and has no focal weakness. He still finds difficulty with memory and word finding in conversational speech especially when he is fatigued. Regarding facts and concepts that are previously new, he is able to re-learn them rather quickly, recall from secondary learning is good. He still finds people and familiar events delated recall with those. Finds himself emotionally irritable and frustrated. Tolerance for fools and delays are bothering him. He is abstaining from all alcohol. He is having no worsening of back pain, bowel or bladder incontinence issues. Denies falls. Sleep is fine aside from paresthesias of hands. Diagnosis remains:  Resolving herpes simplex encephalitis. Paroxysmal a-fib  Cognitive impairment secondary to #1  Paresthesia, gloves distribution, uncertain etiology. 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 He is considering going to OT but is reluctant to come to [facility] due to social situations and talking about his illness, dealing with curious people. He is considering therapy in [facility], instead.

## 2020-10-28 ENCOUNTER — CARE COORDINATION (OUTPATIENT)
Dept: CASE MANAGEMENT | Age: 70
End: 2020-10-28

## 2020-11-03 ENCOUNTER — CARE COORDINATION (OUTPATIENT)
Dept: CASE MANAGEMENT | Age: 70
End: 2020-11-03

## 2020-11-03 NOTE — CARE COORDINATION
[facility] Transitions Follow Up Call    11/3/2020    Patient: Kimani Virgen  Patient : 1950   MRN: <[id number]>  Reason for Admission:   Discharge Date: 20 RARS: Readmission Risk Score: 16      Attempted to reach patient by phone for follow up; BPCI-A. HIPAA compliant message left on patient's voicemail; CTN contact information provided. Final call; no further outreach. Bundle end date 2020. CTN signing off.         Sami Cespedes RN

## 2020-11-16 ENCOUNTER — TELEPHONE (OUTPATIENT)
Dept: NEUROLOGY | Age: 70
End: 2020-11-16

## 2020-11-16 NOTE — TELEPHONE ENCOUNTER
Received a fax from Dr SCHWARTZ Leveler office asking for records for pt's upcoming appt in March. OV note from 9/9/20, MRI Brain from 8/2/20, and EEG from 7/24/20 were printed and faxed.

## 2020-11-18 ENCOUNTER — OFFICE VISIT (OUTPATIENT)
Dept: NEUROLOGY | Age: 70
End: 2020-11-18
Payer: MEDICARE

## 2020-11-18 VITALS
BODY MASS INDEX: 28.98 KG/M2 | DIASTOLIC BLOOD PRESSURE: 90 MMHG | SYSTOLIC BLOOD PRESSURE: 138 MMHG | HEART RATE: 62 BPM | WEIGHT: 207 LBS | HEIGHT: 71 IN

## 2020-11-18 PROCEDURE — 1036F TOBACCO NON-USER: CPT | Performed by: PSYCHIATRY & NEUROLOGY

## 2020-11-18 PROCEDURE — 4040F PNEUMOC VAC/ADMIN/RCVD: CPT | Performed by: PSYCHIATRY & NEUROLOGY

## 2020-11-18 PROCEDURE — G8427 DOCREV CUR MEDS BY ELIG CLIN: HCPCS | Performed by: PSYCHIATRY & NEUROLOGY

## 2020-11-18 PROCEDURE — 3017F COLORECTAL CA SCREEN DOC REV: CPT | Performed by: PSYCHIATRY & NEUROLOGY

## 2020-11-18 PROCEDURE — G8417 CALC BMI ABV UP PARAM F/U: HCPCS | Performed by: PSYCHIATRY & NEUROLOGY

## 2020-11-18 PROCEDURE — 99214 OFFICE O/P EST MOD 30 MIN: CPT | Performed by: PSYCHIATRY & NEUROLOGY

## 2020-11-18 PROCEDURE — G8484 FLU IMMUNIZE NO ADMIN: HCPCS | Performed by: PSYCHIATRY & NEUROLOGY

## 2020-11-18 PROCEDURE — 1123F ACP DISCUSS/DSCN MKR DOCD: CPT | Performed by: PSYCHIATRY & NEUROLOGY

## 2020-11-18 RX ORDER — LATANOPROST 50 UG/ML
1 SOLUTION/ DROPS OPHTHALMIC NIGHTLY
COMMUNITY
Start: 2020-10-27 | End: 2021-04-28

## 2020-11-18 RX ORDER — AMITRIPTYLINE HYDROCHLORIDE 10 MG/1
TABLET, FILM COATED ORAL
Qty: 70 TABLET | Refills: 1 | OUTPATIENT
Start: 2020-11-18 | End: 2020-11-25 | Stop reason: ALTCHOICE

## 2020-11-18 NOTE — PROGRESS NOTES
HEENT [] Hearing Loss  [] Visual Disturbance  [] Tinnitus  [] Eye pain   Respiratory [] Shortness of Breath  [] Cough  [] Snoring   Cardiovascular [] Chest Pain  [x] Palpitations  [] Lightheaded   GI [] Constipation  [] Diarrhea  [] Swallowing change  [] Nausea/vomiting    [x] Urinary Frequency  [x] Urinary Urgency   Musculoskeletal [] Neck pain  [] Back pain  [] Muscle pain  [] Restless legs   Dermatologic [] Skin changes   Neurologic [x] Memory loss/confusion  [] Seizures  [x] Trouble walking or imbalance  [] Dizziness  [x] Sleep disturbance  [x] Weakness  [x] Numbness  [] Tremors  [] Speech Difficulty  [x] Headaches  [] Light Sensitivity  [] Sound Sensitivity   Endocrinology []Excessive thirst  []Excessive hunger   Psychiatric [x] Anxiety/Depression  [] Hallucination   Allergy/immunology []Hives/environmental allergies   Hematologic/lymph [] Abnormal bleeding  [] Abnormal bruising

## 2020-11-18 NOTE — PROGRESS NOTES
microvascular ischemic changes. LP through IR 7/23/2020: Mildly traumatic tap. CSF (7/23/2020): RBC 3000, WBC 1348 (87%lymphocytes), glucose 43,  protein 257, IgG index 0.54, CSF HSV PCR positive  LTME with moderate to severe diffuse slowing with no seizure activity    Review of systems done by staff reviewed and pertinent positives include Fatigue, memory problems and headaches. Current Outpatient Medications on File Prior to Visit   Medication Sig Dispense Refill    Omeprazole (PRILOSEC PO) Take by mouth every morning      timolol (TIMOPTIC) 0.5 % ophthalmic solution 1 drop 2 times daily       No current facility-administered medications on file prior to visit. Allergies: Clemmie Fleischer is allergic to ibuprofen. 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 Social History: Clemmie Fleischer  reports that he has never smoked. He has never used smokeless tobacco. He reports current alcohol use. He reports that he does not use drugs. No family history on file. On exam: Blood pressure (!) 138/90, pulse 62, height 5' 10.5\" (1.791 m), weight 207 lb (93.9 kg). NEUROLOGIC EXAMINATION  GENERAL  Appears comfortable and in no distress   HEENT  NC/ AT   NECK  Supple and no bruits heard   MENTAL STATUS:  Alert, oriented, poor delayed recall; but able to follow one-step as well as three-step commands well; good naming and good repetition; could do subtractions well; scored 24/30 on MoCA testing.     CRANIAL NERVES: II     -      Pupils bilaterally reactive; Visual fields intact to confrontation  III,IV,VI -  EOMs full, no afferent defect, no JASSI, no ptosis  V     -     Normal facial sensation  VII -     Normal facial symmetry  VIII   -     Intact hearing  IX,X -     Symmetrical palate  XI    -     Symmetrical shoulder shrug  XII   -     Midline tongue, no atrophy    MOTOR FUNCTION:  significant for good strength of grade 5/5 in bilateral proximal and distal muscle groups of both upper and lower extremities with normal bulk, normal tone and no involuntary movements, no tremor   SENSORY FUNCTION:  Normal touch, normal pin, normal vibration, normal proprioception   CEREBELLAR FUNCTION:  Intact fine motor control over upper limbs   REFLEX FUNCTION:  Symmetric, no perverted reflex, no Babinski sign   STATION and GAIT  Normal station, normal gait, unable to do tandem gait     Diagnostic data reviewed with the patient:   CT head 7/23/2020: No acute intracranial abnormality. MRI brain 7/24/2020: No acute intracranial abnormality. No acute infarct. Minimal global parenchymal volume loss with minimal microvascular ischemic changes. LP through IR 7/23/2020: Mildly traumatic tap. CSF (7/23/2020): RBC 3000, WBC 1348 (87%lymphocytes), glucose 43,  protein 257, IgG index 0.54, CSF HSV PCR positive    LTME with moderate to severe diffuse slowing with no seizure activity    MOCA TESTING (9/9/2020):    Patient is unable to do the trail making test, able to copy the cube, able to draw the clock and able to put the hands but unable to put the numbers; able to name 3 out of 3 animals; able to do digit backwards but not digit forward; able to do target detection;  able to do the serial subtraction; scored 2/2 on verbal abstraction; could not do phonemic fluency; able to repeat only 1 out of 2 sentences; scored 0 out of 5 on delayed recall and oriented to place, city, date, day of the week, month and year. On [address] [place] [address] patient scored 19/30.     MOCA TESTING (11/18/2020):    Patient is able to do the trail making test, able to copy the cube, able to draw the clock and able to put the hands but unable to put the numbers; able to name 3 out of 3 animals; unable to do digit backwards but able to do digit forward; able to do target detection;  able to do the serial subtraction; scored 2/2 on verbal abstraction; could not do phonemic fluency; able to repeat only 1 out of 2 sentences; scored 1 out of 5 on delayed recall and oriented to place, city, date, day of the week, month and year. On [address] [facility] [place] patient scored 24/30. Impression and Plan: Mr. Perry Tucker is a 79 y.o. 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 Gait impairment with occasional near falls: PT eval  also to get OT eval for simulated Driving evaluation; no driving until cleared by driving eval.   Episodic tension type headaches with increasing frequency; occasional anxiety and chronic insomnia: Discussed about amitriptyline at low-dose with gradual dose escalation; prescription also given but he wants to think about it  Follow up in 3 months. Please note that portions of this note were completed with a voice recognition program.  Although every effort was made to ensure the accuracy of this automated transcription, some errors in transcription may have occurred; occasionally words are mis-transcribed. Thank you for understanding.

## 2020-11-25 NOTE — PROGRESS NOTES
Patient instructed on the pre-operative, intra-operative, and post-operative process. Patient instructed on NPO status. Medication instructions and Pre operative instruction sheet reviewed over the phone. Instructed pt to take omeprazole with a small sip of water prior to arriving to the hospital the day of surgery.

## 2020-11-27 ENCOUNTER — HOSPITAL ENCOUNTER (OUTPATIENT)
Dept: PREADMISSION TESTING | Age: 70
Setting detail: SPECIMEN
Discharge: HOME | End: 2020-12-01
Payer: MEDICARE

## 2020-11-27 DIAGNOSIS — Z01.818 PREOPERATIVE TESTING: Primary | ICD-10-CM

## 2020-11-27 PROCEDURE — U0003 INFECTIOUS AGENT DETECTION BY NUCLEIC ACID (DNA OR RNA); SEVERE ACUTE RESPIRATORY SYNDROME CORONAVIRUS 2 (SARS-COV-2) (CORONAVIRUS DISEASE [COVID-19]), AMPLIFIED PROBE TECHNIQUE, MAKING USE OF HIGH THROUGHPUT TECHNOLOGIES AS DESCRIBED BY CMS-2020-01-R: HCPCS

## 2020-11-27 PROCEDURE — C9803 HOPD COVID-19 SPEC COLLECT: HCPCS

## 2020-11-29 LAB — SARS-COV-2, NAA: NOT DETECTED

## 2020-12-02 ENCOUNTER — ANESTHESIA EVENT (OUTPATIENT)
Dept: OPERATING ROOM | Age: 70
End: 2020-12-02
Payer: MEDICARE

## 2020-12-02 ENCOUNTER — NURSE ONLY (OUTPATIENT)
Dept: FAMILY MEDICINE CLINIC | Age: 70
End: 2020-12-02
Payer: MEDICARE

## 2020-12-02 PROCEDURE — 90694 VACC AIIV4 NO PRSRV 0.5ML IM: CPT

## 2020-12-02 NOTE — PATIENT INSTRUCTIONS
Patient Education        Influenza (Flu) Vaccine (Inactivated or Recombinant): What You Need to Know  Why get vaccinated? Influenza vaccine can prevent influenza (flu). Flu is a contagious disease that spreads around the [facility] every year, usually between October and May. Anyone can get the flu, but it is more dangerous for some people. Infants and young children, people 72years of age and older, pregnant women, and people with certain health conditions or a weakened immune system are at greatest risk of flu complications. Pneumonia, bronchitis, sinus infections and ear infections are examples of flu-related complications. If you have a medical condition, such as heart disease, cancer or diabetes, flu can make it worse. Flu can cause fever and chills, sore throat, muscle aches, fatigue, cough, headache, and runny or stuffy nose. Some people may have vomiting and diarrhea, though this is more common in children than adults. Each year, thousands of people in the [facility] die from flu, and many more are hospitalized. Flu vaccine prevents millions of illnesses and flu-related visits to the doctor each year. Influenza vaccine  CDC recommends everyone 10months of age and older get vaccinated every flu season. Children 6 months through 6years of age may need 2 doses during a single flu season. Everyone else needs only 1 dose each flu season. It takes about 2 weeks for protection to develop after vaccination. There are many flu viruses, and they are always changing. Each year a new flu vaccine is made to protect against three or four viruses that are likely to cause disease in the upcoming flu season. Even when the vaccine doesn't exactly match these viruses, it may still provide some protection. Influenza vaccine does not cause flu. Influenza vaccine may be given at the same time as other vaccines.   Talk with your health care provider  Tell your vaccine provider if the person getting the vaccine:  · Has had an allergic reaction after a previous dose of influenza vaccine, or has any severe, life-threatening allergies. · Has ever had Guillain-Barré Syndrome (also called GBS). In some cases, your health care provider may decide to postpone influenza vaccination to a future visit. People with minor illnesses, such as a cold, may be vaccinated. People who are moderately or severely ill should usually wait until they recover before getting influenza vaccine. Your health care provider can give you more information. Risks of a vaccine reaction  · Soreness, redness, and swelling where shot is given, fever, muscle aches, and headache can happen after influenza vaccine. · There may be a very small increased risk of Guillain-Barré Syndrome (GBS) after inactivated influenza vaccine (the flu shot). Brenton Jacinto children who get the flu shot along with pneumococcal vaccine (PCV13), and/or DTaP vaccine at the same time might be slightly more likely to have a seizure caused by fever. Tell your health care provider if a child who is getting flu vaccine has ever had a seizure. People sometimes faint after medical procedures, including vaccination. Tell your provider if you feel dizzy or have vision changes or ringing in the ears. As with any medicine, there is a very remote chance of a vaccine causing a severe allergic reaction, other serious injury, or death. What if there is a serious problem? An allergic reaction could occur after the vaccinated person leaves the clinic. If you see signs of a severe allergic reaction (hives, swelling of the face and throat, difficulty breathing, a fast heartbeat, dizziness, or weakness), call 9-1-1 and get the person to the nearest hospital.  For other signs that concern you, call your health care provider. Adverse reactions should be reported to the Vaccine Adverse Event Reporting System (VAERS).  Your health care provider will usually file this report, or you can do it yourself. Visit the VAERS website at www.vaers. hhs.gov or call 9-506.139.2219. VAERS is only for reporting reactions, and VAERS staff do not give medical advice. The National Vaccine Injury Compensation Program  The National Vaccine Injury Compensation Program (VICP) is a federal program that was created to compensate people who may have been injured by certain vaccines. Visit the VICP website at www.[facility].gov/vaccinecompensation or call 1-583.905.8962 to learn about the program and about filing a claim. There is a time limit to file a claim for compensation. How can I learn more? · Ask your healthcare provider. · Call your local or state health department. · Contact the Centers for Disease Control and Prevention (CDC):  ? Call 2-299.381.6572 (6-405-YLH-INFO) or  ? Visit CDC's website at www.cdc.gov/flu  Vaccine Information Statement (Interim)  Inactivated Influenza Vaccine  8/15/2019  [address] [id number]-05  Department of Health and Human Services  Centers for Disease Control and Prevention  Many Vaccine Information Statements are available in [place] and other languages. See www.immunize.org/vis. Muchas hojas de información sobre vacunas están disponibles en español y en otros idiomas. Visite www.immunize.org/vis. Care instructions adapted under license by [facility] ([facility]). If you have questions about a medical condition or this instruction, always ask your healthcare professional. [place] [address] any warranty or liability for your use of this information.

## 2020-12-04 ENCOUNTER — ANESTHESIA (OUTPATIENT)
Dept: OPERATING ROOM | Age: 70
End: 2020-12-04
Payer: MEDICARE

## 2020-12-04 ENCOUNTER — HOSPITAL ENCOUNTER (OUTPATIENT)
Age: 70
Setting detail: OUTPATIENT SURGERY
Discharge: HOME | End: 2020-12-04
Attending: ORTHOPAEDIC SURGERY | Admitting: ORTHOPAEDIC SURGERY
Payer: MEDICARE

## 2020-12-04 VITALS
OXYGEN SATURATION: 97 % | HEIGHT: 71 IN | RESPIRATION RATE: 16 BRPM | DIASTOLIC BLOOD PRESSURE: 102 MMHG | BODY MASS INDEX: 28.98 KG/M2 | WEIGHT: 207 LBS | HEART RATE: 51 BPM | SYSTOLIC BLOOD PRESSURE: 153 MMHG | TEMPERATURE: 97.2 F

## 2020-12-04 VITALS — DIASTOLIC BLOOD PRESSURE: 71 MMHG | OXYGEN SATURATION: 97 % | SYSTOLIC BLOOD PRESSURE: 109 MMHG

## 2020-12-04 PROCEDURE — 6370000000 HC RX 637 (ALT 250 FOR IP): Performed by: ORTHOPAEDIC SURGERY

## 2020-12-04 PROCEDURE — 2500000003 HC RX 250 WO HCPCS: Performed by: NURSE ANESTHETIST, CERTIFIED REGISTERED

## 2020-12-04 PROCEDURE — 7100000010 HC PHASE II RECOVERY - FIRST 15 MIN: Performed by: ORTHOPAEDIC SURGERY

## 2020-12-04 PROCEDURE — 3600000004 HC SURGERY LEVEL 4 BASE: Performed by: ORTHOPAEDIC SURGERY

## 2020-12-04 PROCEDURE — 6360000002 HC RX W HCPCS: Performed by: NURSE ANESTHETIST, CERTIFIED REGISTERED

## 2020-12-04 PROCEDURE — 3700000001 HC ADD 15 MINUTES (ANESTHESIA): Performed by: ORTHOPAEDIC SURGERY

## 2020-12-04 PROCEDURE — 3600000014 HC SURGERY LEVEL 4 ADDTL 15MIN: Performed by: ORTHOPAEDIC SURGERY

## 2020-12-04 PROCEDURE — 7100000011 HC PHASE II RECOVERY - ADDTL 15 MIN: Performed by: ORTHOPAEDIC SURGERY

## 2020-12-04 PROCEDURE — 2580000003 HC RX 258: Performed by: ORTHOPAEDIC SURGERY

## 2020-12-04 PROCEDURE — 6360000002 HC RX W HCPCS: Performed by: ORTHOPAEDIC SURGERY

## 2020-12-04 PROCEDURE — 2709999900 HC NON-CHARGEABLE SUPPLY: Performed by: ORTHOPAEDIC SURGERY

## 2020-12-04 PROCEDURE — 2720000010 HC SURG SUPPLY STERILE: Performed by: ORTHOPAEDIC SURGERY

## 2020-12-04 PROCEDURE — 3700000000 HC ANESTHESIA ATTENDED CARE: Performed by: ORTHOPAEDIC SURGERY

## 2020-12-04 PROCEDURE — 2500000003 HC RX 250 WO HCPCS: Performed by: ORTHOPAEDIC SURGERY

## 2020-12-04 RX ORDER — PROMETHAZINE HYDROCHLORIDE 25 MG/ML
6.25 INJECTION, SOLUTION INTRAMUSCULAR; INTRAVENOUS
Status: DISCONTINUED | OUTPATIENT
Start: 2020-12-04 | End: 2020-12-04 | Stop reason: HOSPADM

## 2020-12-04 RX ORDER — BUPIVACAINE HYDROCHLORIDE 5 MG/ML
INJECTION, SOLUTION EPIDURAL; INTRACAUDAL PRN
Status: DISCONTINUED | OUTPATIENT
Start: 2020-12-04 | End: 2020-12-04 | Stop reason: ALTCHOICE

## 2020-12-04 RX ORDER — GLYCOPYRROLATE 1 MG/5 ML
SYRINGE (ML) INTRAVENOUS PRN
Status: DISCONTINUED | OUTPATIENT
Start: 2020-12-04 | End: 2020-12-04 | Stop reason: SDUPTHER

## 2020-12-04 RX ORDER — KETAMINE HCL 50MG/ML(1)
SYRINGE (ML) INTRAVENOUS PRN
Status: DISCONTINUED | OUTPATIENT
Start: 2020-12-04 | End: 2020-12-04 | Stop reason: SDUPTHER

## 2020-12-04 RX ORDER — DEXAMETHASONE SODIUM PHOSPHATE 4 MG/ML
INJECTION, SOLUTION INTRA-ARTICULAR; INTRALESIONAL; INTRAMUSCULAR; INTRAVENOUS; SOFT TISSUE PRN
Status: DISCONTINUED | OUTPATIENT
Start: 2020-12-04 | End: 2020-12-04 | Stop reason: SDUPTHER

## 2020-12-04 RX ORDER — HYDROCODONE BITARTRATE AND ACETAMINOPHEN 5; 325 MG/1; MG/1
1 TABLET ORAL PRN
Status: DISCONTINUED | OUTPATIENT
Start: 2020-12-04 | End: 2020-12-04 | Stop reason: HOSPADM

## 2020-12-04 RX ORDER — SODIUM CHLORIDE 0.9 % (FLUSH) 0.9 %
10 SYRINGE (ML) INJECTION EVERY 12 HOURS SCHEDULED
Status: DISCONTINUED | OUTPATIENT
Start: 2020-12-04 | End: 2020-12-04 | Stop reason: HOSPADM

## 2020-12-04 RX ORDER — ACETAMINOPHEN 325 MG/1
650 TABLET ORAL ONCE
Status: COMPLETED | OUTPATIENT
Start: 2020-12-04 | End: 2020-12-04

## 2020-12-04 RX ORDER — SODIUM CHLORIDE 0.9 % (FLUSH) 0.9 %
10 SYRINGE (ML) INJECTION PRN
Status: DISCONTINUED | OUTPATIENT
Start: 2020-12-04 | End: 2020-12-04 | Stop reason: HOSPADM

## 2020-12-04 RX ORDER — LIDOCAINE HYDROCHLORIDE 20 MG/ML
INJECTION, SOLUTION EPIDURAL; INFILTRATION; INTRACAUDAL; PERINEURAL PRN
Status: DISCONTINUED | OUTPATIENT
Start: 2020-12-04 | End: 2020-12-04 | Stop reason: SDUPTHER

## 2020-12-04 RX ORDER — HYDROCODONE BITARTRATE AND ACETAMINOPHEN 5; 325 MG/1; MG/1
1 TABLET ORAL EVERY 6 HOURS PRN
Qty: 20 TABLET | Refills: 0 | Status: SHIPPED | OUTPATIENT
Start: 2020-12-04 | End: 2020-12-09

## 2020-12-04 RX ORDER — ONDANSETRON 4 MG/1
4 TABLET, ORALLY DISINTEGRATING ORAL EVERY 30 MIN PRN
Status: DISCONTINUED | OUTPATIENT
Start: 2020-12-04 | End: 2020-12-04 | Stop reason: HOSPADM

## 2020-12-04 RX ORDER — ONDANSETRON 2 MG/ML
4 INJECTION INTRAMUSCULAR; INTRAVENOUS
Status: DISCONTINUED | OUTPATIENT
Start: 2020-12-04 | End: 2020-12-04 | Stop reason: HOSPADM

## 2020-12-04 RX ORDER — FENTANYL CITRATE 50 UG/ML
25 INJECTION, SOLUTION INTRAMUSCULAR; INTRAVENOUS EVERY 5 MIN PRN
Status: DISCONTINUED | OUTPATIENT
Start: 2020-12-04 | End: 2020-12-04 | Stop reason: HOSPADM

## 2020-12-04 RX ORDER — PROPOFOL 10 MG/ML
INJECTION, EMULSION INTRAVENOUS PRN
Status: DISCONTINUED | OUTPATIENT
Start: 2020-12-04 | End: 2020-12-04 | Stop reason: SDUPTHER

## 2020-12-04 RX ORDER — PROPOFOL 10 MG/ML
INJECTION, EMULSION INTRAVENOUS CONTINUOUS PRN
Status: DISCONTINUED | OUTPATIENT
Start: 2020-12-04 | End: 2020-12-04 | Stop reason: SDUPTHER

## 2020-12-04 RX ORDER — SODIUM CHLORIDE, SODIUM LACTATE, POTASSIUM CHLORIDE, CALCIUM CHLORIDE 600; 310; 30; 20 MG/100ML; MG/100ML; MG/100ML; MG/100ML
INJECTION, SOLUTION INTRAVENOUS CONTINUOUS
Status: DISCONTINUED | OUTPATIENT
Start: 2020-12-04 | End: 2020-12-04 | Stop reason: HOSPADM

## 2020-12-04 RX ORDER — HYDROCODONE BITARTRATE AND ACETAMINOPHEN 5; 325 MG/1; MG/1
2 TABLET ORAL PRN
Status: DISCONTINUED | OUTPATIENT
Start: 2020-12-04 | End: 2020-12-04 | Stop reason: HOSPADM

## 2020-12-04 RX ADMIN — PROPOFOL 125 MCG/KG/MIN: 10 INJECTION, EMULSION INTRAVENOUS at 11:50

## 2020-12-04 RX ADMIN — SODIUM CHLORIDE, POTASSIUM CHLORIDE, SODIUM LACTATE AND CALCIUM CHLORIDE: 600; 310; 30; 20 INJECTION, SOLUTION INTRAVENOUS at 09:58

## 2020-12-04 RX ADMIN — DEXAMETHASONE SODIUM PHOSPHATE 8 MG: 4 INJECTION, SOLUTION INTRAMUSCULAR; INTRAVENOUS at 12:27

## 2020-12-04 RX ADMIN — PROPOFOL 80 MG: 10 INJECTION, EMULSION INTRAVENOUS at 11:50

## 2020-12-04 RX ADMIN — Medication 0.1 MG: at 12:02

## 2020-12-04 RX ADMIN — Medication 10 MG: at 11:50

## 2020-12-04 RX ADMIN — DEXTROSE MONOHYDRATE 2 G: 50 INJECTION, SOLUTION INTRAVENOUS at 11:39

## 2020-12-04 RX ADMIN — ACETAMINOPHEN 650 MG: 325 TABLET, FILM COATED ORAL at 09:58

## 2020-12-04 RX ADMIN — Medication 10 MG: at 11:59

## 2020-12-04 RX ADMIN — LIDOCAINE HYDROCHLORIDE 100 MG: 20 INJECTION, SOLUTION EPIDURAL; INFILTRATION; INTRACAUDAL; PERINEURAL at 11:50

## 2020-12-04 ASSESSMENT — PAIN SCALES - GENERAL
PAINLEVEL_OUTOF10: 0
PAINLEVEL_OUTOF10: 4
PAINLEVEL_OUTOF10: 0

## 2020-12-04 ASSESSMENT — LIFESTYLE VARIABLES: SMOKING_STATUS: 0

## 2020-12-04 ASSESSMENT — PAIN - FUNCTIONAL ASSESSMENT: PAIN_FUNCTIONAL_ASSESSMENT: 0-10

## 2020-12-04 NOTE — ANESTHESIA PRE PROCEDURE
Department of Anesthesiology  Preprocedure Note       Name:  Iggy Lewis   Age:  79 y.o.  :  1950                                          MRN:  [id number]         Date:  2020      Surgeon: Hussain Mast):  Samir Gutierrez MD    Procedure: Procedure(s):  CARPAL TUNNEL RELEASE ENDOSCOPIC    Medications prior to admission:   Prior to Admission medications    Medication Sig Start Date End Date Taking? Authorizing Provider   latanoprost (XALATAN) 0.005 % ophthalmic solution  10/27/20  Yes Historical Provider, MD   Omeprazole (PRILOSEC PO) Take by mouth every morning   Yes Historical Provider, MD   timolol (TIMOPTIC) 0.5 % ophthalmic solution 1 drop 2 times daily   Yes Historical Provider, MD       Current medications:    Current Facility-Administered Medications   Medication Dose Route Frequency Provider Last Rate Last Dose    ceFAZolin (ANCEF) 2 g in dextrose 5 % 100 mL IVPB  2 g Intravenous Once Samir Gutierrez MD        lactated ringers infusion   Intravenous Continuous Samir Gutierrez  mL/hr at 20 9675      sodium chloride flush 0.9 % injection 10 mL  10 mL Intravenous 2 times per day Samir Gutierrez MD        sodium chloride flush 0.9 % injection 10 mL  10 mL Intravenous PRN Samir Gutierrez MD           Allergies:     Allergies   Allergen Reactions    Nsaids     Sulfamethoxazole-Trimethoprim     Ibuprofen        Problem List:    Patient Active Problem List   Diagnosis Code    Cervical spinal stenosis M48.02    Spinal stenosis of lumbar region M48.061    Sepsis ([facility] [address] A41.9    Acute cystitis without hematuria N30.00    Incomplete emptying of bladder due to benign prostatic hyperplasia N40.1, R39.14    Encephalitis G04.90    Suspected COVID-19 virus infection Z20.828    COVID-19 ruled out by laboratory testing Z03.818    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    Disorientation R41.0    Altered mental status R41.82    Transaminitis R74.01    Hyponatremia E87.1    Benign prostatic hyperplasia without lower urinary tract symptoms N40.0    BALBIR (acute kidney injury) ([address] N17.9    Intestinal occlusion (HCC) K56.609    Hypokalemia E87.6    Cognitive impairment R41.89    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       Past Medical History:        Diagnosis Date    Acute cystitis     Altered mental status     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)     Hypokalemia     Intestinal occlusion (HCC)     Kidney injury     Sepsis ([address]     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     Transaminitis        Past Surgical History:        Procedure Laterality Date    BACK SURGERY  1985    L5-S1    CATARACT REMOVAL WITH IMPLANT Bilateral     DENTAL SURGERY      HERNIA REPAIR Bilateral     IHR    JOINT REPLACEMENT Right     EMY    KNEE ARTHROSCOPY Bilateral     TONSILLECTOMY         Social History:    Social History     Tobacco Use    Smoking status: Never Smoker    Smokeless tobacco: Never Used   Substance Use Topics    Alcohol use: Not Currently                                Counseling given: Not Answered      Vital Signs (Current):   Vitals:    11/25/20 1114 12/04/20 0947   BP:  (!) 171/101   Pulse:  64   Resp:  11   Temp:  36.3 °C (97.4 °F)   TempSrc:  Temporal   SpO2:  96%   Weight: 207 lb (93.9 kg) 207 lb (93.9 kg)   Height: 5' 10.5\" (1.791 m) 5' 10.5\" (1.791 m)                                              BP Readings from Last 3 Encounters:   12/04/20 (!) 171/101   11/18/20 (!) 138/90   09/09/20 (!) 143/91       NPO Status: Time of last liquid consumption: 2345                        Time of last solid consumption: 1900                        Date of last liquid consumption: 12/03/20                        Date of last solid food consumption: 12/03/20    BMI:   Wt Readings from Last 3 Encounters:   12/04/20 207 lb (93.9 kg)   11/18/20 207 lb (93.9 kg)   07/31/20 215 lb 6.2 oz (97.7 kg)     Body mass index is 29.28 Abdominal:           Vascular: negative vascular ROS. Anesthesia Plan      general and TIVA     ASA 2       Induction: intravenous. MIPS: Postoperative opioids intended. Anesthetic plan and risks discussed with patient and spouse.                       ISHMAEL Jain - CRNA   12/4/2020

## 2020-12-04 NOTE — ANESTHESIA POSTPROCEDURE EVALUATION
Department of Anesthesiology  Postprocedure Note    Patient: Lucia Navarrete  MRN: [id number]  YOB: 1950  Date of evaluation: 12/4/2020  Time:  4:23 PM     Procedure Summary     Date:  12/04/20 Room / Location:  [address] [place] [address]    Anesthesia Start:  1415 Anesthesia Stop:  2188    Procedure:  CARPAL TUNNEL RELEASE ENDOSCOPIC (Bilateral ) Diagnosis:  (OSMEL CARPAL TUNNEL SYNDROME)    Surgeon:  Venus Marshall MD Responsible Provider:  ISHMAEL Fierro CRNA    Anesthesia Type:  general, TIVA ASA Status:  2          Anesthesia Type: general, TIVA    Nikole Phase I:      Nikole Phase II: Nikole Score: 10    Last vitals: Reviewed and per EMR flowsheets.        Anesthesia Post Evaluation    Patient location during evaluation: PACU  Patient participation: complete - patient participated  Level of consciousness: awake and alert  Airway patency: patent  Nausea & Vomiting: no nausea and no vomiting  Complications: no  Cardiovascular status: blood pressure returned to baseline and hemodynamically stable  Respiratory status: acceptable and room air  Hydration status: euvolemic

## 2020-12-04 NOTE — ANESTHESIA POSTPROCEDURE EVALUATION
Department of Anesthesiology  Postprocedure Note    Patient: Alejandra Hinojosa  MRN: [id number]  YOB: 1950  Date of evaluation: 12/4/2020  Time:  12:55 PM     Procedure Summary     Date:  12/04/20 Room / Location:  [address] [place] [address]    Anesthesia Start:  0811 Anesthesia Stop:  6772    Procedure:  CARPAL TUNNEL RELEASE ENDOSCOPIC (Bilateral ) Diagnosis:  (OSMEL CARPAL TUNNEL SYNDROME)    Surgeon:  Fadi Davis MD Responsible Provider:  ISHMAEL Arrieta CRNA    Anesthesia Type:  general, TIVA ASA Status:  2          Anesthesia Type: general, TIVA    Nikole Phase I:      Nikole Phase II:      Last vitals: Reviewed and per EMR flowsheets.        Anesthesia Post Evaluation    Patient location during evaluation: PACU  Patient participation: complete - patient participated  Level of consciousness: sleepy but conscious  Airway patency: patent  Nausea & Vomiting: no vomiting and no nausea  Complications: no  Cardiovascular status: hemodynamically stable  Respiratory status: spontaneous ventilation and room air  Hydration status: stable

## 2020-12-04 NOTE — OP NOTE
Preoperative Diagnosis: bilateral carpal tunnel syndrome    Postoperative Diagnosis: Same    Procedure: bilateral endoscopic carpal tunnel release    Surgeon: Rachel Pallas    Anesthesiologist:   Toshia Jennings CRNA    Anesthesia: Local with MAC    Estimated blood loss:Minimal    Tourniquet time:  4 Minutes at 200 mmHg right, 4 minutes 200 mmHg left    Complications: None    Indications for Surgery:  The patient has had signs and symptoms of carpal tunnel syndrome that have failed conservative treatment. Options were discussed with the patient as well as risks and benefits and they have elected to proceed with the surgery. Operative procedure:   Prior to surgery the patient received IV antibiotics. The operative extremity was marked preoperatively. After informed consent was obtained the patient was brought to the operating room where MAC anesthesia was administered. Pre and postoperatively a total of 7 mL of 0.5% Marcaine plain was infiltrated in the operative sight for each wrist.  The right arm was then prepped and draped in the usual sterile fashion after placement of a well padded tourniquet. The arm was elevated, exsanguinated, and the tourniquet was inflated. A 1 cm incision was then made in a preexisting distal wrist crease. Hemostasis was achieved with bipolar electrocautery. Blunt dissection was then carried down to the forearm fascia where a U-based flap was created. Proximally the fascia was incised for 2 cm under direct visualization. Attention was then turned to the endoscopic carpal tunnel release. The synovial elevator was used to clear the underside of the transverse carpal ligament of soft tissue. Sequential dilators were then placed. The endoscopic carpal tunnel released instrument was then placed. The transverse fibers were then identified and release from distal to proximal.  The ligament was completely release. The tourniquet was deflated and hemostasis was achieved.   The wound was irrigated and closed with a nylon suture. A sterile dressing were placed. The left arm was then prepped and draped in the usual sterile fashion after placement of a well padded tourniquet. The arm was elevated, exsanguinated, and the tourniquet was inflated. A 1 cm incision was then made in a preexisting distal wrist crease. Hemostasis was achieved with bipolar electrocautery. Blunt dissection was then carried down to the forearm fascia where a U-based flap was created. Proximally the fascia was incised for 2 cm under direct visualization. Attention was then turned to the endoscopic carpal tunnel release. The synovial elevator was used to clear the underside of the transverse carpal ligament of soft tissue. Sequential dilators were then placed. The endoscopic carpal tunnel released instrument was then placed. The transverse fibers were then identified and release from distal to proximal.  The ligament was completely release. The tourniquet was deflated and hemostasis was achieved. The wound was irrigated and closed with a nylon suture. A sterile dressing were placed. The patient was brought to the recovery room. There were no preoperative or postoperative complications.       Reina Boo  12:49 PM  12/4/2020

## 2021-03-03 ENCOUNTER — OFFICE VISIT (OUTPATIENT)
Dept: NEUROLOGY | Age: 71
End: 2021-03-03
Payer: MEDICARE

## 2021-03-03 VITALS
HEIGHT: 70 IN | HEART RATE: 60 BPM | DIASTOLIC BLOOD PRESSURE: 86 MMHG | WEIGHT: 213.2 LBS | SYSTOLIC BLOOD PRESSURE: 137 MMHG | TEMPERATURE: 97.3 F | BODY MASS INDEX: 30.52 KG/M2

## 2021-03-03 DIAGNOSIS — R41.89 COGNITIVE IMPAIRMENT: ICD-10-CM

## 2021-03-03 DIAGNOSIS — R20.2 PARESTHESIA OF BOTH HANDS: ICD-10-CM

## 2021-03-03 DIAGNOSIS — G44.219 EPISODIC TENSION-TYPE HEADACHE, NOT INTRACTABLE: ICD-10-CM

## 2021-03-03 DIAGNOSIS — F07.89 POST-ENCEPHALITIC SYNDROME: Primary | ICD-10-CM

## 2021-03-03 PROCEDURE — G8427 DOCREV CUR MEDS BY ELIG CLIN: HCPCS | Performed by: PSYCHIATRY & NEUROLOGY

## 2021-03-03 PROCEDURE — G8417 CALC BMI ABV UP PARAM F/U: HCPCS | Performed by: PSYCHIATRY & NEUROLOGY

## 2021-03-03 PROCEDURE — 1123F ACP DISCUSS/DSCN MKR DOCD: CPT | Performed by: PSYCHIATRY & NEUROLOGY

## 2021-03-03 PROCEDURE — G8484 FLU IMMUNIZE NO ADMIN: HCPCS | Performed by: PSYCHIATRY & NEUROLOGY

## 2021-03-03 PROCEDURE — 99214 OFFICE O/P EST MOD 30 MIN: CPT | Performed by: PSYCHIATRY & NEUROLOGY

## 2021-03-03 PROCEDURE — 4040F PNEUMOC VAC/ADMIN/RCVD: CPT | Performed by: PSYCHIATRY & NEUROLOGY

## 2021-03-03 PROCEDURE — 1036F TOBACCO NON-USER: CPT | Performed by: PSYCHIATRY & NEUROLOGY

## 2021-03-03 PROCEDURE — 3017F COLORECTAL CA SCREEN DOC REV: CPT | Performed by: PSYCHIATRY & NEUROLOGY

## 2021-03-03 SDOH — HEALTH STABILITY: MENTAL HEALTH: HOW MANY STANDARD DRINKS CONTAINING ALCOHOL DO YOU HAVE ON A TYPICAL DAY?: NOT ASKED

## 2021-03-03 SDOH — HEALTH STABILITY: MENTAL HEALTH: HOW OFTEN DO YOU HAVE A DRINK CONTAINING ALCOHOL?: NOT ASKED

## 2021-03-03 NOTE — PROGRESS NOTES
ENEUROLOGY FOLLOW-UP VISIT   Patient Name:       Katina Cleveland  :        1950  Clinic Visit Date:    3/3/2021  Last visit: 20      Dear Dr. Cirilo Pino MD     I saw Mr. Katina Cleveland in follow-up in the office today in continuation of neurologic care. As you know he  is a 70 y.o. 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 Today he comes to clinic for follow-up on neuropsychological testing report. He also stated that he has had NCS/EMG of upper extremities and was diagnosed with bilateral carpal tunnel syndrome. He has had carpal tunnel release by Dr. Juan Pablo Stevenson afterwards with significant relief in his upper extremity paresthesias. He rarely had headache requiring to take any medication. Otherwise he has been doing \"extremely well\". He still has concentration difficulties \"but doing okay\". His wife stated that his memory difficulties including short-term and long-term memory difficulties continued. Patient did not want to go to occupational therapy but he has had physical therapy with improvement in stamina. He still has ongoing trouble continuing conversations and trouble finding words in his a speech. He is completely independent of all basic ADLs. He was not driving earlier but patient stated that recently he got lost while driving on family route to [facility]. CT head 2020: No acute intracranial abnormality. MRI brain 2020: No acute intracranial abnormality. No acute infarct. Minimal global parenchymal volume loss with minimal microvascular ischemic changes. LP through IR 2020: Mildly traumatic tap.   CSF (2020): RBC 3000, WBC 1348 (87%lymphocytes), glucose 43,  protein 257, IgG index 0.54, CSF HSV PCR positive  LTME with moderate to severe diffuse slowing with no seizure activity    Review of systems done by staff reviewed and pertinent positives include Memory loss, gait difficulties, numbness, weakness, headaches, anxiety and depression. Current Outpatient Medications on File Prior to Visit   Medication Sig Dispense Refill    latanoprost (XALATAN) 0.005 % ophthalmic solution Place 1 drop into both eyes nightly       timolol (TIMOPTIC) 0.5 % ophthalmic solution Place 1 drop into both eyes daily        No current facility-administered medications on file prior to visit. Allergies: Mignon Louise is allergic to nsaids; sulfamethoxazole-trimethoprim; and ibuprofen. 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 He has never used smokeless tobacco. He reports current alcohol use. He reports that he does not use drugs. No family history on file. On exam: Blood pressure 137/86, pulse 60, temperature 97.3 °F (36.3 °C), height 5' 10\" (1.778 m), weight 213 lb 3.2 oz (96.7 kg).       NEUROLOGIC EXAMINATION  GENERAL  Appears comfortable and in no distress   HEENT  NC/ AT   NECK  Supple and no bruits heard   MENTAL STATUS:  Alert, oriented, poor delayed recall; but able to follow one-step as well as three-step commands well; good naming and good repetition; could do subtractions well; scored 24/30 on MoCA testing. Appropriate affect. CRANIAL NERVES: II     -      Pupils bilaterally reactive; Visual fields intact to confrontation  III,IV,VI -  EOMs full, no afferent defect, no JASSI, no ptosis  V     -     Normal facial sensation  VII    -     Normal facial symmetry  VIII   -     Intact hearing  IX,X -     Symmetrical palate  XI    -     Symmetrical shoulder shrug  XII   -     Midline tongue, no atrophy    MOTOR FUNCTION:  significant for good strength of grade 5/5 in bilateral proximal and distal muscle groups of both upper and lower extremities with normal bulk, normal tone and no involuntary movements, no tremor   SENSORY FUNCTION:  Normal touch, normal pin, normal vibration, normal proprioception   CEREBELLAR FUNCTION:  Intact fine motor control over upper limbs   REFLEX FUNCTION:  Symmetric, no perverted reflex, no Babinski sign   STATION and GAIT  Normal station, normal gait, unable to do tandem gait     Diagnostic data reviewed with the patient:   CT head 7/23/2020: No acute intracranial abnormality. MRI brain 7/24/2020: No acute intracranial abnormality. No acute infarct. Minimal global parenchymal volume loss with minimal microvascular ischemic changes. LP through IR 7/23/2020: Mildly traumatic tap.   CSF (7/23/2020): RBC 3000, WBC 1348 (87%lymphocytes), glucose 43,  protein 257, IgG index 0.54, CSF HSV PCR positive    LTME with moderate to severe diffuse slowing with no seizure activity    MOCA TESTING (9/9/2020):    Patient is unable to do the trail making test, able to copy the cube, able to draw the clock and able to put the hands but unable to put the numbers; able to name 3 out of 3 animals; able to do digit backwards but not digit forward; able to do target detection;  able to do the serial subtraction; scored 2/2 on verbal abstraction; could not do phonemic fluency; able to repeat only 1 out of 2 sentences; scored 0 out of 5 on delayed recall and oriented to place, city, date, day of the week, month and year. On [address] [place] [address], [place] patient scored 19/30. MOCA TESTING (11/18/2020):    Patient is able to do the trail making test, able to copy the cube, able to draw the clock and able to put the hands but unable to put the numbers; able to name 3 out of 3 animals; unable to do digit backwards but able to do digit forward; able to do target detection;  able to do the serial subtraction; scored 2/2 on verbal abstraction; could not do phonemic fluency; able to repeat only 1 out of 2 sentences; scored 1 out of 5 on delayed recall and oriented to place, city, date, day of the week, month and year. On [address] [place] [address], [place] patient scored 24/30. Neuropsychological testing by Dr. Bassem Edge (2/25/2021): In summary; the vast majority of Dr. Home Willis neuropsychological test results are within normal limits, although a few scores on memory tests remain below expectations given the patient's educational status and a couple of memory scores are frankly impaired. He also had some mild difficulties in visual-spatial reasoning. 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 There may be some mild depression, largely due to his change in occupational status and impatience with not being fully back to his usual ability level yet. He appears to drive himself relentlessly and might benefit from mindful meditation to allow himself to slow down and heal.  He may wish to consider a repeat neuropsychological eval in 12-18 months to assess for changes/improvements in cognitive status. Impression and Plan: Mr. Caitie Castrejon is a 70 y.o. 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   Test results reviewed in detail with him. Counseling/discussion done. Disability forms filled. Bilateral carpal tunnel syndrome; s/p danyel CTS release (Dec 2021)  Episodic tension type headaches: Stable. Does not need to be on any prophylactic meds at this point of time. Follow-up in 3-4 months or sooner for further questions and concerns. Please note that portions of this note were completed with a voice recognition program.  Although every effort was made to ensure the accuracy of this automated transcription, some errors in transcription may have occurred; occasionally words are mis-transcribed. Thank you for understanding.

## 2021-03-03 NOTE — PROGRESS NOTES
All items selected indicate a positive finding. Those items not selected are negative.   Constitutional [x] Weight loss/gain   [x] Fatigue  [] Fever/Chills   HEENT [x] Hearing Loss  [] Visual Disturbance  [] Tinnitus  [] Eye pain   Respiratory [] Shortness of Breath  [] Cough  [] Snoring   Cardiovascular [] Chest Pain  [] Palpitations  [] Lightheaded   GI [] Constipation  [] Diarrhea  [] Swallowing change    [x] Urinary Frequency  [x] Urinary Urgency   Musculoskeletal [] Neck pain  [] Back pain  [] Muscle pain  [] Restless legs   Dermatologic [] Skin changes   Neurologic [x] Memory loss/confusion  [] Seizures  [x] Trouble walking or imbalance  [] Dizziness  [x] Weakness  [x] Numbness  [] Tremors  [] Speech Difficulty  [x] Headaches  [] Light Sensitivity  [] Sound Sensitivity   Endocrinology []Excessive thirst  []Excessive hunger   Psychiatric [x] Anxiety/Depression  [] Hallucination   Allergy/immunology []Hives/environmental allergies   Hematologic/lymph [] Abnormal bleeding  [] Abnormal bruising

## 2021-04-28 RX ORDER — LATANOPROST 50 UG/ML
SOLUTION/ DROPS OPHTHALMIC
Qty: 2.5 ML | Refills: 0 | Status: SHIPPED | OUTPATIENT
Start: 2021-04-28 | End: 2021-07-30

## 2021-05-26 RX ORDER — TIMOLOL MALEATE 5 MG/ML
1 SOLUTION/ DROPS OPHTHALMIC DAILY
Qty: 1 BOTTLE | Refills: 0 | Status: SHIPPED | OUTPATIENT
Start: 2021-05-26 | End: 2021-10-08

## 2021-07-03 NOTE — PROGRESS NOTES
ENEUROLOGY FOLLOW-UP VISIT   Patient Name:       Laura Tracy  :        1950  Clinic Visit Date:    2021  Last visit: 3/3/21      Dear Dr. Altagracia Pierce MD     I saw Mr. Laura Tracy in follow-up in the office today in continuation of neurologic care. As you know he  is a 70 y.o. 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 Today he is brought to the clinic by his wife stating that his memory has been fluctuating but he has been increasingly getting anxious. He also stated that he had NCS/EMG of upper extremities and was diagnosed with bilateral carpal tunnel syndrome. He has had carpal tunnel release by Dr. Nicolette Kamara afterwards with significant relief in his upper extremity paresthesias. He rarely had headache requiring to take any medication. Otherwise he has been doing \"extremely well\". He still has concentration difficulties \"but doing okay\". His wife stated that his memory difficulties including short-term and long-term memory difficulties continued. Patient did not want to go to occupational therapy but he has had physical therapy with improvement in stamina. He still has ongoing trouble continuing conversations and trouble finding words in his a speech. He is completely independent of all basic ADLs. He was not driving earlier but patient stated that recently he got lost while driving on family route to [facility]. CT head 2020: No acute intracranial abnormality. MRI brain 2020: No acute intracranial abnormality. No acute infarct. Minimal global parenchymal volume loss with minimal microvascular ischemic changes. LP through IR 2020: Mildly traumatic tap.   CSF (2020): RBC 3000, WBC 1348 (87%lymphocytes), glucose 43,  protein 257, IgG index 0.54, CSF HSV PCR positive  LTME with moderate to severe diffuse slowing with no seizure activity      All items selected indicate a positive finding. Those items not selected are negative. Constitutional [] Weight loss/gain   [] Fatigue  [] Fever/Chills   HEENT [] Hearing Loss  [] Visual Disturbance  [] Tinnitus  [] Eye pain   Respiratory [] Shortness of Breath  [] Cough  [] Snoring   Cardiovascular [] Chest Pain  [] Palpitations  [] Lightheaded   GI [] Constipation  [] Diarrhea  [] Swallowing change    [] Urinary Frequency  [] Urinary Urgency   Musculoskeletal [] Neck pain  [] Back pain  [] Muscle pain  [] Restless legs   Dermatologic [] Skin changes   Neurologic [x] Memory loss/confusion  [] Seizures  [] Trouble walking or imbalance  [] Dizziness  [] Weakness  [] Numbness  [] Tremors  [] Speech Difficulty  [] Headaches  [] Light Sensitivity  [] Sound Sensitivity   Endocrinology []Excessive thirst  []Excessive hunger   Psychiatric [x] Anxiety/Depression  [] Hallucination   Allergy/immunology []Hives/environmental allergies   Hematologic/lymph [] Abnormal bleeding  [] Abnormal bruising           Current Outpatient Medications on File Prior to Visit   Medication Sig Dispense Refill    timolol (TIMOPTIC) 0.5 % ophthalmic solution Place 1 drop into both eyes daily 1 Bottle 0    latanoprost (XALATAN) 0.005 % ophthalmic solution USE AS DIRECTED 2.5 mL 0     No current facility-administered medications on file prior to visit. Allergies: Loretta Darden is allergic to nsaids, sulfamethoxazole-trimethoprim, and ibuprofen.     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 He has never used smokeless tobacco. He reports current alcohol use. He reports that he does not use drugs. No family history on file. On exam: vitals: Blood pressure (!) 149/93, pulse 72, temperature 97.3 °F (36.3 °C), temperature source Temporal, height 5' 10\" (1.778 m), weight 204 lb (92.5 kg). NEUROLOGIC EXAMINATION  GENERAL  Appears comfortable and in no distress   HEENT  NC/ AT   NECK  Supple and no bruits heard   MENTAL STATUS:  Alert, oriented, poor delayed recall; but able to follow one-step as well as three-step commands well; good naming and good repetition; could do subtractions well; scored 24/30 on MoCA testing. Appropriate affect.    CRANIAL NERVES: II     -      Pupils bilaterally reactive; Visual fields intact to confrontation  III,IV,VI -  EOMs full, no afferent defect, no JASSI, no ptosis  V     -     Normal facial sensation  VII    -     Normal facial symmetry  VIII   -     Intact hearing  IX,X -     Symmetrical palate  XI    -     Symmetrical shoulder shrug  XII   -     Midline tongue, no atrophy    MOTOR FUNCTION:  significant for good strength of grade 5/5 in bilateral proximal and distal muscle groups of both upper and lower extremities with normal bulk, normal tone and no involuntary movements, no tremor   SENSORY FUNCTION:  Normal touch, normal pin, normal vibration, normal proprioception   CEREBELLAR FUNCTION:  Intact fine motor control over upper limbs   REFLEX FUNCTION:  Symmetric, no perverted reflex, no Babinski sign   STATION and GAIT  Normal station, normal gait, unable to do tandem gait     Diagnostic data reviewed with the patient:   CT head 7/23/2020: No acute intracranial abnormality. MRI brain 7/24/2020: No acute intracranial abnormality. No acute infarct. Minimal global parenchymal volume loss with minimal microvascular ischemic changes. LP through IR 7/23/2020: Mildly traumatic tap. CSF (7/23/2020): RBC 3000, WBC 1348 (87%lymphocytes), glucose 43,  protein 257, IgG index 0.54, CSF HSV PCR positive    LTME with moderate to severe diffuse slowing with no seizure activity    MOCA TESTING (9/9/2020):    Patient is unable to do the trail making test, able to copy the cube, able to draw the clock and able to put the hands but unable to put the numbers; able to name 3 out of 3 animals; able to do digit backwards but not digit forward; able to do target detection;  able to do the serial subtraction; scored 2/2 on verbal abstraction; could not do phonemic fluency; able to repeat only 1 out of 2 sentences; scored 0 out of 5 on delayed recall and oriented to place, city, date, day of the week, month and year. On [address] [place] [address] patient scored 19/30. MOCA TESTING (11/18/2020):    Patient is able to do the trail making test, able to copy the cube, able to draw the clock and able to put the hands but unable to put the numbers; able to name 3 out of 3 animals; unable to do digit backwards but able to do digit forward; able to do target detection;  able to do the serial subtraction; scored 2/2 on verbal abstraction; could not do phonemic fluency; able to repeat only 1 out of 2 sentences; scored 1 out of 5 on delayed recall and oriented to place, city, date, day of the week, month and year. On [address] [place] [address] patient scored 24/30.       MOCA TESTING (7/7/2021 ):    Patient is unable to do the trail making test, able to copy the cube, able to draw the clock and able to put the hands and put the numbers in correct position; able to name 3 out of 3 animals; able to do digit backwards and able to do digit forward; able to do target detection;  able to do the serial subtraction; scored 2/2 on

## 2021-07-07 ENCOUNTER — OFFICE VISIT (OUTPATIENT)
Dept: NEUROLOGY | Age: 71
End: 2021-07-07
Payer: MEDICARE

## 2021-07-07 VITALS
SYSTOLIC BLOOD PRESSURE: 140 MMHG | WEIGHT: 204 LBS | HEART RATE: 72 BPM | TEMPERATURE: 97.3 F | DIASTOLIC BLOOD PRESSURE: 80 MMHG | BODY MASS INDEX: 29.2 KG/M2 | HEIGHT: 70 IN

## 2021-07-07 DIAGNOSIS — R20.2 PARESTHESIA OF BOTH HANDS: ICD-10-CM

## 2021-07-07 DIAGNOSIS — G44.219 EPISODIC TENSION-TYPE HEADACHE, NOT INTRACTABLE: ICD-10-CM

## 2021-07-07 DIAGNOSIS — F07.89 POST-ENCEPHALITIC SYNDROME: Primary | ICD-10-CM

## 2021-07-07 DIAGNOSIS — R41.89 COGNITIVE IMPAIRMENT: ICD-10-CM

## 2021-07-07 PROCEDURE — 4004F PT TOBACCO SCREEN RCVD TLK: CPT | Performed by: PSYCHIATRY & NEUROLOGY

## 2021-07-07 PROCEDURE — G8417 CALC BMI ABV UP PARAM F/U: HCPCS | Performed by: PSYCHIATRY & NEUROLOGY

## 2021-07-07 PROCEDURE — G8427 DOCREV CUR MEDS BY ELIG CLIN: HCPCS | Performed by: PSYCHIATRY & NEUROLOGY

## 2021-07-07 PROCEDURE — 4040F PNEUMOC VAC/ADMIN/RCVD: CPT | Performed by: PSYCHIATRY & NEUROLOGY

## 2021-07-07 PROCEDURE — 3017F COLORECTAL CA SCREEN DOC REV: CPT | Performed by: PSYCHIATRY & NEUROLOGY

## 2021-07-07 PROCEDURE — 1123F ACP DISCUSS/DSCN MKR DOCD: CPT | Performed by: PSYCHIATRY & NEUROLOGY

## 2021-07-07 PROCEDURE — 99214 OFFICE O/P EST MOD 30 MIN: CPT | Performed by: PSYCHIATRY & NEUROLOGY

## 2021-07-30 RX ORDER — LATANOPROST 50 UG/ML
SOLUTION/ DROPS OPHTHALMIC
Qty: 2.5 ML | Refills: 0 | Status: SHIPPED | OUTPATIENT
Start: 2021-07-30 | End: 2021-10-08

## 2021-10-08 RX ORDER — TIMOLOL MALEATE 5 MG/ML
SOLUTION/ DROPS OPHTHALMIC
Qty: 5 ML | Refills: 0 | Status: SHIPPED | OUTPATIENT
Start: 2021-10-08 | End: 2022-02-01

## 2021-10-08 RX ORDER — LATANOPROST 50 UG/ML
SOLUTION/ DROPS OPHTHALMIC
Qty: 2.5 ML | Refills: 0 | Status: SHIPPED | OUTPATIENT
Start: 2021-10-08 | End: 2022-01-04

## 2021-10-12 ENCOUNTER — OFFICE VISIT (OUTPATIENT)
Dept: FAMILY MEDICINE CLINIC | Age: 71
End: 2021-10-12
Payer: MEDICARE

## 2021-10-12 VITALS
BODY MASS INDEX: 29.63 KG/M2 | SYSTOLIC BLOOD PRESSURE: 114 MMHG | DIASTOLIC BLOOD PRESSURE: 70 MMHG | WEIGHT: 207 LBS | HEIGHT: 70 IN

## 2021-10-12 DIAGNOSIS — F41.9 ANXIETY: ICD-10-CM

## 2021-10-12 DIAGNOSIS — Z23 NEEDS FLU SHOT: ICD-10-CM

## 2021-10-12 DIAGNOSIS — G04.90 ENCEPHALITIS: ICD-10-CM

## 2021-10-12 DIAGNOSIS — M47.812 SPONDYLOSIS OF CERVICAL REGION WITHOUT MYELOPATHY OR RADICULOPATHY: ICD-10-CM

## 2021-10-12 DIAGNOSIS — Z13.220 SCREENING CHOLESTEROL LEVEL: ICD-10-CM

## 2021-10-12 DIAGNOSIS — H81.8X9 DEFICIT OF VESTIBULO-OCULAR REFLEX: ICD-10-CM

## 2021-10-12 DIAGNOSIS — F07.89 POST-ENCEPHALITIC SYNDROME: ICD-10-CM

## 2021-10-12 DIAGNOSIS — E78.5 HYPERLIPIDEMIA, UNSPECIFIED HYPERLIPIDEMIA TYPE: ICD-10-CM

## 2021-10-12 DIAGNOSIS — Z12.5 SCREENING FOR PROSTATE CANCER: ICD-10-CM

## 2021-10-12 DIAGNOSIS — Z12.11 COLON CANCER SCREENING: ICD-10-CM

## 2021-10-12 DIAGNOSIS — R73.9 HYPERGLYCEMIA: ICD-10-CM

## 2021-10-12 DIAGNOSIS — N40.1 BENIGN PROSTATIC HYPERPLASIA WITH POST-VOID DRIBBLING: ICD-10-CM

## 2021-10-12 DIAGNOSIS — N39.43 BENIGN PROSTATIC HYPERPLASIA WITH POST-VOID DRIBBLING: ICD-10-CM

## 2021-10-12 DIAGNOSIS — I48.0 PAROXYSMAL ATRIAL FIBRILLATION (HCC): Primary | ICD-10-CM

## 2021-10-12 PROBLEM — J84.9 INTERSTITIAL PULMONARY DISEASE, UNSPECIFIED (HCC): Status: ACTIVE | Noted: 2021-10-12

## 2021-10-12 LAB
ABSOLUTE BASO #: 0 X10E9/L (ref 0–0.2)
ABSOLUTE EOS #: 0.1 X10E9/L (ref 0–0.4)
ABSOLUTE LYMPH #: 1.7 X10E9/L (ref 1–3.5)
ABSOLUTE MONO #: 0.7 X10E9/L (ref 0–0.9)
ABSOLUTE NEUT #: 3.7 X10E9/L (ref 1.5–6.6)
ALBUMIN SERPL-MCNC: 4.3 G/DL (ref 3.2–5.3)
ALK PHOSPHATASE: 55 U/L (ref 39–130)
ALT SERPL-CCNC: 27 U/L (ref 0–40)
ANION GAP SERPL CALCULATED.3IONS-SCNC: 8 MMOL/L (ref 5–15)
AST SERPL-CCNC: 17 U/L (ref 0–41)
BASOPHILS RELATIVE PERCENT: 0.4 %
BILIRUB SERPL-MCNC: 0.8 MG/DL (ref 0.3–1.2)
BUN BLDV-MCNC: 19 MG/DL (ref 5–27)
CALCIUM SERPL-MCNC: 10.3 MG/DL (ref 8.5–10.5)
CHLORIDE BLD-SCNC: 106 MMOL/L (ref 98–109)
CHOLESTEROL/HDL RATIO: 3.3 (ref 1–5)
CHOLESTEROL: 176 MG/DL (ref 150–200)
CO2: 28 MMOL/L (ref 22–32)
CREAT SERPL-MCNC: 1.01 MG/DL (ref 0.6–1.3)
EGFR AFRICAN AMERICAN: >60 ML/MIN/1.73SQ.M
EGFR IF NONAFRICAN AMERICAN: >60 ML/MIN/1.73SQ.M
EOSINOPHILS RELATIVE PERCENT: 1.4 %
GLUCOSE: 101 MG/DL (ref 65–99)
HCT VFR BLD CALC: 43.9 % (ref 39–49)
HDLC SERPL-MCNC: 54 MG/DL
HEMOGLOBIN: 14.8 G/DL (ref 13–17)
LDL CHOLESTEROL CALCULATED: 94 MG/DL
LDL/HDL RATIO: 1.7
LYMPHOCYTE %: 28 %
MCH RBC QN AUTO: 31.6 PG (ref 27–34)
MCHC RBC AUTO-ENTMCNC: 33.7 G/DL (ref 32–36)
MCV RBC AUTO: 94 FL (ref 80–100)
MONOCYTES # BLD: 11 %
NEUTROPHILS RELATIVE PERCENT: 59.2 %
PDW BLD-RTO: 13.2 % (ref 11.5–15)
PLATELETS: 200 X10E9/L (ref 150–450)
PMV BLD AUTO: 10 FL (ref 7–12)
POTASSIUM SERPL-SCNC: 4.7 MMOL/L (ref 3.5–5)
PSA, ULTRASENSITIVE: 14.1 NG/ML (ref 0–4)
RBC: 4.69 X10E12/L (ref 4.1–5.7)
SODIUM BLD-SCNC: 142 MMOL/L (ref 134–146)
TOTAL PROTEIN: 6.6 G/DL (ref 6–8)
TRIGL SERPL-MCNC: 140 MG/DL (ref 27–150)
TSH SERPL DL<=0.05 MIU/L-ACNC: 1.3 UIU/ML (ref 0.49–4.67)
VLDLC SERPL CALC-MCNC: 28 MG/DL (ref 0–30)
WBC: 6.2 X10E9/L (ref 4–11)

## 2021-10-12 PROCEDURE — 1123F ACP DISCUSS/DSCN MKR DOCD: CPT | Performed by: FAMILY MEDICINE

## 2021-10-12 PROCEDURE — G0008 ADMIN INFLUENZA VIRUS VAC: HCPCS | Performed by: FAMILY MEDICINE

## 2021-10-12 PROCEDURE — G8484 FLU IMMUNIZE NO ADMIN: HCPCS | Performed by: FAMILY MEDICINE

## 2021-10-12 PROCEDURE — PBSHW INFLUENZA, QUADV, ADJUVANTED, 65 YRS +, IM, PF, PREFILL SYR, 0.5ML (FLUAD): Performed by: FAMILY MEDICINE

## 2021-10-12 PROCEDURE — 3017F COLORECTAL CA SCREEN DOC REV: CPT | Performed by: FAMILY MEDICINE

## 2021-10-12 PROCEDURE — G0439 PPPS, SUBSEQ VISIT: HCPCS | Performed by: FAMILY MEDICINE

## 2021-10-12 PROCEDURE — 4040F PNEUMOC VAC/ADMIN/RCVD: CPT | Performed by: FAMILY MEDICINE

## 2021-10-12 SDOH — ECONOMIC STABILITY: FOOD INSECURITY: WITHIN THE PAST 12 MONTHS, THE FOOD YOU BOUGHT JUST DIDN'T LAST AND YOU DIDN'T HAVE MONEY TO GET MORE.: NEVER TRUE

## 2021-10-12 SDOH — ECONOMIC STABILITY: FOOD INSECURITY: WITHIN THE PAST 12 MONTHS, YOU WORRIED THAT YOUR FOOD WOULD RUN OUT BEFORE YOU GOT MONEY TO BUY MORE.: NEVER TRUE

## 2021-10-12 ASSESSMENT — SOCIAL DETERMINANTS OF HEALTH (SDOH): HOW HARD IS IT FOR YOU TO PAY FOR THE VERY BASICS LIKE FOOD, HOUSING, MEDICAL CARE, AND HEATING?: NOT HARD AT ALL

## 2021-10-12 ASSESSMENT — LIFESTYLE VARIABLES
HOW OFTEN DO YOU HAVE SIX OR MORE DRINKS ON ONE OCCASION: 0
HOW OFTEN DO YOU HAVE A DRINK CONTAINING ALCOHOL: 3
AUDIT-C TOTAL SCORE: 3
HOW MANY STANDARD DRINKS CONTAINING ALCOHOL DO YOU HAVE ON A TYPICAL DAY: 0

## 2021-10-12 ASSESSMENT — PATIENT HEALTH QUESTIONNAIRE - PHQ9
1. LITTLE INTEREST OR PLEASURE IN DOING THINGS: 1
SUM OF ALL RESPONSES TO PHQ QUESTIONS 1-9: 1
SUM OF ALL RESPONSES TO PHQ QUESTIONS 1-9: 1
2. FEELING DOWN, DEPRESSED OR HOPELESS: 0
SUM OF ALL RESPONSES TO PHQ9 QUESTIONS 1 & 2: 1
SUM OF ALL RESPONSES TO PHQ QUESTIONS 1-9: 1

## 2021-10-12 NOTE — PROGRESS NOTES
BRITTANY Baez am scribing for and in the presence of Dr. Fiordaliza Johnson MD. 10/12/2021 8:10 am [address] Medicare Annual Wellness Visit  Name: Bobby Cha Date: 10/12/2021   MRN: [id number] Sex: Male   Age: 70 y.o. Ethnicity: Non- / Non    : 1950 Race: White (non-)      Cindy Turner is here for Medicare AWV    Screenings for behavioral, psychosocial and functional/safety risks, and cognitive dysfunction are all negative except as indicated below. These results, as well as other patient data from the [address] form, are documented in Flowsheets linked to this Encounter. Accompanied by: wife, Sj Blunt. ROS:  General Constitutional: Denies chills. Denies fever. Denies headache. Denies lightheadedness. Reports that \"I just don't feel good by the end of the day\" \"I don't feel like I used to\"  Fatigues but also does not give himself any rest days to recover  Sleeps ok but often has periods of time he cannot sleep but is not tired in morning  Ophthalmologic: Denies blurred vision. ENT: Denies nasal congestion. Denies sore throat. Denies ear pain and pressure. Respiratory: Denies cough. Denies shortness of breath. Denies wheezing. Cardiovascular: Denies chest pain at rest. Denies irregular heartbeat. Admits usual palpitations, unchanged. Gastrointestinal: Denies abdominal pain. Denies blood in the stool. Denies constipation. Denies diarrhea. Denies nausea. Denies vomiting. Genitourinary: Denies blood in the urine. Denies difficulty urinating. Denies frequent urination. Denies painful urination. dribbling  urinary incontinence  Musculoskeletal: Denies muscle aches. Denies painful joints. Denies swollen joints. Peripheral Vascular: Denies pain/cramping in legs after exertion. Skin: Denies dry skin. Denies itching. Denies rash. Neurologic: Denies falls. Denies dizziness. Denies fainting. Denies tingling/numbness.   States he no longer feels \"pain\"  Painful events dont seem tobother him anymore    Psychiatric: Denies sleep disturbance. Wife reports anxiety, improving with social situations. Seems worse when there is too much on his plate \"usually self inflicted\". Denies depressed mood. Allergies   Allergen Reactions    Nsaids     Sulfamethoxazole-Trimethoprim     Ibuprofen          Prior to Visit Medications    Medication Sig Taking? Authorizing Provider   timolol (TIMOPTIC) 0.5 % ophthalmic solution INSTILL 1 DROP INTO EACH EYE ONCE DAILY. 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 CareTeam (Including outside providers/suppliers regularly involved in providing care):   Patient Care Team:  John Simms MD as PCP - General (Family Medicine)  Jhon Simms MD as PCP - [facility] Empaneled Provider    Wt Readings from Last 3 Encounters:   10/12/21 207 lb (93.9 kg)   07/07/21 204 lb (92.5 kg)   03/03/21 213 lb 3.2 oz (96.7 kg)     Vitals:    10/12/21 0752   BP: 114/70   Weight: 207 lb (93.9 kg)   Height: 5' 10\" (1.778 m)     Body mass index is 29.7 kg/m².     Based upon direct observation of the patient, evaluation of cognition reveals recent and remote memory intact. General Appearance: alert and oriented to person, place and time, well developed and well- nourished, in no acute distress  Skin: warm and dry, no rash or erythema  Head: normocephalic and atraumatic  Eyes: pupils equal, round, and reactive to light, extraocular eye movements intact, conjunctivae normal; visual pursuits  Tracking is not fluid with lateral  nystagmus with horizontal saccades. Vertical sacaades okay. ENT: tympanic membrane, external ear and ear canal normal bilaterally, nose without deformity, nasal mucosa and turbinates normal without polyps   Neck: supple and non-tender without mass, no thyromegaly or thyroid nodules, no cervical lymphadenopathy 5 degrees extension 20 degrees flexion 45 degrees rotation to the left and right  Pulmonary/Chest: clear to auscultation bilaterally- no wheezes, rales or rhonchi, normal air movement, no respiratory distress  Cardiovascular: normal rate, regular rhythm, normal S1 and S2, no murmurs, rubs, clicks, or gallops, distal pulses intact, no carotid bruits Rate: 70  Abdomen: soft, non-tender, non-distended, normal bowel sounds, no masses or organomegaly  Extremities: no cyanosis, clubbing or edema  Musculoskeletal: normal range of motion, no joint swelling, deformity or tenderness atrophy thenar eminence R  Neurologic: reflexes normal and symmetric, no cranial nerve deficit, gait, coordination and speech normal 1+ UE reflex and 2+ LE reflexes. Finger to nose ok  Tandem stance with eyes open mildly impaired      Patient's complete Health Risk Assessment and screening values have been reviewed and are found in Flowsheets. The following problems were reviewed today and where indicated follow up appointments were made and/or referrals ordered.     Positive Risk Factor Screenings with Interventions:     Fall Risk:  2 or more falls in past year?: (!) yes  Fall with injury in past year?: no Substance History:  Social History     Tobacco History     Smoking Status  Current Every Day Smoker Smoking Frequency  0.25 packs/day for 15 years (3.75 pk yrs) Smoking Tobacco Type  Cigars    Smokeless Tobacco Use  Never Used          Alcohol History     Alcohol Use Status  Yes Comment  moderatly          Drug Use     Drug Use Status  Never          Sexual Activity     Sexually Active  Not Asked               Alcohol Screening: Audit-C Score: 3    A score of 8 or more is associated with harmful or hazardous drinking. A score of 13 or more in women, and 15 or more in men, is likely to indicate alcohol dependence. Substance Abuse Interventions:  · see plan for any interventions    General Health and ACP:  General  In general, how would you say your health is?: Fair  In the past 7 days, have you experienced any of the following?  New or Increased Pain, New or Increased Fatigue, Loneliness, Social Isolation, Stress or Anger?: (!) Stress  Do you get the social and emotional support that you need?: Yes  Do you have a Living Will?: Yes  Advance Directives     Power of  Living Will ACP-Advance Directive ACP-Power of     Not on File [place] on 06/30/17 Filed Filed         Hearing/Vision:  No exam data present  Hearing/Vision  Do you or your family notice any trouble with your hearing that hasn't been managed with hearing aids?: (!) Yes  Do you have difficulty driving, watching TV, or doing any of your daily activities because of your eyesight?: No  Have you had an eye exam within the past year?: Yes      Personalized Preventive Plan   Current Health Maintenance Status  Immunization History   Administered Date(s) Administered    COVID-19, Moderna, PF, 100mcg/0.5mL 02/06/2021, 03/10/2021    Influenza A ([id number]) Vaccine PF IM 10/16/2009    Influenza Virus Vaccine 10/30/2015    Influenza, High Dose (Fluzone 65 yrs and older) 10/06/2017, 10/19/2018, 10/16/2019    Influenza, Quadv, IM, PF (6 mo and older Fluzone, Flulaval, Fluarix, and 3 yrs and older Afluria) 10/03/2016    Influenza, Quadv, adjuvanted, 65 yrs +, IM, PF (Fluad) 12/02/2020, 10/12/2021    Pneumococcal Conjugate 13-valent (Mariel Kianna) 10/30/2015        Health Maintenance   Topic Date Due    Hepatitis C screen  Never done    DTaP/Tdap/Td vaccine (1 - Tdap) Never done    Shingles Vaccine (1 of 2) Never done    Pneumococcal 65+ years Vaccine (2 of 2 - PPSV23) 10/30/2016    Colon Cancer Screen FIT/FOBT  04/07/2018    Annual Wellness Visit (AWV)  Never done    PSA counseling  01/03/2021    Lipid screen  07/29/2025    Flu vaccine  Completed    COVID-19 Vaccine  Completed    AAA screen  Completed    Hepatitis A vaccine  Aged Out    Hepatitis B vaccine  Aged Out    Hib vaccine  Aged Out    Meningococcal (ACWY) vaccine  Aged Out     Recommendations for [place] Due: see orders and patient instructions/AVS.  . Recommended screening schedule for the next 5-10 years is provided to the patient in written form: see Patient Instructions/AVS.    Ирина Mejia was seen today for medicare awv. Diagnoses and all orders for this visit:    Paroxysmal atrial fibrillation ([facility] [address]  -     Comprehensive Metabolic Panel; Future  -     TSH without Reflex; Future  -     CBC With Auto Differential; Future  -     PSA Screening; Future  -     Lipid Panel; Future    Hyperglycemia  -     Comprehensive Metabolic Panel; Future  -     TSH without Reflex; Future  -     CBC With Auto Differential; Future  -     PSA Screening; Future  -     Lipid Panel; Future    Benign prostatic hyperplasia with post-void dribbling  -     PSA Screening; Future    Needs flu shot  -     INFLUENZA, QUADV, ADJUVANTED, 65 YRS =, IM, PF, PREFILL SYR, 0.5ML (FLUAD)    Colon cancer screening  -     POCT Fecal Immunochemical Test (FIT); Future    Screening cholesterol level  -     Lipid Panel; Future    Screening for prostate cancer  -     PSA Screening;  Future    Encephalitis  -     Lipid Panel; Future    Hyperlipidemia, unspecified hyperlipidemia type  -     Lipid Panel; Future    Post-encephalitic syndrome    Deficit of vestibulo-ocular reflex    Anxiety    Spondylosis of cervical region without myelopathy or radiculopathy            Plan:  VOMs testing is positive, today. This explains some of his symptoms. This is an indication that his brain is still healing. I think he has an inflammatory component to this. I recommend that he treat this with aerobic activity, anti-inflammatory diet, etc.     We discuss some of his anxiety. I encourage him to do things to his tolerance but be mindful that it will be followed with a deficit since he is not fully healed. I encourage him to practice some mindfulness training. He should do a private yoga session with Medardo Palacio to focus on mind and body control. We discussed tapering effect ie taking some time off with intense activity to recover physically and mentally  Reading and concentrating does not seem to cause the mental fatigue  He will take a FIT test home, today. He will get a flu shot, today. I will see him back in 1 year.

## 2021-10-20 ENCOUNTER — NURSE ONLY (OUTPATIENT)
Dept: FAMILY MEDICINE CLINIC | Age: 71
End: 2021-10-20
Payer: MEDICARE

## 2021-10-20 DIAGNOSIS — R19.5 POSITIVE FIT (FECAL IMMUNOCHEMICAL TEST): Primary | ICD-10-CM

## 2021-10-20 DIAGNOSIS — Z12.11 COLON CANCER SCREENING: ICD-10-CM

## 2021-10-20 LAB
CONTROL: ABNORMAL
HEMOCCULT STL QL: POSITIVE

## 2021-10-20 PROCEDURE — 82274 ASSAY TEST FOR BLOOD FECAL: CPT | Performed by: FAMILY MEDICINE

## 2021-10-20 PROCEDURE — 99999 PR OFFICE/OUTPT VISIT,PROCEDURE ONLY: CPT | Performed by: FAMILY MEDICINE

## 2021-10-20 PROCEDURE — PBSHW POCT FECAL IMMUNOCHEMICAL TEST (FIT): Performed by: FAMILY MEDICINE

## 2021-10-20 NOTE — PROGRESS NOTES
Patient returned OC Light Fit Kit to the office  The specimen was tested and is POSITIVE. Patient was notified, referral made to Dr. Jim Manzanares at [facility].

## 2021-10-21 DIAGNOSIS — R19.5 POSITIVE FIT (FECAL IMMUNOCHEMICAL TEST): Primary | ICD-10-CM

## 2021-10-21 NOTE — RESULT ENCOUNTER NOTE
Pt notified of result and will arrange to see Dr Bishop Garner for colonoscopy   ok to make referral there

## 2021-12-11 ENCOUNTER — TELEPHONE (OUTPATIENT)
Dept: FAMILY MEDICINE CLINIC | Age: 71
End: 2021-12-11

## 2021-12-11 RX ORDER — ACETAZOLAMIDE 250 MG/1
250 TABLET ORAL 3 TIMES DAILY
Qty: 7 TABLET | Refills: 3 | Status: SHIPPED | OUTPATIENT
Start: 2021-12-11 | End: 2022-01-05

## 2021-12-13 NOTE — TELEPHONE ENCOUNTER
Pt has some symptoms suggestive of worsening glaucoma similar to he other eye pre operatively  He is unable to see Dr Jabier Chavarria over the weekend but will take diamox for prevention till next week  12/11/21

## 2022-01-04 RX ORDER — LATANOPROST 50 UG/ML
SOLUTION/ DROPS OPHTHALMIC
Qty: 2.5 ML | Refills: 0 | Status: SHIPPED | OUTPATIENT
Start: 2022-01-04 | End: 2022-05-10

## 2022-01-05 ENCOUNTER — OFFICE VISIT (OUTPATIENT)
Dept: NEUROLOGY | Age: 72
End: 2022-01-05
Payer: MEDICARE

## 2022-01-05 VITALS
HEART RATE: 64 BPM | HEIGHT: 70 IN | DIASTOLIC BLOOD PRESSURE: 99 MMHG | SYSTOLIC BLOOD PRESSURE: 163 MMHG | WEIGHT: 215.8 LBS | BODY MASS INDEX: 30.9 KG/M2

## 2022-01-05 DIAGNOSIS — F41.9 ANXIETY DISORDER, UNSPECIFIED TYPE: ICD-10-CM

## 2022-01-05 DIAGNOSIS — F07.89 POST-ENCEPHALITIC SYNDROME: ICD-10-CM

## 2022-01-05 DIAGNOSIS — G44.219 EPISODIC TENSION-TYPE HEADACHE, NOT INTRACTABLE: ICD-10-CM

## 2022-01-05 DIAGNOSIS — R41.89 COGNITIVE IMPAIRMENT: Primary | ICD-10-CM

## 2022-01-05 DIAGNOSIS — R20.2 PARESTHESIA OF BOTH HANDS: ICD-10-CM

## 2022-01-05 PROCEDURE — 1123F ACP DISCUSS/DSCN MKR DOCD: CPT | Performed by: PSYCHIATRY & NEUROLOGY

## 2022-01-05 PROCEDURE — 99214 OFFICE O/P EST MOD 30 MIN: CPT | Performed by: PSYCHIATRY & NEUROLOGY

## 2022-01-05 PROCEDURE — 3017F COLORECTAL CA SCREEN DOC REV: CPT | Performed by: PSYCHIATRY & NEUROLOGY

## 2022-01-05 PROCEDURE — 4004F PT TOBACCO SCREEN RCVD TLK: CPT | Performed by: PSYCHIATRY & NEUROLOGY

## 2022-01-05 PROCEDURE — G8427 DOCREV CUR MEDS BY ELIG CLIN: HCPCS | Performed by: PSYCHIATRY & NEUROLOGY

## 2022-01-05 PROCEDURE — G8484 FLU IMMUNIZE NO ADMIN: HCPCS | Performed by: PSYCHIATRY & NEUROLOGY

## 2022-01-05 PROCEDURE — G8417 CALC BMI ABV UP PARAM F/U: HCPCS | Performed by: PSYCHIATRY & NEUROLOGY

## 2022-01-05 PROCEDURE — 4040F PNEUMOC VAC/ADMIN/RCVD: CPT | Performed by: PSYCHIATRY & NEUROLOGY

## 2022-01-05 NOTE — PROGRESS NOTES
Cardiovascular [] Chest Pain  [] Palpitations  [] Lightheaded   GI [] Constipation  [] Diarrhea  [] Swallowing change    [] Urinary Frequency  [] Urinary Urgency   Musculoskeletal [] Neck pain  [] Back pain  [] Muscle pain  [] Restless legs   Dermatologic [] Skin changes   Neurologic [x] Memory loss/confusion  [] Seizures  [] Trouble walking or imbalance  [] Dizziness  [] Weakness  [] Numbness  [] Tremors  [] Speech Difficulty  [] Headaches  [] Light Sensitivity  [] Sound Sensitivity   Endocrinology []Excessive thirst  []Excessive hunger   Psychiatric [x] Anxiety present but denied depression  [] Hallucination   Allergy/immunology []Hives/environmental allergies   Hematologic/lymph [] Abnormal bleeding  [] Abnormal bruising     Current Outpatient Medications on File Prior to Visit   Medication Sig Dispense Refill    latanoprost (XALATAN) 0.005 % ophthalmic solution USE AS DIRECTED 2.5 mL 0    acetaZOLAMIDE (DIAMOX) 250 MG tablet Take 1 tablet by mouth 3 times daily 7 tablet 3    timolol (TIMOPTIC) 0.5 % ophthalmic solution INSTILL 1 DROP INTO EACH EYE ONCE DAILY. 5 mL 0     No current facility-administered medications on file prior to visit. Allergies: Ridge An is allergic to nsaids, sulfamethoxazole-trimethoprim, and ibuprofen.     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 He has a 3.75 pack-year smoking history. He has never used smokeless tobacco. He reports current alcohol use. He reports that he does not use drugs. No family history on file. On exam: vitals: Blood pressure (!) 163/99, pulse 64, height 5' 10\" (1.778 m), weight 215 lb 12.8 oz (97.9 kg). NEUROLOGIC EXAMINATION  GENERAL  Appears comfortable and in no distress   HEENT  NC/ AT   NECK  Supple and no bruits heard   MENTAL STATUS:  Alert, oriented, poor delayed recall; but able to follow one-step as well as three-step commands well; good naming and good repetition; could do subtractions well; scored 24/30 on MoCA testing. Appropriate affect. CRANIAL NERVES: II     -      Pupils bilaterally reactive; Visual fields intact to confrontation  III,IV,VI -  EOMs full, no afferent defect, no JASSI, no ptosis  V     -     Normal facial sensation  VII    -     Normal facial symmetry  VIII   -     Intact hearing  IX,X -     Symmetrical palate  XI    -     Symmetrical shoulder shrug  XII   -     Midline tongue, no atrophy    MOTOR FUNCTION:  significant for good strength of grade 5/5 in bilateral proximal and distal muscle groups of both upper and lower extremities with normal bulk, normal tone and no involuntary movements, no tremor   SENSORY FUNCTION:  Normal touch, normal pin, normal vibration, normal proprioception   CEREBELLAR FUNCTION:  Intact fine motor control over upper limbs   REFLEX FUNCTION:  Symmetric, no perverted reflex, no Babinski sign   STATION and GAIT  Normal station, normal gait, unable to do tandem gait     Diagnostic data reviewed with the patient:   CT head 7/23/2020: No acute intracranial abnormality. MRI brain 7/24/2020: No acute intracranial abnormality. No acute infarct.   Minimal global parenchymal volume loss with minimal microvascular ischemic changes. LP through IR 7/23/2020: Mildly traumatic tap. CSF (7/23/2020): RBC 3000, WBC 1348 (87%lymphocytes), glucose 43,  protein 257, IgG index 0.54, CSF HSV PCR positive    LTME with moderate to severe diffuse slowing with no seizure activity    MOCA TESTING (9/9/2020):    Patient is unable to do the trail making test, able to copy the cube, able to draw the clock and able to put the hands but unable to put the numbers; able to name 3 out of 3 animals; able to do digit backwards but not digit forward; able to do target detection;  able to do the serial subtraction; scored 2/2 on verbal abstraction; could not do phonemic fluency; able to repeat only 1 out of 2 sentences; scored 0 out of 5 on delayed recall and oriented to place, city, date, day of the week, month and year. On [address] [place] [address] patient scored 19/30. MOCA TESTING (11/18/2020):    Patient is able to do the trail making test, able to copy the cube, able to draw the clock and able to put the hands but unable to put the numbers; able to name 3 out of 3 animals; unable to do digit backwards but able to do digit forward; able to do target detection;  able to do the serial subtraction; scored 2/2 on verbal abstraction; could not do phonemic fluency; able to repeat only 1 out of 2 sentences; scored 1 out of 5 on delayed recall and oriented to place, city, date, day of the week, month and year. On [address] [place] [address] patient scored 24/30.       [address] [place] [address] TESTING (7/7/2021 ):    Patient is unable to do the trail making test, able to copy the cube, able to draw the clock and able to put the hands and put the numbers in correct position; able to name 3 out of 3 animals; able to do digit backwards and able to do digit forward; able to do target detection;  able to do the serial subtraction; scored 2/2 on verbal abstraction; could do phonemic fluency; able to repeat 2 out of 2 sentences; scored 3 out of 5 on delayed recall and oriented to place, city, date, day of the week, month and year. On [address] [place] [address], [place] patient scored 27/30. Neuropsychological testing by Dr. Cristina Gallegos (2/25/2021): In summary; the vast majority of Dr. Maximo Alfaro neuropsychological test results are within normal limits, although a few scores on memory tests remain below expectations given the patient's educational status and a couple of memory scores are frankly impaired. He also had some mild difficulties in visual-spatial reasoning. 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 There may be some mild depression, largely due to his change in occupational status and impatience with not being fully back to his usual ability level yet. He appears to drive himself relentlessly and might benefit from mindful meditation to allow himself to slow down and heal.  He may wish to consider a repeat neuropsychological eval in 12-18 months to assess for changes/improvements in cognitive status. Impression and Plan: Mr. Giuliano Kaur is a 70 y.o. 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 On MOCA testing; he scored 25/30. Discussion done about MoCA testing; he was clearly explained that the testing of 26 or higher is normal.  His a score does not indicate mild cognitive impairment.   But he could not recall any of the test items on delayed recall even with multiple cues; scored 0/5 on delayed recall part of MoCA testing. This is significantly abnormal.    He is presently concerned with ongoing short-term memory difficulties; as he is recognizing it on a daily basis. This could be related to comorbid anxiety disorder. He scored moderately severe anxiety on anxiety questionnaire and he was advised again about referral to psychiatrist of his choice. I am sure he will be benefited with talk therapy. Anxiety disorder:  Patient's wife stated that he has been having anxiety and anxiety questionnaire done. He admits to feeling nervous and anxious almost on a daily basis and also unable to control worrying. He admits to worrying too much about different things and also has trouble relaxing. He also has restlessness and at times he gets easily annoyed or irritable. He scored 14 points indicating moderate anxiety on anxiety questionnaire. I did offer a referral to psychiatrist on patient denied any referral.  He also denied any anxiolytic therapies. Bilateral carpal tunnel syndrome; s/p danyel CTS release (Dec 2021)  Episodic tension type headaches: Stable. Does not need to be on any prophylactic meds at this point of time. Follow-up in 6 months. This note was partially created using voice recognition software and is inherently subject to errors including those of syntax and \"sound alike\" substitutions which may escape proofreading. In such instances, original meaning may be extrapolated by contextual derivation.

## 2022-02-01 RX ORDER — TIMOLOL MALEATE 5 MG/ML
SOLUTION/ DROPS OPHTHALMIC
Qty: 5 ML | Refills: 0 | Status: SHIPPED | OUTPATIENT
Start: 2022-02-01 | End: 2022-06-07

## 2022-04-11 ENCOUNTER — TELEPHONE (OUTPATIENT)
Dept: NEUROLOGY | Age: 72
End: 2022-04-11

## 2022-05-10 RX ORDER — LATANOPROST 50 UG/ML
SOLUTION/ DROPS OPHTHALMIC
Qty: 2.5 ML | Refills: 0 | Status: SHIPPED | OUTPATIENT
Start: 2022-05-10 | End: 2022-07-21

## 2022-06-07 RX ORDER — TIMOLOL MALEATE 5 MG/ML
SOLUTION/ DROPS OPHTHALMIC
Qty: 5 ML | Refills: 0 | Status: SHIPPED | OUTPATIENT
Start: 2022-06-07 | End: 2022-06-08 | Stop reason: SDUPTHER

## 2022-07-05 ENCOUNTER — OFFICE VISIT (OUTPATIENT)
Dept: NEUROLOGY | Age: 72
End: 2022-07-05
Payer: MEDICARE

## 2022-07-05 VITALS
HEART RATE: 53 BPM | SYSTOLIC BLOOD PRESSURE: 135 MMHG | HEIGHT: 70 IN | DIASTOLIC BLOOD PRESSURE: 83 MMHG | WEIGHT: 214.2 LBS | BODY MASS INDEX: 30.67 KG/M2

## 2022-07-05 DIAGNOSIS — F41.9 ANXIETY DISORDER, UNSPECIFIED TYPE: ICD-10-CM

## 2022-07-05 DIAGNOSIS — R41.89 COGNITIVE IMPAIRMENT: Primary | ICD-10-CM

## 2022-07-05 DIAGNOSIS — R20.2 PARESTHESIA OF BOTH HANDS: ICD-10-CM

## 2022-07-05 DIAGNOSIS — F07.89 POST-ENCEPHALITIC SYNDROME: ICD-10-CM

## 2022-07-05 PROCEDURE — G8417 CALC BMI ABV UP PARAM F/U: HCPCS | Performed by: STUDENT IN AN ORGANIZED HEALTH CARE EDUCATION/TRAINING PROGRAM

## 2022-07-05 PROCEDURE — 1123F ACP DISCUSS/DSCN MKR DOCD: CPT | Performed by: STUDENT IN AN ORGANIZED HEALTH CARE EDUCATION/TRAINING PROGRAM

## 2022-07-05 PROCEDURE — 3017F COLORECTAL CA SCREEN DOC REV: CPT | Performed by: STUDENT IN AN ORGANIZED HEALTH CARE EDUCATION/TRAINING PROGRAM

## 2022-07-05 PROCEDURE — 99215 OFFICE O/P EST HI 40 MIN: CPT | Performed by: STUDENT IN AN ORGANIZED HEALTH CARE EDUCATION/TRAINING PROGRAM

## 2022-07-05 PROCEDURE — 1036F TOBACCO NON-USER: CPT | Performed by: STUDENT IN AN ORGANIZED HEALTH CARE EDUCATION/TRAINING PROGRAM

## 2022-07-05 PROCEDURE — G8427 DOCREV CUR MEDS BY ELIG CLIN: HCPCS | Performed by: STUDENT IN AN ORGANIZED HEALTH CARE EDUCATION/TRAINING PROGRAM

## 2022-07-05 RX ORDER — ESCITALOPRAM OXALATE 5 MG/1
5 TABLET ORAL DAILY
COMMUNITY
Start: 2022-06-16 | End: 2022-10-17

## 2022-07-05 ASSESSMENT — ENCOUNTER SYMPTOMS
RESPIRATORY NEGATIVE: 1
GASTROINTESTINAL NEGATIVE: 1
EYES NEGATIVE: 1

## 2022-07-05 NOTE — PROGRESS NOTES
[address] [place] [id number]  Dept: [phone]    PATIENT NAME: David Santoyo  PATIENT MRN: [id number]  PRIMARY CARE PHYSICIAN: Shanice Harp MD    HPI:      David Santoyo is a 67 y.o. 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 Patient was last seen in January 2022. He was referred to psychiatry for anxiety and was started on lexapro 5 mg. He has been taking it daily for 3 months and notes a significant improvement with his anxiety and thinking. He notes it also improved his interpersonal skills which he was struggling with previously. He is learning Polish and does regular word puzzles. He notes he has been doing much better now that he has a regular routine. Patient is also trying to get back to being more physically active. Notes he sleeps well and does not have any issues with sleep initiation or sleep maintenance. He has rare snoring but denies any apneic episodes. He notes his whole body hurts. He is not interested in taking any medications. Patient has a history of bilateral carpal tunnel status post bilateral carpal tunnel release in December 2021. He still has some numbness and tingling in his bilateral hands and has been doing exercises daily to improve his  strength. He notes it is slightly improving and now he is able to open jars. PREVIOUS WORKUP:     - Reviewed results of prior labs and imaging. Current Outpatient Medications   Medication Sig Dispense Refill    escitalopram (LEXAPRO) 5 MG tablet Take 5 mg by mouth daily       timolol (TIMOPTIC) 0.5 % ophthalmic solution Place 1 drop into both eyes daily 5 mL 3    latanoprost (XALATAN) 0.005 % ophthalmic solution USE AS DIRECTED 2.5 mL 0     No current facility-administered medications for this visit. REVIEW OF SYSTEMS:     Review of Systems   Constitutional: Negative. HENT: Negative. Eyes: Negative. Respiratory: Negative. Cardiovascular: Negative. Gastrointestinal: Negative. Endocrine: Negative. Genitourinary: Negative. Musculoskeletal: Negative. Skin: Negative. Neurological: Negative for dizziness, tremors, seizures, syncope, facial asymmetry, speech difficulty, weakness, light-headedness, numbness and headaches. Hematological: Negative. Psychiatric/Behavioral: Positive for decreased concentration. Negative for sleep disturbance. The patient is nervous/anxious. NEUROLOGICAL EXAMINATION:   VITALS  /83 (Site: Right Upper Arm, Position: Sitting, Cuff Size: Large Adult)   Pulse 53   Ht 5' 10\" (1.778 m)   Wt 214 lb 3.2 oz (97.2 kg)   BMI 30.73 kg/m²     Mental status    Alert and oriented x 3; intact memory with no confusion, speech or language problems; no hallucinations or delusions     Cranial nerves    II - visual fields intact to confrontation  III, IV, VI - extra-ocular muscles full: no pupillary defect; no JASSI, no nystagmus, no ptosis   V - normal facial sensation                                                               VII - normal facial symmetry                                                             VIII - intact hearing                                                                             IX, X - symmetrical palate                                                                  XI - symmetrical shoulder shrug                                                       XII - tongue midline without atrophy or fasciculation      Motor function  Normal muscle bulk and tone; strength 5/5 on all 4 extremities, no pronator drift      Sensory function Intact to light touch, pinprick, vibration, proprioception on all 4 extremities      Cerebellar Intact fine motor movement. No involuntary movements or tremors. No ataxia or dysmetria on finger to nose or heel to shin testing      Reflex function DTR 2+ on bilateral UE and LE, symmetric. Negative Babinski      Gait                   normal base and arm swing      Diagnostic data reviewed with the patient:   CT head 7/23/2020: No acute intracranial abnormality.     MRI brain 7/24/2020: No acute intracranial abnormality.  No acute infarct.  Minimal global parenchymal volume loss with minimal microvascular ischemic changes.     LP through IR 7/23/2020: Mildly traumatic tap. CSF (7/23/2020): RBC 3000, WBC 1348 (87%lymphocytes), glucose 43,  protein 257, IgG index 0.54, CSF HSV PCR positive     LTME with moderate to severe diffuse slowing with no seizure activity     MOCA TESTING (9/9/2020):    Patient is unable to do the trail making test, able to copy the cube, able to draw the clock and able to put the hands but unable to put the numbers; able to name 3 out of 3 animals; able to do digit backwards but not digit forward; able to do target detection;  able to do the serial subtraction; scored 2/2 on verbal abstraction; could not do phonemic fluency; able to repeat only 1 out of 2 sentences; scored 0 out of 5 on delayed recall and oriented to place, city, date, day of the week, month and year. On [facility] patient scored 19/30.     MOCA TESTING (11/18/2020):    Patient is able to do the trail making test, able to copy the cube, able to draw the clock and able to put the hands but unable to put the numbers; able to name 3 out of 3 animals; unable to do digit backwards but able to do digit forward; able to do target detection;  able to do the serial subtraction; scored 2/2 on verbal abstraction; could not do phonemic fluency; able to repeat only 1 out of 2 sentences; scored 1 out of 5 on delayed recall and oriented to place, city, date, day of the week, month and year.    On [facility] patient scored 24/30.        MOCA TESTING (7/7/2021 ):    Patient is unable to do the trail making test, able to copy the cube, able to draw the clock and able to put the hands and put the numbers in correct position; able to name 3 improve his cognition daily. 1.Bilateral carpal tunnel syndrome; s/p danyel CTS release (Dec 2021)  2. Cognitive impairment: stable; not on any medications  3. Anxiety disorder: on Lexapro 5 mg daily. I spent a total of 40  minutes on the date of the service which included preparing to see the patient, face-to-face patient care, completing clinical documentation, obtaining and/or reviewing separately obtained history, performing a medically appropriate examination, counseling and educating the patient/family/caregiver and ordering medications, tests, or procedures.      Duke Ogden MD   Neurology & Sleep Medicine  [facility]

## 2022-10-17 ENCOUNTER — OFFICE VISIT (OUTPATIENT)
Dept: NEUROLOGY | Age: 72
End: 2022-10-17
Payer: MEDICARE

## 2022-10-17 VITALS
SYSTOLIC BLOOD PRESSURE: 130 MMHG | HEIGHT: 70 IN | BODY MASS INDEX: 30.95 KG/M2 | WEIGHT: 216.2 LBS | DIASTOLIC BLOOD PRESSURE: 87 MMHG | HEART RATE: 55 BPM

## 2022-10-17 DIAGNOSIS — R20.2 PARESTHESIA OF BOTH HANDS: ICD-10-CM

## 2022-10-17 DIAGNOSIS — F07.89 POST-ENCEPHALITIC SYNDROME: Primary | ICD-10-CM

## 2022-10-17 PROCEDURE — G8417 CALC BMI ABV UP PARAM F/U: HCPCS | Performed by: STUDENT IN AN ORGANIZED HEALTH CARE EDUCATION/TRAINING PROGRAM

## 2022-10-17 PROCEDURE — G8484 FLU IMMUNIZE NO ADMIN: HCPCS | Performed by: STUDENT IN AN ORGANIZED HEALTH CARE EDUCATION/TRAINING PROGRAM

## 2022-10-17 PROCEDURE — 99214 OFFICE O/P EST MOD 30 MIN: CPT | Performed by: STUDENT IN AN ORGANIZED HEALTH CARE EDUCATION/TRAINING PROGRAM

## 2022-10-17 PROCEDURE — 1123F ACP DISCUSS/DSCN MKR DOCD: CPT | Performed by: STUDENT IN AN ORGANIZED HEALTH CARE EDUCATION/TRAINING PROGRAM

## 2022-10-17 PROCEDURE — 1036F TOBACCO NON-USER: CPT | Performed by: STUDENT IN AN ORGANIZED HEALTH CARE EDUCATION/TRAINING PROGRAM

## 2022-10-17 PROCEDURE — G8427 DOCREV CUR MEDS BY ELIG CLIN: HCPCS | Performed by: STUDENT IN AN ORGANIZED HEALTH CARE EDUCATION/TRAINING PROGRAM

## 2022-10-17 PROCEDURE — 3017F COLORECTAL CA SCREEN DOC REV: CPT | Performed by: STUDENT IN AN ORGANIZED HEALTH CARE EDUCATION/TRAINING PROGRAM

## 2022-10-17 ASSESSMENT — ENCOUNTER SYMPTOMS
GASTROINTESTINAL NEGATIVE: 1
RESPIRATORY NEGATIVE: 1
EYES NEGATIVE: 1

## 2022-10-17 NOTE — PROGRESS NOTES
[address] [place] [id number]  Dept: [phone]    PATIENT NAME: Michelle Farrell  PATIENT MRN: [id number]  PRIMARY CARE PHYSICIAN: Kirk Rojas MD    HPI:      Michelle Farrell is a 67 y.o. 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 Interim History:  He was last seen on 7/5/22. He took himself off lexapro because he was starting to experience side effects. He notes since he stopped a few weeks ago, he is starting to care about things again and has noticed an improvement in his thinking . Memory has been stable. He is still learning Nepali. He notes when he physically or mentally exerts himself too much he has difficulty processing quickly. His wife is in clinic with him today and she notes since he has been off the lexapro, he is fixating again on things like he used to before. He is having staring episodes. He does respond to his wife. It is typically after strenuous activity. He is still playing golf and stays active. Prior History:  Patient was last seen in January 2022. He was referred to psychiatry for anxiety and was started on lexapro 5 mg. He has been taking it daily for 3 months and notes a significant improvement with his anxiety and thinking. He notes it also improved his interpersonal skills which he was struggling with previously. He is learning Nepali and does regular word puzzles. He notes he has been doing much better now that he has a regular routine. Patient is also trying to get back to being more physically active. Notes he sleeps well and does not have any issues with sleep initiation or sleep maintenance. He has rare snoring but denies any apneic episodes. He notes his whole body hurts. He is not interested in taking any medications. Patient has a history of bilateral carpal tunnel status post bilateral carpal tunnel release in December 2021.   He still has some numbness and tingling in his bilateral hands and has been doing exercises daily to improve his  strength. He notes it is slightly improving and now he is able to open jars. PREVIOUS WORKUP:     - Reviewed results of prior labs and imaging. Current Outpatient Medications   Medication Sig Dispense Refill    latanoprost (XALATAN) 0.005 % ophthalmic solution USE AS DIRECTED 2.5 mL 0    timolol (TIMOPTIC) 0.5 % ophthalmic solution Place 1 drop into both eyes daily 5 mL 3     No current facility-administered medications for this visit. REVIEW OF SYSTEMS:     Review of Systems   Constitutional: Negative. HENT: Negative. Eyes: Negative. Respiratory: Negative. Cardiovascular: Negative. Gastrointestinal: Negative. Endocrine: Negative. Genitourinary: Negative. Musculoskeletal: Negative. Skin: Negative. Neurological:  Negative for dizziness, tremors, seizures, syncope, facial asymmetry, speech difficulty, weakness, light-headedness, numbness and headaches. Hematological: Negative. Psychiatric/Behavioral:  Positive for decreased concentration. Negative for sleep disturbance. The patient is nervous/anxious.        NEUROLOGICAL EXAMINATION:   VITALS  /87 (Site: Left Upper Arm, Position: Sitting)   Pulse 55   Ht 5' 10\" (1.778 m)   Wt 216 lb 3.2 oz (98.1 kg)   BMI 31.02 kg/m²     Mental status    Alert and oriented x 3; intact memory with no confusion, speech or language problems; no hallucinations or delusions     Cranial nerves    II - visual fields intact to confrontation  III, IV, VI - extra-ocular muscles full: no pupillary defect; no JASSI, no nystagmus, no ptosis   V - normal facial sensation                                                               VII - normal facial symmetry                                                             VIII - intact hearing                                                                             IX, X - symmetrical palate                                                                  XI - symmetrical shoulder shrug                                                       XII - tongue midline without atrophy or fasciculation      Motor function  Normal muscle bulk and tone; strength 5/5 on all 4 extremities, no pronator drift      Sensory function Intact to light touch, pinprick, vibration, proprioception on all 4 extremities      Cerebellar Intact fine motor movement. No involuntary movements or tremors. No ataxia or dysmetria on finger to nose or heel to shin testing      Reflex function DTR 2+ on bilateral UE and LE, symmetric. Negative Babinski      Gait                   normal base and arm swing      Diagnostic data reviewed with the patient:   CT head 7/23/2020: No acute intracranial abnormality. MRI brain 7/24/2020: No acute intracranial abnormality. No acute infarct. Minimal global parenchymal volume loss with minimal microvascular ischemic changes. LP through IR 7/23/2020: Mildly traumatic tap. CSF (7/23/2020): RBC 3000, WBC 1348 (87%lymphocytes), glucose 43,  protein 257, IgG index 0.54, CSF HSV PCR positive     LTME with moderate to severe diffuse slowing with no seizure activity     MOCA TESTING (9/9/2020):    Patient is unable to do the trail making test, able to copy the cube, able to draw the clock and able to put the hands but unable to put the numbers; able to name 3 out of 3 animals; able to do digit backwards but not digit forward; able to do target detection;  able to do the serial subtraction; scored 2/2 on verbal abstraction; could not do phonemic fluency; able to repeat only 1 out of 2 sentences; scored 0 out of 5 on delayed recall and oriented to place, city, date, day of the week, month and year. On [address] [place] [address] patient scored 19/30.      [address] [place] [address] TESTING (11/18/2020):    Patient is able to do the trail making test, able to copy the cube, able to draw the clock and able to put the hands but unable to put the numbers; able to name 3 out of 3 animals; unable to do digit backwards but able to do digit forward; able to do target detection;  able to do the serial subtraction; scored 2/2 on verbal abstraction; could not do phonemic fluency; able to repeat only 1 out of 2 sentences; scored 1 out of 5 on delayed recall and oriented to place, city, date, day of the week, month and year. On [address] [place] [address], [place] patient scored 24/30. MOCA TESTING (7/7/2021 ):    Patient is unable to do the trail making test, able to copy the cube, able to draw the clock and able to put the hands and put the numbers in correct position; able to name 3 out of 3 animals; able to do digit backwards and able to do digit forward; able to do target detection;  able to do the serial subtraction; scored 2/2 on verbal abstraction; could do phonemic fluency; able to repeat 2 out of 2 sentences; scored 3 out of 5 on delayed recall and oriented to place, city, date, day of the week, month and year. On [address] [place] [address], [place] patient scored 27/30. Neuropsychological testing by Dr. Shirlene Loera (2/25/2021): In summary; the vast majority of Dr. Llanos Ing neuropsychological test results are within normal limits, although a few scores on memory tests remain below expectations given the patient's educational status and a couple of memory scores are frankly impaired. He also had some mild difficulties in visual-spatial reasoning. 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 There may be some mild depression, largely due to his change in occupational status and impatience with not being fully back to his usual ability level yet. He appears to drive himself relentlessly and might benefit from mindful meditation to allow himself to slow down and heal.  He may wish to consider a repeat neuropsychological eval in 12-18 months to assess for changes/improvements in cognitive status.               ASSESSMENT / PLAN:   Gavino Kumar Sue Mccarty is a 67 y.o. male that presents to neurology clinic for follow-up visit for cognitive impairment. Patient had postencephalitic syndrome with subsequent memory difficulties status post a herpes simplex encephalitis in July 2020. The patient has had normal MoCA testing in the past and mainly had issues with delayed memory recall. Patient recently took himself off of Lexapro and has noted an improvement in his thinking but wife notes he is starting to fixate again on things. He is following up with a psychiatrist.  Memory is stable. We will continue to plan to monitor patient's cognitive impairment. Patient is currently not on any many medication for cognitive impairment. He is doing activities to improve his cognition daily. 1.Bilateral carpal tunnel syndrome; s/p danyel CTS release (Dec 2021)  2. Cognitive impairment: stable; not on any medications  3. Anxiety disorder: self weaned himself off lexapro 5 mg 3 weeks ago due to side effects.  Being managed by a psychiatrist.    Sahil Quevedo MD   Neurology & Sleep Medicine  [facility]

## 2022-12-08 ENCOUNTER — HOSPITAL ENCOUNTER (OUTPATIENT)
Dept: NON INVASIVE DIAGNOSTICS | Age: 72
Discharge: HOME | End: 2022-12-08
Payer: MEDICARE

## 2022-12-08 DIAGNOSIS — R06.00 DYSPNEA, UNSPECIFIED TYPE: ICD-10-CM

## 2022-12-08 DIAGNOSIS — I47.9 PAROXYSMAL TACHYCARDIA (HCC): ICD-10-CM

## 2022-12-08 PROCEDURE — 93306 TTE W/DOPPLER COMPLETE: CPT

## 2022-12-08 PROCEDURE — 93225 XTRNL ECG REC<48 HRS REC: CPT

## 2022-12-08 PROCEDURE — 93226 XTRNL ECG REC<48 HR SCAN A/R: CPT

## 2023-07-07 NOTE — TELEPHONE ENCOUNTER
Discussed with Dr. Truman Marques. He said he will look at the forms next week (it is in the brown file) when he is back in the office. He saw Dr. Home Prajapati yesterday for [facility] visit.
I completed all the questions on the form that could be found in reviewing the chart note and gave it to Dr. Leonard Schaumann to complete on 9/29/2020.
Received a call from Dr. Gordy Lobato office. They will be faxing a disability form here for Dr. Vineet Sood to fill out. She said she will be the  and she is including all that info on the fax.
The completed, signed forms were faxed to both Nila Suero at Dr[facility] and [facility] as requested by Mrs. Matias Mcclellan.
wait time explained/warm blanket provided
darkened lights
plan of care explained/wait time explained

## 2023-10-13 DIAGNOSIS — R06.02 SOB (SHORTNESS OF BREATH): ICD-10-CM

## 2023-10-13 DIAGNOSIS — I48.0 PAF (PAROXYSMAL ATRIAL FIBRILLATION) (HCC): Primary | ICD-10-CM

## 2023-10-13 DIAGNOSIS — I48.0 PAROXYSMAL ATRIAL FIBRILLATION (HCC): Primary | ICD-10-CM

## 2023-10-13 NOTE — PROGRESS NOTES
Per Dr. Wan Side - order christina stress for Mr. Simpson ASAP for PAF & SOB. Order placed. Scheduling will call to schedule.

## 2023-10-16 ENCOUNTER — HOSPITAL ENCOUNTER (OUTPATIENT)
Dept: NUCLEAR MEDICINE | Age: 73
Discharge: HOME | End: 2023-10-18
Payer: MEDICARE

## 2023-10-16 DIAGNOSIS — R06.02 SOB (SHORTNESS OF BREATH): ICD-10-CM

## 2023-10-16 DIAGNOSIS — I48.0 PAROXYSMAL ATRIAL FIBRILLATION (HCC): ICD-10-CM

## 2023-10-16 PROCEDURE — 3430000000 HC RX DIAGNOSTIC RADIOPHARMACEUTICAL: Performed by: FAMILY MEDICINE

## 2023-10-16 PROCEDURE — 78452 HT MUSCLE IMAGE SPECT MULT: CPT | Performed by: INTERNAL MEDICINE

## 2023-10-16 PROCEDURE — A9500 TC99M SESTAMIBI: HCPCS | Performed by: FAMILY MEDICINE

## 2023-10-16 PROCEDURE — 93016 CV STRESS TEST SUPVJ ONLY: CPT | Performed by: INTERNAL MEDICINE

## 2023-10-16 PROCEDURE — 93017 CV STRESS TEST TRACING ONLY: CPT

## 2023-10-16 PROCEDURE — 93018 CV STRESS TEST I&R ONLY: CPT | Performed by: INTERNAL MEDICINE

## 2023-10-16 RX ORDER — TETRAKIS(2-METHOXYISOBUTYLISOCYANIDE)COPPER(I) TETRAFLUOROBORATE 1 MG/ML
30 INJECTION, POWDER, LYOPHILIZED, FOR SOLUTION INTRAVENOUS
Status: COMPLETED | OUTPATIENT
Start: 2023-10-16 | End: 2023-10-16

## 2023-10-16 RX ADMIN — Medication 30 MILLICURIE: at 13:03

## 2023-10-17 ENCOUNTER — HOSPITAL ENCOUNTER (OUTPATIENT)
Age: 73
Discharge: HOME | End: 2023-10-19
Payer: MEDICARE

## 2023-10-17 ENCOUNTER — HOSPITAL ENCOUNTER (OUTPATIENT)
Dept: NUCLEAR MEDICINE | Age: 73
Discharge: HOME | End: 2023-10-19
Payer: MEDICARE

## 2023-10-17 LAB
NUC STRESS EJECTION FRACTION: 72 %
STRESS BASELINE DIAS BP: 70 MMHG
STRESS BASELINE HR: 65 BPM
STRESS BASELINE ST DEPRESSION: 0 MM
STRESS BASELINE SYS BP: 126 MMHG
STRESS ESTIMATED WORKLOAD: 10.1 METS
STRESS EXERCISE DUR MIN: 8 MIN
STRESS EXERCISE DUR SEC: 50 SEC
STRESS PEAK DIAS BP: 90 MMHG
STRESS PEAK SYS BP: 158 MMHG
STRESS PERCENT HR ACHIEVED: 88 %
STRESS POST PEAK HR: 129 BPM
STRESS RATE PRESSURE PRODUCT: NORMAL BPM*MMHG
STRESS STAGE 1 BP: NORMAL MMHG
STRESS STAGE 1 DURATION: 3 MIN:SEC
STRESS STAGE 1 HR: 95 BPM
STRESS STAGE 2 DURATION: 6 MIN:SEC
STRESS STAGE 2 HR: 104 BPM
STRESS STAGE RECOVERY 1 BP: NORMAL MMHG
STRESS STAGE RECOVERY 1 DURATION: 0 MIN:SEC
STRESS STAGE RECOVERY 1 HR: 129 BPM
STRESS STAGE RECOVERY 2 DURATION: 1 MIN:SEC
STRESS STAGE RECOVERY 2 HR: 100 BPM
STRESS STAGE RECOVERY 3 BP: NORMAL MMHG
STRESS STAGE RECOVERY 3 DURATION: 3 MIN:SEC
STRESS STAGE RECOVERY 3 HR: 80 BPM
STRESS STAGE RECOVERY 4 BP: NORMAL MMHG
STRESS STAGE RECOVERY 4 DURATION: 5 MIN:SEC
STRESS STAGE RECOVERY 4 HR: 76 BPM
STRESS TARGET HR: 147 BPM
TID: 0.61

## 2023-10-17 PROCEDURE — A9500 TC99M SESTAMIBI: HCPCS | Performed by: FAMILY MEDICINE

## 2023-10-17 PROCEDURE — 3430000000 HC RX DIAGNOSTIC RADIOPHARMACEUTICAL: Performed by: FAMILY MEDICINE

## 2023-10-17 RX ORDER — TETRAKIS(2-METHOXYISOBUTYLISOCYANIDE)COPPER(I) TETRAFLUOROBORATE 1 MG/ML
30 INJECTION, POWDER, LYOPHILIZED, FOR SOLUTION INTRAVENOUS
Status: COMPLETED | OUTPATIENT
Start: 2023-10-17 | End: 2023-10-17

## 2023-10-17 RX ADMIN — Medication 30 MILLICURIE: at 12:35

## 2023-12-13 ENCOUNTER — OFFICE VISIT (OUTPATIENT)
Dept: NEUROLOGY | Age: 73
End: 2023-12-13
Payer: MEDICARE

## 2023-12-13 VITALS
BODY MASS INDEX: 28.56 KG/M2 | HEART RATE: 53 BPM | WEIGHT: 204 LBS | SYSTOLIC BLOOD PRESSURE: 126 MMHG | HEIGHT: 71 IN | DIASTOLIC BLOOD PRESSURE: 81 MMHG

## 2023-12-13 DIAGNOSIS — R20.2 PARESTHESIA OF BOTH LEGS: ICD-10-CM

## 2023-12-13 DIAGNOSIS — R41.89 COGNITIVE IMPAIRMENT: ICD-10-CM

## 2023-12-13 DIAGNOSIS — R41.3 MEMORY DIFFICULTIES: Primary | ICD-10-CM

## 2023-12-13 PROCEDURE — 1036F TOBACCO NON-USER: CPT | Performed by: PSYCHIATRY & NEUROLOGY

## 2023-12-13 PROCEDURE — G8417 CALC BMI ABV UP PARAM F/U: HCPCS | Performed by: PSYCHIATRY & NEUROLOGY

## 2023-12-13 PROCEDURE — 3017F COLORECTAL CA SCREEN DOC REV: CPT | Performed by: PSYCHIATRY & NEUROLOGY

## 2023-12-13 PROCEDURE — G8427 DOCREV CUR MEDS BY ELIG CLIN: HCPCS | Performed by: PSYCHIATRY & NEUROLOGY

## 2023-12-13 PROCEDURE — 1123F ACP DISCUSS/DSCN MKR DOCD: CPT | Performed by: PSYCHIATRY & NEUROLOGY

## 2023-12-13 PROCEDURE — G8484 FLU IMMUNIZE NO ADMIN: HCPCS | Performed by: PSYCHIATRY & NEUROLOGY

## 2023-12-13 PROCEDURE — 99214 OFFICE O/P EST MOD 30 MIN: CPT | Performed by: PSYCHIATRY & NEUROLOGY

## 2023-12-13 ASSESSMENT — ENCOUNTER SYMPTOMS
RESPIRATORY NEGATIVE: 1
GASTROINTESTINAL NEGATIVE: 1
EYES NEGATIVE: 1

## 2023-12-13 NOTE — PROGRESS NOTES
lb).      Mental status    Alert and oriented x 3; intact memory with no confusion, speech or language problems; no hallucinations or delusions     Cranial nerves    II - visual fields intact to confrontation  III, IV, VI - extra-ocular muscles full: no pupillary defect; no JASSI, no nystagmus, no ptosis   V - normal facial sensation                                                               VII - normal facial symmetry                                                             VIII - intact hearing                                                                             IX, X - symmetrical palate                                                                  XI - symmetrical shoulder shrug                                                       XII - tongue midline without atrophy or fasciculation      Motor function  Normal muscle bulk and tone; strength 5/5 on all 4 extremities, no pronator drift      Sensory function Impaired light touch and pinprick in distal lower extremities    Cerebellar Intact fine motor movement. No involuntary movements or tremors. No ataxia or dysmetria on finger to nose or heel to shin testing      Reflex function DTR 2+ on bilateral UE and LE, symmetric. Negative Babinski      Gait                   normal base and arm swing      Diagnostic data reviewed with the patient:   CT head 7/23/2020: No acute intracranial abnormality. MRI brain 7/24/2020: No acute intracranial abnormality. No acute infarct. Minimal global parenchymal volume loss with minimal microvascular ischemic changes. LP through IR 7/23/2020: Mildly traumatic tap.   CSF (7/23/2020): RBC 3000, WBC 1348 (87%lymphocytes), glucose 43,  protein 257, IgG index 0.54, CSF HSV PCR positive     LTME with moderate to severe diffuse slowing with no seizure activity     MOCA TESTING (9/9/2020):    Patient is unable to do the trail making test, able to copy the cube, able to draw the clock and able to put the hands but

## 2024-11-06 PROBLEM — N30.00 ACUTE CYSTITIS WITHOUT HEMATURIA: Status: RESOLVED | Noted: 2020-07-21 | Resolved: 2024-11-06

## 2024-11-06 PROBLEM — F41.9 ANXIETY: Status: RESOLVED | Noted: 2021-10-12 | Resolved: 2024-11-06

## 2024-11-06 PROBLEM — N40.1 INCOMPLETE EMPTYING OF BLADDER DUE TO BENIGN PROSTATIC HYPERPLASIA: Status: RESOLVED | Noted: 2020-07-21 | Resolved: 2024-11-06

## 2024-11-06 PROBLEM — R41.89 COGNITIVE IMPAIRMENT: Status: RESOLVED | Noted: 2020-09-09 | Resolved: 2024-11-06

## 2024-11-06 PROBLEM — E87.6 HYPOKALEMIA: Status: RESOLVED | Noted: 2020-08-04 | Resolved: 2024-11-06

## 2024-11-06 PROBLEM — R33.9 INCOMPLETE EMPTYING OF BLADDER DUE TO BENIGN PROSTATIC HYPERPLASIA: Status: RESOLVED | Noted: 2020-07-21 | Resolved: 2024-11-06

## 2024-11-06 PROBLEM — H81.8X9 DEFICIT OF VESTIBULO-OCULAR REFLEX: Status: RESOLVED | Noted: 2021-10-12 | Resolved: 2024-11-06

## 2024-11-06 PROBLEM — J84.9 INTERSTITIAL PULMONARY DISEASE, UNSPECIFIED (HCC): Status: RESOLVED | Noted: 2021-10-12 | Resolved: 2024-11-06

## 2024-11-06 PROBLEM — G04.90 ENCEPHALITIS: Status: RESOLVED | Noted: 2020-07-23 | Resolved: 2024-11-06

## 2024-11-06 PROBLEM — I48.0 PAROXYSMAL ATRIAL FIBRILLATION (HCC): Status: RESOLVED | Noted: 2021-10-12 | Resolved: 2024-11-06

## 2024-11-06 PROBLEM — A41.9 SEPSIS (HCC): Status: RESOLVED | Noted: 2020-07-21 | Resolved: 2024-11-06

## 2024-11-06 PROBLEM — M48.02 CERVICAL SPINAL STENOSIS: Status: RESOLVED | Noted: 2020-03-11 | Resolved: 2024-11-06

## 2024-12-17 ENCOUNTER — HOSPITAL ENCOUNTER (OUTPATIENT)
Age: 74
Discharge: HOME | End: 2024-12-17
Payer: MEDICARE

## 2024-12-17 DIAGNOSIS — Z12.5 SCREENING FOR PROSTATE CANCER: ICD-10-CM

## 2024-12-17 DIAGNOSIS — M15.0 PRIMARY OSTEOARTHRITIS INVOLVING MULTIPLE JOINTS: ICD-10-CM

## 2024-12-17 LAB
ALBUMIN SERPL-MCNC: 4.5 G/DL (ref 3.5–5.2)
ALBUMIN/GLOB SERPL: 1.7 {RATIO} (ref 1–2.5)
ALP SERPL-CCNC: 66 U/L (ref 40–129)
ALT SERPL-CCNC: 32 U/L (ref 10–50)
ANION GAP SERPL CALCULATED.3IONS-SCNC: 10 MMOL/L (ref 9–16)
AST SERPL-CCNC: 28 U/L (ref 10–50)
BILIRUB SERPL-MCNC: 0.6 MG/DL (ref 0–1.2)
BUN SERPL-MCNC: 21 MG/DL (ref 8–23)
BUN/CREAT SERPL: 21 (ref 9–20)
CALCIUM SERPL-MCNC: 10.6 MG/DL (ref 8.6–10.4)
CHLORIDE SERPL-SCNC: 102 MMOL/L (ref 98–107)
CO2 SERPL-SCNC: 25 MMOL/L (ref 20–31)
CREAT SERPL-MCNC: 1 MG/DL (ref 0.7–1.2)
GFR, ESTIMATED: 82 ML/MIN/1.73M2
GLUCOSE P FAST SERPL-MCNC: 95 MG/DL (ref 74–99)
POTASSIUM SERPL-SCNC: 4.9 MMOL/L (ref 3.7–5.3)
PROT SERPL-MCNC: 7.2 G/DL (ref 6.6–8.7)
PSA SERPL-MCNC: 14.6 NG/ML (ref 0–4)
SODIUM SERPL-SCNC: 137 MMOL/L (ref 136–145)

## 2024-12-17 PROCEDURE — 80053 COMPREHEN METABOLIC PANEL: CPT

## 2024-12-17 PROCEDURE — G0103 PSA SCREENING: HCPCS

## 2024-12-17 PROCEDURE — 36415 COLL VENOUS BLD VENIPUNCTURE: CPT

## (undated) DEVICE — APPLICATOR MEDICATED 26 CC SOLUTION HI LT ORNG CHLORAPREP

## (undated) DEVICE — SOLUTION IV IRRIG POUR BRL 0.9% SODIUM CHL 2F7124

## (undated) DEVICE — DRAPE,EXTREMITY,89X128,STERILE: Brand: MEDLINE

## (undated) DEVICE — COVER,MAYO STAND,STERILE: Brand: MEDLINE

## (undated) DEVICE — BANDAGE COMPR W4INXL9FT EXSANG SGL LAYERED NO CLSR ESMARCH

## (undated) DEVICE — SYRINGE MED 10ML LUERLOCK TIP W/O SFTY DISP

## (undated) DEVICE — PADDING,UNDERCAST,COTTON, 3X4YD STERILE: Brand: MEDLINE

## (undated) DEVICE — STANDARD (U) BLADE ASSEMBLY 1PK: Brand: MICROAIRE®

## (undated) DEVICE — 1000 S-DRAPE TOWEL DRAPE 10/BX: Brand: STERI-DRAPE™

## (undated) DEVICE — GLOVE SURG SZ 75 L12IN FNGR THK87MIL WHT LTX FREE

## (undated) DEVICE — NEEDLE FLTR 18GA L1.5IN MEM THK5UM BLNT DISP

## (undated) DEVICE — GLOVE SURG SZ 75 L12IN FNGR THK87MIL DK GRN LTX FREE ISOLEX

## (undated) DEVICE — 3M™ STERI-STRIP™ REINFORCED ADHESIVE SKIN CLOSURES, R1541, 1/4 IN X 3 IN (6 MM X 75 MM), 3 STRIPS/ENVELOPE: Brand: 3M™ STERI-STRIP™

## (undated) DEVICE — ZIMMER® STERILE DISPOSABLE TOURNIQUET CUFF WITH PROTECTIVE SLEEVE AND PLC, SINGLE PORT, SINGLE BLADDER, 18 IN. (46 CM)

## (undated) DEVICE — MASTISOL ADHESIVE LIQ 2/3ML

## (undated) DEVICE — HAND AND FT PK

## (undated) DEVICE — DRESSING PETRO W3XL3IN OIL EMUL N ADH GZ KNIT IMPREG CELOS

## (undated) DEVICE — STANDARD HYPODERMIC NEEDLE,ALUMINUM HUB: Brand: MONOJECT

## (undated) DEVICE — ZIMMER® STERILE DISPOSABLE TOURNIQUET CUFF WITH PLC, SINGLE PORT, SINGLE BLADDER, 12 IN. (30 CM)

## (undated) DEVICE — GLOVE ORANGE PI 7 1/2   MSG9075